# Patient Record
Sex: MALE | Race: WHITE | Employment: OTHER | URBAN - NONMETROPOLITAN AREA
[De-identification: names, ages, dates, MRNs, and addresses within clinical notes are randomized per-mention and may not be internally consistent; named-entity substitution may affect disease eponyms.]

---

## 2017-01-17 ENCOUNTER — OFFICE VISIT (OUTPATIENT)
Dept: ENDOCRINOLOGY | Age: 67
End: 2017-01-17

## 2017-01-17 VITALS
RESPIRATION RATE: 18 BRPM | HEART RATE: 74 BPM | DIASTOLIC BLOOD PRESSURE: 65 MMHG | BODY MASS INDEX: 30.94 KG/M2 | WEIGHT: 221 LBS | HEIGHT: 71 IN | SYSTOLIC BLOOD PRESSURE: 131 MMHG

## 2017-01-17 DIAGNOSIS — E11.65 TYPE 2 DIABETES MELLITUS WITH HYPERGLYCEMIA, WITHOUT LONG-TERM CURRENT USE OF INSULIN (HCC): Primary | ICD-10-CM

## 2017-01-17 PROCEDURE — 4040F PNEUMOC VAC/ADMIN/RCVD: CPT | Performed by: CLINICAL NURSE SPECIALIST

## 2017-01-17 PROCEDURE — 99214 OFFICE O/P EST MOD 30 MIN: CPT | Performed by: CLINICAL NURSE SPECIALIST

## 2017-01-17 PROCEDURE — 1123F ACP DISCUSS/DSCN MKR DOCD: CPT | Performed by: CLINICAL NURSE SPECIALIST

## 2017-01-17 PROCEDURE — 3045F PR MOST RECENT HEMOGLOBIN A1C LEVEL 7.0-9.0%: CPT | Performed by: CLINICAL NURSE SPECIALIST

## 2017-01-17 PROCEDURE — 1036F TOBACCO NON-USER: CPT | Performed by: CLINICAL NURSE SPECIALIST

## 2017-01-17 PROCEDURE — G8427 DOCREV CUR MEDS BY ELIG CLIN: HCPCS | Performed by: CLINICAL NURSE SPECIALIST

## 2017-01-17 PROCEDURE — 3017F COLORECTAL CA SCREEN DOC REV: CPT | Performed by: CLINICAL NURSE SPECIALIST

## 2017-01-17 PROCEDURE — G8484 FLU IMMUNIZE NO ADMIN: HCPCS | Performed by: CLINICAL NURSE SPECIALIST

## 2017-01-17 PROCEDURE — G8419 CALC BMI OUT NRM PARAM NOF/U: HCPCS | Performed by: CLINICAL NURSE SPECIALIST

## 2017-01-17 RX ORDER — METFORMIN HYDROCHLORIDE 500 MG/1
500 TABLET, EXTENDED RELEASE ORAL
Qty: 180 TABLET | Refills: 3 | Status: SHIPPED | OUTPATIENT
Start: 2017-01-17 | End: 2018-10-02 | Stop reason: DRUGHIGH

## 2017-01-17 ASSESSMENT — ENCOUNTER SYMPTOMS
ABDOMINAL PAIN: 0
SHORTNESS OF BREATH: 0
VOICE CHANGE: 0
COUGH: 0
WHEEZING: 0
NAUSEA: 0
EYE REDNESS: 0
CHEST TIGHTNESS: 0
CONSTIPATION: 0
DIARRHEA: 0

## 2017-01-19 ENCOUNTER — TELEPHONE (OUTPATIENT)
Dept: ENDOCRINOLOGY | Age: 67
End: 2017-01-19

## 2017-01-24 ENCOUNTER — OFFICE VISIT (OUTPATIENT)
Dept: FAMILY MEDICINE CLINIC | Age: 67
End: 2017-01-24

## 2017-01-24 VITALS
HEART RATE: 68 BPM | RESPIRATION RATE: 17 BRPM | DIASTOLIC BLOOD PRESSURE: 60 MMHG | WEIGHT: 219.6 LBS | SYSTOLIC BLOOD PRESSURE: 100 MMHG | BODY MASS INDEX: 30.74 KG/M2

## 2017-01-24 DIAGNOSIS — N40.1 BPH WITH OBSTRUCTION/LOWER URINARY TRACT SYMPTOMS: ICD-10-CM

## 2017-01-24 DIAGNOSIS — I48.0 PAROXYSMAL ATRIAL FIBRILLATION (HCC): ICD-10-CM

## 2017-01-24 DIAGNOSIS — F43.10 POST TRAUMATIC STRESS DISORDER: ICD-10-CM

## 2017-01-24 DIAGNOSIS — S61.452A CAT BITE OF HAND, LEFT, INITIAL ENCOUNTER: ICD-10-CM

## 2017-01-24 DIAGNOSIS — I10 ESSENTIAL HYPERTENSION: ICD-10-CM

## 2017-01-24 DIAGNOSIS — Z12.11 COLON CANCER SCREENING: ICD-10-CM

## 2017-01-24 DIAGNOSIS — E78.5 HYPERLIPIDEMIA WITH TARGET LDL LESS THAN 100: ICD-10-CM

## 2017-01-24 DIAGNOSIS — S96.911D RIGHT FOOT STRAIN, SUBSEQUENT ENCOUNTER: ICD-10-CM

## 2017-01-24 DIAGNOSIS — Z23 NEED FOR PNEUMOCOCCAL VACCINATION: ICD-10-CM

## 2017-01-24 DIAGNOSIS — K21.9 GASTROESOPHAGEAL REFLUX DISEASE WITHOUT ESOPHAGITIS: ICD-10-CM

## 2017-01-24 DIAGNOSIS — Z99.89 OBSTRUCTIVE SLEEP APNEA ON CPAP: ICD-10-CM

## 2017-01-24 DIAGNOSIS — G47.33 OBSTRUCTIVE SLEEP APNEA ON CPAP: ICD-10-CM

## 2017-01-24 DIAGNOSIS — D51.0 PERNICIOUS ANEMIA: Primary | ICD-10-CM

## 2017-01-24 DIAGNOSIS — N13.8 BPH WITH OBSTRUCTION/LOWER URINARY TRACT SYMPTOMS: ICD-10-CM
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
## 2017-01-24 DIAGNOSIS — E87.1 HYPONATREMIA: ICD-10-CM

## 2017-01-24 PROCEDURE — 3045F PR MOST RECENT HEMOGLOBIN A1C LEVEL 7.0-9.0%: CPT | Performed by: FAMILY MEDICINE

## 2017-01-24 PROCEDURE — 96372 THER/PROPH/DIAG INJ SC/IM: CPT | Performed by: FAMILY MEDICINE

## 2017-01-24 PROCEDURE — G0009 ADMIN PNEUMOCOCCAL VACCINE: HCPCS | Performed by: FAMILY MEDICINE

## 2017-01-24 PROCEDURE — G8419 CALC BMI OUT NRM PARAM NOF/U: HCPCS | Performed by: FAMILY MEDICINE

## 2017-01-24 PROCEDURE — 90732 PPSV23 VACC 2 YRS+ SUBQ/IM: CPT | Performed by: FAMILY MEDICINE

## 2017-01-24 PROCEDURE — G8484 FLU IMMUNIZE NO ADMIN: HCPCS | Performed by: FAMILY MEDICINE

## 2017-01-24 PROCEDURE — 4040F PNEUMOC VAC/ADMIN/RCVD: CPT | Performed by: FAMILY MEDICINE

## 2017-01-24 PROCEDURE — 99215 OFFICE O/P EST HI 40 MIN: CPT | Performed by: FAMILY MEDICINE

## 2017-01-24 PROCEDURE — G8427 DOCREV CUR MEDS BY ELIG CLIN: HCPCS | Performed by: FAMILY MEDICINE

## 2017-01-24 PROCEDURE — 3017F COLORECTAL CA SCREEN DOC REV: CPT | Performed by: FAMILY MEDICINE

## 2017-01-24 PROCEDURE — 1123F ACP DISCUSS/DSCN MKR DOCD: CPT | Performed by: FAMILY MEDICINE

## 2017-01-24 PROCEDURE — 1036F TOBACCO NON-USER: CPT | Performed by: FAMILY MEDICINE

## 2017-01-24 RX ORDER — AMOXICILLIN AND CLAVULANATE POTASSIUM 875; 125 MG/1; MG/1
1 TABLET, FILM COATED ORAL 2 TIMES DAILY
Qty: 20 TABLET | Refills: 0 | Status: SHIPPED | OUTPATIENT
Start: 2017-01-24 | End: 2017-02-02

## 2017-01-24 RX ORDER — CYANOCOBALAMIN 1000 UG/ML
1000 INJECTION INTRAMUSCULAR; SUBCUTANEOUS ONCE
Status: COMPLETED | OUTPATIENT
Start: 2017-01-24 | End: 2017-01-24

## 2017-01-24 RX ADMIN — CYANOCOBALAMIN 1000 MCG: 1000 INJECTION INTRAMUSCULAR; SUBCUTANEOUS at 17:05

## 2017-01-25 ENCOUNTER — TELEPHONE (OUTPATIENT)
Dept: ENDOCRINOLOGY | Age: 67
End: 2017-01-25

## 2017-02-15 ENCOUNTER — OFFICE VISIT (OUTPATIENT)
Dept: ENDOCRINOLOGY | Age: 67
End: 2017-02-15

## 2017-02-15 VITALS
BODY MASS INDEX: 30.59 KG/M2 | HEART RATE: 68 BPM | RESPIRATION RATE: 18 BRPM | DIASTOLIC BLOOD PRESSURE: 61 MMHG | HEIGHT: 71 IN | SYSTOLIC BLOOD PRESSURE: 109 MMHG | WEIGHT: 218.5 LBS

## 2017-02-15 DIAGNOSIS — E11.65 TYPE 2 DIABETES MELLITUS WITH HYPERGLYCEMIA, WITHOUT LONG-TERM CURRENT USE OF INSULIN (HCC): Primary | ICD-10-CM

## 2017-02-15 PROCEDURE — 1123F ACP DISCUSS/DSCN MKR DOCD: CPT | Performed by: NURSE PRACTITIONER

## 2017-02-15 PROCEDURE — 4040F PNEUMOC VAC/ADMIN/RCVD: CPT | Performed by: NURSE PRACTITIONER

## 2017-02-15 PROCEDURE — G8484 FLU IMMUNIZE NO ADMIN: HCPCS | Performed by: NURSE PRACTITIONER

## 2017-02-15 PROCEDURE — G8417 CALC BMI ABV UP PARAM F/U: HCPCS | Performed by: NURSE PRACTITIONER

## 2017-02-15 PROCEDURE — G8427 DOCREV CUR MEDS BY ELIG CLIN: HCPCS | Performed by: NURSE PRACTITIONER

## 2017-02-15 PROCEDURE — 3045F PR MOST RECENT HEMOGLOBIN A1C LEVEL 7.0-9.0%: CPT | Performed by: NURSE PRACTITIONER

## 2017-02-15 PROCEDURE — 3017F COLORECTAL CA SCREEN DOC REV: CPT | Performed by: NURSE PRACTITIONER

## 2017-02-15 PROCEDURE — 99214 OFFICE O/P EST MOD 30 MIN: CPT | Performed by: NURSE PRACTITIONER

## 2017-02-15 PROCEDURE — 1036F TOBACCO NON-USER: CPT | Performed by: NURSE PRACTITIONER

## 2017-02-15 ASSESSMENT — ENCOUNTER SYMPTOMS
TROUBLE SWALLOWING: 0
VOICE CHANGE: 0
SHORTNESS OF BREATH: 0
ABDOMINAL PAIN: 0
NAUSEA: 0
VOMITING: 0

## 2017-02-16 ENCOUNTER — TELEPHONE (OUTPATIENT)
Dept: INTERNAL MEDICINE | Age: 67
End: 2017-02-16

## 2017-02-16 RX ORDER — GLIPIZIDE 5 MG/1
2.5 TABLET ORAL DAILY
Qty: 15 TABLET | Refills: 3 | Status: SHIPPED | OUTPATIENT
Start: 2017-02-16 | End: 2017-03-22 | Stop reason: ALTCHOICE

## 2017-02-21 ENCOUNTER — NURSE ONLY (OUTPATIENT)
Dept: FAMILY MEDICINE CLINIC | Age: 67
End: 2017-02-21

## 2017-02-21 DIAGNOSIS — D51.0 PERNICIOUS ANEMIA: Primary | ICD-10-CM

## 2017-02-21 PROCEDURE — 96372 THER/PROPH/DIAG INJ SC/IM: CPT | Performed by: FAMILY MEDICINE

## 2017-02-21 RX ORDER — CYANOCOBALAMIN 1000 UG/ML
1000 INJECTION INTRAMUSCULAR; SUBCUTANEOUS ONCE
Status: COMPLETED | OUTPATIENT
Start: 2017-02-21 | End: 2017-02-21

## 2017-02-21 RX ADMIN — CYANOCOBALAMIN 1000 MCG: 1000 INJECTION INTRAMUSCULAR; SUBCUTANEOUS at 11:26

## 2017-02-24 ENCOUNTER — OFFICE VISIT (OUTPATIENT)
Dept: FAMILY MEDICINE CLINIC | Age: 67
End: 2017-02-24

## 2017-02-24 VITALS
HEART RATE: 72 BPM | HEIGHT: 71 IN | DIASTOLIC BLOOD PRESSURE: 70 MMHG | SYSTOLIC BLOOD PRESSURE: 110 MMHG | WEIGHT: 217.8 LBS | BODY MASS INDEX: 30.49 KG/M2 | RESPIRATION RATE: 16 BRPM

## 2017-02-24 DIAGNOSIS — S20.211D RIB CONTUSION, RIGHT, SUBSEQUENT ENCOUNTER: Primary | ICD-10-CM

## 2017-02-24 PROCEDURE — G8484 FLU IMMUNIZE NO ADMIN: HCPCS | Performed by: FAMILY MEDICINE

## 2017-02-24 PROCEDURE — 4040F PNEUMOC VAC/ADMIN/RCVD: CPT | Performed by: FAMILY MEDICINE

## 2017-02-24 PROCEDURE — 3017F COLORECTAL CA SCREEN DOC REV: CPT | Performed by: FAMILY MEDICINE

## 2017-02-24 PROCEDURE — 1123F ACP DISCUSS/DSCN MKR DOCD: CPT | Performed by: FAMILY MEDICINE

## 2017-02-24 PROCEDURE — G8417 CALC BMI ABV UP PARAM F/U: HCPCS | Performed by: FAMILY MEDICINE

## 2017-02-24 PROCEDURE — G8427 DOCREV CUR MEDS BY ELIG CLIN: HCPCS | Performed by: FAMILY MEDICINE

## 2017-02-24 PROCEDURE — 99213 OFFICE O/P EST LOW 20 MIN: CPT | Performed by: FAMILY MEDICINE

## 2017-02-24 PROCEDURE — 1036F TOBACCO NON-USER: CPT | Performed by: FAMILY MEDICINE

## 2017-02-24 RX ORDER — CYCLOBENZAPRINE HCL 5 MG
5-10 TABLET ORAL 2 TIMES DAILY PRN
Qty: 90 TABLET | Refills: 1 | Status: SHIPPED | OUTPATIENT
Start: 2017-02-24 | End: 2017-03-06

## 2017-02-24 RX ORDER — CLOSTRIDIUM TETANI TOXOID ANTIGEN (FORMALDEHYDE INACTIVATED) AND CORYNEBACTERIUM DIPHTHERIAE TOXOID ANTIGEN (FORMALDEHYDE INACTIVATED) 5; 2 [LF]/.5ML; [LF]/.5ML
INJECTION, SUSPENSION INTRAMUSCULAR
COMMUNITY
Start: 2017-01-25 | End: 2018-10-02 | Stop reason: ALTCHOICE

## 2017-03-22 ENCOUNTER — OFFICE VISIT (OUTPATIENT)
Dept: CARDIOLOGY | Age: 67
End: 2017-03-22

## 2017-03-22 ENCOUNTER — NURSE ONLY (OUTPATIENT)
Dept: FAMILY MEDICINE CLINIC | Age: 67
End: 2017-03-22

## 2017-03-22 VITALS
BODY MASS INDEX: 30.8 KG/M2 | HEIGHT: 71 IN | SYSTOLIC BLOOD PRESSURE: 142 MMHG | WEIGHT: 220 LBS | DIASTOLIC BLOOD PRESSURE: 78 MMHG | HEART RATE: 62 BPM

## 2017-03-22 DIAGNOSIS — D51.0 PERNICIOUS ANEMIA: Primary | ICD-10-CM

## 2017-03-22 DIAGNOSIS — E78.5 HYPERLIPIDEMIA WITH TARGET LDL LESS THAN 100: ICD-10-CM

## 2017-03-22 DIAGNOSIS — Z98.890 S/P CARDIAC CATHETERIZATION: Primary | ICD-10-CM

## 2017-03-22 DIAGNOSIS — I10 ESSENTIAL HYPERTENSION: ICD-10-CM

## 2017-03-22 DIAGNOSIS — G47.33 OBSTRUCTIVE SLEEP APNEA ON CPAP: ICD-10-CM

## 2017-03-22 DIAGNOSIS — Z99.89 OBSTRUCTIVE SLEEP APNEA ON CPAP: ICD-10-CM

## 2017-03-22 DIAGNOSIS — Z95.0 PACEMAKER: ICD-10-CM

## 2017-03-22 DIAGNOSIS — R06.02 SOB (SHORTNESS OF BREATH): ICD-10-CM

## 2017-03-22 PROCEDURE — 99213 OFFICE O/P EST LOW 20 MIN: CPT | Performed by: INTERNAL MEDICINE

## 2017-03-22 PROCEDURE — 96372 THER/PROPH/DIAG INJ SC/IM: CPT | Performed by: FAMILY MEDICINE

## 2017-03-22 PROCEDURE — 1123F ACP DISCUSS/DSCN MKR DOCD: CPT | Performed by: INTERNAL MEDICINE

## 2017-03-22 PROCEDURE — G8484 FLU IMMUNIZE NO ADMIN: HCPCS | Performed by: INTERNAL MEDICINE

## 2017-03-22 PROCEDURE — G8427 DOCREV CUR MEDS BY ELIG CLIN: HCPCS | Performed by: INTERNAL MEDICINE

## 2017-03-22 PROCEDURE — G8417 CALC BMI ABV UP PARAM F/U: HCPCS | Performed by: INTERNAL MEDICINE

## 2017-03-22 PROCEDURE — 1036F TOBACCO NON-USER: CPT | Performed by: INTERNAL MEDICINE

## 2017-03-22 PROCEDURE — 3017F COLORECTAL CA SCREEN DOC REV: CPT | Performed by: INTERNAL MEDICINE

## 2017-03-22 PROCEDURE — 4040F PNEUMOC VAC/ADMIN/RCVD: CPT | Performed by: INTERNAL MEDICINE

## 2017-03-22 RX ORDER — CYANOCOBALAMIN 1000 UG/ML
1000 INJECTION INTRAMUSCULAR; SUBCUTANEOUS ONCE
Status: COMPLETED | OUTPATIENT
Start: 2017-03-22 | End: 2017-03-22

## 2017-03-22 RX ADMIN — CYANOCOBALAMIN 1000 MCG: 1000 INJECTION INTRAMUSCULAR; SUBCUTANEOUS at 10:42

## 2017-04-17 ENCOUNTER — OFFICE VISIT (OUTPATIENT)
Dept: PULMONOLOGY | Age: 67
End: 2017-04-17

## 2017-04-17 VITALS
HEART RATE: 64 BPM | DIASTOLIC BLOOD PRESSURE: 80 MMHG | HEIGHT: 71 IN | WEIGHT: 217.4 LBS | SYSTOLIC BLOOD PRESSURE: 138 MMHG | BODY MASS INDEX: 30.44 KG/M2 | OXYGEN SATURATION: 96 %

## 2017-04-17 DIAGNOSIS — R06.02 SHORTNESS OF BREATH: ICD-10-CM

## 2017-04-17 DIAGNOSIS — Z99.89 OBSTRUCTIVE SLEEP APNEA ON CPAP: Primary | ICD-10-CM

## 2017-04-17 DIAGNOSIS — G47.33 OBSTRUCTIVE SLEEP APNEA ON CPAP: Primary | ICD-10-CM

## 2017-04-17 PROCEDURE — 3017F COLORECTAL CA SCREEN DOC REV: CPT | Performed by: PHYSICIAN ASSISTANT

## 2017-04-17 PROCEDURE — G8417 CALC BMI ABV UP PARAM F/U: HCPCS | Performed by: PHYSICIAN ASSISTANT

## 2017-04-17 PROCEDURE — G8427 DOCREV CUR MEDS BY ELIG CLIN: HCPCS | Performed by: PHYSICIAN ASSISTANT

## 2017-04-17 PROCEDURE — 99213 OFFICE O/P EST LOW 20 MIN: CPT | Performed by: PHYSICIAN ASSISTANT

## 2017-04-17 PROCEDURE — 1123F ACP DISCUSS/DSCN MKR DOCD: CPT | Performed by: PHYSICIAN ASSISTANT

## 2017-04-17 PROCEDURE — 1036F TOBACCO NON-USER: CPT | Performed by: PHYSICIAN ASSISTANT

## 2017-04-17 PROCEDURE — 4040F PNEUMOC VAC/ADMIN/RCVD: CPT | Performed by: PHYSICIAN ASSISTANT

## 2017-04-18 ENCOUNTER — OFFICE VISIT (OUTPATIENT)
Dept: ENDOCRINOLOGY | Age: 67
End: 2017-04-18

## 2017-04-18 VITALS
DIASTOLIC BLOOD PRESSURE: 74 MMHG | HEART RATE: 67 BPM | RESPIRATION RATE: 20 BRPM | BODY MASS INDEX: 30.73 KG/M2 | WEIGHT: 219.5 LBS | SYSTOLIC BLOOD PRESSURE: 136 MMHG | HEIGHT: 71 IN

## 2017-04-18 DIAGNOSIS — I10 ESSENTIAL HYPERTENSION: ICD-10-CM

## 2017-04-18 DIAGNOSIS — E87.1 HYPONATREMIA: ICD-10-CM

## 2017-04-18 DIAGNOSIS — E78.5 HYPERLIPIDEMIA WITH TARGET LDL LESS THAN 100: ICD-10-CM

## 2017-04-18 PROCEDURE — G8427 DOCREV CUR MEDS BY ELIG CLIN: HCPCS | Performed by: INTERNAL MEDICINE

## 2017-04-18 PROCEDURE — 3017F COLORECTAL CA SCREEN DOC REV: CPT | Performed by: INTERNAL MEDICINE

## 2017-04-18 PROCEDURE — G8417 CALC BMI ABV UP PARAM F/U: HCPCS | Performed by: INTERNAL MEDICINE

## 2017-04-18 PROCEDURE — 99214 OFFICE O/P EST MOD 30 MIN: CPT | Performed by: INTERNAL MEDICINE

## 2017-04-18 PROCEDURE — 3045F PR MOST RECENT HEMOGLOBIN A1C LEVEL 7.0-9.0%: CPT | Performed by: INTERNAL MEDICINE

## 2017-04-18 PROCEDURE — 1036F TOBACCO NON-USER: CPT | Performed by: INTERNAL MEDICINE

## 2017-04-18 PROCEDURE — 4040F PNEUMOC VAC/ADMIN/RCVD: CPT | Performed by: INTERNAL MEDICINE

## 2017-04-18 PROCEDURE — 1123F ACP DISCUSS/DSCN MKR DOCD: CPT | Performed by: INTERNAL MEDICINE

## 2017-04-24 ENCOUNTER — NURSE ONLY (OUTPATIENT)
Dept: FAMILY MEDICINE CLINIC | Age: 67
End: 2017-04-24

## 2017-04-24 DIAGNOSIS — D64.9 ANEMIA, UNSPECIFIED TYPE: Primary | ICD-10-CM

## 2017-04-24 PROCEDURE — 96372 THER/PROPH/DIAG INJ SC/IM: CPT | Performed by: FAMILY MEDICINE

## 2017-04-24 RX ORDER — CYANOCOBALAMIN 1000 UG/ML
1000 INJECTION INTRAMUSCULAR; SUBCUTANEOUS ONCE
Status: COMPLETED | OUTPATIENT
Start: 2017-04-24 | End: 2017-04-24

## 2017-04-24 RX ADMIN — CYANOCOBALAMIN 1000 MCG: 1000 INJECTION INTRAMUSCULAR; SUBCUTANEOUS at 10:43

## 2017-05-12 ENCOUNTER — OFFICE VISIT (OUTPATIENT)
Dept: PULMONOLOGY | Age: 67
End: 2017-05-12

## 2017-05-12 VITALS
HEIGHT: 69 IN | DIASTOLIC BLOOD PRESSURE: 66 MMHG | OXYGEN SATURATION: 96 % | TEMPERATURE: 98.4 F | HEART RATE: 68 BPM | RESPIRATION RATE: 18 BRPM | BODY MASS INDEX: 32.44 KG/M2 | SYSTOLIC BLOOD PRESSURE: 115 MMHG | WEIGHT: 219 LBS

## 2017-05-12 DIAGNOSIS — G47.33 OSA ON CPAP: ICD-10-CM

## 2017-05-12 DIAGNOSIS — Z99.89 OSA ON CPAP: ICD-10-CM

## 2017-05-12 DIAGNOSIS — R06.02 SOB (SHORTNESS OF BREATH): ICD-10-CM

## 2017-05-12 DIAGNOSIS — J44.9 COPD, MILD (HCC): Primary | ICD-10-CM

## 2017-05-12 PROCEDURE — G8926 SPIRO NO PERF OR DOC: HCPCS | Performed by: INTERNAL MEDICINE

## 2017-05-12 PROCEDURE — G8427 DOCREV CUR MEDS BY ELIG CLIN: HCPCS | Performed by: INTERNAL MEDICINE

## 2017-05-12 PROCEDURE — G8417 CALC BMI ABV UP PARAM F/U: HCPCS | Performed by: INTERNAL MEDICINE

## 2017-05-12 PROCEDURE — 1036F TOBACCO NON-USER: CPT | Performed by: INTERNAL MEDICINE

## 2017-05-12 PROCEDURE — 99214 OFFICE O/P EST MOD 30 MIN: CPT | Performed by: INTERNAL MEDICINE

## 2017-05-12 PROCEDURE — 3017F COLORECTAL CA SCREEN DOC REV: CPT | Performed by: INTERNAL MEDICINE

## 2017-05-12 PROCEDURE — 4040F PNEUMOC VAC/ADMIN/RCVD: CPT | Performed by: INTERNAL MEDICINE

## 2017-05-12 PROCEDURE — 94620 6 MIN WALK TEST: CPT | Performed by: INTERNAL MEDICINE

## 2017-05-12 PROCEDURE — 1123F ACP DISCUSS/DSCN MKR DOCD: CPT | Performed by: INTERNAL MEDICINE

## 2017-05-12 PROCEDURE — 3023F SPIROM DOC REV: CPT | Performed by: INTERNAL MEDICINE

## 2017-05-12 RX ORDER — ALBUTEROL SULFATE 90 UG/1
2 AEROSOL, METERED RESPIRATORY (INHALATION) 4 TIMES DAILY
Qty: 1 INHALER | Refills: 11 | Status: SHIPPED | OUTPATIENT
Start: 2017-05-12 | End: 2018-05-29 | Stop reason: ALTCHOICE

## 2017-05-12 ASSESSMENT — ENCOUNTER SYMPTOMS
STRIDOR: 0
BLOOD IN STOOL: 0
CHEST TIGHTNESS: 0
WHEEZING: 1
PHOTOPHOBIA: 0
VOMITING: 0
ABDOMINAL DISTENTION: 0
SHORTNESS OF BREATH: 1
CHOKING: 0
SINUS PRESSURE: 0
FACIAL SWELLING: 0
BACK PAIN: 0
VOICE CHANGE: 0
COUGH: 0
RHINORRHEA: 0
ABDOMINAL PAIN: 0
APNEA: 0
CONSTIPATION: 0

## 2017-05-23 ENCOUNTER — TELEPHONE (OUTPATIENT)
Dept: ENDOCRINOLOGY | Age: 67
End: 2017-05-23

## 2017-05-23 DIAGNOSIS — I10 ESSENTIAL HYPERTENSION: ICD-10-CM

## 2017-05-24 ENCOUNTER — NURSE ONLY (OUTPATIENT)
Dept: FAMILY MEDICINE CLINIC | Age: 67
End: 2017-05-24

## 2017-05-24 DIAGNOSIS — D64.9 ANEMIA, UNSPECIFIED TYPE: Primary | ICD-10-CM

## 2017-05-24 PROCEDURE — 96372 THER/PROPH/DIAG INJ SC/IM: CPT | Performed by: FAMILY MEDICINE

## 2017-05-24 RX ORDER — CYANOCOBALAMIN 1000 UG/ML
1000 INJECTION INTRAMUSCULAR; SUBCUTANEOUS ONCE
Status: COMPLETED | OUTPATIENT
Start: 2017-05-24 | End: 2017-05-24

## 2017-05-24 RX ADMIN — CYANOCOBALAMIN 1000 MCG: 1000 INJECTION INTRAMUSCULAR; SUBCUTANEOUS at 11:31

## 2017-05-31 ENCOUNTER — TELEPHONE (OUTPATIENT)
Dept: CARDIOLOGY | Age: 67
End: 2017-05-31

## 2017-06-14 ENCOUNTER — PROCEDURE VISIT (OUTPATIENT)
Dept: CARDIOLOGY | Age: 67
End: 2017-06-14

## 2017-06-14 DIAGNOSIS — Z95.0 PACEMAKER: Primary | ICD-10-CM

## 2017-06-14 PROCEDURE — 93280 PM DEVICE PROGR EVAL DUAL: CPT | Performed by: INTERNAL MEDICINE

## 2017-06-23 ENCOUNTER — NURSE ONLY (OUTPATIENT)
Dept: FAMILY MEDICINE CLINIC | Age: 67
End: 2017-06-23

## 2017-06-23 DIAGNOSIS — D64.9 ANEMIA, UNSPECIFIED TYPE: Primary | ICD-10-CM

## 2017-06-23 PROCEDURE — 96372 THER/PROPH/DIAG INJ SC/IM: CPT | Performed by: FAMILY MEDICINE

## 2017-06-23 RX ORDER — CYANOCOBALAMIN 1000 UG/ML
1000 INJECTION INTRAMUSCULAR; SUBCUTANEOUS ONCE
Status: COMPLETED | OUTPATIENT
Start: 2017-06-23 | End: 2017-06-23

## 2017-06-23 RX ADMIN — CYANOCOBALAMIN 1000 MCG: 1000 INJECTION INTRAMUSCULAR; SUBCUTANEOUS at 11:07

## 2017-07-18 ENCOUNTER — OFFICE VISIT (OUTPATIENT)
Dept: ENDOCRINOLOGY | Age: 67
End: 2017-07-18
Payer: MEDICARE

## 2017-07-18 VITALS
HEART RATE: 70 BPM | BODY MASS INDEX: 33.4 KG/M2 | WEIGHT: 225.5 LBS | RESPIRATION RATE: 16 BRPM | DIASTOLIC BLOOD PRESSURE: 68 MMHG | SYSTOLIC BLOOD PRESSURE: 127 MMHG | HEIGHT: 69 IN

## 2017-07-18 DIAGNOSIS — E11.9 TYPE 2 DIABETES MELLITUS WITHOUT COMPLICATION, WITHOUT LONG-TERM CURRENT USE OF INSULIN (HCC): Primary | ICD-10-CM

## 2017-07-18 DIAGNOSIS — E87.1 HYPONATREMIA: ICD-10-CM

## 2017-07-18 PROCEDURE — 1123F ACP DISCUSS/DSCN MKR DOCD: CPT | Performed by: CLINICAL NURSE SPECIALIST

## 2017-07-18 PROCEDURE — 99214 OFFICE O/P EST MOD 30 MIN: CPT | Performed by: CLINICAL NURSE SPECIALIST

## 2017-07-18 PROCEDURE — 3017F COLORECTAL CA SCREEN DOC REV: CPT | Performed by: CLINICAL NURSE SPECIALIST

## 2017-07-18 PROCEDURE — 1036F TOBACCO NON-USER: CPT | Performed by: CLINICAL NURSE SPECIALIST

## 2017-07-18 PROCEDURE — 4040F PNEUMOC VAC/ADMIN/RCVD: CPT | Performed by: CLINICAL NURSE SPECIALIST

## 2017-07-18 PROCEDURE — 3046F HEMOGLOBIN A1C LEVEL >9.0%: CPT | Performed by: CLINICAL NURSE SPECIALIST

## 2017-07-18 PROCEDURE — G8417 CALC BMI ABV UP PARAM F/U: HCPCS | Performed by: CLINICAL NURSE SPECIALIST

## 2017-07-18 PROCEDURE — G8427 DOCREV CUR MEDS BY ELIG CLIN: HCPCS | Performed by: CLINICAL NURSE SPECIALIST

## 2017-07-18 ASSESSMENT — ENCOUNTER SYMPTOMS
ABDOMINAL PAIN: 0
WHEEZING: 0
CONSTIPATION: 0
SHORTNESS OF BREATH: 0
NAUSEA: 0
VOICE CHANGE: 0
CHEST TIGHTNESS: 0
DIARRHEA: 0
EYE REDNESS: 0
COUGH: 0

## 2017-07-21 ENCOUNTER — TELEPHONE (OUTPATIENT)
Dept: ENDOCRINOLOGY | Age: 67
End: 2017-07-21

## 2017-07-25 ENCOUNTER — OFFICE VISIT (OUTPATIENT)
Dept: FAMILY MEDICINE CLINIC | Age: 67
End: 2017-07-25
Payer: MEDICARE

## 2017-07-25 VITALS
BODY MASS INDEX: 32.97 KG/M2 | HEIGHT: 69 IN | SYSTOLIC BLOOD PRESSURE: 120 MMHG | RESPIRATION RATE: 16 BRPM | WEIGHT: 222.6 LBS | HEART RATE: 62 BPM | DIASTOLIC BLOOD PRESSURE: 70 MMHG | OXYGEN SATURATION: 95 %

## 2017-07-25 DIAGNOSIS — G62.9 PERIPHERAL POLYNEUROPATHY: ICD-10-CM

## 2017-07-25 DIAGNOSIS — E78.5 HYPERLIPIDEMIA WITH TARGET LDL LESS THAN 100: ICD-10-CM

## 2017-07-25 DIAGNOSIS — G47.33 OBSTRUCTIVE SLEEP APNEA ON CPAP: ICD-10-CM

## 2017-07-25 DIAGNOSIS — N13.8 BPH WITH OBSTRUCTION/LOWER URINARY TRACT SYMPTOMS: ICD-10-CM

## 2017-07-25 DIAGNOSIS — F43.10 POST TRAUMATIC STRESS DISORDER: ICD-10-CM

## 2017-07-25 DIAGNOSIS — Z99.89 OBSTRUCTIVE SLEEP APNEA ON CPAP: ICD-10-CM

## 2017-07-25 DIAGNOSIS — I10 ESSENTIAL HYPERTENSION: ICD-10-CM

## 2017-07-25 DIAGNOSIS — R97.20 ELEVATED PSA: ICD-10-CM

## 2017-07-25 DIAGNOSIS — E53.8 B12 DEFICIENCY: ICD-10-CM

## 2017-07-25 DIAGNOSIS — N40.1 BPH WITH OBSTRUCTION/LOWER URINARY TRACT SYMPTOMS: ICD-10-CM

## 2017-07-25 PROCEDURE — 99214 OFFICE O/P EST MOD 30 MIN: CPT | Performed by: FAMILY MEDICINE

## 2017-07-25 PROCEDURE — G8427 DOCREV CUR MEDS BY ELIG CLIN: HCPCS | Performed by: FAMILY MEDICINE

## 2017-07-25 PROCEDURE — 3046F HEMOGLOBIN A1C LEVEL >9.0%: CPT | Performed by: FAMILY MEDICINE

## 2017-07-25 PROCEDURE — G8417 CALC BMI ABV UP PARAM F/U: HCPCS | Performed by: FAMILY MEDICINE

## 2017-07-25 PROCEDURE — 3017F COLORECTAL CA SCREEN DOC REV: CPT | Performed by: FAMILY MEDICINE

## 2017-07-25 PROCEDURE — 1123F ACP DISCUSS/DSCN MKR DOCD: CPT | Performed by: FAMILY MEDICINE

## 2017-07-25 PROCEDURE — 4040F PNEUMOC VAC/ADMIN/RCVD: CPT | Performed by: FAMILY MEDICINE

## 2017-07-25 PROCEDURE — 1036F TOBACCO NON-USER: CPT | Performed by: FAMILY MEDICINE

## 2017-07-25 PROCEDURE — 96372 THER/PROPH/DIAG INJ SC/IM: CPT | Performed by: FAMILY MEDICINE

## 2017-07-25 RX ORDER — CYANOCOBALAMIN 1000 UG/ML
1000 INJECTION INTRAMUSCULAR; SUBCUTANEOUS ONCE
Status: COMPLETED | OUTPATIENT
Start: 2017-07-25 | End: 2017-07-25

## 2017-07-25 RX ADMIN — CYANOCOBALAMIN 1000 MCG: 1000 INJECTION INTRAMUSCULAR; SUBCUTANEOUS at 12:26

## 2017-07-25 ASSESSMENT — PATIENT HEALTH QUESTIONNAIRE - PHQ9
2. FEELING DOWN, DEPRESSED OR HOPELESS: 0
1. LITTLE INTEREST OR PLEASURE IN DOING THINGS: 0
SUM OF ALL RESPONSES TO PHQ9 QUESTIONS 1 & 2: 0
SUM OF ALL RESPONSES TO PHQ QUESTIONS 1-9: 0

## 2017-08-22 ENCOUNTER — CARE COORDINATION (OUTPATIENT)
Dept: CARE COORDINATION | Age: 67
End: 2017-08-22

## 2017-08-25 ENCOUNTER — NURSE ONLY (OUTPATIENT)
Dept: FAMILY MEDICINE CLINIC | Age: 67
End: 2017-08-25
Payer: MEDICARE

## 2017-08-25 DIAGNOSIS — D64.9 ANEMIA, UNSPECIFIED TYPE: Primary | ICD-10-CM

## 2017-08-25 PROCEDURE — 96372 THER/PROPH/DIAG INJ SC/IM: CPT | Performed by: FAMILY MEDICINE

## 2017-08-25 RX ORDER — CYANOCOBALAMIN 1000 UG/ML
1000 INJECTION INTRAMUSCULAR; SUBCUTANEOUS ONCE
Status: COMPLETED | OUTPATIENT
Start: 2017-08-25 | End: 2017-08-25

## 2017-08-25 RX ADMIN — CYANOCOBALAMIN 1000 MCG: 1000 INJECTION INTRAMUSCULAR; SUBCUTANEOUS at 11:08

## 2017-09-06 ENCOUNTER — CARE COORDINATION (OUTPATIENT)
Dept: CARE COORDINATION | Age: 67
End: 2017-09-06

## 2017-09-25 ENCOUNTER — NURSE ONLY (OUTPATIENT)
Dept: FAMILY MEDICINE CLINIC | Age: 67
End: 2017-09-25
Payer: MEDICARE

## 2017-09-25 DIAGNOSIS — D64.9 ANEMIA, UNSPECIFIED TYPE: Primary | ICD-10-CM

## 2017-09-25 PROCEDURE — 96372 THER/PROPH/DIAG INJ SC/IM: CPT | Performed by: FAMILY MEDICINE

## 2017-09-25 RX ORDER — CYANOCOBALAMIN 1000 UG/ML
1000 INJECTION INTRAMUSCULAR; SUBCUTANEOUS ONCE
Status: COMPLETED | OUTPATIENT
Start: 2017-09-25 | End: 2017-09-25

## 2017-09-25 RX ADMIN — CYANOCOBALAMIN 1000 MCG: 1000 INJECTION INTRAMUSCULAR; SUBCUTANEOUS at 12:57

## 2017-09-26 ENCOUNTER — CARE COORDINATION (OUTPATIENT)
Dept: CARE COORDINATION | Age: 67
End: 2017-09-26

## 2017-09-28 ASSESSMENT — ENCOUNTER SYMPTOMS: DYSPNEA ASSOCIATED WITH: EXERTION

## 2017-10-11 ENCOUNTER — HOSPITAL ENCOUNTER (OUTPATIENT)
Age: 67
Discharge: HOME | End: 2017-10-11
Payer: MEDICARE

## 2017-10-11 DIAGNOSIS — E87.1 HYPONATREMIA: ICD-10-CM

## 2017-10-11 DIAGNOSIS — R97.20 ELEVATED PSA: ICD-10-CM

## 2017-10-11 DIAGNOSIS — E11.9 TYPE 2 DIABETES MELLITUS WITHOUT COMPLICATION, WITHOUT LONG-TERM CURRENT USE OF INSULIN (HCC): ICD-10-CM

## 2017-10-11 DIAGNOSIS — I10 ESSENTIAL HYPERTENSION: ICD-10-CM

## 2017-10-11 LAB
ANION GAP SERPL CALCULATED.3IONS-SCNC: 13 MEQ/L (ref 8–16)
BUN BLDV-MCNC: 11 MG/DL (ref 7–22)
CALCIUM SERPL-MCNC: 9.3 MG/DL (ref 8.5–10.5)
CHLORIDE BLD-SCNC: 92 MEQ/L (ref 98–111)
CO2: 28 MEQ/L (ref 23–33)
CREAT SERPL-MCNC: 0.9 MG/DL (ref 0.4–1.2)
GFR SERPL CREATININE-BSD FRML MDRD: 84 ML/MIN/1.73M2
GLUCOSE BLD-MCNC: 263 MG/DL (ref 70–108)
HCT VFR BLD CALC: 40.8 % (ref 42–52)
HEMOGLOBIN: 13.9 GM/DL (ref 14–18)
MCH RBC QN AUTO: 30 PG (ref 27–31)
MCHC RBC AUTO-ENTMCNC: 34 GM/DL (ref 33–37)
MCV RBC AUTO: 88.3 FL (ref 80–94)
PDW BLD-RTO: 13.7 % (ref 11.5–14.5)
PLATELET # BLD: 208 THOU/MM3 (ref 130–400)
PMV BLD AUTO: 7.9 MCM (ref 7.4–10.4)
POTASSIUM SERPL-SCNC: 4.5 MEQ/L (ref 3.5–5.2)
RBC # BLD: 4.62 MILL/MM3 (ref 4.7–6.1)
SODIUM BLD-SCNC: 133 MEQ/L (ref 135–145)
WBC # BLD: 5.1 THOU/MM3 (ref 4.8–10.8)

## 2017-10-11 PROCEDURE — 84154 ASSAY OF PSA FREE: CPT

## 2017-10-11 PROCEDURE — 85027 COMPLETE CBC AUTOMATED: CPT

## 2017-10-11 PROCEDURE — 84153 ASSAY OF PSA TOTAL: CPT

## 2017-10-11 PROCEDURE — 80048 BASIC METABOLIC PNL TOTAL CA: CPT

## 2017-10-11 PROCEDURE — 83036 HEMOGLOBIN GLYCOSYLATED A1C: CPT

## 2017-10-11 PROCEDURE — 36415 COLL VENOUS BLD VENIPUNCTURE: CPT

## 2017-10-12 LAB
AVERAGE GLUCOSE: 168 MG/DL (ref 70–126)
HBA1C MFR BLD: 7.6 % (ref 4.4–6.4)

## 2017-10-14 LAB — PROSTATE SPECIFIC ANTIGEN FREE: NORMAL

## 2017-10-16 ENCOUNTER — CARE COORDINATION (OUTPATIENT)
Dept: CARE COORDINATION | Age: 67
End: 2017-10-16

## 2017-10-17 ENCOUNTER — OFFICE VISIT (OUTPATIENT)
Dept: UROLOGY | Age: 67
End: 2017-10-17
Payer: MEDICARE

## 2017-10-17 VITALS — HEIGHT: 69 IN | WEIGHT: 225 LBS | BODY MASS INDEX: 33.33 KG/M2

## 2017-10-17 DIAGNOSIS — N40.1 BPH WITH OBSTRUCTION/LOWER URINARY TRACT SYMPTOMS: Primary | ICD-10-CM

## 2017-10-17 DIAGNOSIS — N13.8 BPH WITH OBSTRUCTION/LOWER URINARY TRACT SYMPTOMS: Primary | ICD-10-CM

## 2017-10-17 LAB
BILIRUBIN, POC: NORMAL
BLOOD URINE, POC: NORMAL
CLARITY, POC: CLEAR
COLOR, POC: YELLOW
GLUCOSE URINE, POC: NORMAL
KETONES, POC: NORMAL
LEUKOCYTE EST, POC: NORMAL
NITRITE, POC: NORMAL
PH, POC: NORMAL
POST VOID RESIDUAL (PVR): 0 ML
PROTEIN, POC: NORMAL
SPECIFIC GRAVITY, POC: NORMAL
UROBILINOGEN, POC: NORMAL

## 2017-10-17 PROCEDURE — G8482 FLU IMMUNIZE ORDER/ADMIN: HCPCS | Performed by: NURSE PRACTITIONER

## 2017-10-17 PROCEDURE — 1123F ACP DISCUSS/DSCN MKR DOCD: CPT | Performed by: NURSE PRACTITIONER

## 2017-10-17 PROCEDURE — 4040F PNEUMOC VAC/ADMIN/RCVD: CPT | Performed by: NURSE PRACTITIONER

## 2017-10-17 PROCEDURE — 99213 OFFICE O/P EST LOW 20 MIN: CPT | Performed by: NURSE PRACTITIONER

## 2017-10-17 PROCEDURE — G8427 DOCREV CUR MEDS BY ELIG CLIN: HCPCS | Performed by: NURSE PRACTITIONER

## 2017-10-17 PROCEDURE — 1036F TOBACCO NON-USER: CPT | Performed by: NURSE PRACTITIONER

## 2017-10-17 PROCEDURE — G8417 CALC BMI ABV UP PARAM F/U: HCPCS | Performed by: NURSE PRACTITIONER

## 2017-10-17 PROCEDURE — 51798 US URINE CAPACITY MEASURE: CPT | Performed by: NURSE PRACTITIONER

## 2017-10-17 PROCEDURE — 3017F COLORECTAL CA SCREEN DOC REV: CPT | Performed by: NURSE PRACTITIONER

## 2017-10-17 PROCEDURE — 81002 URINALYSIS NONAUTO W/O SCOPE: CPT | Performed by: NURSE PRACTITIONER

## 2017-10-17 NOTE — PROGRESS NOTES
Take 1 capsule by mouth 2 times daily      PARoxetine (PAXIL) 20 MG tablet   Take 40 mg by mouth daily       BUSPIRONE HCL PO Take 20 mg by mouth 3 times daily Take 1 tablet by mouth 3 times daily Disp 270 tablets  R-3      Omega-3 Fatty Acids (FISH OIL) 1000 MG CAPS Take 1,000 mg by mouth 2 times daily.  fluticasone (FLONASE) 50 MCG/ACT nasal spray USE 2 SPRAYS NASALLY DAILY 3 Bottle 3    cyanocobalamin 1000 MCG/ML injection Inject 1 mL into the muscle every 30 days. 1 vial 2    aspirin 81 MG EC tablet Take 81 mg by mouth daily.  vitamin D (CHOLECALCIFEROL) 1000 UNIT TABS tablet Take 1,000 Int'l Units by mouth 2 times daily        No current facility-administered medications on file prior to visit. Allergies   Allergen Reactions    Latex Rash     itching    Doxycycline Calcium [Doxycycline Calcium]     Lactose Diarrhea    Nasacort [Triamcinolone] Other (See Comments)     Unable to remember    Wellbutrin [Bupropion] Other (See Comments)     Does not remember       Social History   Substance Use Topics    Smoking status: Former Smoker     Quit date: 11/14/1971    Smokeless tobacco: Former User    Alcohol use No       Family History   Problem Relation Age of Onset    High Cholesterol Father     High Blood Pressure Father     Heart Disease Father     Stroke Father     Diabetes Mother     Heart Disease Mother     High Blood Pressure Mother     High Cholesterol Mother     Kidney Disease Mother     Cancer Paternal Grandfather        Review of Systems  ROS  No problems with ears, nose or throat. No problems with eyes. No chest pain, shortness of breath, abdominal pain, extremity pain or weakness, and no neurological deficits. No rashes. No swollen glands or lymph nodes.  symptoms per HPI. The remainder of the review of symptoms is negative.     Exam  Vitals:    10/17/17 0930   Weight: 225 lb (102.1 kg)   Height: 5' 8.9\" (1.75 m)     Nursing note and vitals

## 2017-10-18 ENCOUNTER — OFFICE VISIT (OUTPATIENT)
Dept: ENDOCRINOLOGY | Age: 67
End: 2017-10-18
Payer: MEDICARE

## 2017-10-18 VITALS
BODY MASS INDEX: 33.77 KG/M2 | RESPIRATION RATE: 16 BRPM | DIASTOLIC BLOOD PRESSURE: 74 MMHG | HEIGHT: 69 IN | SYSTOLIC BLOOD PRESSURE: 124 MMHG | HEART RATE: 70 BPM | WEIGHT: 228 LBS

## 2017-10-18 DIAGNOSIS — E78.5 HYPERLIPIDEMIA WITH TARGET LDL LESS THAN 100: ICD-10-CM

## 2017-10-18 DIAGNOSIS — I10 ESSENTIAL HYPERTENSION: ICD-10-CM

## 2017-10-18 DIAGNOSIS — E87.1 HYPONATREMIA: ICD-10-CM

## 2017-10-18 DIAGNOSIS — E11.65 UNCONTROLLED TYPE 2 DIABETES MELLITUS WITH HYPERGLYCEMIA, WITHOUT LONG-TERM CURRENT USE OF INSULIN (HCC): Primary | ICD-10-CM

## 2017-10-18 PROCEDURE — G8427 DOCREV CUR MEDS BY ELIG CLIN: HCPCS | Performed by: INTERNAL MEDICINE

## 2017-10-18 PROCEDURE — 4040F PNEUMOC VAC/ADMIN/RCVD: CPT | Performed by: INTERNAL MEDICINE

## 2017-10-18 PROCEDURE — G8417 CALC BMI ABV UP PARAM F/U: HCPCS | Performed by: INTERNAL MEDICINE

## 2017-10-18 PROCEDURE — 1036F TOBACCO NON-USER: CPT | Performed by: INTERNAL MEDICINE

## 2017-10-18 PROCEDURE — 99214 OFFICE O/P EST MOD 30 MIN: CPT | Performed by: INTERNAL MEDICINE

## 2017-10-18 PROCEDURE — G8482 FLU IMMUNIZE ORDER/ADMIN: HCPCS | Performed by: INTERNAL MEDICINE

## 2017-10-18 PROCEDURE — 3046F HEMOGLOBIN A1C LEVEL >9.0%: CPT | Performed by: INTERNAL MEDICINE

## 2017-10-18 PROCEDURE — 3017F COLORECTAL CA SCREEN DOC REV: CPT | Performed by: INTERNAL MEDICINE

## 2017-10-18 PROCEDURE — 1123F ACP DISCUSS/DSCN MKR DOCD: CPT | Performed by: INTERNAL MEDICINE

## 2017-10-18 NOTE — LETTER
 Bronchitis, chronic (HCC)     Carotid artery stenosis     Dr Anita Connell    Chickenpox     CVA (cerebral infarction)     GERD (gastroesophageal reflux disease)     Hemorrhoids     Hyperlipidemia     Hypertension     Nausea & vomiting     [facility]    Osteoarthritis     Peripheral neuropathy (HCC)     Post traumatic stress disorder (PTSD)     [facility]    S/P cardiac catheterization: 11/20/2013: No obstructive lesions. Moderate LI's in the mid LAD and in the RCA. 11/20/2013 11/20/2013: No obstructive lesions. Moderate LI's in the mid LAD and in the RCA.  Dr. Main Borne Tremor of both hands     [facility]    Type II or unspecified type diabetes mellitus without mention of complication, not stated as uncontrolled     Dr Lin garcía    Unspecified sleep apnea     Dr Rolley Sacks      Past Surgical History:   Procedure Laterality Date    APPENDECTOMY  age 14    BLEPHAROPLASTY Bilateral 05/2017    CARDIAC CATHETERIZATION  11/2013    no stents    CARPAL TUNNEL RELEASE Right 01/2017    COLONOSCOPY  2005    Dr. Jeff Banuelos      right eye clog oil duct    EYE SURGERY Bilateral 01/2017    HAND SURGERY Right 01/19/2017    OIO - carpal tunnel    MANDIBLE SURGERY N/A 05/2017    jaw surgery lower front of mouth    PACEMAKER INSERTION  2000/2010    TONSILLECTOMY AND ADENOIDECTOMY  as a child        Family History   Problem Relation Age of Onset    High Cholesterol Father     High Blood Pressure Father     Heart Disease Father     Stroke Father     Diabetes Mother     Heart Disease Mother     High Blood Pressure Mother     High Cholesterol Mother     Kidney Disease Mother     Cancer Paternal Grandfather      Social History   Substance Use Topics    Smoking status: Former Smoker     Quit date: 11/14/1971    Smokeless tobacco: Former User    Alcohol use No      Current Outpatient Prescriptions   Medication Sig Dispense Refill  Liraglutide (VICTOZA) 18 MG/3ML SOPN SC injection 0.6 mg daily for 1 week. Then increase to 1.2 mg daily 1 Pen 5    glucose blood VI test strips (ACCU-CHEK ERVIN) strip 1 each by In Vitro route daily As needed.  100 each 1    saxagliptin (ONGLYZA) 5 MG TABS tablet Take 1 tablet by mouth daily 90 tablet 1    albuterol sulfate HFA (VENTOLIN HFA) 108 (90 BASE) MCG/ACT inhaler Inhale 2 puffs into the lungs 4 times daily 1 Inhaler 11    TENIVAC 5-2 LFU injection       metFORMIN (GLUCOPHAGE XR) 500 MG extended release tablet Take 1 tablet by mouth 2 times daily (before meals) 180 tablet 3    FLUZONE HIGH-DOSE 0.5 ML POOL injection inject 0.5 milliliter intramuscularly  0    tamsulosin (FLOMAX) 0.4 MG capsule Take 1 capsule by mouth nightly 90 capsule 3    traMADol (ULTRAM) 50 MG tablet Take 2 tablets by mouth 2 times daily Short time increase to 100 mg BID for 2 weeks then back to 50 mg  tablet 3    loratadine (CLARITIN) 10 MG tablet Take 10 mg by mouth daily      hydrOXYzine (VISTARIL) 25 MG capsule Take 25 mg by mouth three times daily       olodaterol (STRIVERDI RESPIMAT) 2.5 MCG/ACT inhaler Inhale 2 puffs into the lungs daily      rivaroxaban (XARELTO) 20 MG TABS tablet Take 20 mg by mouth daily (with breakfast)      gabapentin (NEURONTIN) 300 MG capsule Take 300 mg by mouth 3 times daily      prazosin (MINIPRESS) 2 MG capsule Take 2 mg by mouth nightly      amLODIPine (NORVASC) 5 MG tablet TAKE 1 TABLET BY MOUTH DAILY (Patient taking differently: Take 5 mg by mouth daily TAKE 1 TABLET BY MOUTH DAILY) 90 tablet 3    atorvastatin (LIPITOR) 20 MG tablet take 1 tablet by mouth once daily 90 tablet 3    omeprazole (PRILOSEC) 40 MG capsule Take 1 capsule by mouth daily (Patient taking differently: Take 20 mg by mouth 2 times daily ) 90 capsule 3    propranolol (INDERAL LA) 120 MG CR capsule Take 120 mg by mouth 2 times daily  montelukast (SINGULAIR) 10 MG tablet take 1 tablet by mouth once daily 90 tablet 3    b complex vitamins capsule Take 1 capsule by mouth 2 times daily      PARoxetine (PAXIL) 20 MG tablet   Take 40 mg by mouth daily       BUSPIRONE HCL PO Take 20 mg by mouth 3 times daily Take 1 tablet by mouth 3 times daily Disp 270 tablets  R-3      Omega-3 Fatty Acids (FISH OIL) 1000 MG CAPS Take 1,000 mg by mouth 2 times daily.  fluticasone (FLONASE) 50 MCG/ACT nasal spray USE 2 SPRAYS NASALLY DAILY 3 Bottle 3    cyanocobalamin 1000 MCG/ML injection Inject 1 mL into the muscle every 30 days. 1 vial 2    aspirin 81 MG EC tablet Take 81 mg by mouth daily.  vitamin D (CHOLECALCIFEROL) 1000 UNIT TABS tablet Take 1,000 Int'l Units by mouth 2 times daily        No current facility-administered medications for this visit.       Allergies   Allergen Reactions    Latex Rash     itching    Doxycycline Calcium [Doxycycline Calcium]     Lactose Diarrhea    Nasacort [Triamcinolone] Other (See Comments)     Unable to remember    Wellbutrin [Bupropion] Other (See Comments)     Does not remember     Health Maintenance   Topic Date Due    AAA screen  1950    DTaP/Tdap/Td vaccine (1 - Tdap) 01/23/2013    Diabetic microalbuminuria test  10/10/2017    Diabetic retinal exam  03/14/2018    Lipid screen  04/13/2018    Diabetic foot exam  04/18/2018    Diabetic hemoglobin A1C test  10/11/2018    Colon cancer screen colonoscopy  03/17/2019    Zostavax vaccine  Completed    Flu vaccine  Completed    Pneumococcal low/med risk  Completed    Hepatitis C screen  Excluded       Labs  Lab Results   Component Value Date    LABA1C 7.6 (H) 10/11/2017     No results found for: EAG  Lab Results   Component Value Date    TSH 2.140 04/24/2017     Lab Results   Component Value Date    CHOL 122 04/13/2017    CHOL 142 10/26/2016    CHOL 129 10/10/2016     Lab Results   Component Value Date    TRIG 82 04/13/2017 TRIG 91 10/26/2016    TRIG 71 10/10/2016     Lab Results   Component Value Date    HDL 45 04/13/2017    HDL 55 10/26/2016    HDL 53 10/10/2016     Lab Results   Component Value Date    LDLCALC 61 04/13/2017    LDLCALC 69 10/26/2016    LDLCALC 62 10/10/2016     No results found for: LABVLDL, VLDL  No results found for: CHOLHDLRATIO      Review of Systems  Constitutional: negative for chills, fatigue and fevers  Eyes: negative for irritation, redness and visual disturbance  Respiratory: negative for cough, shortness of breath and wheezing  Cardiovascular: negative for chest pain, irregular heart beat and palpitations    The remainder of systems were reviewed and negative.      Objective:   /74   Pulse 70   Resp 16   Ht 5' 8.9\" (1.75 m)   Wt 228 lb (103.4 kg)   BMI 33.77 kg/m²      General:  alert, appears stated age, cooperative and no distress   Oropharynx: normal findings: lips normal without lesions, buccal mucosa normal, gums healthy and tongue midline and normal    Eyes:  negative findings: lids and lashes normal, conjunctivae and sclerae normal, corneas clear and pupils equal, round, reactive to light and accomodation       Neck: no adenopathy, no carotid bruit, no JVD, supple, symmetrical, trachea midline and thyroid not enlarged, symmetric, no tenderness/mass/nodules   Thyroid:  no palpable nodule   Lung: clear to auscultation bilaterally   Heart:  regular rate and rhythm, S1, S2 normal, no murmur, click, rub or gallop and normal apical impulse   Abdomen: soft, non-tender; bowel sounds normal; no masses,  no organomegaly   Extremities: extremities normal, atraumatic, no cyanosis or edema and Homans sign is negative, no sign of DVT   Pulses: 2+ and symmetric   Skin: warm and dry, no hyperpigmentation, vitiligo, or suspicious lesions   Neuro: normal without focal findings, mental status, speech normal, alert and oriented x3, SURI, cranial nerves 2-12 intact, muscle tone and

## 2017-10-18 NOTE — PROGRESS NOTES
[facility] PROFESSIONAL SERVS  ENDOCRINE DIABETES METABOLISM MAEGAN  [address]  [address]  Dept: [phone]  Dept Fax: [phone]  Loc: [phone]    Visit Date: 10/18/2017    Sri Mosquera is a 75 y.o. male who presents today for:  Chief Complaint   Patient presents with    Diabetes     type 2    Foot Problem     denies any foot problems    Eye Problem     03/14/17    Other     hyponatremia    Results     completed 10/11/17            Subjective:     75-year-old male comes for follow-up for type 2 diabetes mellitus, hypertension and hyperlipidemia. .  he was diagnosed with diabetes mellitus 17 years ago. Current therapy includes onglyza and metformin. He has been getting some GI symptoms from the metformin so he is taking half a tablet twice a day. I told him to take one tablet a day rather than splitting it. The patient does not follow diet. Physical activity is still limited due to post OP status. Weight has increased by 3 lbs. The patient is checking blood sugar a few times a week and his blood sugar is generally high . A1c has gone up from 7.4 to  7.6. Diabetic symptoms include: polyuria, polysipsia. visual blurriness. Most recent eye exam was this year. The patient reports no open sores in the feet. He experiences numbness in the feet and hands. He takes 5 mg and Norvasc for blood pressure and 20 mg of Lipitor for cholesterol. Patient has noticed tremors for which he is following with neurology. There are no headaches. This patient also has a history of hyponatremia. Recent blood sugar test showed sodium of 133. There is no headache or dizziness.   Past Medical History:   Diagnosis Date    Asthma     [employer] dual pacemaker 4/9/2014    Bronchitis, chronic (HCC)     Carotid artery stenosis     Dr Polo Adkins Chickenpox     CVA (cerebral infarction)     GERD (gastroesophageal reflux disease)     Hemorrhoids     Hyperlipidemia     Hypertension     tablet by mouth daily 90 tablet 1    albuterol sulfate HFA (VENTOLIN HFA) 108 (90 BASE) MCG/ACT inhaler Inhale 2 puffs into the lungs 4 times daily 1 Inhaler 11    TENIVAC 5-2 LFU injection       metFORMIN (GLUCOPHAGE XR) 500 MG extended release tablet Take 1 tablet by mouth 2 times daily (before meals) 180 tablet 3    FLUZONE HIGH-DOSE 0.5 ML POOL injection inject 0.5 milliliter intramuscularly  0    tamsulosin (FLOMAX) 0.4 MG capsule Take 1 capsule by mouth nightly 90 capsule 3    traMADol (ULTRAM) 50 MG tablet Take 2 tablets by mouth 2 times daily Short time increase to 100 mg BID for 2 weeks then back to 50 mg  tablet 3    loratadine (CLARITIN) 10 MG tablet Take 10 mg by mouth daily      hydrOXYzine (VISTARIL) 25 MG capsule Take 25 mg by mouth three times daily       olodaterol (STRIVERDI RESPIMAT) 2.5 MCG/ACT inhaler Inhale 2 puffs into the lungs daily      rivaroxaban (XARELTO) 20 MG TABS tablet Take 20 mg by mouth daily (with breakfast)      gabapentin (NEURONTIN) 300 MG capsule Take 300 mg by mouth 3 times daily      prazosin (MINIPRESS) 2 MG capsule Take 2 mg by mouth nightly      amLODIPine (NORVASC) 5 MG tablet TAKE 1 TABLET BY MOUTH DAILY (Patient taking differently: Take 5 mg by mouth daily TAKE 1 TABLET BY MOUTH DAILY) 90 tablet 3    atorvastatin (LIPITOR) 20 MG tablet take 1 tablet by mouth once daily 90 tablet 3    omeprazole (PRILOSEC) 40 MG capsule Take 1 capsule by mouth daily (Patient taking differently: Take 20 mg by mouth 2 times daily ) 90 capsule 3    propranolol (INDERAL LA) 120 MG CR capsule Take 120 mg by mouth 2 times daily       montelukast (SINGULAIR) 10 MG tablet take 1 tablet by mouth once daily 90 tablet 3    b complex vitamins capsule Take 1 capsule by mouth 2 times daily      PARoxetine (PAXIL) 20 MG tablet   Take 40 mg by mouth daily       BUSPIRONE HCL PO Take 20 mg by mouth 3 times daily Take 1 tablet by mouth 3 times daily Disp 270 tablets  R-3      Systems  Constitutional: negative for chills, fatigue and fevers  Eyes: negative for irritation, redness and visual disturbance  Respiratory: negative for cough, shortness of breath and wheezing  Cardiovascular: negative for chest pain, irregular heart beat and palpitations    The remainder of systems were reviewed and negative. Objective:   /74   Pulse 70   Resp 16   Ht 5' 8.9\" (1.75 m)   Wt 228 lb (103.4 kg)   BMI 33.77 kg/m²     General:  alert, appears stated age, cooperative and no distress   Oropharynx: normal findings: lips normal without lesions, buccal mucosa normal, gums healthy and tongue midline and normal    Eyes:  negative findings: lids and lashes normal, conjunctivae and sclerae normal, corneas clear and pupils equal, round, reactive to light and accomodation       Neck: no adenopathy, no carotid bruit, no JVD, supple, symmetrical, trachea midline and thyroid not enlarged, symmetric, no tenderness/mass/nodules   Thyroid:  no palpable nodule   Lung: clear to auscultation bilaterally   Heart:  regular rate and rhythm, S1, S2 normal, no murmur, click, rub or gallop and normal apical impulse   Abdomen: soft, non-tender; bowel sounds normal; no masses,  no organomegaly   Extremities: extremities normal, atraumatic, no cyanosis or edema and Homans sign is negative, no sign of DVT   Pulses: 2+ and symmetric   Skin: warm and dry, no hyperpigmentation, vitiligo, or suspicious lesions   Neuro: normal without focal findings, mental status, speech normal, alert and oriented x3, SURI, cranial nerves 2-12 intact, muscle tone and strength normal and symmetric. Assessment/Plan:     1. Uncontrolled type 2 diabetes mellitus with hyperglycemia, without long-term current use of insulin ([facility] [address]): Uncontrolled due to noncompliance. I have instructed him to check blood sugars at least once a day. This will give us data needed to adjust his medication.   At the same time I will add a small dose of

## 2017-10-23 ENCOUNTER — NURSE ONLY (OUTPATIENT)
Dept: FAMILY MEDICINE CLINIC | Age: 67
End: 2017-10-23
Payer: MEDICARE

## 2017-10-23 DIAGNOSIS — D51.0 PERNICIOUS ANEMIA: Primary | ICD-10-CM

## 2017-10-23 PROCEDURE — 96372 THER/PROPH/DIAG INJ SC/IM: CPT | Performed by: FAMILY MEDICINE

## 2017-10-23 RX ORDER — CYANOCOBALAMIN 1000 UG/ML
1000 INJECTION INTRAMUSCULAR; SUBCUTANEOUS ONCE
Status: COMPLETED | OUTPATIENT
Start: 2017-10-23 | End: 2017-10-23

## 2017-10-23 RX ADMIN — CYANOCOBALAMIN 1000 MCG: 1000 INJECTION INTRAMUSCULAR; SUBCUTANEOUS at 11:13

## 2017-10-23 NOTE — PROGRESS NOTES
After obtaining consent, and per orders of Dr. Jeremie Fenton, injection of Cyanocobalamin B-12 1 mL IM was given in Right upper quad. gluteus by Enoc Jaime. Patient instructed to remain in clinic for 20 minutes afterwards, and to report any adverse reaction to me immediately.

## 2017-10-24 ENCOUNTER — HOSPITAL ENCOUNTER (OUTPATIENT)
Age: 67
Discharge: HOME | End: 2017-10-24
Payer: MEDICARE

## 2017-10-24 ENCOUNTER — TELEPHONE (OUTPATIENT)
Dept: FAMILY MEDICINE CLINIC | Age: 67
End: 2017-10-24

## 2017-10-24 LAB
BASOPHILS # BLD: 0 %
BASOPHILS ABSOLUTE: 0 THOU/MM3 (ref 0–0.1)
BILIRUBIN URINE: NEGATIVE
BLOOD, URINE: NEGATIVE
CHARACTER, URINE: CLEAR
CHOLESTEROL, TOTAL: 121 MG/DL (ref 100–199)
COLOR: YELLOW
EOSINOPHIL # BLD: 1.2 %
EOSINOPHILS ABSOLUTE: 0.1 THOU/MM3 (ref 0–0.4)
GLUCOSE, URINE: NEGATIVE MG/DL
HCT VFR BLD CALC: 41.7 % (ref 42–52)
HDLC SERPL-MCNC: 52 MG/DL
HEMOGLOBIN: 14.3 GM/DL (ref 14–18)
KETONES, URINE: NEGATIVE
LDL CHOLESTEROL CALCULATED: 51 MG/DL
LEUKOCYTE ESTERASE, URINE: NEGATIVE
LYMPHOCYTES # BLD: 29.4 %
LYMPHOCYTES ABSOLUTE: 1.9 THOU/MM3 (ref 1–4.8)
MCH RBC QN AUTO: 29.8 PG (ref 27–31)
MCHC RBC AUTO-ENTMCNC: 34.2 GM/DL (ref 33–37)
MCV RBC AUTO: 87 FL (ref 80–94)
MONOCYTES # BLD: 9.8 %
MONOCYTES ABSOLUTE: 0.6 THOU/MM3 (ref 0.4–1.3)
NITRITE, URINE: NEGATIVE
NUCLEATED RED BLOOD CELLS: 0 /100 WBC
PDW BLD-RTO: 13.9 % (ref 11.5–14.5)
PH UA: 6.5 (ref 5–9)
PLATELET # BLD: 233 THOU/MM3 (ref 130–400)
PMV BLD AUTO: 8 MCM (ref 7.4–10.4)
PROTEIN UA: NEGATIVE MG/DL
RBC # BLD: 4.79 MILL/MM3 (ref 4.7–6.1)
SEG NEUTROPHILS: 59.6 %
SEGMENTED NEUTROPHILS ABSOLUTE COUNT: 3.8 THOU/MM3 (ref 1.8–7.7)
SPECIFIC GRAVITY UA: 1.01 (ref 1–1.03)
TRIGL SERPL-MCNC: 89 MG/DL (ref 0–199)
TSH SERPL DL<=0.05 MIU/L-ACNC: 2.52 UIU/ML (ref 0.4–4.2)
UROBILINOGEN, URINE: 1 EU/DL (ref 0.2–1)
VITAMIN D 25-HYDROXY: 34 NG/ML (ref 30–100)
WBC # BLD: 6.3 THOU/MM3 (ref 4.8–10.8)

## 2017-10-24 PROCEDURE — 80061 LIPID PANEL: CPT

## 2017-10-24 PROCEDURE — 81003 URINALYSIS AUTO W/O SCOPE: CPT

## 2017-10-24 PROCEDURE — 83036 HEMOGLOBIN GLYCOSYLATED A1C: CPT

## 2017-10-24 PROCEDURE — 36415 COLL VENOUS BLD VENIPUNCTURE: CPT

## 2017-10-24 PROCEDURE — 85025 COMPLETE CBC W/AUTO DIFF WBC: CPT

## 2017-10-24 PROCEDURE — 82306 VITAMIN D 25 HYDROXY: CPT

## 2017-10-24 PROCEDURE — 84443 ASSAY THYROID STIM HORMONE: CPT

## 2017-10-24 NOTE — TELEPHONE ENCOUNTER
No chief complaint on file. Urine clear    Call or return to clinic prn if these symptoms worsen or fail to improve as anticipated.

## 2017-10-25 LAB
AVERAGE GLUCOSE: 168 MG/DL (ref 70–126)
HBA1C MFR BLD: 7.6 % (ref 4.4–6.4)

## 2017-11-13 ENCOUNTER — CARE COORDINATION (OUTPATIENT)
Dept: CARE COORDINATION | Age: 67
End: 2017-11-13

## 2017-11-13 ASSESSMENT — ENCOUNTER SYMPTOMS: DYSPNEA ASSOCIATED WITH: EXERTION

## 2017-11-13 NOTE — CARE COORDINATION
Ambulatory Care Coordination Note  11/13/2017  CM Risk Score: 6  Ilda Mortality Risk Score: 4.73    ACC: Amharic Peeling, RN    Summary Note: Patient was called for continued Care Coordination follow up and education. Patient shared he has been doing \"ok. \"  Patient denied any c/o cough or worsening SOB being present. COPD education was briefly reviewed, and patient acknowledged understanding. Patient reported he recently saw his endocrinologist and was started on Victoza. Victoza education and administration use reviewed with patient and he verbalized understanding. Patient stated he is now monitoring BS daily and they are ranging 135-177. Patient stated this is improving from previously. Patient denied any c/o hypoglycemia. Hypoglycemia and hyperglycemia education reviewed with patient along with prevention and treatment and patient verbalized understanding. Patient shared he is scheduled to see a neurologist thru the [place] for  Parkinson's disease. Medications briefly reviewed with patient and patient denied any questions or concerns re: medications or costs. Patient denied any other questions, concerns, or needs and he was encouraged to call with any that may develop. Care Coordination Interventions    Program Enrollment:  Complex Care  Referral from Primary Care Provider:  No  Suggested Interventions and Community Resources  Diabetes Education:  Completed  Fall Risk Prevention:  Completed  Medication Assistance Program:  Declined (Comment: [facility] receives meds thru [facility] )  Transportation Support:  Declined  Zone Management Tools:  Completed  Other Services or Interventions:  [facility]             Most Recent     Conditions and Symptoms   Improving (11/13/2017)             I will notify my provider of any barriers to my plan of care. I will follow my Zone Management tool to seek urgent or emergent care.   I will notify my provider of any (PAXIL) 20 MG tablet   Take 40 mg by mouth daily    Yes Historical Provider, MD   BUSPIRONE HCL PO Take 20 mg by mouth 3 times daily Take 1 tablet by mouth 3 times daily Disp 270 tablets  R-3   Yes Historical Provider, MD   Omega-3 Fatty Acids (FISH OIL) 1000 MG CAPS Take 1,000 mg by mouth 2 times daily. Yes Historical Provider, MD   fluticasone (FLONASE) 50 MCG/ACT nasal spray USE 2 SPRAYS NASALLY DAILY 8/20/14  Yes Oliver Shah MD   cyanocobalamin 1000 MCG/ML injection Inject 1 mL into the muscle every 30 days. 8/20/14  Yes Oliver Shah MD   aspirin 81 MG EC tablet Take 81 mg by mouth daily. Yes Historical Provider, MD   vitamin D (CHOLECALCIFEROL) 1000 UNIT TABS tablet Take 1,000 Int'l Units by mouth 2 times daily    Yes Historical Provider, MD   Liraglutide (VICTOZA) 18 MG/3ML SOPN SC injection 0.6 mg daily for 1 week. Then increase to 1.2 mg daily 10/18/17   Jamaica Kim MD   glucose blood VI test strips (ACCU-CHEK ERVIN) strip 1 each by In Vitro route daily As needed.  10/18/17   Jamaica Kim MD   saxagliptin (ONGLYZA) 5 MG TABS tablet Take 1 tablet by mouth daily 7/18/17   FERDINAND Pleitez   TENIVAC 5-2 LFU injection  1/25/17   Historical Provider, MD   FLUZONE HIGH-DOSE 0.5 ML POOL injection inject 0.5 milliliter intramuscularly 10/4/16   Historical Provider, MD   tamsulosin [facility]) 0.4 MG capsule Take 1 capsule by mouth nightly 10/25/16 10/25/17  Jeannie Arredondo MD   hydrOXYzine (VISTARIL) 25 MG capsule Take 25 mg by mouth three times daily     Historical Provider, MD   montelukast (SINGULAIR) 10 MG tablet take 1 tablet by mouth once daily 6/2/15   Megan Gonsalves NP       Future Appointments  Date Time Provider Sy Ayon   11/20/2017 11:00 AM ABHINAV Pepe [facility] BRIAN PEREA OFFENEDAR IIRafaelVIERTRAINE   12/13/2017 1:15 PM DO [facility] MARCELLA OCHOA II.VIERTRAINE   12/13/2017 1:45 PM SCHEDULE, NIMCO PACER NURSE [facility] PCR PUT MARCELLA OCHOA II.GILLIANERTRAINE   1/25/2018 11:00 AM MD Toma Foy [address] 2/7/2018 2:00 PM MD Hamzah Dominguez [facility]   4/23/2018 10:15 AM Nereyda Henson, [address]   5/17/2018 10:20 AM Maninder Pritchett   10/16/2018 9:00 AM Ricki Pitts NP Roe Abad [facility]     General Assessment    Do you have any symptoms that are causing concern?:  No      and   COPD Assessment    Does the patient understand envrionmental exposure?:  Yes  Is the patient able to verbalize Rescue vs. Long Acting medications?:  Yes  Does the patient have a nebulizer?:  No  Does the patient use a space with inhaled medications?:  No     No patient-reported symptoms         Symptoms:   None:  Yes      Symptom course:  stable  Breathlessness:  exertion  Increase use of rapid acting/rescue inhaled medications?:  No  Change in chronic cough?:  No/At Baseline  Change in sputum?:  No/At Baseline     ,   Diabetes Assessment    Meal Planning:  Avoidance of concentrated sweets   How often do you test your blood sugar?:  Other, Daily   Do you have barriers with adherence to non-pharmacologic self-management interventions?  (Nutrition/Exercise/Self-Monitoring):  No   Have you ever had to go to the ED for symptoms of low blood sugar?:  No       No patient-reported symptoms   Do you have hyperglycemia symptoms?:  No   Do you have hypoglycemia symptoms?:  No   Last Blood Sugar Value:  135   Blood Sugar Monitoring Regimen:  Once a Day, Morning Fasting   Blood Sugar Trends:  Steady Decrease      ,

## 2017-11-20 ENCOUNTER — NURSE ONLY (OUTPATIENT)
Dept: FAMILY MEDICINE CLINIC | Age: 67
End: 2017-11-20
Payer: MEDICARE

## 2017-11-20 DIAGNOSIS — D64.9 ANEMIA, UNSPECIFIED TYPE: Primary | ICD-10-CM

## 2017-11-20 PROCEDURE — 96372 THER/PROPH/DIAG INJ SC/IM: CPT | Performed by: NURSE PRACTITIONER

## 2017-11-20 RX ORDER — CYANOCOBALAMIN 1000 UG/ML
1000 INJECTION INTRAMUSCULAR; SUBCUTANEOUS ONCE
Status: COMPLETED | OUTPATIENT
Start: 2017-11-20 | End: 2017-11-20

## 2017-11-20 RX ADMIN — CYANOCOBALAMIN 1000 MCG: 1000 INJECTION INTRAMUSCULAR; SUBCUTANEOUS at 11:02

## 2017-11-20 NOTE — PROGRESS NOTES
After obtaining consent, and per orders of Bob Damon CNP, injection of Cyanocobalamin 1,000mcg IM given in Right upper quad. gluteus by Lico Maria. Patient instructed to report any adverse reaction to me immediately.   Patient tolerated well

## 2017-12-05 ENCOUNTER — HOSPITAL ENCOUNTER (OUTPATIENT)
Age: 67
Discharge: HOME | End: 2017-12-05
Payer: MEDICARE

## 2017-12-05 DIAGNOSIS — E11.65 UNCONTROLLED TYPE 2 DIABETES MELLITUS WITH HYPERGLYCEMIA, WITHOUT LONG-TERM CURRENT USE OF INSULIN (HCC): ICD-10-CM

## 2017-12-05 LAB
ANION GAP SERPL CALCULATED.3IONS-SCNC: 15 MEQ/L (ref 8–16)
BUN BLDV-MCNC: 12 MG/DL (ref 7–22)
CALCIUM SERPL-MCNC: 9.4 MG/DL (ref 8.5–10.5)
CHLORIDE BLD-SCNC: 95 MEQ/L (ref 98–111)
CO2: 24 MEQ/L (ref 23–33)
CREAT SERPL-MCNC: 1.1 MG/DL (ref 0.4–1.2)
GFR SERPL CREATININE-BSD FRML MDRD: 66 ML/MIN/1.73M2
GLUCOSE BLD-MCNC: 257 MG/DL (ref 70–108)
POTASSIUM SERPL-SCNC: 4.8 MEQ/L (ref 3.5–5.2)
SODIUM BLD-SCNC: 134 MEQ/L (ref 135–145)

## 2017-12-05 PROCEDURE — 80048 BASIC METABOLIC PNL TOTAL CA: CPT

## 2017-12-05 PROCEDURE — 36415 COLL VENOUS BLD VENIPUNCTURE: CPT

## 2017-12-05 PROCEDURE — 83036 HEMOGLOBIN GLYCOSYLATED A1C: CPT

## 2017-12-06 LAB
AVERAGE GLUCOSE: 156 MG/DL (ref 70–126)
HBA1C MFR BLD: 7.2 % (ref 4.4–6.4)

## 2017-12-11 ENCOUNTER — CARE COORDINATION (OUTPATIENT)
Dept: CARE COORDINATION | Age: 67
End: 2017-12-11

## 2017-12-13 ENCOUNTER — OFFICE VISIT (OUTPATIENT)
Dept: CARDIOLOGY CLINIC | Age: 67
End: 2017-12-13
Payer: MEDICARE

## 2017-12-13 ENCOUNTER — NURSE ONLY (OUTPATIENT)
Dept: CARDIOLOGY CLINIC | Age: 67
End: 2017-12-13
Payer: MEDICARE

## 2017-12-13 VITALS
HEART RATE: 79 BPM | BODY MASS INDEX: 32.88 KG/M2 | DIASTOLIC BLOOD PRESSURE: 64 MMHG | SYSTOLIC BLOOD PRESSURE: 124 MMHG | WEIGHT: 222 LBS | HEIGHT: 69 IN

## 2017-12-13 DIAGNOSIS — Z98.890 S/P CARDIAC CATHETERIZATION: Primary | ICD-10-CM

## 2017-12-13 DIAGNOSIS — Z95.0 PACEMAKER: Primary | ICD-10-CM

## 2017-12-13 PROCEDURE — 3017F COLORECTAL CA SCREEN DOC REV: CPT | Performed by: INTERNAL MEDICINE

## 2017-12-13 PROCEDURE — 1123F ACP DISCUSS/DSCN MKR DOCD: CPT | Performed by: INTERNAL MEDICINE

## 2017-12-13 PROCEDURE — 4040F PNEUMOC VAC/ADMIN/RCVD: CPT | Performed by: INTERNAL MEDICINE

## 2017-12-13 PROCEDURE — G8482 FLU IMMUNIZE ORDER/ADMIN: HCPCS | Performed by: INTERNAL MEDICINE

## 2017-12-13 PROCEDURE — 93280 PM DEVICE PROGR EVAL DUAL: CPT | Performed by: INTERNAL MEDICINE

## 2017-12-13 PROCEDURE — G8417 CALC BMI ABV UP PARAM F/U: HCPCS | Performed by: INTERNAL MEDICINE

## 2017-12-13 PROCEDURE — 1036F TOBACCO NON-USER: CPT | Performed by: INTERNAL MEDICINE

## 2017-12-13 PROCEDURE — G8427 DOCREV CUR MEDS BY ELIG CLIN: HCPCS | Performed by: INTERNAL MEDICINE

## 2017-12-13 PROCEDURE — 99213 OFFICE O/P EST LOW 20 MIN: CPT | Performed by: INTERNAL MEDICINE

## 2017-12-13 PROCEDURE — 93000 ELECTROCARDIOGRAM COMPLETE: CPT | Performed by: INTERNAL MEDICINE

## 2017-12-13 RX ORDER — TOPIRAMATE 25 MG/1
25 TABLET ORAL 2 TIMES DAILY
COMMUNITY
End: 2018-02-04 | Stop reason: ALTCHOICE

## 2017-12-13 NOTE — PROGRESS NOTES
Neo Holman is a 75 y.o. male is here for mild cad and afib and in sinus and has pacer and is tired a lot and has srini and wears CPAP  none  nausea no fever and chills and no sob with and without activity. No evidence of swelling in legs no palpitations and no syncopal episodes and no dizziness ,no bleeding ,or urination  Problems ,no double visions ,no speech problems and no bowel problems or diarrhea and tolerating medications     Patient Active Problem List   Diagnosis    Diabetes mellitus type 2, uncontrolled    Post traumatic stress disorder    Hyperlipidemia with target LDL less than 100    Hypertension    Arthritis    S/P cardiac catheterization: 11/20/2013: No obstructive lesions. Moderate LI's in the mid LAD and in the RCA. [address] dual pacemaker    Paroxysmal atrial fibrillation (HCC)    Essential tremor    Peripheral polyneuropathy (HCC)    Obstructive sleep apnea on CPAP    Elevated PSA    BPH with obstruction/lower urinary tract symptoms    Hyponatremia    SOB (shortness of breath)    Hyponatremia     Past Medical History:   Diagnosis Date    Asthma     [employer] dual pacemaker 4/9/2014    Bronchitis, chronic (HCC)     Carotid artery stenosis     Dr J Luis Kraus    Chickenpox     CVA (cerebral infarction)     GERD (gastroesophageal reflux disease)     Hemorrhoids     Hyperlipidemia     Hypertension     Nausea & vomiting     [facility]    Osteoarthritis     Peripheral neuropathy ([address]     [facility] traumatic stress disorder [facility])     [facility]    S/P cardiac catheterization: 11/20/2013: No obstructive lesions. Moderate LI's in the mid LAD and in the RCA. 11/20/2013 11/20/2013: No obstructive lesions. Moderate LI's in the mid LAD and in the RCA.  Dr. César Peacock Tremor of both hands     [facility]    Type II or unspecified type diabetes mellitus without mention of complication, not stated as uncontrolled     Dr Lin office    Unspecified sleep apnea Dr Pablo Hedrick History   Substance Use Topics    Smoking status: Former Smoker     Quit date: 11/14/1971    Smokeless tobacco: Former User    Alcohol use No     Allergies   Allergen Reactions    Latex Rash     itching    Doxycycline Calcium [Doxycycline Calcium]     Lactose Diarrhea    Nasacort [Triamcinolone] Other (See Comments)     Unable to remember    Wellbutrin [Bupropion] Other (See Comments)     Does not remember     Current Outpatient Prescriptions   Medication Sig Dispense Refill    topiramate (TOPAMAX) 25 MG tablet Take 25 mg by mouth daily      Liraglutide (VICTOZA) 18 MG/3ML SOPN SC injection 0.6 mg daily for 1 week. Then increase to 1.2 mg daily 1 Pen 5    glucose blood VI test strips (ACCU-CHEK ERVIN) strip 1 each by In Vitro route daily As needed.  100 each 1    albuterol sulfate HFA (VENTOLIN HFA) 108 (90 BASE) MCG/ACT inhaler Inhale 2 puffs into the lungs 4 times daily 1 Inhaler 11    TENIVAC 5-2 LFU injection       metFORMIN (GLUCOPHAGE XR) 500 MG extended release tablet Take 1 tablet by mouth 2 times daily (before meals) 180 tablet 3    tamsulosin (FLOMAX) 0.4 MG capsule Take 1 capsule by mouth nightly 90 capsule 3    traMADol (ULTRAM) 50 MG tablet Take 2 tablets by mouth 2 times daily Short time increase to 100 mg BID for 2 weeks then back to 50 mg  tablet 3    loratadine (CLARITIN) 10 MG tablet Take 10 mg by mouth daily      hydrOXYzine (VISTARIL) 25 MG capsule Take 25 mg by mouth three times daily       olodaterol (STRIVERDI RESPIMAT) 2.5 MCG/ACT inhaler Inhale 2 puffs into the lungs daily      rivaroxaban (XARELTO) 20 MG TABS tablet Take 20 mg by mouth daily (with breakfast)      gabapentin (NEURONTIN) 300 MG capsule Take 300 mg by mouth 3 times daily      prazosin (MINIPRESS) 2 MG capsule Take 2 mg by mouth nightly      amLODIPine (NORVASC) 5 MG tablet TAKE 1 TABLET BY MOUTH DAILY (Patient taking differently: Take 5 mg by mouth daily TAKE 1 TABLET BY MOUTH DAILY) 90 tablet 3    atorvastatin (LIPITOR) 20 MG tablet take 1 tablet by mouth once daily (Patient taking differently: 40 mg take 1 tablet by mouth once daily) 90 tablet 3    omeprazole (PRILOSEC) 40 MG capsule Take 1 capsule by mouth daily (Patient taking differently: Take 20 mg by mouth 2 times daily ) 90 capsule 3    propranolol (INDERAL LA) 120 MG CR capsule Take 120 mg by mouth 2 times daily       montelukast (SINGULAIR) 10 MG tablet take 1 tablet by mouth once daily 90 tablet 3    b complex vitamins capsule Take 1 capsule by mouth 2 times daily      PARoxetine (PAXIL) 20 MG tablet   Take 40 mg by mouth daily       BUSPIRONE HCL PO Take 20 mg by mouth 3 times daily Take 1 tablet by mouth 3 times daily Disp 270 tablets  R-3      fluticasone (FLONASE) 50 MCG/ACT nasal spray USE 2 SPRAYS NASALLY DAILY 3 Bottle 3    cyanocobalamin 1000 MCG/ML injection Inject 1 mL into the muscle every 30 days. 1 vial 2    aspirin 81 MG EC tablet Take 81 mg by mouth daily.  vitamin D (CHOLECALCIFEROL) 1000 UNIT TABS tablet Take 1,000 Int'l Units by mouth 2 times daily        No current facility-administered medications for this visit. REVIEW OF SYSTEM  Constitutional  symptoms such as fever chills and malaise and weakness  Are negative   HE ENT negative  HEART all negative  LUNGS all negative   ABDOMEN all negative  GENITAL URINARY all within normal limits  NEUROLOGY all negative  NECK all negative   SKIN all negative  MUSCULOSKELETAL negative  HEMATOLOGICAL negative  PSYCHIATRIC  negative    Vitals:    12/13/17 1350   BP: 124/64   Pulse: 79   Weight: 222 lb (100.7 kg)   Height: 5' 9\" (1.753 m)       EXAMINATION   Gen.  Appearance is reflective of nutritional normal nutrition and well-groomed   Appears  stated age    Carotid the amplitude of  carotid artery  And up stroke are present or diminished and bruits are absent   Thyroid palpation appears to be  Normal    SURI  And evaluated and within normal limits  And size of pupils is normal  HEENT  teeth appears to be symmetrical without poor dentition and gums  and palate appears to be healthy and  head is normal cephalic  Without signs of trauma and eyes are without trauma no conjunctiva and Iids retracting and not retracted ptosis and no ptosis and without  erythematous changes and  Nose is symmetrical  without drainage  and ears are  without tinnitus  and throat is clear   JUGULAR VEINS  are not elevated and tongue is mid line no masses presence and no murry A waves present   LYMPHATICS are without adenopathies at least on exam   CHEST  No biventricular heaves and thrills and no right ventricular heaves and examination without signs of trauma and lesions  HEART not distant and regular rate and rhythm without   gallop signs and and no murmurs and systolic crescendo  Decrescendo  normal s1 and s2 sounds and no s3 and s4 split   Point of maximum intensity of the heartbeat  is at or displaced from the fifth intercostal space  LUNGS no   presence of intercostal retractions and no  use of the accessory muscles with a diaphragmatic movement present and not present pectus and pigeon chest and without wheezes and rhonchi and non diminished in all posterior lungs  and without rales  ABDOMEN abdominal bruit not present  And  No  Presency of  morbid obesity and with no    non distended without organomegaly and no rebound tenderness and bowel sound are present  all quadrants   NEUROLOGY all the cranial nerves are intact and no motor deficits  and no sensory deficits  MUSCULAR SKELETAL without signs of trauma and kyphosis and scoliosis and normal range of motion for all extremities  INTEGUMENTARY without tenting and skin rashes indentation and  dryness  NECK without lymph adenopathy   NEUROPSYCHIATRIC has no anxiety, no mood swings and depression  VASCULAR EXAM for carotid ,radial femoral arteries are  steady  and not diminished   Extremities no evidence of peripheral edema And pulses are  normal    Assessment and plans    1. S/P cardiac catheterization: 11/20/2013: No obstructive lesions. Moderate LI's in the mid LAD and in the RCA. Patient is a advised to have their lipids and liver panel and TSH checked through their family doctors and the therapeutic values that need to be met are also presented to the patient and need to achieve them is emphasized and patient agrees and accepts. Patient is on anticoagulation and has accepted the potential risk of bleeding to hopefull decrease risk of embolic stroke.    ekg sinus paced  In ventricle     We Re assured  And educated the  patient  On their  medical status  And the treatment plans  And the patient  is willing  to be complaint with care  And follow up and  All their question  answered to their  satisfaction    Patient was advised to return in 6 to 10 months for follow up or sooner if there is any change in care.     Electronically signed by Heaven Fajardo DO on 12/13/2017 at 2:14 PM

## 2017-12-19 ENCOUNTER — CARE COORDINATION (OUTPATIENT)
Dept: CARE COORDINATION | Age: 67
End: 2017-12-19

## 2017-12-19 ASSESSMENT — ENCOUNTER SYMPTOMS: DYSPNEA ASSOCIATED WITH: EXERTION

## 2017-12-19 NOTE — CARE COORDINATION
Ambulatory Care Coordination Note  12/19/2017  CM Risk Score: 6  Ilda Mortality Risk Score: 3.55    ACC: María Argueta, RN    Summary Note: Patient called [facility] in f/u to recent message. Patient shared he has been doing well. Patient denied any c/o SOB or cough being present. COPD education was briefly reviewed with patient and he verbalized understanding. Patient stated he has been monitoring BS every AM and they are now running in the 150 range on average. Patient is also trying to check in the evenings and those readings are in the 130-140 range. Patient denied any c/o hypoglycemia. Patient stated he continues to work with the [facility] and he is now taking victoza. Patient was encouraged to continue with monitoring and to work to follow a diabetic diet. Diabetic foot care was also reviewed with patient and he verbalized understanding. Patient denied any other questions, concerns, or needs and he was encouraged to call with any that may develop. Care Coordination Interventions    Program Enrollment:  Complex Care  Referral from Primary Care Provider:  No  Suggested Interventions and Community Resources  Diabetes Education:  Completed  Fall Risk Prevention:  Completed  Medication Assistance Program:  Declined (Comment: states receives meds thru [facility] )  Transportation Support:  Declined  Zone Management Tools:  Completed  Other Services or Interventions:  [facility]         Goals Addressed             Most Recent     Conditions and Symptoms   Improving (12/19/2017)             I will notify my provider of any barriers to my plan of care. I will follow my Zone Management tool to seek urgent or emergent care. I will notify my provider of any symptoms that indicate a worsening of my condition.     Barriers: none  Plan for overcoming my barriers: N/A  Confidence: 7/10  Anticipated Goal Completion Date: ongoing              Prior to Admission medications    Medication Sig Start Date End Date Taking? Authorizing Provider   topiramate (TOPAMAX) 25 MG tablet Take 25 mg by mouth 2 times daily As per [address]   Yes Historical Provider, MD   Liraglutide (VICTOZA) 18 MG/3ML SOPN SC injection 0.6 mg daily for 1 week. Then increase to 1.2 mg daily 10/18/17   Yuan Bolton MD   glucose blood VI test strips (ACCU-CHEK ERVIN) strip 1 each by In Vitro route daily As needed.  10/18/17   Yuan Bolton MD   albuterol sulfate HFA (VENTOLIN HFA) 108 (90 BASE) MCG/ACT inhaler Inhale 2 puffs into the lungs 4 times daily 5/12/17 5/12/18  La Parks MD   TENIVAC 5-2 LFU injection  1/25/17   Historical Provider, MD   metFORMIN (GLUCOPHAGE XR) 500 MG extended release tablet Take 1 tablet by mouth 2 times daily (before meals) 1/17/17   FERDINAND Doshi   tamsulosin [facility]) 0.4 MG capsule Take 1 capsule by mouth nightly 10/25/16 12/13/17  Brit Shaffer MD   traMADol (ULTRAM) 50 MG tablet Take 2 tablets by mouth 2 times daily Short time increase to 100 mg BID for 2 weeks then back to 50 mg BID 8/25/16   Estefania Machado MD   loratadine (CLARITIN) 10 MG tablet Take 10 mg by mouth daily    Historical Provider, MD   hydrOXYzine (VISTARIL) 25 MG capsule Take 25 mg by mouth three times daily     Historical Provider, MD   olodaterol (STRIVERDI RESPIMAT) 2.5 MCG/ACT inhaler Inhale 2 puffs into the lungs daily    Historical Provider, MD   rivaroxaban (XARELTO) 20 MG TABS tablet Take 20 mg by mouth daily (with breakfast)    Historical Provider, MD   gabapentin (NEURONTIN) 300 MG capsule Take 300 mg by mouth 3 times daily    Historical Provider, MD   prazosin (MINIPRESS) 2 MG capsule Take 2 mg by mouth nightly    Historical Provider, MD   amLODIPine (NORVASC) 5 MG tablet TAKE 1 TABLET BY MOUTH DAILY  Patient taking differently: Take 5 mg by mouth daily TAKE 1 TABLET BY MOUTH DAILY 7/28/15   Estefania Machado MD   atorvastatin (LIPITOR) 20 MG tablet take 1 tablet by mouth once daily  Patient taking differently: 40 mg take 1 tablet by mouth once daily 7/28/15   Angela Russo MD   omeprazole (PRILOSEC) 40 MG capsule Take 1 capsule by mouth daily  Patient taking differently: Take 20 mg by mouth 2 times daily  7/6/15   Angela Russo MD   propranolol (INDERAL LA) 120 MG CR capsule Take 120 mg by mouth 2 times daily     Historical Provider, MD   montelukast (SINGULAIR) 10 MG tablet take 1 tablet by mouth once daily 6/2/15   Anand Rojas NP   b complex vitamins capsule Take 1 capsule by mouth 2 times daily    Historical Provider, MD   PARoxetine (PAXIL) 20 MG tablet   Take 40 mg by mouth daily     Historical Provider, MD   BUSPIRONE HCL PO Take 20 mg by mouth 3 times daily Take 1 tablet by mouth 3 times daily Disp 270 tablets  R-3    Historical Provider, MD   fluticasone (FLONASE) 50 MCG/ACT nasal spray USE 2 SPRAYS NASALLY DAILY 8/20/14   Angela Russo MD   cyanocobalamin 1000 MCG/ML injection Inject 1 mL into the muscle every 30 days. 8/20/14   Angela Russo MD   aspirin 81 MG EC tablet Take 81 mg by mouth daily.       Historical Provider, MD   vitamin D (CHOLECALCIFEROL) 1000 UNIT TABS tablet Take 1,000 Int'l Units by mouth 2 times daily     Historical Provider, MD       Future Appointments  Date Time Provider Sy Ayon   12/26/2017 11:00 AM SCHEDULE, ABHINAV [facility]   1/25/2018 11:00 AM [facility] [place]   2/7/2018 2:00 PM Shena Cannon MD [facility] [place]   4/23/2018 10:15 AM Paola Herr [facility]   5/17/2018 10:20 AM [facility] [place] [facility] [place]   6/13/2018 2:30 PM SCHEDULE, CARINA PACER NURSE [facility] [place]   9/19/2018 3:00 PM Celena Coffey, DO [facility] [place]   10/16/2018 9:00 AM ALEK Loving [facility]     General Assessment    Do you have any symptoms that are causing concern?:  No      and   COPD Assessment    Does the patient understand envrionmental exposure?:  Yes  Is the patient able to verbalize Rescue vs. Long Acting medications?:  Yes  Does the patient have a nebulizer?:  No  Does the patient use a space with inhaled medications?:  No     No patient-reported symptoms         Symptoms:   None:  Yes      Symptom course:  stable  Breathlessness:  exertion  Change in chronic cough?:  No/At Baseline  Change in sputum?:  No/At Baseline     ,   Diabetes Assessment    Meal Planning:  Avoidance of concentrated sweets   How often do you test your blood sugar?:  Other, Daily   Do you have barriers with adherence to non-pharmacologic self-management interventions?  (Nutrition/Exercise/Self-Monitoring):  No   Have you ever had to go to the ED for symptoms of low blood sugar?:  No       No patient-reported symptoms   Do you have hyperglycemia symptoms?:  No   Do you have hypoglycemia symptoms?:  No   Last Blood Sugar Value:  140   Blood Sugar Monitoring Regimen:  Morning Fasting   Blood Sugar Trends:  No Change      ,

## 2017-12-20 ENCOUNTER — TELEPHONE (OUTPATIENT)
Dept: ENDOCRINOLOGY | Age: 67
End: 2017-12-20

## 2017-12-20 DIAGNOSIS — E11.65 UNCONTROLLED TYPE 2 DIABETES MELLITUS WITH HYPERGLYCEMIA, WITHOUT LONG-TERM CURRENT USE OF INSULIN (HCC): ICD-10-CM

## 2017-12-20 NOTE — TELEPHONE ENCOUNTER
How much Victoza is he taking? And is he tolerating without nausea, vomiting, abdominal pain or jaundice?

## 2017-12-26 ENCOUNTER — NURSE ONLY (OUTPATIENT)
Dept: FAMILY MEDICINE CLINIC | Age: 67
End: 2017-12-26
Payer: MEDICARE

## 2017-12-26 DIAGNOSIS — D64.9 ANEMIA, UNSPECIFIED TYPE: Primary | ICD-10-CM

## 2017-12-26 PROCEDURE — 96372 THER/PROPH/DIAG INJ SC/IM: CPT | Performed by: FAMILY MEDICINE

## 2017-12-26 RX ORDER — CYANOCOBALAMIN 1000 UG/ML
1000 INJECTION INTRAMUSCULAR; SUBCUTANEOUS ONCE
Status: COMPLETED | OUTPATIENT
Start: 2017-12-26 | End: 2017-12-26

## 2017-12-26 RX ADMIN — CYANOCOBALAMIN 1000 MCG: 1000 INJECTION INTRAMUSCULAR; SUBCUTANEOUS at 09:39

## 2018-01-15 ENCOUNTER — CARE COORDINATION (OUTPATIENT)
Dept: CARE COORDINATION | Age: 68
End: 2018-01-15

## 2018-01-19 ENCOUNTER — CARE COORDINATION (OUTPATIENT)
Dept: CARE COORDINATION | Age: 68
End: 2018-01-19

## 2018-01-22 ENCOUNTER — OFFICE VISIT (OUTPATIENT)
Dept: FAMILY MEDICINE CLINIC | Age: 68
End: 2018-01-22
Payer: MEDICARE

## 2018-01-22 VITALS
RESPIRATION RATE: 16 BRPM | BODY MASS INDEX: 30.83 KG/M2 | OXYGEN SATURATION: 98 % | DIASTOLIC BLOOD PRESSURE: 60 MMHG | WEIGHT: 208.8 LBS | HEART RATE: 74 BPM | SYSTOLIC BLOOD PRESSURE: 100 MMHG | TEMPERATURE: 98.7 F

## 2018-01-22 DIAGNOSIS — J20.9 ACUTE BRONCHITIS, UNSPECIFIED ORGANISM: Primary | ICD-10-CM

## 2018-01-22 PROCEDURE — 1123F ACP DISCUSS/DSCN MKR DOCD: CPT | Performed by: NURSE PRACTITIONER

## 2018-01-22 PROCEDURE — 3017F COLORECTAL CA SCREEN DOC REV: CPT | Performed by: NURSE PRACTITIONER

## 2018-01-22 PROCEDURE — G8417 CALC BMI ABV UP PARAM F/U: HCPCS | Performed by: NURSE PRACTITIONER

## 2018-01-22 PROCEDURE — 1036F TOBACCO NON-USER: CPT | Performed by: NURSE PRACTITIONER

## 2018-01-22 PROCEDURE — 99213 OFFICE O/P EST LOW 20 MIN: CPT | Performed by: NURSE PRACTITIONER

## 2018-01-22 PROCEDURE — G8427 DOCREV CUR MEDS BY ELIG CLIN: HCPCS | Performed by: NURSE PRACTITIONER

## 2018-01-22 PROCEDURE — 4040F PNEUMOC VAC/ADMIN/RCVD: CPT | Performed by: NURSE PRACTITIONER

## 2018-01-22 PROCEDURE — G8482 FLU IMMUNIZE ORDER/ADMIN: HCPCS | Performed by: NURSE PRACTITIONER

## 2018-01-22 RX ORDER — METHYLPREDNISOLONE 4 MG/1
TABLET ORAL
Qty: 1 KIT | Refills: 0 | Status: SHIPPED | OUTPATIENT
Start: 2018-01-22 | End: 2018-01-28

## 2018-01-22 RX ORDER — LEVOFLOXACIN 500 MG/1
500 TABLET, FILM COATED ORAL DAILY
Qty: 10 TABLET | Refills: 0 | Status: SHIPPED | OUTPATIENT
Start: 2018-01-22 | End: 2018-01-30 | Stop reason: ALTCHOICE

## 2018-01-22 ASSESSMENT — ENCOUNTER SYMPTOMS
GASTROINTESTINAL NEGATIVE: 1
EYES NEGATIVE: 1
COUGH: 1

## 2018-01-23 NOTE — PROGRESS NOTES
Subjective:      Patient ID: Jonathan Veras is a 75 y.o. male. HPI   Chief Complaint   Patient presents with    Cough     patient voiced started last Thursday - dry cough with no phelgm      Patient's medications, allergies, past medical, surgical, social and family histories were reviewed and updated as appropriate. Patient Active Problem List   Diagnosis    Diabetes mellitus type 2, uncontrolled    Post traumatic stress disorder    Hyperlipidemia with target LDL less than 100    Hypertension    Arthritis    S/P cardiac catheterization: 11/20/2013: No obstructive lesions. Moderate LI's in the mid LAD and in the RCA.  [employer] dual pacemaker    Paroxysmal atrial fibrillation (HCC)    Essential tremor    Peripheral polyneuropathy (HCC)    Obstructive sleep apnea on CPAP    Elevated PSA    BPH with obstruction/lower urinary tract symptoms    Hyponatremia    SOB (shortness of breath)    Hyponatremia     Allergies   Allergen Reactions    Latex Rash     itching    Doxycycline Calcium [Doxycycline Calcium]     Lactose Diarrhea    Nasacort [Triamcinolone] Other (See Comments)     Unable to remember    Wellbutrin [Bupropion] Other (See Comments)     Does not remember     Health Maintenance   Topic Date Due    AAA screen  1950    DTaP/Tdap/Td vaccine (1 - Tdap) 01/23/2013    Diabetic microalbuminuria test  10/10/2017    Diabetic retinal exam  03/14/2018    Diabetic foot exam  04/18/2018    Lipid screen  10/24/2018    A1C test (Diabetic or Prediabetic)  12/05/2018    Colon cancer screen colonoscopy  03/17/2019    Zostavax vaccine  Completed    Flu vaccine  Completed    Pneumococcal low/med risk  Completed    Hepatitis C screen  Excluded     Current Outpatient Prescriptions   Medication Sig Dispense Refill    levofloxacin (LEVAQUIN) 500 MG tablet Take 1 tablet by mouth daily for 10 days 10 tablet 0    methylPREDNISolone (MEDROL DOSEPACK) 4 MG tablet Take by mouth.  1 fluticasone (FLONASE) 50 MCG/ACT nasal spray USE 2 SPRAYS NASALLY DAILY 3 Bottle 3    cyanocobalamin 1000 MCG/ML injection Inject 1 mL into the muscle every 30 days. 1 vial 2    aspirin 81 MG EC tablet Take 81 mg by mouth daily.  vitamin D (CHOLECALCIFEROL) 1000 UNIT TABS tablet Take 1,000 Int'l Units by mouth 2 times daily       tamsulosin (FLOMAX) 0.4 MG capsule Take 1 capsule by mouth nightly 90 capsule 3    atorvastatin (LIPITOR) 20 MG tablet take 1 tablet by mouth once daily (Patient taking differently: 40 mg take 1 tablet by mouth once daily) 90 tablet 3     No current facility-administered medications for this visit. Pt here cough chills but no fever. Started about 7 days ago. Wife is ill as well  She has pneumonia and is getting admitted    Review of Systems   Constitutional: Negative. HENT: Negative. Eyes: Negative. Respiratory: Positive for cough. Cardiovascular: Negative. Gastrointestinal: Negative. Musculoskeletal: Negative. Skin: Negative. Neurological: Negative. Objective:   Physical Exam   Constitutional: He is oriented to person, place, and time. He appears well-developed and well-nourished. HENT:   Head: Normocephalic. Right Ear: Tympanic membrane and external ear normal.   Left Ear: Tympanic membrane and external ear normal.   Nose: Nose normal.   Mouth/Throat: Oropharynx is clear and moist.   Neck: Normal range of motion. Neck supple. Cardiovascular: Normal rate, regular rhythm, normal heart sounds and intact distal pulses. Exam reveals no gallop and no friction rub. No murmur heard. Pulmonary/Chest: Effort normal and breath sounds normal. He has no wheezes. He has no rales. Abdominal: Soft. Bowel sounds are normal. There is no tenderness. There is no guarding. Musculoskeletal: Normal range of motion. Lymphadenopathy:     He has no cervical adenopathy. Neurological: He is alert and oriented to person, place, and time.  He has

## 2018-01-25 ENCOUNTER — OFFICE VISIT (OUTPATIENT)
Dept: FAMILY MEDICINE CLINIC | Age: 68
End: 2018-01-25
Payer: MEDICARE

## 2018-01-25 VITALS
HEIGHT: 69 IN | WEIGHT: 206.6 LBS | RESPIRATION RATE: 16 BRPM | HEART RATE: 73 BPM | BODY MASS INDEX: 30.6 KG/M2 | OXYGEN SATURATION: 98 % | TEMPERATURE: 97.7 F | SYSTOLIC BLOOD PRESSURE: 120 MMHG | DIASTOLIC BLOOD PRESSURE: 68 MMHG

## 2018-01-25 DIAGNOSIS — J40 BRONCHITIS: ICD-10-CM

## 2018-01-25 DIAGNOSIS — E87.1 HYPONATREMIA: ICD-10-CM

## 2018-01-25 DIAGNOSIS — G25.0 ESSENTIAL TREMOR: ICD-10-CM

## 2018-01-25 DIAGNOSIS — N40.1 BPH WITH OBSTRUCTION/LOWER URINARY TRACT SYMPTOMS: ICD-10-CM

## 2018-01-25 DIAGNOSIS — E78.5 HYPERLIPIDEMIA WITH TARGET LDL LESS THAN 100: ICD-10-CM

## 2018-01-25 DIAGNOSIS — G47.33 OBSTRUCTIVE SLEEP APNEA ON CPAP: ICD-10-CM

## 2018-01-25 DIAGNOSIS — Z87.891 PERSONAL HISTORY OF TOBACCO USE: ICD-10-CM

## 2018-01-25 DIAGNOSIS — Z99.89 OBSTRUCTIVE SLEEP APNEA ON CPAP: ICD-10-CM

## 2018-01-25 DIAGNOSIS — I10 ESSENTIAL HYPERTENSION: ICD-10-CM

## 2018-01-25 DIAGNOSIS — I48.0 PAROXYSMAL ATRIAL FIBRILLATION (HCC): ICD-10-CM

## 2018-01-25 DIAGNOSIS — F43.10 POST TRAUMATIC STRESS DISORDER: ICD-10-CM

## 2018-01-25 DIAGNOSIS — N13.8 BPH WITH OBSTRUCTION/LOWER URINARY TRACT SYMPTOMS: ICD-10-CM

## 2018-01-25 DIAGNOSIS — E11.65 UNCONTROLLED TYPE 2 DIABETES MELLITUS WITH HYPERGLYCEMIA, WITHOUT LONG-TERM CURRENT USE OF INSULIN (HCC): Primary | ICD-10-CM

## 2018-01-25 PROCEDURE — G0296 VISIT TO DETERM LDCT ELIG: HCPCS | Performed by: FAMILY MEDICINE

## 2018-01-25 PROCEDURE — 3046F HEMOGLOBIN A1C LEVEL >9.0%: CPT | Performed by: FAMILY MEDICINE

## 2018-01-25 PROCEDURE — 4040F PNEUMOC VAC/ADMIN/RCVD: CPT | Performed by: FAMILY MEDICINE

## 2018-01-25 PROCEDURE — G8427 DOCREV CUR MEDS BY ELIG CLIN: HCPCS | Performed by: FAMILY MEDICINE

## 2018-01-25 PROCEDURE — 1123F ACP DISCUSS/DSCN MKR DOCD: CPT | Performed by: FAMILY MEDICINE

## 2018-01-25 PROCEDURE — 96372 THER/PROPH/DIAG INJ SC/IM: CPT | Performed by: FAMILY MEDICINE

## 2018-01-25 PROCEDURE — 3017F COLORECTAL CA SCREEN DOC REV: CPT | Performed by: FAMILY MEDICINE

## 2018-01-25 PROCEDURE — G8417 CALC BMI ABV UP PARAM F/U: HCPCS | Performed by: FAMILY MEDICINE

## 2018-01-25 PROCEDURE — 99214 OFFICE O/P EST MOD 30 MIN: CPT | Performed by: FAMILY MEDICINE

## 2018-01-25 PROCEDURE — G8482 FLU IMMUNIZE ORDER/ADMIN: HCPCS | Performed by: FAMILY MEDICINE

## 2018-01-25 PROCEDURE — 1036F TOBACCO NON-USER: CPT | Performed by: FAMILY MEDICINE

## 2018-01-25 RX ORDER — CYANOCOBALAMIN 1000 UG/ML
1000 INJECTION INTRAMUSCULAR; SUBCUTANEOUS ONCE
Status: COMPLETED | OUTPATIENT
Start: 2018-01-25 | End: 2018-01-25

## 2018-01-25 RX ADMIN — CYANOCOBALAMIN 1000 MCG: 1000 INJECTION INTRAMUSCULAR; SUBCUTANEOUS at 11:53

## 2018-01-25 NOTE — PROGRESS NOTES
After obtaining consent, and per orders of Dr. Anna Bryan, injection of 1ml Cyanocobalamin given in Right upper quad. gluteus by Agnieszka Washington. Patient instructed to report any adverse reaction to me immediately.

## 2018-01-28 NOTE — PROGRESS NOTES
[facility] PROFESSIONAL SERVS  [facility]  [address] [facility]  Dept: [phone]  Dept Fax: [phone]  Loc: [phone]    Jorge Apple is a 75 y.o. male who presents today for:  Chief Complaint   Patient presents with    6 Month Follow-Up    Injections     Patient would like his B12 shot today    Cough     Patient is still experiencing a cough. Patient was seen in the office on 1/22/18 and was prescribed medication that he is still currently taking. HPI:     HPI  Here for recheck but also slowly recovering from bronchitis. Still taking levaquin and medrol. Symptoms still include cough, headache with the coughing, weakness, sweats, runny nose, nausea, and low appetite. He is seeing primarily the [place] due to medications and treatment for PTSD but comes here 2 times yearly to go over all the information. Hypertension: Patient here for follow-up of elevated blood pressure. He is not exercising and is adherent to low salt diet. Blood pressure is well controlled at home. Cardiac symptoms none. Patient denies chest pain, dyspnea and palpitations. Cardiovascular risk factors: advanced age (older than 54 for men, 72 for women), dyslipidemia, hypertension and male gender. Use of agents associated with hypertension: none. History of target organ damage: stroke. Hyperlipidemia: Patient presents with hyperlipidemia. He was tested because hypertension and CVA.   His last labs showed   Lab Results   Component Value Date    CHOL 121 10/24/2017    CHOL 122 04/13/2017    CHOL 142 10/26/2016     Lab Results   Component Value Date    TRIG 89 10/24/2017    TRIG 82 04/13/2017    TRIG 91 10/26/2016     Lab Results   Component Value Date    HDL 52 10/24/2017    HDL 45 04/13/2017    HDL 55 10/26/2016     Lab Results   Component Value Date    LDLCALC 51 10/24/2017    LDLCALC 61 04/13/2017    LDLCALC 69 10/26/2016     No results found for: LABVLDL, VLDL  No results A1c quarterly, lipids every 6 months and BMP quaterly    Discussed use, benefit, and side effects of prescribed medications. All patient questions answered. Pt voiced understanding. Reviewed health maintenance. Instructed to continue current medications, diet and exercise. Patient agreed with treatment plan. Follow up as directed.      Electronically signed by Jessi Chavira MD

## 2018-01-30 ENCOUNTER — APPOINTMENT (OUTPATIENT)
Dept: GENERAL RADIOLOGY | Age: 68
End: 2018-01-30
Payer: MEDICARE

## 2018-01-30 ENCOUNTER — HOSPITAL ENCOUNTER (EMERGENCY)
Age: 68
Discharge: HOME | End: 2018-01-30
Attending: EMERGENCY MEDICINE
Payer: MEDICARE

## 2018-01-30 ENCOUNTER — APPOINTMENT (OUTPATIENT)
Dept: CT IMAGING | Age: 68
End: 2018-01-30
Payer: MEDICARE

## 2018-01-30 ENCOUNTER — TELEPHONE (OUTPATIENT)
Dept: FAMILY MEDICINE CLINIC | Age: 68
End: 2018-01-30

## 2018-01-30 VITALS
WEIGHT: 208 LBS | SYSTOLIC BLOOD PRESSURE: 122 MMHG | OXYGEN SATURATION: 96 % | DIASTOLIC BLOOD PRESSURE: 75 MMHG | TEMPERATURE: 98 F | BODY MASS INDEX: 30.81 KG/M2 | RESPIRATION RATE: 18 BRPM | HEIGHT: 69 IN | HEART RATE: 78 BPM

## 2018-01-30 DIAGNOSIS — R53.1 GENERALIZED WEAKNESS: Primary | ICD-10-CM

## 2018-01-30 DIAGNOSIS — R11.0 NAUSEA: ICD-10-CM

## 2018-01-30 LAB
ALBUMIN SERPL-MCNC: 3.3 GM/DL (ref 3.5–5)
ALP BLD-CCNC: 129 U/L (ref 46–116)
ALT SERPL-CCNC: 52 U/L (ref 12–78)
AMORPHOUS: ABNORMAL
ANION GAP: 10 MEQ/L (ref 8–16)
AST SERPL-CCNC: 20 U/L (ref 15–37)
BACTERIA: ABNORMAL
BASOPHILS # BLD: 1 % (ref 0–3)
BILIRUB SERPL-MCNC: 0.8 MG/DL (ref 0.2–1)
BILIRUBIN URINE: NEGATIVE
BLOOD, URINE: NEGATIVE
BUN BLDV-MCNC: 16 MG/DL (ref 7–18)
CASTS UA: ABNORMAL /LPF
CHARACTER, URINE: CLEAR
CHLORIDE BLD-SCNC: 97 MEQ/L (ref 98–107)
CO2: 25 MEQ/L (ref 21–32)
COLOR: ABNORMAL
CREAT SERPL-MCNC: 1 MG/DL (ref 0.8–1.3)
CRYSTALS, UA: ABNORMAL
EKG ATRIAL RATE: 86 BPM
EKG P AXIS: 41 DEGREES
EKG P-R INTERVAL: 188 MS
EKG Q-T INTERVAL: 438 MS
EKG QRS DURATION: 168 MS
EKG QTC CALCULATION (BAZETT): 524 MS
EKG R AXIS: -103 DEGREES
EKG T AXIS: 57 DEGREES
EKG VENTRICULAR RATE: 86 BPM
EOSINOPHILS RELATIVE PERCENT: 1.8 % (ref 0–4)
EPITHELIAL CELLS, UA: ABNORMAL /HPF
GFR, ESTIMATED: 79 ML/MIN/1.73M2
GLUCOSE BLD-MCNC: 227 MG/DL (ref 74–106)
GLUCOSE BLD-MCNC: 231 MG/DL (ref 70–108)
GLUCOSE, URINE: 250 MG/DL
HCT VFR BLD CALC: 41.2 % (ref 42–52)
HEMOGLOBIN: 14.1 GM/DL (ref 14–18)
KETONES, URINE: NEGATIVE
LEUKOCYTE ESTERASE, URINE: NEGATIVE
LIPASE: 104 U/L (ref 65–230)
LYMPHOCYTES # BLD: 29.1 % (ref 15–47)
MAGNESIUM: 1.9 MG/DL (ref 1.8–2.4)
MCH RBC QN AUTO: 30 PG (ref 27–31)
MCHC RBC AUTO-ENTMCNC: 34.2 GM/DL (ref 33–37)
MCV RBC AUTO: 87.8 FL (ref 80–94)
MONOCYTES: 10.2 % (ref 0–12)
MUCUS: ABNORMAL
NITRITE, URINE: NEGATIVE
PDW BLD-RTO: 11.4 % (ref 11.5–14.5)
PH UA: 6 (ref 5–9)
PLATELET # BLD: 315 THOU/MM3 (ref 130–400)
PMV BLD AUTO: 6.4 MCM (ref 7.4–10.4)
POC CALCIUM: 8.9 MG/DL (ref 8.5–10.1)
POTASSIUM SERPL-SCNC: 4 MEQ/L (ref 3.5–5.1)
PROTEIN UA: ABNORMAL MG/DL
RBC # BLD: 4.69 MILL/MM3 (ref 4.7–6.1)
RBC UA: ABNORMAL /HPF
REFLEX TO URINE C & S: ABNORMAL
SEGS: 57.9 % (ref 43–75)
SODIUM BLD-SCNC: 132 MEQ/L (ref 136–145)
SPECIFIC GRAVITY UA: 1.02 (ref 1–1.03)
TOTAL PROTEIN: 6.8 GM/DL (ref 6.4–8.2)
TROPONIN I: 0.04 NG/ML
UROBILINOGEN, URINE: 0.2 EU/DL (ref 0–1)
WBC # BLD: 8.2 THOU/MM3 (ref 4.8–10.8)
WBC UA: ABNORMAL /HPF

## 2018-01-30 PROCEDURE — 84484 ASSAY OF TROPONIN QUANT: CPT

## 2018-01-30 PROCEDURE — 81001 URINALYSIS AUTO W/SCOPE: CPT

## 2018-01-30 PROCEDURE — 85025 COMPLETE CBC W/AUTO DIFF WBC: CPT

## 2018-01-30 PROCEDURE — 71046 X-RAY EXAM CHEST 2 VIEWS: CPT

## 2018-01-30 PROCEDURE — 96374 THER/PROPH/DIAG INJ IV PUSH: CPT

## 2018-01-30 PROCEDURE — 99285 EMERGENCY DEPT VISIT HI MDM: CPT

## 2018-01-30 PROCEDURE — 83735 ASSAY OF MAGNESIUM: CPT

## 2018-01-30 PROCEDURE — 93005 ELECTROCARDIOGRAM TRACING: CPT

## 2018-01-30 PROCEDURE — 83690 ASSAY OF LIPASE: CPT

## 2018-01-30 PROCEDURE — 2580000003 HC RX 258: Performed by: EMERGENCY MEDICINE

## 2018-01-30 PROCEDURE — 80053 COMPREHEN METABOLIC PANEL: CPT

## 2018-01-30 PROCEDURE — 74019 RADEX ABDOMEN 2 VIEWS: CPT

## 2018-01-30 PROCEDURE — 36415 COLL VENOUS BLD VENIPUNCTURE: CPT

## 2018-01-30 PROCEDURE — 6360000002 HC RX W HCPCS: Performed by: EMERGENCY MEDICINE

## 2018-01-30 PROCEDURE — 70450 CT HEAD/BRAIN W/O DYE: CPT

## 2018-01-30 PROCEDURE — 82948 REAGENT STRIP/BLOOD GLUCOSE: CPT

## 2018-01-30 RX ORDER — ONDANSETRON 2 MG/ML
4 INJECTION INTRAMUSCULAR; INTRAVENOUS ONCE
Status: COMPLETED | OUTPATIENT
Start: 2018-01-30 | End: 2018-01-30

## 2018-01-30 RX ORDER — 0.9 % SODIUM CHLORIDE 0.9 %
500 INTRAVENOUS SOLUTION INTRAVENOUS ONCE
Status: COMPLETED | OUTPATIENT
Start: 2018-01-30 | End: 2018-01-30

## 2018-01-30 RX ADMIN — SODIUM CHLORIDE 500 ML: 9 INJECTION, SOLUTION INTRAVENOUS at 18:27

## 2018-01-30 RX ADMIN — ONDANSETRON 4 MG: 2 INJECTION INTRAMUSCULAR; INTRAVENOUS at 18:27

## 2018-01-30 ASSESSMENT — ENCOUNTER SYMPTOMS
ABDOMINAL PAIN: 1
NAUSEA: 1
VOMITING: 0
SORE THROAT: 0
CONSTIPATION: 1
SHORTNESS OF BREATH: 0
WHEEZING: 0
BLOOD IN STOOL: 0
COUGH: 1

## 2018-01-30 NOTE — ED PROVIDER NOTES
gabapentin (NEURONTIN) 300 MG capsule Take 300 mg by mouth 2 times daily . Historical Med      prazosin (MINIPRESS) 2 MG capsule Take 2 mg by mouth nightly      amLODIPine (NORVASC) 5 MG tablet TAKE 1 TABLET BY MOUTH DAILY, Disp-90 tablet, R-3      atorvastatin (LIPITOR) 20 MG tablet take 1 tablet by mouth once daily, Disp-90 tablet, R-3      omeprazole (PRILOSEC) 40 MG capsule Take 1 capsule by mouth daily, Disp-90 capsule, R-3      propranolol (INDERAL LA) 120 MG CR capsule Take 120 mg by mouth 2 times daily       montelukast (SINGULAIR) 10 MG tablet take 1 tablet by mouth once daily, Disp-90 tablet, R-3      b complex vitamins capsule Take 1 capsule by mouth 2 times daily      PARoxetine (PAXIL) 20 MG tablet   Take 40 mg by mouth daily       BUSPIRONE HCL PO Take 20 mg by mouth 3 times daily Take 1 tablet by mouth 3 times daily Disp 270 tablets  R-3      fluticasone (FLONASE) 50 MCG/ACT nasal spray USE 2 SPRAYS NASALLY DAILY, Disp-3 Bottle, R-3      cyanocobalamin 1000 MCG/ML injection Inject 1 mL into the muscle every 30 days. , Disp-1 vial, R-2      aspirin 81 MG EC tablet Take 81 mg by mouth daily. vitamin D (CHOLECALCIFEROL) 1000 UNIT TABS tablet Take 1,000 Int'l Units by mouth 2 times daily       glucose blood VI test strips (ACCU-CHEK ERVIN) strip DAILY Starting Wed 10/18/2017, Disp-100 each, R-1, NormalAs needed. tamsulosin (FLOMAX) 0.4 MG capsule Take 1 capsule by mouth nightly, Disp-90 capsule, R-3Print             ALLERGIES    is allergic to latex; doxycycline calcium [doxycycline calcium]; lactose; nasacort [triamcinolone]; and wellbutrin [bupropion]. FAMILY HISTORY    indicated that his mother is . He indicated that his father is . He indicated that his sister is alive. He indicated that his brother is alive.  He indicated that the status of his paternal grandfather is unknown.    family history includes Cancer in his paternal grandfather; Diabetes in his mother; Heart

## 2018-02-01 ENCOUNTER — TELEPHONE (OUTPATIENT)
Dept: ENDOCRINOLOGY | Age: 68
End: 2018-02-01

## 2018-02-01 NOTE — TELEPHONE ENCOUNTER
Patient of dr galdamez's calling who was treated for dm. Would dasia or John Ovalles be willing to see? Please advise.

## 2018-02-04 ENCOUNTER — HOSPITAL ENCOUNTER (EMERGENCY)
Age: 68
Discharge: HOME | End: 2018-02-04
Attending: EMERGENCY MEDICINE
Payer: MEDICARE

## 2018-02-04 VITALS
BODY MASS INDEX: 31.1 KG/M2 | WEIGHT: 210 LBS | TEMPERATURE: 96.4 F | HEIGHT: 69 IN | DIASTOLIC BLOOD PRESSURE: 77 MMHG | SYSTOLIC BLOOD PRESSURE: 125 MMHG | OXYGEN SATURATION: 97 % | HEART RATE: 66 BPM | RESPIRATION RATE: 16 BRPM

## 2018-02-04 DIAGNOSIS — R73.9 HYPERGLYCEMIA: Primary | ICD-10-CM

## 2018-02-04 LAB
GLUCOSE BLD-MCNC: 208 MG/DL (ref 70–108)
GLUCOSE BLD-MCNC: 273 MG/DL (ref 70–108)

## 2018-02-04 PROCEDURE — 82948 REAGENT STRIP/BLOOD GLUCOSE: CPT

## 2018-02-04 PROCEDURE — 96372 THER/PROPH/DIAG INJ SC/IM: CPT

## 2018-02-04 PROCEDURE — 99284 EMERGENCY DEPT VISIT MOD MDM: CPT

## 2018-02-04 PROCEDURE — 6370000000 HC RX 637 (ALT 250 FOR IP): Performed by: EMERGENCY MEDICINE

## 2018-02-04 RX ORDER — GLIMEPIRIDE 2 MG/1
1 TABLET ORAL
Qty: 30 TABLET | Refills: 0 | Status: SHIPPED | OUTPATIENT
Start: 2018-02-04 | End: 2018-04-05 | Stop reason: SDUPTHER

## 2018-02-04 RX ADMIN — INSULIN HUMAN 8 UNITS: 100 INJECTION, SOLUTION PARENTERAL at 17:30

## 2018-02-04 ASSESSMENT — ENCOUNTER SYMPTOMS
BLOOD IN STOOL: 0
SORE THROAT: 0
VOMITING: 0
ABDOMINAL PAIN: 0
DIARRHEA: 0
WHEEZING: 0
SHORTNESS OF BREATH: 0
NAUSEA: 0

## 2018-02-04 NOTE — ED PROVIDER NOTES
stated as uncontrolled; and Unspecified sleep apnea. SURGICAL HISTORY      has a past surgical history that includes Appendectomy (age 14); Tonsillectomy and adenoidectomy (as a child ); Pacemaker insertion (2000/2010); Colonoscopy (2005); Cardiac catheterization (11/2013); Hand surgery (Right, 01/19/2017); Carpal tunnel release (Right, 01/2017); eye surgery; eye surgery (Bilateral, 01/2017); Mandible surgery (N/A, 05/2017); and Blepharoplasty (Bilateral, 05/2017). CURRENT MEDICATIONS       Discharge Medication List as of 2/4/2018  5:23 PM      CONTINUE these medications which have NOT CHANGED    Details   metFORMIN (GLUCOPHAGE XR) 500 MG extended release tablet Take 1 tablet by mouth 2 times daily (before meals), Disp-180 tablet, R-3      loratadine (CLARITIN) 10 MG tablet Take 10 mg by mouth daily      hydrOXYzine (VISTARIL) 25 MG capsule Take 25 mg by mouth three times daily Historical Med      olodaterol (STRIVERDI RESPIMAT) 2.5 MCG/ACT inhaler Inhale 2 puffs into the lungs daily      rivaroxaban (XARELTO) 20 MG TABS tablet Take 20 mg by mouth daily (with breakfast)      gabapentin (NEURONTIN) 300 MG capsule Take 300 mg by mouth 2 times daily . Historical Med      prazosin (MINIPRESS) 2 MG capsule Take 2 mg by mouth nightly      amLODIPine (NORVASC) 5 MG tablet TAKE 1 TABLET BY MOUTH DAILY, Disp-90 tablet, R-3      omeprazole (PRILOSEC) 40 MG capsule Take 1 capsule by mouth daily, Disp-90 capsule, R-3      propranolol (INDERAL LA) 120 MG CR capsule Take 120 mg by mouth 2 times daily       montelukast (SINGULAIR) 10 MG tablet take 1 tablet by mouth once daily, Disp-90 tablet, R-3      b complex vitamins capsule Take 1 capsule by mouth 2 times daily      PARoxetine (PAXIL) 20 MG tablet   Take 40 mg by mouth daily       BUSPIRONE HCL PO Take 20 mg by mouth 3 times daily Take 1 tablet by mouth 3 times daily Disp 270 tablets  R-3      fluticasone (FLONASE) 50 MCG/ACT nasal spray USE 2 SPRAYS NASALLY DAILY,

## 2018-02-07 ENCOUNTER — OFFICE VISIT (OUTPATIENT)
Dept: FAMILY MEDICINE CLINIC | Age: 68
End: 2018-02-07
Payer: MEDICARE

## 2018-02-07 VITALS
SYSTOLIC BLOOD PRESSURE: 118 MMHG | RESPIRATION RATE: 16 BRPM | DIASTOLIC BLOOD PRESSURE: 70 MMHG | HEART RATE: 71 BPM | OXYGEN SATURATION: 98 % | WEIGHT: 206.2 LBS | BODY MASS INDEX: 30.45 KG/M2

## 2018-02-07 DIAGNOSIS — G93.31 POSTVIRAL FATIGUE SYNDROME: ICD-10-CM

## 2018-02-07 DIAGNOSIS — J40 BRONCHITIS: Primary | ICD-10-CM

## 2018-02-07 DIAGNOSIS — E87.1 HYPONATREMIA: ICD-10-CM

## 2018-02-07 DIAGNOSIS — Z01.89 ROUTINE LAB DRAW: ICD-10-CM

## 2018-02-07 LAB
ALBUMIN SERPL-MCNC: 4 G/DL (ref 3.5–5.1)
ALP BLD-CCNC: 129 U/L (ref 38–126)
ALT SERPL-CCNC: 48 U/L (ref 11–66)
ANION GAP SERPL CALCULATED.3IONS-SCNC: 15 MEQ/L (ref 8–16)
AST SERPL-CCNC: 27 U/L (ref 5–40)
BILIRUB SERPL-MCNC: 0.4 MG/DL (ref 0.3–1.2)
BUN BLDV-MCNC: 11 MG/DL (ref 7–22)
CALCIUM SERPL-MCNC: 9.3 MG/DL (ref 8.5–10.5)
CHLORIDE BLD-SCNC: 96 MEQ/L (ref 98–111)
CO2: 24 MEQ/L (ref 23–33)
CREAT SERPL-MCNC: 1 MG/DL (ref 0.4–1.2)
GFR SERPL CREATININE-BSD FRML MDRD: 74 ML/MIN/1.73M2
GLUCOSE BLD-MCNC: 309 MG/DL (ref 70–108)
HCT VFR BLD CALC: 39.2 % (ref 42–52)
HEMOGLOBIN: 13.2 GM/DL (ref 14–18)
MCH RBC QN AUTO: 30.5 PG (ref 27–31)
MCHC RBC AUTO-ENTMCNC: 33.8 GM/DL (ref 33–37)
MCV RBC AUTO: 90.5 FL (ref 80–94)
PDW BLD-RTO: 13.7 % (ref 11.5–14.5)
PLATELET # BLD: 321 THOU/MM3 (ref 130–400)
PMV BLD AUTO: 8 FL (ref 7.4–10.4)
POTASSIUM SERPL-SCNC: 4.8 MEQ/L (ref 3.5–5.2)
RBC # BLD: 4.33 MILL/MM3 (ref 4.7–6.1)
SODIUM BLD-SCNC: 135 MEQ/L (ref 135–145)
TOTAL PROTEIN: 6.4 G/DL (ref 6.1–8)
WBC # BLD: 5.4 THOU/MM3 (ref 4.8–10.8)

## 2018-02-07 PROCEDURE — 4040F PNEUMOC VAC/ADMIN/RCVD: CPT | Performed by: FAMILY MEDICINE

## 2018-02-07 PROCEDURE — 36415 COLL VENOUS BLD VENIPUNCTURE: CPT | Performed by: FAMILY MEDICINE

## 2018-02-07 PROCEDURE — G8427 DOCREV CUR MEDS BY ELIG CLIN: HCPCS | Performed by: FAMILY MEDICINE

## 2018-02-07 PROCEDURE — 3017F COLORECTAL CA SCREEN DOC REV: CPT | Performed by: FAMILY MEDICINE

## 2018-02-07 PROCEDURE — G8482 FLU IMMUNIZE ORDER/ADMIN: HCPCS | Performed by: FAMILY MEDICINE

## 2018-02-07 PROCEDURE — 1036F TOBACCO NON-USER: CPT | Performed by: FAMILY MEDICINE

## 2018-02-07 PROCEDURE — 1123F ACP DISCUSS/DSCN MKR DOCD: CPT | Performed by: FAMILY MEDICINE

## 2018-02-07 PROCEDURE — 99214 OFFICE O/P EST MOD 30 MIN: CPT | Performed by: FAMILY MEDICINE

## 2018-02-07 PROCEDURE — G8417 CALC BMI ABV UP PARAM F/U: HCPCS | Performed by: FAMILY MEDICINE

## 2018-02-07 NOTE — PROGRESS NOTES
exhibits no edema. Left lower leg: He exhibits no edema. Neurological: He is alert. Oriented and pleasent        Assessment/Plan   Lisa Finney was seen today for ed follow-up. Diagnoses and all orders for this visit:    Bronchitis  -     CBC; Future  -     CBC    Hyponatremia  -     Comprehensive Metabolic Panel; Future  -     Comprehensive Metabolic Panel    Postviral fatigue syndrome  -     CBC; Future  -     CBC    Push fluids  Tylenol or ibuprofen prn fever  Cool mist Humidifier in the bedroom  Follow up if not better in 1 week or if symptoms get worse. Discussed use, benefit, and side effects of prescribed medications. All patient questions answered. Pt voiced understanding. Reviewed health maintenance. Instructed to continue current medications, diet and exercise. Patient agreed with treatment plan. Follow up as directed.      Electronically signed by Cynthia Garcia MD

## 2018-02-08 ENCOUNTER — TELEPHONE (OUTPATIENT)
Dept: FAMILY MEDICINE CLINIC | Age: 68
End: 2018-02-08

## 2018-02-08 NOTE — TELEPHONE ENCOUNTER
Labs look ok but the sugar is still high  Have him check coverage for bydureon or trulicity or byetta  Call patient

## 2018-02-08 NOTE — TELEPHONE ENCOUNTER
Patient stated that the trulicity is 993$ for a 90 day supply. 1006$ for Byetta the bydureon was unaffordable aslso Patient stated that he may be able to go through [facility] services for cheaper medication but he cannot afford the insulin at this time. Patient wants to know if there is any other suggestions other than these?

## 2018-02-14 ENCOUNTER — TELEPHONE (OUTPATIENT)
Dept: ENDOCRINOLOGY | Age: 68
End: 2018-02-14

## 2018-02-14 NOTE — TELEPHONE ENCOUNTER
Patient contacted our office and left a message on the nurse line stating he has decided to follow with his family doctor.

## 2018-02-16 ENCOUNTER — CARE COORDINATION (OUTPATIENT)
Dept: CARE COORDINATION | Age: 68
End: 2018-02-16

## 2018-02-16 ASSESSMENT — ENCOUNTER SYMPTOMS: DYSPNEA ASSOCIATED WITH: EXERTION

## 2018-02-26 ENCOUNTER — NURSE ONLY (OUTPATIENT)
Dept: FAMILY MEDICINE CLINIC | Age: 68
End: 2018-02-26
Payer: MEDICARE

## 2018-02-26 DIAGNOSIS — D64.9 ANEMIA, UNSPECIFIED TYPE: Primary | ICD-10-CM

## 2018-02-26 PROCEDURE — 96372 THER/PROPH/DIAG INJ SC/IM: CPT | Performed by: FAMILY MEDICINE

## 2018-02-26 RX ORDER — CYANOCOBALAMIN 1000 UG/ML
1000 INJECTION INTRAMUSCULAR; SUBCUTANEOUS ONCE
Status: COMPLETED | OUTPATIENT
Start: 2018-02-26 | End: 2018-02-26

## 2018-02-26 RX ADMIN — CYANOCOBALAMIN 1000 MCG: 1000 INJECTION INTRAMUSCULAR; SUBCUTANEOUS at 14:10

## 2018-03-05 ENCOUNTER — CARE COORDINATION (OUTPATIENT)
Dept: CARE COORDINATION | Age: 68
End: 2018-03-05

## 2018-03-07 ENCOUNTER — OFFICE VISIT (OUTPATIENT)
Dept: FAMILY MEDICINE CLINIC | Age: 68
End: 2018-03-07
Payer: MEDICARE

## 2018-03-07 VITALS
WEIGHT: 216 LBS | RESPIRATION RATE: 16 BRPM | BODY MASS INDEX: 31.99 KG/M2 | SYSTOLIC BLOOD PRESSURE: 104 MMHG | HEIGHT: 69 IN | HEART RATE: 68 BPM | DIASTOLIC BLOOD PRESSURE: 60 MMHG

## 2018-03-07 DIAGNOSIS — N13.8 BPH WITH OBSTRUCTION/LOWER URINARY TRACT SYMPTOMS: ICD-10-CM

## 2018-03-07 DIAGNOSIS — Z99.89 OBSTRUCTIVE SLEEP APNEA ON CPAP: ICD-10-CM

## 2018-03-07 DIAGNOSIS — E55.9 VITAMIN D DEFICIENCY: ICD-10-CM

## 2018-03-07 DIAGNOSIS — N40.1 BPH WITH OBSTRUCTION/LOWER URINARY TRACT SYMPTOMS: ICD-10-CM

## 2018-03-07 DIAGNOSIS — G47.33 OBSTRUCTIVE SLEEP APNEA ON CPAP: ICD-10-CM

## 2018-03-07 DIAGNOSIS — F43.10 POST TRAUMATIC STRESS DISORDER: ICD-10-CM

## 2018-03-07 DIAGNOSIS — I48.0 PAROXYSMAL ATRIAL FIBRILLATION (HCC): ICD-10-CM

## 2018-03-07 DIAGNOSIS — E78.5 HYPERLIPIDEMIA WITH TARGET LDL LESS THAN 100: ICD-10-CM

## 2018-03-07 DIAGNOSIS — E11.65 UNCONTROLLED TYPE 2 DIABETES MELLITUS WITH HYPERGLYCEMIA, WITHOUT LONG-TERM CURRENT USE OF INSULIN (HCC): Primary | ICD-10-CM

## 2018-03-07 DIAGNOSIS — I10 ESSENTIAL HYPERTENSION: ICD-10-CM

## 2018-03-07 PROCEDURE — G8482 FLU IMMUNIZE ORDER/ADMIN: HCPCS | Performed by: FAMILY MEDICINE

## 2018-03-07 PROCEDURE — G8427 DOCREV CUR MEDS BY ELIG CLIN: HCPCS | Performed by: FAMILY MEDICINE

## 2018-03-07 PROCEDURE — 1123F ACP DISCUSS/DSCN MKR DOCD: CPT | Performed by: FAMILY MEDICINE

## 2018-03-07 PROCEDURE — G8417 CALC BMI ABV UP PARAM F/U: HCPCS | Performed by: FAMILY MEDICINE

## 2018-03-07 PROCEDURE — 4040F PNEUMOC VAC/ADMIN/RCVD: CPT | Performed by: FAMILY MEDICINE

## 2018-03-07 PROCEDURE — 99214 OFFICE O/P EST MOD 30 MIN: CPT | Performed by: FAMILY MEDICINE

## 2018-03-07 PROCEDURE — 3017F COLORECTAL CA SCREEN DOC REV: CPT | Performed by: FAMILY MEDICINE

## 2018-03-07 PROCEDURE — 1036F TOBACCO NON-USER: CPT | Performed by: FAMILY MEDICINE

## 2018-03-07 PROCEDURE — 3046F HEMOGLOBIN A1C LEVEL >9.0%: CPT | Performed by: FAMILY MEDICINE

## 2018-03-07 NOTE — PATIENT INSTRUCTIONS
Patient Education        Learning About Meal Planning for Diabetes  Why plan your meals? Meal planning can be a key part of managing diabetes. Planning meals and snacks with the right balance of carbohydrate, protein, and fat can help you keep your blood sugar at the target level you set with your doctor. You don't have to eat special foods. You can eat what your family eats, including sweets once in a while. But you do have to pay attention to how often you eat and how much you eat of certain foods. You may want to work with a dietitian or a certified diabetes educator. He or she can give you tips and meal ideas and can answer your questions about meal planning. This health professional can also help you reach a healthy weight if that is one of your goals. What plan is right for you? Your dietitian or diabetes educator may suggest that you start with the plate format or carbohydrate counting. The plate format  The plate format is a simple way to help you manage how you eat. You plan meals by learning how much space each food should take on a plate. Using the plate format helps you spread carbohydrate throughout the day. It can make it easier to keep your blood sugar level within your target range. It also helps you see if you're eating healthy portion sizes. To use the plate format, you put non-starchy vegetables on half your plate. Add meat or meat substitutes on one-quarter of the plate. Put a grain or starchy vegetable (such as brown rice or a potato) on the final quarter of the plate. You can add a small piece of fruit and some low-fat or fat-free milk or yogurt, depending on your carbohydrate goal for each meal.  Here are some tips for using the plate format:  · Make sure that you are not using an oversized plate. A 9-inch plate is best. Many restaurants use larger plates. · Get used to using the plate format at home. Then you can use it when you eat out.   · Write down your questions about using the rapid-acting insulin to take before you eat. If you use an insulin pump, you get a constant rate of insulin during the day. So the pump must be programmed at meals to give you extra insulin to cover the rise in blood sugar after meals. When you know how much carbohydrate you will eat, you can take the right amount of insulin. Or, if you always use the same amount of insulin, you need to make sure that you eat the same amount of carbohydrate at meals. If you need more help to understand carbohydrate counting and food labels, ask your doctor, dietitian, or diabetes educator. How do you get started with meal planning? Here are some tips to get started:  · Plan your meals a week at a time. Don't forget to include snacks too. · Use cookbooks or online recipes to plan several main meals. Plan some quick meals for busy nights. You also can double some recipes that freeze well. Then you can save half for other busy nights when you don't have time to cook. · Make sure you have the ingredients you need for your recipes. If you're running low on basic items, put these items on your shopping list too. · List foods that you use to make breakfasts, lunches, and snacks. List plenty of fruits and vegetables. · Post this list on the refrigerator. Add to it as you think of more things you need. · Take the list to the store to do your weekly shopping. Follow-up care is a key part of your treatment and safety. Be sure to make and go to all appointments, and call your doctor if you are having problems. It's also a good idea to know your test results and keep a list of the medicines you take. Where can you learn more? Go to [email or web address] and sign in to your [employer] account. Enter K338 in the [facility] box to learn more about \"Learning About Meal Planning for Diabetes. \"     If you do not have an account, please click on the \"Sign Up Now\" link.   Current as of: March 13, 2017  Content

## 2018-03-09 NOTE — PROGRESS NOTES
sore throat, rhinorrhea, postnasal drip and ear discharge. Eyes: Negative for photophobia and visual disturbance. Respiratory: Negative for cough, chest tightness, shortness of breath and wheezing. Cardiovascular: Negative for chest pain and leg swelling. Gastrointestinal: Negative for nausea, vomiting, abdominal pain, diarrhea and constipation. Genitourinary: Negative for dysuria, urgency and frequency. Neurological: Negative for weakness, light-headedness and headaches. Psychiatric/Behavioral: Negative for sleep disturbance. Objective:     Vitals:    03/07/18 1011   BP: 104/60   Site: Left Arm   Position: Sitting   Cuff Size: Large Adult   Pulse: 68   Resp: 16   Weight: 216 lb (98 kg)   Height: 5' 9.02\" (1.753 m)     Wt Readings from Last 3 Encounters:   03/07/18 216 lb (98 kg)   02/07/18 206 lb 3.2 oz (93.5 kg)   02/04/18 210 lb (95.3 kg)       Physical Exam  Physical Exam   Constitutional: Vital signs are normal. He appears well-developed and well-nourished. He is active. HENT:   Head: Normocephalic and atraumatic. Right Ear: Tympanic membrane, external ear and ear canal normal. No drainage or tenderness. Left Ear: Tympanic membrane, external ear and ear canal normal. No drainage or tenderness. Nose: Nose normal. No mucosal edema or rhinorrhea. Mouth/Throat: Uvula is midline, oropharynx is clear and moist and mucous membranes are normal. Mucous membranes are not pale. Normal dentition. No posterior oropharyngeal edema or posterior oropharyngeal erythema. Eyes: Lids are normal. Right eye exhibits no chemosis and no discharge. Left eye exhibits no chemosis and no drainage. Right conjunctiva has no hemorrhage. Left conjunctiva has no hemorrhage. Right eye exhibits normal extraocular motion. Left eye exhibits normal extraocular motion. Right pupil is round and reactive. Left pupil is round and reactive.  Pupils are equal.   Cardiovascular: Normal rate, regular rhythm, S1 normal, S2 normal and normal heart sounds. Exam reveals no gallop. No murmur heard. Pulmonary/Chest: Effort normal and breath sounds normal. No respiratory distress. He has no wheezes. He has no rhonchi. He has no rales. Abdominal: Soft. Normal appearance and bowel sounds are normal. He exhibits no distension and no mass. There is no hepatosplenomegaly. No tenderness. He has no rigidity, no rebound and no guarding. No hernia. Musculoskeletal:        Right lower leg: He exhibits no edema. Left lower leg: He exhibits no edema. Neurological: He is alert. Oriented and pleasent        Assessment/Plan   Brian Baldwin was seen today for 1 month follow-up. Diagnoses and all orders for this visit:    Uncontrolled type 2 diabetes mellitus with hyperglycemia, without long-term current use of insulin ([facility] [address]  -     Basic Metabolic Panel; Future  -     Hemoglobin A1C; Future    Essential hypertension    Obstructive sleep apnea on CPAP    Hyperlipidemia with target LDL less than 100    Post traumatic stress disorder    Paroxysmal atrial fibrillation (HCC)  -     CBC; Future    BPH with obstruction/lower urinary tract symptoms    Vitamin D deficiency  -     Vitamin D 25 Hydroxy; Future    No change to medication   Continue healthy diet and exercise  Yearly eye exam  Daily foot inspection  Yearly flu shot  Monitor glucose regularly  Daily aspirin  Regular labs : A1c quarterly, lipids every 6 months and BMP quaterly    Patient given educational materials - see patient instructions. Discussed use, benefit, and side effects of prescribed medications. All patient questions answered. Pt voiced understanding. Reviewed health maintenance. Instructed to continue current medications, diet and exercise. Patient agreed with treatment plan. Follow up as directed.      Electronically signed by Harley Garcia MD

## 2018-03-26 ENCOUNTER — NURSE ONLY (OUTPATIENT)
Dept: FAMILY MEDICINE CLINIC | Age: 68
End: 2018-03-26
Payer: MEDICARE

## 2018-03-26 DIAGNOSIS — D64.9 ANEMIA, UNSPECIFIED TYPE: Primary | ICD-10-CM

## 2018-03-26 PROCEDURE — 96372 THER/PROPH/DIAG INJ SC/IM: CPT | Performed by: FAMILY MEDICINE

## 2018-03-26 RX ORDER — CYANOCOBALAMIN 1000 UG/ML
1000 INJECTION INTRAMUSCULAR; SUBCUTANEOUS ONCE
Status: COMPLETED | OUTPATIENT
Start: 2018-03-26 | End: 2018-03-26

## 2018-03-26 RX ADMIN — CYANOCOBALAMIN 1000 MCG: 1000 INJECTION INTRAMUSCULAR; SUBCUTANEOUS at 11:05

## 2018-04-05 ENCOUNTER — CARE COORDINATION (OUTPATIENT)
Dept: CARE COORDINATION | Age: 68
End: 2018-04-05

## 2018-04-06 RX ORDER — GLIMEPIRIDE 2 MG/1
1 TABLET ORAL
Qty: 30 TABLET | Refills: 11 | Status: SHIPPED | OUTPATIENT
Start: 2018-04-06 | End: 2018-10-02

## 2018-04-23 ENCOUNTER — CARE COORDINATION (OUTPATIENT)
Dept: CARE COORDINATION | Age: 68
End: 2018-04-23

## 2018-04-23 ENCOUNTER — OFFICE VISIT (OUTPATIENT)
Dept: PULMONOLOGY | Age: 68
End: 2018-04-23
Payer: MEDICARE

## 2018-04-23 VITALS
DIASTOLIC BLOOD PRESSURE: 66 MMHG | SYSTOLIC BLOOD PRESSURE: 118 MMHG | RESPIRATION RATE: 18 BRPM | HEIGHT: 69 IN | OXYGEN SATURATION: 96 % | WEIGHT: 216.6 LBS | HEART RATE: 68 BPM | BODY MASS INDEX: 32.08 KG/M2

## 2018-04-23 DIAGNOSIS — G47.33 OBSTRUCTIVE SLEEP APNEA ON CPAP: Primary | ICD-10-CM

## 2018-04-23 DIAGNOSIS — Z99.89 OBSTRUCTIVE SLEEP APNEA ON CPAP: Primary | ICD-10-CM

## 2018-04-23 PROCEDURE — 3017F COLORECTAL CA SCREEN DOC REV: CPT | Performed by: PHYSICIAN ASSISTANT

## 2018-04-23 PROCEDURE — G8427 DOCREV CUR MEDS BY ELIG CLIN: HCPCS | Performed by: PHYSICIAN ASSISTANT

## 2018-04-23 PROCEDURE — G8417 CALC BMI ABV UP PARAM F/U: HCPCS | Performed by: PHYSICIAN ASSISTANT

## 2018-04-23 PROCEDURE — 1123F ACP DISCUSS/DSCN MKR DOCD: CPT | Performed by: PHYSICIAN ASSISTANT

## 2018-04-23 PROCEDURE — 99213 OFFICE O/P EST LOW 20 MIN: CPT | Performed by: PHYSICIAN ASSISTANT

## 2018-04-23 PROCEDURE — 1036F TOBACCO NON-USER: CPT | Performed by: PHYSICIAN ASSISTANT

## 2018-04-23 PROCEDURE — 4040F PNEUMOC VAC/ADMIN/RCVD: CPT | Performed by: PHYSICIAN ASSISTANT

## 2018-04-23 RX ORDER — OMEGA-3/DHA/EPA/FISH OIL 60 MG-90MG
CAPSULE ORAL 2 TIMES DAILY
COMMUNITY
End: 2022-02-11

## 2018-04-23 ASSESSMENT — ENCOUNTER SYMPTOMS
SORE THROAT: 0
GASTROINTESTINAL NEGATIVE: 1
HEARTBURN: 0
EYES NEGATIVE: 1
SHORTNESS OF BREATH: 1
COUGH: 0
WHEEZING: 0
NAUSEA: 0
SINUS PAIN: 0
ORTHOPNEA: 0
SPUTUM PRODUCTION: 0

## 2018-04-26 ENCOUNTER — NURSE ONLY (OUTPATIENT)
Dept: FAMILY MEDICINE CLINIC | Age: 68
End: 2018-04-26
Payer: MEDICARE

## 2018-04-26 DIAGNOSIS — D50.9 IRON DEFICIENCY ANEMIA, UNSPECIFIED IRON DEFICIENCY ANEMIA TYPE: Primary | ICD-10-CM

## 2018-04-26 PROCEDURE — 96372 THER/PROPH/DIAG INJ SC/IM: CPT | Performed by: FAMILY MEDICINE

## 2018-04-26 RX ORDER — CYANOCOBALAMIN 1000 UG/ML
1000 INJECTION INTRAMUSCULAR; SUBCUTANEOUS ONCE
Status: COMPLETED | OUTPATIENT
Start: 2018-04-26 | End: 2018-04-26

## 2018-04-26 RX ADMIN — CYANOCOBALAMIN 1000 MCG: 1000 INJECTION INTRAMUSCULAR; SUBCUTANEOUS at 10:44

## 2018-05-11 ENCOUNTER — OFFICE VISIT (OUTPATIENT)
Dept: PULMONOLOGY | Age: 68
End: 2018-05-11
Payer: MEDICARE

## 2018-05-11 VITALS
RESPIRATION RATE: 16 BRPM | HEIGHT: 69 IN | HEART RATE: 68 BPM | TEMPERATURE: 97.8 F | DIASTOLIC BLOOD PRESSURE: 64 MMHG | SYSTOLIC BLOOD PRESSURE: 108 MMHG | OXYGEN SATURATION: 98 % | WEIGHT: 217 LBS | BODY MASS INDEX: 32.14 KG/M2

## 2018-05-11 DIAGNOSIS — Z99.89 OSA ON CPAP: ICD-10-CM

## 2018-05-11 DIAGNOSIS — G47.33 OSA ON CPAP: ICD-10-CM

## 2018-05-11 DIAGNOSIS — J44.9 COPD, MILD (HCC): Primary | ICD-10-CM

## 2018-05-11 PROCEDURE — 1123F ACP DISCUSS/DSCN MKR DOCD: CPT | Performed by: NURSE PRACTITIONER

## 2018-05-11 PROCEDURE — 99213 OFFICE O/P EST LOW 20 MIN: CPT | Performed by: NURSE PRACTITIONER

## 2018-05-11 PROCEDURE — G8417 CALC BMI ABV UP PARAM F/U: HCPCS | Performed by: NURSE PRACTITIONER

## 2018-05-11 PROCEDURE — G8427 DOCREV CUR MEDS BY ELIG CLIN: HCPCS | Performed by: NURSE PRACTITIONER

## 2018-05-11 PROCEDURE — 4040F PNEUMOC VAC/ADMIN/RCVD: CPT | Performed by: NURSE PRACTITIONER

## 2018-05-11 PROCEDURE — G8926 SPIRO NO PERF OR DOC: HCPCS | Performed by: NURSE PRACTITIONER

## 2018-05-11 PROCEDURE — 3023F SPIROM DOC REV: CPT | Performed by: NURSE PRACTITIONER

## 2018-05-11 PROCEDURE — 3017F COLORECTAL CA SCREEN DOC REV: CPT | Performed by: NURSE PRACTITIONER

## 2018-05-11 PROCEDURE — 1036F TOBACCO NON-USER: CPT | Performed by: NURSE PRACTITIONER

## 2018-05-11 ASSESSMENT — ENCOUNTER SYMPTOMS
VOMITING: 0
EYES NEGATIVE: 1
WHEEZING: 0
HEMOPTYSIS: 0
COUGH: 1
NAUSEA: 0
DIARRHEA: 0
SHORTNESS OF BREATH: 0
SPUTUM PRODUCTION: 0
ABDOMINAL PAIN: 0

## 2018-05-23 ENCOUNTER — CARE COORDINATION (OUTPATIENT)
Dept: CARE COORDINATION | Age: 68
End: 2018-05-23

## 2018-05-25 ENCOUNTER — NURSE ONLY (OUTPATIENT)
Dept: FAMILY MEDICINE CLINIC | Age: 68
End: 2018-05-25
Payer: MEDICARE

## 2018-05-25 DIAGNOSIS — E55.9 VITAMIN D DEFICIENCY: ICD-10-CM

## 2018-05-25 DIAGNOSIS — D64.9 ANEMIA, UNSPECIFIED TYPE: Primary | ICD-10-CM

## 2018-05-25 DIAGNOSIS — E11.65 UNCONTROLLED TYPE 2 DIABETES MELLITUS WITH HYPERGLYCEMIA, WITHOUT LONG-TERM CURRENT USE OF INSULIN (HCC): ICD-10-CM

## 2018-05-25 DIAGNOSIS — I48.0 PAROXYSMAL ATRIAL FIBRILLATION (HCC): ICD-10-CM

## 2018-05-25 LAB
ANION GAP SERPL CALCULATED.3IONS-SCNC: 17 MEQ/L (ref 8–16)
AVERAGE GLUCOSE: 141 MG/DL (ref 70–126)
BUN BLDV-MCNC: 13 MG/DL (ref 7–22)
CALCIUM SERPL-MCNC: 9 MG/DL (ref 8.5–10.5)
CHLORIDE BLD-SCNC: 98 MEQ/L (ref 98–111)
CO2: 23 MEQ/L (ref 23–33)
CREAT SERPL-MCNC: 1 MG/DL (ref 0.4–1.2)
GFR SERPL CREATININE-BSD FRML MDRD: 74 ML/MIN/1.73M2
GLUCOSE BLD-MCNC: 278 MG/DL (ref 70–108)
HBA1C MFR BLD: 6.7 % (ref 4.4–6.4)
HCT VFR BLD CALC: 40.6 % (ref 42–52)
HEMOGLOBIN: 13.7 GM/DL (ref 14–18)
MCH RBC QN AUTO: 29.9 PG (ref 27–31)
MCHC RBC AUTO-ENTMCNC: 33.8 GM/DL (ref 33–37)
MCV RBC AUTO: 88.3 FL (ref 80–94)
PDW BLD-RTO: 13.7 % (ref 11.5–14.5)
PLATELET # BLD: 214 THOU/MM3 (ref 130–400)
PMV BLD AUTO: 8.1 FL (ref 7.4–10.4)
POTASSIUM SERPL-SCNC: 4.3 MEQ/L (ref 3.5–5.2)
RBC # BLD: 4.59 MILL/MM3 (ref 4.7–6.1)
SODIUM BLD-SCNC: 138 MEQ/L (ref 135–145)
VITAMIN D 25-HYDROXY: 34 NG/ML (ref 30–100)
WBC # BLD: 5.1 THOU/MM3 (ref 4.8–10.8)

## 2018-05-25 PROCEDURE — 36415 COLL VENOUS BLD VENIPUNCTURE: CPT | Performed by: FAMILY MEDICINE

## 2018-05-25 PROCEDURE — 96372 THER/PROPH/DIAG INJ SC/IM: CPT | Performed by: FAMILY MEDICINE

## 2018-05-25 RX ORDER — CYANOCOBALAMIN 1000 UG/ML
1000 INJECTION INTRAMUSCULAR; SUBCUTANEOUS ONCE
Status: COMPLETED | OUTPATIENT
Start: 2018-05-25 | End: 2018-05-25

## 2018-05-25 RX ADMIN — CYANOCOBALAMIN 1000 MCG: 1000 INJECTION INTRAMUSCULAR; SUBCUTANEOUS at 11:16

## 2018-05-29 ENCOUNTER — OFFICE VISIT (OUTPATIENT)
Dept: FAMILY MEDICINE CLINIC | Age: 68
End: 2018-05-29
Payer: MEDICARE

## 2018-05-29 VITALS
HEART RATE: 68 BPM | DIASTOLIC BLOOD PRESSURE: 64 MMHG | SYSTOLIC BLOOD PRESSURE: 122 MMHG | HEIGHT: 69 IN | WEIGHT: 218 LBS | BODY MASS INDEX: 32.29 KG/M2 | RESPIRATION RATE: 16 BRPM | TEMPERATURE: 97.6 F

## 2018-05-29 DIAGNOSIS — N13.8 BPH WITH OBSTRUCTION/LOWER URINARY TRACT SYMPTOMS: ICD-10-CM

## 2018-05-29 DIAGNOSIS — N40.1 BPH WITH OBSTRUCTION/LOWER URINARY TRACT SYMPTOMS: ICD-10-CM

## 2018-05-29 DIAGNOSIS — I48.0 PAROXYSMAL ATRIAL FIBRILLATION (HCC): ICD-10-CM

## 2018-05-29 DIAGNOSIS — F43.10 POST TRAUMATIC STRESS DISORDER: ICD-10-CM

## 2018-05-29 DIAGNOSIS — Z98.890 S/P CARDIAC CATHETERIZATION: ICD-10-CM

## 2018-05-29 DIAGNOSIS — E11.65 UNCONTROLLED TYPE 2 DIABETES MELLITUS WITH HYPERGLYCEMIA, WITHOUT LONG-TERM CURRENT USE OF INSULIN (HCC): Primary | ICD-10-CM

## 2018-05-29 DIAGNOSIS — G47.33 OBSTRUCTIVE SLEEP APNEA ON CPAP: ICD-10-CM

## 2018-05-29 DIAGNOSIS — Z99.89 OBSTRUCTIVE SLEEP APNEA ON CPAP: ICD-10-CM

## 2018-05-29 DIAGNOSIS — E55.9 VITAMIN D DEFICIENCY: ICD-10-CM

## 2018-05-29 DIAGNOSIS — I10 ESSENTIAL HYPERTENSION: ICD-10-CM

## 2018-05-29 DIAGNOSIS — R97.20 ELEVATED PSA: ICD-10-CM

## 2018-05-29 DIAGNOSIS — E78.5 HYPERLIPIDEMIA WITH TARGET LDL LESS THAN 100: ICD-10-CM

## 2018-05-29 PROCEDURE — 4040F PNEUMOC VAC/ADMIN/RCVD: CPT | Performed by: FAMILY MEDICINE

## 2018-05-29 PROCEDURE — 1036F TOBACCO NON-USER: CPT | Performed by: FAMILY MEDICINE

## 2018-05-29 PROCEDURE — G8427 DOCREV CUR MEDS BY ELIG CLIN: HCPCS | Performed by: FAMILY MEDICINE

## 2018-05-29 PROCEDURE — 3044F HG A1C LEVEL LT 7.0%: CPT | Performed by: FAMILY MEDICINE

## 2018-05-29 PROCEDURE — 1123F ACP DISCUSS/DSCN MKR DOCD: CPT | Performed by: FAMILY MEDICINE

## 2018-05-29 PROCEDURE — 2022F DILAT RTA XM EVC RTNOPTHY: CPT | Performed by: FAMILY MEDICINE

## 2018-05-29 PROCEDURE — 3017F COLORECTAL CA SCREEN DOC REV: CPT | Performed by: FAMILY MEDICINE

## 2018-05-29 PROCEDURE — G8417 CALC BMI ABV UP PARAM F/U: HCPCS | Performed by: FAMILY MEDICINE

## 2018-05-29 PROCEDURE — 99215 OFFICE O/P EST HI 40 MIN: CPT | Performed by: FAMILY MEDICINE

## 2018-06-19 ENCOUNTER — CARE COORDINATION (OUTPATIENT)
Dept: CARE COORDINATION | Age: 68
End: 2018-06-19

## 2018-06-20 ENCOUNTER — NURSE ONLY (OUTPATIENT)
Dept: CARDIOLOGY CLINIC | Age: 68
End: 2018-06-20
Payer: MEDICARE

## 2018-06-20 DIAGNOSIS — Z95.0 PACEMAKER: Primary | ICD-10-CM

## 2018-06-20 PROCEDURE — 93280 PM DEVICE PROGR EVAL DUAL: CPT | Performed by: INTERNAL MEDICINE

## 2018-06-21 ENCOUNTER — NURSE TRIAGE (OUTPATIENT)
Dept: ADMINISTRATIVE | Age: 68
End: 2018-06-21

## 2018-06-22 ENCOUNTER — NURSE ONLY (OUTPATIENT)
Dept: FAMILY MEDICINE CLINIC | Age: 68
End: 2018-06-22
Payer: MEDICARE

## 2018-06-22 DIAGNOSIS — D51.9 ANEMIA DUE TO VITAMIN B12 DEFICIENCY, UNSPECIFIED B12 DEFICIENCY TYPE: Primary | ICD-10-CM

## 2018-06-22 PROCEDURE — 96372 THER/PROPH/DIAG INJ SC/IM: CPT | Performed by: FAMILY MEDICINE

## 2018-06-22 RX ORDER — CYANOCOBALAMIN 1000 UG/ML
1000 INJECTION INTRAMUSCULAR; SUBCUTANEOUS ONCE
Status: COMPLETED | OUTPATIENT
Start: 2018-06-22 | End: 2018-06-22

## 2018-06-22 RX ADMIN — CYANOCOBALAMIN 1000 MCG: 1000 INJECTION INTRAMUSCULAR; SUBCUTANEOUS at 10:57

## 2018-07-19 ENCOUNTER — CARE COORDINATION (OUTPATIENT)
Dept: CARE COORDINATION | Age: 68
End: 2018-07-19

## 2018-07-19 NOTE — CARE COORDINATION
Appointments  Date Time Provider Sy Dodsoni   7/20/2018 11:00 AM ABHINAV Pepe Begun [facility] - [place]   9/19/2018 3:15 PM MD Quin [facility]   10/2/2018 11:10 AM MD Abhinav oRb FM [facility] - [place]   10/16/2018 9:00 AM Alanis Friend, APRN - [facility] [place] [facility] - [place]   1/16/2019 12:45 PM SCHEDULE, CARINA PACER NURSE SRPX PCR PUT [facility] - [place]   4/23/2019 10:15 AM Roseline Ramachandran PA-C Pulm Med [facility] - [place]   5/10/2019 11:30 AM Evette Soundra Runner, FERDINAND - CNP Pul Med [facility] - [place]     ,   Diabetes Assessment    Meal Planning:  Avoidance of concentrated sweets   How often do you test your blood sugar?:  Other, Daily (Comment: Now checking every AM and prior to supper/dinner nightly )   Do you have barriers with adherence to non-pharmacologic self-management interventions?  (Nutrition/Exercise/Self-Monitoring):  No   Have you ever had to go to the ED for symptoms of low blood sugar?:  No       No patient-reported symptoms   Do you have hyperglycemia symptoms?:  No   Do you have hypoglycemia symptoms?:  No   Last Blood Sugar Value:  108   Blood Sugar Monitoring Regimen:  Morning Fasting   Blood Sugar Trends:  No Change      ,   COPD Assessment    Does the patient understand envrionmental exposure?:  Yes  Is the patient able to verbalize Rescue vs. Long Acting medications?:  Yes  Does the patient have a nebulizer?:  No  Does the patient use a space with inhaled medications?:  No     No patient-reported symptoms         Symptoms:      Symptom course:  stable  Increase use of rapid acting/rescue inhaled medications?:  No  Change in chronic cough?:  No/At Baseline  Change in sputum?:  No/At Baseline     ,   General Assessment    Do you have any symptoms that are causing concern?:  Yes  Progression since Onset:  Unchanged, Intermittent - Waxing/Waning  Reported Symptoms:  Fatigue, Nausea, Other (Comment: headache and \"flu\" like symptoms )      and Care Coordination Episodes    Type: [facility]

## 2018-07-20 ENCOUNTER — NURSE ONLY (OUTPATIENT)
Dept: FAMILY MEDICINE CLINIC | Age: 68
End: 2018-07-20
Payer: MEDICARE

## 2018-07-20 DIAGNOSIS — D51.9 ANEMIA DUE TO VITAMIN B12 DEFICIENCY, UNSPECIFIED B12 DEFICIENCY TYPE: Primary | ICD-10-CM

## 2018-07-20 PROCEDURE — 96372 THER/PROPH/DIAG INJ SC/IM: CPT | Performed by: FAMILY MEDICINE

## 2018-07-20 RX ORDER — CYANOCOBALAMIN 1000 UG/ML
1000 INJECTION INTRAMUSCULAR; SUBCUTANEOUS ONCE
Status: COMPLETED | OUTPATIENT
Start: 2018-07-20 | End: 2018-07-20

## 2018-07-20 RX ADMIN — CYANOCOBALAMIN 1000 MCG: 1000 INJECTION INTRAMUSCULAR; SUBCUTANEOUS at 11:09

## 2018-08-21 ENCOUNTER — CARE COORDINATION (OUTPATIENT)
Dept: CARE COORDINATION | Age: 68
End: 2018-08-21

## 2018-08-21 ENCOUNTER — TELEPHONE (OUTPATIENT)
Dept: FAMILY MEDICINE CLINIC | Age: 68
End: 2018-08-21

## 2018-08-21 DIAGNOSIS — E11.65 UNCONTROLLED TYPE 2 DIABETES MELLITUS WITH HYPERGLYCEMIA, WITHOUT LONG-TERM CURRENT USE OF INSULIN (HCC): ICD-10-CM

## 2018-08-21 RX ORDER — LANCETS 30 GAUGE
EACH MISCELLANEOUS
Qty: 100 EACH | Refills: 3 | Status: SHIPPED | OUTPATIENT
Start: 2018-08-21 | End: 2022-02-11

## 2018-08-21 NOTE — CARE COORDINATION
5 mg by mouth daily TAKE 1 TABLET BY MOUTH DAILY 7/28/15  Yes Micaela Franks MD   atorvastatin (LIPITOR) 20 MG tablet take 1 tablet by mouth once daily  Patient taking differently: 40 mg take 1 tablet by mouth once daily 7/28/15  Yes Micaela Franks MD   omeprazole (PRILOSEC) 40 MG capsule Take 1 capsule by mouth daily  Patient taking differently: Take 20 mg by mouth 2 times daily  7/6/15  Yes Micaela Franks MD   propranolol (INDERAL LA) 120 MG CR capsule Take 120 mg by mouth 2 times daily    Yes Historical Provider, MD   montelukast (SINGULAIR) 10 MG tablet take 1 tablet by mouth once daily 6/2/15  Yes FERDINAND Jennings - CNP   b complex vitamins capsule Take 1 capsule by mouth 2 times daily   Yes Historical Provider, MD   PARoxetine (PAXIL) 20 MG tablet   Take 40 mg by mouth daily    Yes Historical Provider, MD   BUSPIRONE HCL PO Take 10 mg by mouth 3 times daily Take 2 tablet by mouth 3 times daily Disp 270 tablets  R-3   Yes Historical Provider, MD   cyanocobalamin 1000 MCG/ML injection Inject 1 mL into the muscle every 30 days. 8/20/14  Yes Micaela Franks MD   aspirin 81 MG EC tablet Take 81 mg by mouth daily. Yes Historical Provider, MD   glucose blood VI test strips (ACCU-CHEK ERVIN) strip 1 each by In Vitro route daily As needed.  10/18/17   Georgina Kruger MD   TENIVAC 5-2 LFU injection  1/25/17   Historical Provider, MD   tamsulosin [facility]) 0.4 MG capsule Take 1 capsule by mouth nightly 10/25/16 5/29/18  Yris Sher MD   hydrOXYzine (VISTARIL) 25 MG capsule Take 25 mg by mouth three times daily     Historical Provider, MD   fluticasone Earnstine Trinidad) 50 MCG/ACT nasal spray USE 2 SPRAYS NASALLY DAILY 8/20/14   Micaela Franks MD       Future Appointments  Date Time Provider Sy yAon   8/23/2018 11:00 [facility] [place]   9/19/2018 1:15 PM Christine Okeefe PA-C [facility] [place]   10/2/2018 11:10 AM MD Anna Sullivan [facility] - [place]   10/16/2018 9:00 AM Fito Hobson

## 2018-08-23 ENCOUNTER — NURSE ONLY (OUTPATIENT)
Dept: FAMILY MEDICINE CLINIC | Age: 68
End: 2018-08-23
Payer: MEDICARE

## 2018-08-23 DIAGNOSIS — E53.8 VITAMIN B 12 DEFICIENCY: Primary | ICD-10-CM

## 2018-08-23 PROCEDURE — 96372 THER/PROPH/DIAG INJ SC/IM: CPT | Performed by: FAMILY MEDICINE

## 2018-08-23 RX ORDER — CYANOCOBALAMIN 1000 UG/ML
1000 INJECTION INTRAMUSCULAR; SUBCUTANEOUS ONCE
Status: COMPLETED | OUTPATIENT
Start: 2018-08-23 | End: 2018-08-23

## 2018-08-23 RX ADMIN — CYANOCOBALAMIN 1000 MCG: 1000 INJECTION INTRAMUSCULAR; SUBCUTANEOUS at 11:03

## 2018-09-19 ENCOUNTER — OFFICE VISIT (OUTPATIENT)
Dept: CARDIOLOGY CLINIC | Age: 68
End: 2018-09-19
Payer: MEDICARE

## 2018-09-19 VITALS
HEART RATE: 60 BPM | WEIGHT: 223 LBS | SYSTOLIC BLOOD PRESSURE: 128 MMHG | HEIGHT: 71 IN | BODY MASS INDEX: 31.22 KG/M2 | DIASTOLIC BLOOD PRESSURE: 62 MMHG

## 2018-09-19 DIAGNOSIS — I25.10 CORONARY ARTERY DISEASE INVOLVING NATIVE CORONARY ARTERY OF NATIVE HEART WITHOUT ANGINA PECTORIS: Primary | ICD-10-CM

## 2018-09-19 DIAGNOSIS — E78.5 HYPERLIPIDEMIA WITH TARGET LDL LESS THAN 100: ICD-10-CM

## 2018-09-19 DIAGNOSIS — Z99.89 OBSTRUCTIVE SLEEP APNEA ON CPAP: ICD-10-CM

## 2018-09-19 DIAGNOSIS — I10 ESSENTIAL HYPERTENSION: ICD-10-CM

## 2018-09-19 DIAGNOSIS — G47.33 OBSTRUCTIVE SLEEP APNEA ON CPAP: ICD-10-CM

## 2018-09-19 DIAGNOSIS — Z95.0 PACEMAKER: ICD-10-CM

## 2018-09-19 DIAGNOSIS — Z98.890 S/P CARDIAC CATHETERIZATION: ICD-10-CM

## 2018-09-19 PROCEDURE — 1123F ACP DISCUSS/DSCN MKR DOCD: CPT | Performed by: PHYSICIAN ASSISTANT

## 2018-09-19 PROCEDURE — G8598 ASA/ANTIPLAT THER USED: HCPCS | Performed by: PHYSICIAN ASSISTANT

## 2018-09-19 PROCEDURE — G8417 CALC BMI ABV UP PARAM F/U: HCPCS | Performed by: PHYSICIAN ASSISTANT

## 2018-09-19 PROCEDURE — 3017F COLORECTAL CA SCREEN DOC REV: CPT | Performed by: PHYSICIAN ASSISTANT

## 2018-09-19 PROCEDURE — 1101F PT FALLS ASSESS-DOCD LE1/YR: CPT | Performed by: PHYSICIAN ASSISTANT

## 2018-09-19 PROCEDURE — 1036F TOBACCO NON-USER: CPT | Performed by: PHYSICIAN ASSISTANT

## 2018-09-19 PROCEDURE — 99213 OFFICE O/P EST LOW 20 MIN: CPT | Performed by: PHYSICIAN ASSISTANT

## 2018-09-19 PROCEDURE — 4040F PNEUMOC VAC/ADMIN/RCVD: CPT | Performed by: PHYSICIAN ASSISTANT

## 2018-09-19 PROCEDURE — G8427 DOCREV CUR MEDS BY ELIG CLIN: HCPCS | Performed by: PHYSICIAN ASSISTANT

## 2018-09-19 RX ORDER — TOPIRAMATE 100 MG/1
100 TABLET, FILM COATED ORAL 2 TIMES DAILY
COMMUNITY
End: 2019-11-06 | Stop reason: CLARIF

## 2018-09-19 NOTE — PROGRESS NOTES
[facility] PROFESSIONAL SERVICES  [facility] [address]   [address] [place] [address] [address] [address]   Dept: [phone]   Dept Fax: [phone]   Loc: [phone]      Chief Complaint   Patient presents with    Check-Up     Pt with a h/o PPM and mild CAD. Pt states he has been doing well. Pt denies any CP, SOB or palpitations. Cardiologist:  Dr. Freire Early:   No fever, no chills, No fatigue or weight loss  Pulmonary:    No dyspnea, no wheezing  Cardiac:    Denies recent chest pain   GI:     No nausea or vomiting, no abdominal pain  Neuro:    No dizziness or light headedness  Musculoskeletal:  No recent active issues  Extremities:   No edema, good peripheral pulses      Past Medical History:   Diagnosis Date    Asthma     [employer] dual pacemaker 4/9/2014    Bronchitis, chronic (HCC)     Carotid artery stenosis     Dr Pearl White Chickenpox     CVA (cerebral infarction)     GERD (gastroesophageal reflux disease)     Hemorrhoids     Hyperlipidemia     Hypertension     Nausea & vomiting     [facility]     [facility]    Osteoarthritis     Peripheral neuropathy     Post traumatic stress disorder (PTSD)     [facility]    S/P cardiac catheterization: 11/20/2013: No obstructive lesions. Moderate LI's in the mid LAD and in the RCA. 11/20/2013 11/20/2013: No obstructive lesions. Moderate LI's in the mid LAD and in the RCA.  Dr. Geoffrey Nobles Tremor of both hands     [facility]    Type II or unspecified type diabetes mellitus without mention of complication, not stated as uncontrolled     Dr Lin garcía    Unspecified sleep apnea     Dr Brad Alvarez       Allergies   Allergen Reactions    Latex Rash     itching    Doxycycline Calcium [Doxycycline Calcium]     Lactose Diarrhea    Nasacort [Triamcinolone] Other (See Comments)     Unable to remember    Wellbutrin [Bupropion] Other (See Comments)     Does not remember       Current Outpatient Prescriptions   Medication Sig Dispense Refill    positive bowel sounds  Extremities:   No edema, no cyanosis, good peripheral pulses  Neurological:   Awake, alert, oriented. No obvious focal deficits  Musculoskeletal:  No obvious deformities      Stress test 3/30/2017  Conclusions      Summary   Lexiscan EKG stress test is not suggestive for ischemia.   The LVEF is calculated to be 55% with mild apical hypokinesis   There was a probable moderate infarct with no ischemia and or , no   viability in the distribution of the left anterior descending in the   anterior and inferior apical region. Echo 3/30/2017  Conclusions      Summary   Systolic function was normal.   Ejection fraction is visually estimated at 55%.   Tricuspid valve is structurally normal.   Mild tricuspid regurgitation visualized. Diagnosis Orders   1. Coronary artery disease involving native coronary artery of native heart without angina pectoris     2. S/P cardiac catheterization: 11/20/2013: No obstructive lesions. Moderate LI's in the mid LAD and in the RCA. 3. [employer] dual pacemaker     4. Obstructive sleep apnea on CPAP     5. Hyperlipidemia with target LDL less than 100     6. Essential hypertension         Continue Dr Tomas Bell current treatment plan    Continue same current medications and with constant vigilance to changes in symptoms and also any potential side effects. Return for care if become worse or seek medical attention immediately. The patient is educated on symptoms of heart disease that include chest pain and passing out, dizziness, etc and to report them if there is any change or go to emergency room.        Follow up with myself in 1 year sooner if needed  (Please note that portions of this note were completed with a voice recognition program.  Efforts were made to edit the dictation but occasionally words are mis-transcribed.)      Sonam Garcia PA-C

## 2018-09-19 NOTE — PROGRESS NOTES
Pt here for 9 month check up     Denies chest pain, palpitations or edema    Continues to have SOB with no change

## 2018-09-25 ENCOUNTER — CARE COORDINATION (OUTPATIENT)
Dept: CARE COORDINATION | Age: 68
End: 2018-09-25

## 2018-09-26 ENCOUNTER — NURSE ONLY (OUTPATIENT)
Dept: FAMILY MEDICINE CLINIC | Age: 68
End: 2018-09-26
Payer: MEDICARE

## 2018-09-26 DIAGNOSIS — D51.9 ANEMIA DUE TO VITAMIN B12 DEFICIENCY, UNSPECIFIED B12 DEFICIENCY TYPE: Primary | ICD-10-CM

## 2018-09-26 DIAGNOSIS — N13.8 BPH WITH OBSTRUCTION/LOWER URINARY TRACT SYMPTOMS: ICD-10-CM

## 2018-09-26 DIAGNOSIS — E11.65 UNCONTROLLED TYPE 2 DIABETES MELLITUS WITH HYPERGLYCEMIA, WITHOUT LONG-TERM CURRENT USE OF INSULIN (HCC): ICD-10-CM

## 2018-09-26 DIAGNOSIS — E55.9 VITAMIN D DEFICIENCY: ICD-10-CM

## 2018-09-26 DIAGNOSIS — N40.1 BPH WITH OBSTRUCTION/LOWER URINARY TRACT SYMPTOMS: ICD-10-CM

## 2018-09-26 LAB
ANION GAP SERPL CALCULATED.3IONS-SCNC: 17 MEQ/L (ref 8–16)
AVERAGE GLUCOSE: 132 MG/DL (ref 70–126)
BUN BLDV-MCNC: 13 MG/DL (ref 7–22)
CALCIUM SERPL-MCNC: 9.7 MG/DL (ref 8.5–10.5)
CHLORIDE BLD-SCNC: 97 MEQ/L (ref 98–111)
CO2: 23 MEQ/L (ref 23–33)
CREAT SERPL-MCNC: 1 MG/DL (ref 0.4–1.2)
CREATININE, URINE: 80.5 MG/DL
GFR SERPL CREATININE-BSD FRML MDRD: 74 ML/MIN/1.73M2
GLUCOSE BLD-MCNC: 137 MG/DL (ref 70–108)
HBA1C MFR BLD: 6.4 % (ref 4.4–6.4)
MICROALBUMIN UR-MCNC: < 1.2 MG/DL
MICROALBUMIN/CREAT UR-RTO: 15 MG/G (ref 0–30)
POTASSIUM SERPL-SCNC: 4.5 MEQ/L (ref 3.5–5.2)
PROSTATE SPECIFIC ANTIGEN: 3.08 NG/ML (ref 0–1)
SODIUM BLD-SCNC: 137 MEQ/L (ref 135–145)
VITAMIN D 25-HYDROXY: 44 NG/ML (ref 30–100)

## 2018-09-26 PROCEDURE — 96372 THER/PROPH/DIAG INJ SC/IM: CPT | Performed by: FAMILY MEDICINE

## 2018-09-26 PROCEDURE — 36415 COLL VENOUS BLD VENIPUNCTURE: CPT | Performed by: FAMILY MEDICINE

## 2018-09-26 RX ORDER — CYANOCOBALAMIN 1000 UG/ML
1000 INJECTION INTRAMUSCULAR; SUBCUTANEOUS ONCE
Status: COMPLETED | OUTPATIENT
Start: 2018-09-26 | End: 2018-09-26

## 2018-09-26 RX ADMIN — CYANOCOBALAMIN 1000 MCG: 1000 INJECTION INTRAMUSCULAR; SUBCUTANEOUS at 08:26

## 2018-10-02 ENCOUNTER — OFFICE VISIT (OUTPATIENT)
Dept: FAMILY MEDICINE CLINIC | Age: 68
End: 2018-10-02
Payer: MEDICARE

## 2018-10-02 ENCOUNTER — CARE COORDINATION (OUTPATIENT)
Dept: CARE COORDINATION | Age: 68
End: 2018-10-02

## 2018-10-02 VITALS
HEART RATE: 68 BPM | SYSTOLIC BLOOD PRESSURE: 128 MMHG | BODY MASS INDEX: 31.33 KG/M2 | DIASTOLIC BLOOD PRESSURE: 70 MMHG | RESPIRATION RATE: 16 BRPM | WEIGHT: 223.8 LBS | HEIGHT: 71 IN | TEMPERATURE: 98 F

## 2018-10-02 DIAGNOSIS — Z99.89 OBSTRUCTIVE SLEEP APNEA ON CPAP: ICD-10-CM

## 2018-10-02 DIAGNOSIS — N40.1 BPH WITH OBSTRUCTION/LOWER URINARY TRACT SYMPTOMS: ICD-10-CM

## 2018-10-02 DIAGNOSIS — N13.8 BPH WITH OBSTRUCTION/LOWER URINARY TRACT SYMPTOMS: ICD-10-CM

## 2018-10-02 DIAGNOSIS — I10 ESSENTIAL HYPERTENSION: ICD-10-CM

## 2018-10-02 DIAGNOSIS — I48.0 PAROXYSMAL ATRIAL FIBRILLATION (HCC): ICD-10-CM

## 2018-10-02 DIAGNOSIS — Z98.890 S/P CARDIAC CATHETERIZATION: ICD-10-CM

## 2018-10-02 DIAGNOSIS — G62.9 PERIPHERAL POLYNEUROPATHY: ICD-10-CM

## 2018-10-02 DIAGNOSIS — G25.0 ESSENTIAL TREMOR: ICD-10-CM

## 2018-10-02 DIAGNOSIS — E78.5 HYPERLIPIDEMIA WITH TARGET LDL LESS THAN 100: ICD-10-CM

## 2018-10-02 DIAGNOSIS — F43.10 POST TRAUMATIC STRESS DISORDER: ICD-10-CM

## 2018-10-02 DIAGNOSIS — E11.65 UNCONTROLLED TYPE 2 DIABETES MELLITUS WITH HYPERGLYCEMIA (HCC): Primary | ICD-10-CM

## 2018-10-02 DIAGNOSIS — G47.33 OBSTRUCTIVE SLEEP APNEA ON CPAP: ICD-10-CM

## 2018-10-02 DIAGNOSIS — Z77.098 AGENT ORANGE EXPOSURE: ICD-10-CM

## 2018-10-02 DIAGNOSIS — J45.20 MILD INTERMITTENT ASTHMA WITHOUT COMPLICATION: ICD-10-CM

## 2018-10-02 PROCEDURE — 1036F TOBACCO NON-USER: CPT | Performed by: FAMILY MEDICINE

## 2018-10-02 PROCEDURE — 2022F DILAT RTA XM EVC RTNOPTHY: CPT | Performed by: FAMILY MEDICINE

## 2018-10-02 PROCEDURE — G8482 FLU IMMUNIZE ORDER/ADMIN: HCPCS | Performed by: FAMILY MEDICINE

## 2018-10-02 PROCEDURE — 3044F HG A1C LEVEL LT 7.0%: CPT | Performed by: FAMILY MEDICINE

## 2018-10-02 PROCEDURE — 99214 OFFICE O/P EST MOD 30 MIN: CPT | Performed by: FAMILY MEDICINE

## 2018-10-02 PROCEDURE — G8427 DOCREV CUR MEDS BY ELIG CLIN: HCPCS | Performed by: FAMILY MEDICINE

## 2018-10-02 PROCEDURE — 1123F ACP DISCUSS/DSCN MKR DOCD: CPT | Performed by: FAMILY MEDICINE

## 2018-10-02 PROCEDURE — 4040F PNEUMOC VAC/ADMIN/RCVD: CPT | Performed by: FAMILY MEDICINE

## 2018-10-02 PROCEDURE — G8417 CALC BMI ABV UP PARAM F/U: HCPCS | Performed by: FAMILY MEDICINE

## 2018-10-02 PROCEDURE — G8598 ASA/ANTIPLAT THER USED: HCPCS | Performed by: FAMILY MEDICINE

## 2018-10-02 PROCEDURE — 3017F COLORECTAL CA SCREEN DOC REV: CPT | Performed by: FAMILY MEDICINE

## 2018-10-02 PROCEDURE — 1101F PT FALLS ASSESS-DOCD LE1/YR: CPT | Performed by: FAMILY MEDICINE

## 2018-10-02 RX ORDER — ALBUTEROL SULFATE 90 UG/1
2 AEROSOL, METERED RESPIRATORY (INHALATION) EVERY 6 HOURS PRN
Qty: 1 INHALER | Refills: 3 | Status: SHIPPED | OUTPATIENT
Start: 2018-10-02 | End: 2019-11-01 | Stop reason: SDUPTHER

## 2018-10-02 RX ORDER — METFORMIN HYDROCHLORIDE 500 MG/1
1000 TABLET, EXTENDED RELEASE ORAL 2 TIMES DAILY
Qty: 60 TABLET | Refills: 11 | Status: SHIPPED
Start: 2018-10-02 | End: 2022-02-11 | Stop reason: DRUGHIGH

## 2018-10-02 ASSESSMENT — PATIENT HEALTH QUESTIONNAIRE - PHQ9
SUM OF ALL RESPONSES TO PHQ QUESTIONS 1-9: 0
2. FEELING DOWN, DEPRESSED OR HOPELESS: 0
SUM OF ALL RESPONSES TO PHQ QUESTIONS 1-9: 0
SUM OF ALL RESPONSES TO PHQ9 QUESTIONS 1 & 2: 0
1. LITTLE INTEREST OR PLEASURE IN DOING THINGS: 0

## 2018-10-03 NOTE — PROGRESS NOTES
hemorrhage. Right eye exhibits normal extraocular motion. Left eye exhibits normal extraocular motion. Right pupil is round and reactive. Left pupil is round and reactive. Pupils are equal.   Cardiovascular: Normal rate, regular rhythm, S1 normal, S2 normal and normal heart sounds. Exam reveals no gallop. No murmur heard. Pulmonary/Chest: Effort normal and breath sounds normal. No respiratory distress. He has no wheezes. He has no rhonchi. He has no rales. Abdominal: Soft. Normal appearance and bowel sounds are normal. He exhibits no distension and no mass. There is no hepatosplenomegaly. No tenderness. He has no rigidity, no rebound and no guarding. No hernia. Musculoskeletal:        Right lower leg: He exhibits no edema. Left lower leg: He exhibits no edema. Neurological: He is alert. Oriented and pleasent        Assessment/Plan   Amy Foreman was seen today for follow-up. Diagnoses and all orders for this visit:    Uncontrolled type 2 diabetes mellitus with hyperglycemia (HCC)  -     metFORMIN (GLUCOPHAGE XR) 500 MG extended release tablet; Take 2 tablets by mouth 2 times daily  -     insulin glargine (BASAGLAR KWIKPEN) 100 UNIT/ML injection pen; Inject 10 Units into the skin nightly  -     Hemoglobin A1C; Future  -     Comprehensive Metabolic Panel; Future  -      DIABETES FOOT EXAM    Paroxysmal atrial fibrillation (HCC)    Obstructive sleep apnea on CPAP    Essential hypertension    Post traumatic stress disorder    Hyperlipidemia with target LDL less than 100  -     Comprehensive Metabolic Panel; Future  -     Lipid Panel; Future    S/P cardiac catheterization: 11/20/2013: No obstructive lesions. Moderate LI's in the mid LAD and in the RCA.     Essential tremor    Peripheral polyneuropathy  -     TSH With Reflex Ft4; Future    BPH with obstruction/lower urinary tract symptoms    Mild intermittent asthma without complication  -     albuterol sulfate HFA (PROAIR HFA) 108 (90 Base) MCG/ACT

## 2018-10-16 ENCOUNTER — CARE COORDINATION (OUTPATIENT)
Dept: CARE COORDINATION | Age: 68
End: 2018-10-16

## 2018-10-16 ENCOUNTER — OFFICE VISIT (OUTPATIENT)
Dept: UROLOGY | Age: 68
End: 2018-10-16
Payer: MEDICARE

## 2018-10-16 VITALS
WEIGHT: 256 LBS | HEIGHT: 71 IN | BODY MASS INDEX: 35.84 KG/M2 | DIASTOLIC BLOOD PRESSURE: 88 MMHG | SYSTOLIC BLOOD PRESSURE: 136 MMHG

## 2018-10-16 DIAGNOSIS — N40.1 BPH WITH OBSTRUCTION/LOWER URINARY TRACT SYMPTOMS: Primary | ICD-10-CM

## 2018-10-16 DIAGNOSIS — N13.8 BPH WITH OBSTRUCTION/LOWER URINARY TRACT SYMPTOMS: Primary | ICD-10-CM

## 2018-10-16 LAB
BILIRUBIN, POC: NORMAL
BLOOD URINE, POC: NORMAL
CLARITY, POC: NORMAL
COLOR, POC: NORMAL
GLUCOSE URINE, POC: NORMAL
KETONES, POC: NORMAL
LEUKOCYTE EST, POC: NORMAL
NITRITE, POC: NORMAL
PH, POC: NORMAL
POST VOID RESIDUAL (PVR): 0 ML
PROTEIN, POC: NORMAL
SPECIFIC GRAVITY, POC: NORMAL
UROBILINOGEN, POC: NORMAL

## 2018-10-16 PROCEDURE — G8427 DOCREV CUR MEDS BY ELIG CLIN: HCPCS | Performed by: NURSE PRACTITIONER

## 2018-10-16 PROCEDURE — G8417 CALC BMI ABV UP PARAM F/U: HCPCS | Performed by: NURSE PRACTITIONER

## 2018-10-16 PROCEDURE — 1101F PT FALLS ASSESS-DOCD LE1/YR: CPT | Performed by: NURSE PRACTITIONER

## 2018-10-16 PROCEDURE — 1036F TOBACCO NON-USER: CPT | Performed by: NURSE PRACTITIONER

## 2018-10-16 PROCEDURE — 99213 OFFICE O/P EST LOW 20 MIN: CPT | Performed by: NURSE PRACTITIONER

## 2018-10-16 PROCEDURE — G8598 ASA/ANTIPLAT THER USED: HCPCS | Performed by: NURSE PRACTITIONER

## 2018-10-16 PROCEDURE — 81002 URINALYSIS NONAUTO W/O SCOPE: CPT | Performed by: NURSE PRACTITIONER

## 2018-10-16 PROCEDURE — 3017F COLORECTAL CA SCREEN DOC REV: CPT | Performed by: NURSE PRACTITIONER

## 2018-10-16 PROCEDURE — G8482 FLU IMMUNIZE ORDER/ADMIN: HCPCS | Performed by: NURSE PRACTITIONER

## 2018-10-16 PROCEDURE — 51798 US URINE CAPACITY MEASURE: CPT | Performed by: NURSE PRACTITIONER

## 2018-10-16 PROCEDURE — 4040F PNEUMOC VAC/ADMIN/RCVD: CPT | Performed by: NURSE PRACTITIONER

## 2018-10-16 PROCEDURE — 1123F ACP DISCUSS/DSCN MKR DOCD: CPT | Performed by: NURSE PRACTITIONER

## 2018-10-16 NOTE — PROGRESS NOTES
EOM are normal. No scleral icterus. PERRLA. Neck:        Supple, symmetrical, trachea midline  Pulmonary/Chest:      Chest symmetric with normal A/P diameter,  Normal respiratory rate and rhthym. No use of accessory muscles. Abdominal:         Soft. No tenderness. Bowel sounds present. Musculoskeletal:         Normal range of motion. No edema or tenderness of lower extremities. Extremities: No cyanosis, clubbing, or edema present. Neurological:        Alert and oriented. No cranial nerve deficit. [facility]:        Normal mood and affect. Rectal: moderate enlarged prostate, normal tone, no masses, nodules, induration palpated, smooth and freely movable      Labs   Urine dip demonstrates   Results for POC orders placed in visit on 10/16/18   poct post void residual   Result Value Ref Range    post void residual 0 ml       Patients recent PSA values are as follows  Lab Results   Component Value Date    PSA 3.08 (H) 09/26/2018    PSA 3.16 (H) 10/26/2016    PSA 3.16 (H) 10/26/2016        Recent BUN/Creatinine:  Lab Results   Component Value Date    BUN 13 09/26/2018    CREATININE 1.0 09/26/2018       Radiology  No recent imaging       Assessment & Plan:     BPH w/LUTS    Alexus f/u for BPH w/ LUTS which ate stable. PVR is 0 ml. Remains on Flomax 0.4 mg daily. PSA is stable, obtain in 1 year. Return in about 1 year (around 10/16/2019) for BPH.     Kavya Livingston, APRN  Urology

## 2018-10-16 NOTE — CARE COORDINATION
tablets by mouth 2 times daily Short time increase to 100 mg BID for 2 weeks then back to 50 mg BID  Patient taking differently: Take 50 mg by mouth 2 times daily Short time increase to 100 mg BID for 2 weeks then back to 50 mg BID. 8/25/16   Wilmar Ford MD   loratadine (CLARITIN) 10 MG tablet Take 10 mg by mouth daily    Historical Provider, MD   rivaroxaban (XARELTO) 20 MG TABS tablet Take 20 mg by mouth daily (with breakfast)    Historical Provider, MD   gabapentin (NEURONTIN) 300 MG capsule Take 300 mg by mouth 2 times daily . Historical Provider, MD   amLODIPine (NORVASC) 5 MG tablet TAKE 1 TABLET BY MOUTH DAILY  Patient taking differently: Take 5 mg by mouth daily  7/28/15   Wilmar Ford MD   atorvastatin (LIPITOR) 20 MG tablet take 1 tablet by mouth once daily  Patient taking differently: 40 mg take 1 tablet by mouth once daily 7/28/15   Wilmar Ford MD   omeprazole (PRILOSEC) 40 MG capsule Take 1 capsule by mouth daily  Patient taking differently: Take 20 mg by mouth 2 times daily  7/6/15   Wilmar Ford MD   propranolol (INDERAL LA) 120 MG CR capsule Take 120 mg by mouth 2 times daily     Historical Provider, MD   montelukast (SINGULAIR) 10 MG tablet take 1 tablet by mouth once daily 6/2/15   FERDINAND Jennings CNP   b complex vitamins capsule Take 1 capsule by mouth 2 times daily    Historical Provider, MD   PARoxetine (PAXIL) 20 MG tablet   Take 40 mg by mouth daily     Historical Provider, MD   BUSPIRONE HCL PO Take 10 mg by mouth 3 times daily Take 2 tablet by mouth 3 times daily Disp 270 tablets  R-3    Historical Provider, MD   fluticasone (FLONASE) 50 MCG/ACT nasal spray USE 2 SPRAYS NASALLY DAILY 8/20/14   Wilmar Ford MD   cyanocobalamin 1000 MCG/ML injection Inject 1 mL into the muscle every 30 days. 8/20/14   Wilmar Ford MD   aspirin 81 MG EC tablet Take 81 mg by mouth daily.       Historical Provider, MD       Future Appointments  Date Time Provider Sy Ayon

## 2018-10-24 ENCOUNTER — NURSE ONLY (OUTPATIENT)
Dept: FAMILY MEDICINE CLINIC | Age: 68
End: 2018-10-24
Payer: MEDICARE

## 2018-10-24 DIAGNOSIS — D51.9 ANEMIA DUE TO VITAMIN B12 DEFICIENCY, UNSPECIFIED B12 DEFICIENCY TYPE: Primary | ICD-10-CM

## 2018-10-24 PROCEDURE — 96372 THER/PROPH/DIAG INJ SC/IM: CPT | Performed by: FAMILY MEDICINE

## 2018-10-24 RX ORDER — CYANOCOBALAMIN 1000 UG/ML
1000 INJECTION INTRAMUSCULAR; SUBCUTANEOUS ONCE
Status: COMPLETED | OUTPATIENT
Start: 2018-10-24 | End: 2018-10-24

## 2018-10-24 RX ADMIN — CYANOCOBALAMIN 1000 MCG: 1000 INJECTION INTRAMUSCULAR; SUBCUTANEOUS at 08:17

## 2018-10-25 ENCOUNTER — OFFICE VISIT (OUTPATIENT)
Dept: FAMILY MEDICINE CLINIC | Age: 68
End: 2018-10-25
Payer: MEDICARE

## 2018-10-25 VITALS
HEART RATE: 72 BPM | SYSTOLIC BLOOD PRESSURE: 100 MMHG | WEIGHT: 222 LBS | RESPIRATION RATE: 14 BRPM | DIASTOLIC BLOOD PRESSURE: 60 MMHG | TEMPERATURE: 98.2 F | BODY MASS INDEX: 30.96 KG/M2

## 2018-10-25 DIAGNOSIS — G56.02 LEFT CARPAL TUNNEL SYNDROME: Primary | ICD-10-CM

## 2018-10-25 PROCEDURE — G8598 ASA/ANTIPLAT THER USED: HCPCS | Performed by: FAMILY MEDICINE

## 2018-10-25 PROCEDURE — 1123F ACP DISCUSS/DSCN MKR DOCD: CPT | Performed by: FAMILY MEDICINE

## 2018-10-25 PROCEDURE — 4040F PNEUMOC VAC/ADMIN/RCVD: CPT | Performed by: FAMILY MEDICINE

## 2018-10-25 PROCEDURE — 99213 OFFICE O/P EST LOW 20 MIN: CPT | Performed by: FAMILY MEDICINE

## 2018-10-25 PROCEDURE — 3017F COLORECTAL CA SCREEN DOC REV: CPT | Performed by: FAMILY MEDICINE

## 2018-10-25 PROCEDURE — 1036F TOBACCO NON-USER: CPT | Performed by: FAMILY MEDICINE

## 2018-10-25 PROCEDURE — G8417 CALC BMI ABV UP PARAM F/U: HCPCS | Performed by: FAMILY MEDICINE

## 2018-10-25 PROCEDURE — G8482 FLU IMMUNIZE ORDER/ADMIN: HCPCS | Performed by: FAMILY MEDICINE

## 2018-10-25 PROCEDURE — 1101F PT FALLS ASSESS-DOCD LE1/YR: CPT | Performed by: FAMILY MEDICINE

## 2018-10-25 PROCEDURE — G8427 DOCREV CUR MEDS BY ELIG CLIN: HCPCS | Performed by: FAMILY MEDICINE

## 2018-10-29 ENCOUNTER — TELEPHONE (OUTPATIENT)
Dept: FAMILY MEDICINE CLINIC | Age: 68
End: 2018-10-29

## 2018-10-29 DIAGNOSIS — E11.65 UNCONTROLLED TYPE 2 DIABETES MELLITUS WITH HYPERGLYCEMIA (HCC): ICD-10-CM

## 2018-11-08 ENCOUNTER — OFFICE VISIT (OUTPATIENT)
Dept: FAMILY MEDICINE CLINIC | Age: 68
End: 2018-11-08
Payer: MEDICARE

## 2018-11-08 VITALS
OXYGEN SATURATION: 98 % | TEMPERATURE: 96.3 F | WEIGHT: 222.6 LBS | RESPIRATION RATE: 18 BRPM | DIASTOLIC BLOOD PRESSURE: 64 MMHG | BODY MASS INDEX: 31.16 KG/M2 | SYSTOLIC BLOOD PRESSURE: 112 MMHG | HEIGHT: 71 IN | HEART RATE: 63 BPM

## 2018-11-08 DIAGNOSIS — L30.9 DERMATITIS OF FACE: Primary | ICD-10-CM

## 2018-11-08 DIAGNOSIS — E11.65 UNCONTROLLED TYPE 2 DIABETES MELLITUS WITH HYPERGLYCEMIA (HCC): ICD-10-CM

## 2018-11-08 PROCEDURE — G8482 FLU IMMUNIZE ORDER/ADMIN: HCPCS | Performed by: FAMILY MEDICINE

## 2018-11-08 PROCEDURE — G8427 DOCREV CUR MEDS BY ELIG CLIN: HCPCS | Performed by: FAMILY MEDICINE

## 2018-11-08 PROCEDURE — G8598 ASA/ANTIPLAT THER USED: HCPCS | Performed by: FAMILY MEDICINE

## 2018-11-08 PROCEDURE — 1123F ACP DISCUSS/DSCN MKR DOCD: CPT | Performed by: FAMILY MEDICINE

## 2018-11-08 PROCEDURE — 1101F PT FALLS ASSESS-DOCD LE1/YR: CPT | Performed by: FAMILY MEDICINE

## 2018-11-08 PROCEDURE — 1036F TOBACCO NON-USER: CPT | Performed by: FAMILY MEDICINE

## 2018-11-08 PROCEDURE — 4040F PNEUMOC VAC/ADMIN/RCVD: CPT | Performed by: FAMILY MEDICINE

## 2018-11-08 PROCEDURE — 3044F HG A1C LEVEL LT 7.0%: CPT | Performed by: FAMILY MEDICINE

## 2018-11-08 PROCEDURE — 3017F COLORECTAL CA SCREEN DOC REV: CPT | Performed by: FAMILY MEDICINE

## 2018-11-08 PROCEDURE — 99213 OFFICE O/P EST LOW 20 MIN: CPT | Performed by: FAMILY MEDICINE

## 2018-11-08 PROCEDURE — 2022F DILAT RTA XM EVC RTNOPTHY: CPT | Performed by: FAMILY MEDICINE

## 2018-11-08 PROCEDURE — G8417 CALC BMI ABV UP PARAM F/U: HCPCS | Performed by: FAMILY MEDICINE

## 2018-11-08 RX ORDER — DIAPER,BRIEF,INFANT-TODD,DISP
EACH MISCELLANEOUS
Qty: 1 TUBE | Refills: 1 | COMMUNITY
Start: 2018-11-08 | End: 2018-11-15

## 2018-11-15 ENCOUNTER — CARE COORDINATION (OUTPATIENT)
Dept: CARE COORDINATION | Age: 68
End: 2018-11-15

## 2018-11-15 DIAGNOSIS — E11.65 UNCONTROLLED TYPE 2 DIABETES MELLITUS WITH HYPERGLYCEMIA (HCC): ICD-10-CM

## 2018-11-15 NOTE — CARE COORDINATION
Prevention:  Completed  Medication Assistance Program:  Declined (Comment: [facility] receives meds thru [facility] )  Transportation Support:  Declined  Zone Management Tools:  Completed  Other Services or Interventions:  [facility] [place] [address]             Most Recent       Care Coordination     Conditions and Symptoms   Improving (11/15/2018)             I will notify my provider of any barriers to my plan of care. I will follow my Zone Management tool to seek urgent or emergent care. I will notify my provider of any symptoms that indicate a worsening of my condition. Barriers: none  Plan for overcoming my barriers: N/A  Confidence: 7/10  Anticipated Goal Completion Date: ongoing              Prior to Admission medications    Medication Sig Start Date End Date Taking? Authorizing Provider   insulin glargine (BASAGLAR KWIKPEN) 100 UNIT/ML injection pen Inject 20 Units into the skin nightly 11/8/18  Yes Kayla Boateng MD   metFORMIN (GLUCOPHAGE XR) 500 MG extended release tablet Take 2 tablets by mouth 2 times daily 10/2/18  Yes Kayla Boateng MD   mupirocin (BACTROBAN) 2 % ointment Apply 3 times daily. 11/8/18 11/15/18  Kayla Boateng MD   hydrocortisone 1 % cream Apply topically 2 times daily. 11/8/18 11/15/18  Kayla Boateng MD   albuterol sulfate HFA (PROAIR HFA) 108 (90 Base) MCG/ACT inhaler Inhale 2 puffs into the lungs every 6 hours as needed for Wheezing 10/2/18   Kayla Boateng MD   topiramate (TOPAMAX) 100 MG tablet Take 100 mg by mouth 2 times daily    Historical Provider, MD   blood glucose test strips (ACCU-CHEK ERVIN) strip 1 each by In Vitro route 2 times daily As needed.  8/21/18   Kayla Boateng MD   Lancets MISC Testing BID 8/21/18   Kayla Boateng MD   vitamin D (CHOLECALCIFEROL) 1000 UNIT TABS tablet Take 1 tablet by mouth 2 times daily 5/29/18   Kayla Boateng MD   olodaterol (STRIVERDI RESPIMAT) 2.5 MCG/ACT inhaler Inhale 2 puffs into the lungs daily Drake Baker MD   cyanocobalamin 1000 MCG/ML injection Inject 1 mL into the muscle every 30 days. 8/20/14   Drake Baker MD   aspirin 81 MG EC tablet Take 81 mg by mouth daily. Historical Provider, MD       Future Appointments  Date Time Provider Sy Ayon   11/19/2018 9:00 AM SCHEDULE, ABHINAV [facility]low [facility]   1/16/2019 12:45 PM SCHEDULE, CARINA PACER NURSE SRPX PCR [facility] [place]   1/16/2019 1:00 PM Keo Simpson PA-C [facility] [place]   2/6/2019 10:30 AM MD Abhinav Warren [facility]   4/23/2019 10:15 AM Shay Oakes PA-C Pulm Med [facility] [place]   5/10/2019 11:30 AM FERDINAND Hooks CNP Pul Med [facility] [place]   10/15/2019 8:30 AM FERDINAND Rothman - DARIUS Lucas [facility] [place]     ,   Diabetes Assessment    Meal Planning:  Avoidance of concentrated sweets   How often do you test your blood sugar?:  Other, Daily (Comment: Now checking every AM and prior to supper/dinner nightly )   Do you have barriers with adherence to non-pharmacologic self-management interventions?  (Nutrition/Exercise/Self-Monitoring):  No   Have you ever had to go to the ED for symptoms of low blood sugar?:  No       No patient-reported symptoms   Do you have hyperglycemia symptoms?:  No   Do you have hypoglycemia symptoms?:  No   Last Blood Sugar Value:  180   Blood Sugar Monitoring Regimen:  Once a Day, Morning Fasting   Blood Sugar Trends:  No Change      ,   COPD Assessment    Does the patient understand envrionmental exposure?:  Yes  Is the patient able to verbalize Rescue vs. Long Acting medications?:  Yes  Does the patient have a nebulizer?:  No  Does the patient use a space with inhaled medications?:  No     No patient-reported symptoms         Symptoms:      Symptom course:  stable  Increase use of rapid acting/rescue inhaled medications?:  No  Change in chronic cough?:  No/At Baseline  Change in sputum?:  No/At Baseline     ,   General Assessment    Do you have any symptoms that are causing concern?:  Yes  Progression since Onset:  Gradually Improving  Reported Symptoms:  Rash      and Care Coordination Episodes    Type: Amb Care Coordination  Episode: Complex Care  Noted: 8/22/2017

## 2018-11-19 ENCOUNTER — NURSE ONLY (OUTPATIENT)
Dept: FAMILY MEDICINE CLINIC | Age: 68
End: 2018-11-19
Payer: MEDICARE

## 2018-11-19 DIAGNOSIS — D51.9 ANEMIA DUE TO VITAMIN B12 DEFICIENCY, UNSPECIFIED B12 DEFICIENCY TYPE: Primary | ICD-10-CM

## 2018-11-19 PROCEDURE — 96372 THER/PROPH/DIAG INJ SC/IM: CPT | Performed by: FAMILY MEDICINE

## 2018-11-19 RX ORDER — CYANOCOBALAMIN 1000 UG/ML
1000 INJECTION INTRAMUSCULAR; SUBCUTANEOUS ONCE
Status: COMPLETED | OUTPATIENT
Start: 2018-11-19 | End: 2018-11-19

## 2018-11-19 RX ADMIN — CYANOCOBALAMIN 1000 MCG: 1000 INJECTION INTRAMUSCULAR; SUBCUTANEOUS at 09:48

## 2018-12-17 ENCOUNTER — NURSE ONLY (OUTPATIENT)
Dept: FAMILY MEDICINE CLINIC | Age: 68
End: 2018-12-17
Payer: MEDICARE

## 2018-12-17 DIAGNOSIS — D51.9 ANEMIA DUE TO VITAMIN B12 DEFICIENCY, UNSPECIFIED B12 DEFICIENCY TYPE: Primary | ICD-10-CM

## 2018-12-17 PROCEDURE — 96372 THER/PROPH/DIAG INJ SC/IM: CPT | Performed by: FAMILY MEDICINE

## 2018-12-17 RX ORDER — CYANOCOBALAMIN 1000 UG/ML
1000 INJECTION INTRAMUSCULAR; SUBCUTANEOUS ONCE
Status: COMPLETED | OUTPATIENT
Start: 2018-12-17 | End: 2018-12-17

## 2018-12-17 RX ADMIN — CYANOCOBALAMIN 1000 MCG: 1000 INJECTION INTRAMUSCULAR; SUBCUTANEOUS at 13:35

## 2018-12-17 NOTE — PROGRESS NOTES
Administrations This Visit     cyanocobalamin injection 1,000 mcg     Admin Date  12/17/2018  13:35 Action  Given Dose  1000 mcg Route  Intramuscular Site  Dorsogluteal Right Administered By  Dave Bower LPN    Ordering Provider:  Ilsa Fraga MD    [address]:  [id number]    Lot#:  [id number]    :  Bvents0 [facility]    Patient Supplied?:  No    Comments:  EXP: 03/01/2020                Patient instructed to report any adverse reaction to me immediately. PATIENT TOLERATED WELL.

## 2018-12-18 ENCOUNTER — CARE COORDINATION (OUTPATIENT)
Dept: CARE COORDINATION | Age: 68
End: 2018-12-18

## 2019-01-16 ENCOUNTER — OFFICE VISIT (OUTPATIENT)
Dept: CARDIOLOGY CLINIC | Age: 69
End: 2019-01-16
Payer: MEDICARE

## 2019-01-16 ENCOUNTER — NURSE ONLY (OUTPATIENT)
Dept: CARDIOLOGY CLINIC | Age: 69
End: 2019-01-16
Payer: MEDICARE

## 2019-01-16 ENCOUNTER — NURSE ONLY (OUTPATIENT)
Dept: FAMILY MEDICINE CLINIC | Age: 69
End: 2019-01-16
Payer: MEDICARE

## 2019-01-16 VITALS
SYSTOLIC BLOOD PRESSURE: 128 MMHG | HEART RATE: 72 BPM | WEIGHT: 223 LBS | BODY MASS INDEX: 33.03 KG/M2 | DIASTOLIC BLOOD PRESSURE: 62 MMHG | HEIGHT: 69 IN

## 2019-01-16 DIAGNOSIS — Z95.0 PACEMAKER: Primary | ICD-10-CM

## 2019-01-16 DIAGNOSIS — Z95.0 PACEMAKER: ICD-10-CM

## 2019-01-16 DIAGNOSIS — D51.8 OTHER VITAMIN B12 DEFICIENCY ANEMIA: Primary | ICD-10-CM

## 2019-01-16 DIAGNOSIS — I48.0 PAROXYSMAL ATRIAL FIBRILLATION (HCC): Primary | ICD-10-CM

## 2019-01-16 DIAGNOSIS — Z98.890 S/P CARDIAC CATHETERIZATION: ICD-10-CM

## 2019-01-16 DIAGNOSIS — I10 ESSENTIAL HYPERTENSION: ICD-10-CM

## 2019-01-16 DIAGNOSIS — E78.5 HYPERLIPIDEMIA WITH TARGET LDL LESS THAN 100: ICD-10-CM

## 2019-01-16 PROCEDURE — 1036F TOBACCO NON-USER: CPT | Performed by: PHYSICIAN ASSISTANT

## 2019-01-16 PROCEDURE — G8427 DOCREV CUR MEDS BY ELIG CLIN: HCPCS | Performed by: PHYSICIAN ASSISTANT

## 2019-01-16 PROCEDURE — 3017F COLORECTAL CA SCREEN DOC REV: CPT | Performed by: PHYSICIAN ASSISTANT

## 2019-01-16 PROCEDURE — 1123F ACP DISCUSS/DSCN MKR DOCD: CPT | Performed by: PHYSICIAN ASSISTANT

## 2019-01-16 PROCEDURE — 93280 PM DEVICE PROGR EVAL DUAL: CPT | Performed by: INTERNAL MEDICINE

## 2019-01-16 PROCEDURE — 1101F PT FALLS ASSESS-DOCD LE1/YR: CPT | Performed by: PHYSICIAN ASSISTANT

## 2019-01-16 PROCEDURE — 99213 OFFICE O/P EST LOW 20 MIN: CPT | Performed by: PHYSICIAN ASSISTANT

## 2019-01-16 PROCEDURE — G8417 CALC BMI ABV UP PARAM F/U: HCPCS | Performed by: PHYSICIAN ASSISTANT

## 2019-01-16 PROCEDURE — 96372 THER/PROPH/DIAG INJ SC/IM: CPT | Performed by: FAMILY MEDICINE

## 2019-01-16 PROCEDURE — 4040F PNEUMOC VAC/ADMIN/RCVD: CPT | Performed by: PHYSICIAN ASSISTANT

## 2019-01-16 PROCEDURE — G8482 FLU IMMUNIZE ORDER/ADMIN: HCPCS | Performed by: PHYSICIAN ASSISTANT

## 2019-01-16 RX ORDER — DULOXETIN HYDROCHLORIDE 60 MG/1
60 CAPSULE, DELAYED RELEASE ORAL DAILY
COMMUNITY

## 2019-01-16 RX ORDER — CYANOCOBALAMIN 1000 UG/ML
1000 INJECTION INTRAMUSCULAR; SUBCUTANEOUS ONCE
Status: COMPLETED | OUTPATIENT
Start: 2019-01-16 | End: 2019-01-16

## 2019-01-16 RX ADMIN — CYANOCOBALAMIN 1000 MCG: 1000 INJECTION INTRAMUSCULAR; SUBCUTANEOUS at 13:43

## 2019-01-17 ENCOUNTER — CARE COORDINATION (OUTPATIENT)
Dept: CARE COORDINATION | Age: 69
End: 2019-01-17

## 2019-01-28 ENCOUNTER — NURSE ONLY (OUTPATIENT)
Dept: FAMILY MEDICINE CLINIC | Age: 69
End: 2019-01-28
Payer: MEDICARE

## 2019-01-28 DIAGNOSIS — Z77.098 AGENT ORANGE EXPOSURE: ICD-10-CM

## 2019-01-28 DIAGNOSIS — G62.9 PERIPHERAL POLYNEUROPATHY: ICD-10-CM

## 2019-01-28 DIAGNOSIS — E78.5 HYPERLIPIDEMIA WITH TARGET LDL LESS THAN 100: ICD-10-CM

## 2019-01-28 DIAGNOSIS — E11.65 UNCONTROLLED TYPE 2 DIABETES MELLITUS WITH HYPERGLYCEMIA (HCC): ICD-10-CM

## 2019-01-28 LAB
ALBUMIN SERPL-MCNC: 4.8 G/DL (ref 3.5–5.1)
ALP BLD-CCNC: 130 U/L (ref 38–126)
ALT SERPL-CCNC: 23 U/L (ref 11–66)
ANION GAP SERPL CALCULATED.3IONS-SCNC: 11 MEQ/L (ref 8–16)
AST SERPL-CCNC: 16 U/L (ref 5–40)
AVERAGE GLUCOSE: 132 MG/DL (ref 70–126)
BILIRUB SERPL-MCNC: 0.6 MG/DL (ref 0.3–1.2)
BUN BLDV-MCNC: 13 MG/DL (ref 7–22)
CALCIUM SERPL-MCNC: 9.2 MG/DL (ref 8.5–10.5)
CHLORIDE BLD-SCNC: 94 MEQ/L (ref 98–111)
CHOLESTEROL, TOTAL: 117 MG/DL (ref 100–199)
CO2: 27 MEQ/L (ref 23–33)
CREAT SERPL-MCNC: 1.2 MG/DL (ref 0.4–1.2)
ERYTHROCYTE [DISTWIDTH] IN BLOOD BY AUTOMATED COUNT: 12.5 % (ref 11.5–14.5)
ERYTHROCYTE [DISTWIDTH] IN BLOOD BY AUTOMATED COUNT: 41 FL (ref 35–45)
FOLATE: > 20 NG/ML (ref 4.8–24.2)
GFR SERPL CREATININE-BSD FRML MDRD: 60 ML/MIN/1.73M2
GLUCOSE BLD-MCNC: 162 MG/DL (ref 70–108)
HBA1C MFR BLD: 6.4 % (ref 4.4–6.4)
HCT VFR BLD CALC: 44.1 % (ref 42–52)
HDLC SERPL-MCNC: 49 MG/DL
HEMOGLOBIN: 14.7 GM/DL (ref 14–18)
LDL CHOLESTEROL CALCULATED: 53 MG/DL
MCH RBC QN AUTO: 29.9 PG (ref 26–33)
MCHC RBC AUTO-ENTMCNC: 33.3 GM/DL (ref 32.2–35.5)
MCV RBC AUTO: 89.6 FL (ref 80–94)
PLATELET # BLD: 261 THOU/MM3 (ref 130–400)
PMV BLD AUTO: 9.6 FL (ref 9.4–12.4)
POTASSIUM SERPL-SCNC: 5.2 MEQ/L (ref 3.5–5.2)
RBC # BLD: 4.92 MILL/MM3 (ref 4.7–6.1)
SODIUM BLD-SCNC: 132 MEQ/L (ref 135–145)
TOTAL PROTEIN: 7 G/DL (ref 6.1–8)
TRIGL SERPL-MCNC: 77 MG/DL (ref 0–199)
TSH SERPL DL<=0.05 MIU/L-ACNC: 3.78 UIU/ML (ref 0.4–4.2)
VITAMIN B-12: 1175 PG/ML (ref 211–911)
WBC # BLD: 5.7 THOU/MM3 (ref 4.8–10.8)

## 2019-01-28 PROCEDURE — 36415 COLL VENOUS BLD VENIPUNCTURE: CPT | Performed by: FAMILY MEDICINE

## 2019-02-06 ENCOUNTER — OFFICE VISIT (OUTPATIENT)
Dept: FAMILY MEDICINE CLINIC | Age: 69
End: 2019-02-06
Payer: MEDICARE

## 2019-02-06 VITALS
HEART RATE: 68 BPM | BODY MASS INDEX: 33.09 KG/M2 | SYSTOLIC BLOOD PRESSURE: 112 MMHG | WEIGHT: 223.4 LBS | DIASTOLIC BLOOD PRESSURE: 68 MMHG | RESPIRATION RATE: 16 BRPM | HEIGHT: 69 IN | TEMPERATURE: 97.4 F

## 2019-02-06 DIAGNOSIS — G25.0 ESSENTIAL TREMOR: ICD-10-CM

## 2019-02-06 DIAGNOSIS — N40.1 BPH WITH OBSTRUCTION/LOWER URINARY TRACT SYMPTOMS: ICD-10-CM

## 2019-02-06 DIAGNOSIS — F43.10 POST TRAUMATIC STRESS DISORDER: ICD-10-CM

## 2019-02-06 DIAGNOSIS — E11.65 UNCONTROLLED TYPE 2 DIABETES MELLITUS WITH HYPERGLYCEMIA (HCC): Primary | ICD-10-CM

## 2019-02-06 DIAGNOSIS — Z98.890 S/P CARDIAC CATHETERIZATION: ICD-10-CM

## 2019-02-06 DIAGNOSIS — I10 ESSENTIAL HYPERTENSION: ICD-10-CM

## 2019-02-06 DIAGNOSIS — E87.1 HYPONATREMIA: ICD-10-CM

## 2019-02-06 DIAGNOSIS — I48.0 PAROXYSMAL ATRIAL FIBRILLATION (HCC): ICD-10-CM

## 2019-02-06 DIAGNOSIS — L71.0 PERIORAL DERMATITIS: ICD-10-CM

## 2019-02-06 DIAGNOSIS — Z99.89 OBSTRUCTIVE SLEEP APNEA ON CPAP: ICD-10-CM

## 2019-02-06 DIAGNOSIS — G47.33 OBSTRUCTIVE SLEEP APNEA ON CPAP: ICD-10-CM

## 2019-02-06 DIAGNOSIS — N13.8 BPH WITH OBSTRUCTION/LOWER URINARY TRACT SYMPTOMS: ICD-10-CM

## 2019-02-06 DIAGNOSIS — E53.8 VITAMIN B12 DEFICIENCY: ICD-10-CM

## 2019-02-06 DIAGNOSIS — J45.20 MILD INTERMITTENT ASTHMA WITHOUT COMPLICATION: ICD-10-CM

## 2019-02-06 DIAGNOSIS — E78.5 HYPERLIPIDEMIA WITH TARGET LDL LESS THAN 100: ICD-10-CM

## 2019-02-06 LAB — SODIUM BLD-SCNC: 129 MEQ/L (ref 135–145)

## 2019-02-06 PROCEDURE — 4040F PNEUMOC VAC/ADMIN/RCVD: CPT | Performed by: FAMILY MEDICINE

## 2019-02-06 PROCEDURE — G8427 DOCREV CUR MEDS BY ELIG CLIN: HCPCS | Performed by: FAMILY MEDICINE

## 2019-02-06 PROCEDURE — 1101F PT FALLS ASSESS-DOCD LE1/YR: CPT | Performed by: FAMILY MEDICINE

## 2019-02-06 PROCEDURE — G8482 FLU IMMUNIZE ORDER/ADMIN: HCPCS | Performed by: FAMILY MEDICINE

## 2019-02-06 PROCEDURE — 99214 OFFICE O/P EST MOD 30 MIN: CPT | Performed by: FAMILY MEDICINE

## 2019-02-06 PROCEDURE — 3017F COLORECTAL CA SCREEN DOC REV: CPT | Performed by: FAMILY MEDICINE

## 2019-02-06 PROCEDURE — 36415 COLL VENOUS BLD VENIPUNCTURE: CPT | Performed by: FAMILY MEDICINE

## 2019-02-06 PROCEDURE — 2022F DILAT RTA XM EVC RTNOPTHY: CPT | Performed by: FAMILY MEDICINE

## 2019-02-06 PROCEDURE — G8417 CALC BMI ABV UP PARAM F/U: HCPCS | Performed by: FAMILY MEDICINE

## 2019-02-06 PROCEDURE — 3044F HG A1C LEVEL LT 7.0%: CPT | Performed by: FAMILY MEDICINE

## 2019-02-06 PROCEDURE — 1123F ACP DISCUSS/DSCN MKR DOCD: CPT | Performed by: FAMILY MEDICINE

## 2019-02-06 PROCEDURE — 1036F TOBACCO NON-USER: CPT | Performed by: FAMILY MEDICINE

## 2019-02-06 ASSESSMENT — PATIENT HEALTH QUESTIONNAIRE - PHQ9
1. LITTLE INTEREST OR PLEASURE IN DOING THINGS: 0
2. FEELING DOWN, DEPRESSED OR HOPELESS: 0
SUM OF ALL RESPONSES TO PHQ QUESTIONS 1-9: 0
SUM OF ALL RESPONSES TO PHQ9 QUESTIONS 1 & 2: 0
SUM OF ALL RESPONSES TO PHQ QUESTIONS 1-9: 0

## 2019-02-07 ENCOUNTER — TELEPHONE (OUTPATIENT)
Dept: FAMILY MEDICINE CLINIC | Age: 69
End: 2019-02-07

## 2019-02-11 ENCOUNTER — NURSE ONLY (OUTPATIENT)
Dept: FAMILY MEDICINE CLINIC | Age: 69
End: 2019-02-11
Payer: MEDICARE

## 2019-02-11 DIAGNOSIS — Z01.89 ROUTINE LAB DRAW: ICD-10-CM

## 2019-02-11 DIAGNOSIS — E87.1 HYPONATREMIA: Primary | ICD-10-CM

## 2019-02-11 LAB — SODIUM BLD-SCNC: 134 MEQ/L (ref 136–145)

## 2019-02-11 PROCEDURE — 36415 COLL VENOUS BLD VENIPUNCTURE: CPT | Performed by: FAMILY MEDICINE

## 2019-02-14 ENCOUNTER — TELEPHONE (OUTPATIENT)
Dept: FAMILY MEDICINE CLINIC | Age: 69
End: 2019-02-14

## 2019-02-15 ENCOUNTER — TELEPHONE (OUTPATIENT)
Dept: CARDIOLOGY CLINIC | Age: 69
End: 2019-02-15

## 2019-03-04 ENCOUNTER — NURSE ONLY (OUTPATIENT)
Dept: FAMILY MEDICINE CLINIC | Age: 69
End: 2019-03-04
Payer: MEDICARE

## 2019-03-04 DIAGNOSIS — E53.8 B12 DEFICIENCY: Primary | ICD-10-CM

## 2019-03-04 PROCEDURE — 96372 THER/PROPH/DIAG INJ SC/IM: CPT | Performed by: FAMILY MEDICINE

## 2019-03-04 RX ORDER — CYANOCOBALAMIN 1000 UG/ML
1000 INJECTION INTRAMUSCULAR; SUBCUTANEOUS ONCE
Status: COMPLETED | OUTPATIENT
Start: 2019-03-04 | End: 2019-03-04

## 2019-03-04 RX ADMIN — CYANOCOBALAMIN 1000 MCG: 1000 INJECTION INTRAMUSCULAR; SUBCUTANEOUS at 10:50

## 2019-04-16 ENCOUNTER — CARE COORDINATION (OUTPATIENT)
Dept: CARE COORDINATION | Age: 69
End: 2019-04-16

## 2019-04-16 NOTE — CARE COORDINATION
Ambulatory Care Coordination Note  4/16/2019  CM Risk Score: 4  Ilda Mortality Risk Score: 6    ACC: Yumiko Perez RN    Summary Note: Patient was called for continued Care Coordination follow up and education. Patient shared he has been doing well. Patient stated he continues to monitor BS every AM and they remain stable in the 140-160 range. Patient denied any c/o hypoglcyemia or hyperglycemia symptoms being present. Diabetes education was reviewed and patient verbalized understanding. Patient reported his breathing remains at his baseline and he denied any c/o worsening SOB or cough being present. COPD education reviewed. Med Rec completed and patient shared he is now receiving medications thru the [place] and he denied any concerns. Patient denied any other questions, concerns, or needs and he was encouraged to call with any that may develop. Patient has remained stable and is aware of the resources available to him so will plan to discharge from Care Coordination at this time. Patient verbalized understanding and agreement to this plan. Actions:   - Reviewed COPD and Diabetes education   - Monitored for additional needs  - Reviewed home medications and monitored for any need for assistance          Plan of Care:   - Discharge from Care Coordination     Care Coordination Interventions    Program Enrollment:  Complex Care  Referral from Primary Care Provider:  No  Suggested Interventions and Community Resources  Diabetes Education:  Completed  Fall Risk Prevention:  Completed  Medication Assistance Program:  Declined (Comment: states receives meds thru [facility] )  Transportation Support:  Declined  Zone Management Tools:  Completed  Other Services or Interventions:  [facility] [place]         Goals Addressed                 This Visit's Progress       Care Coordination     Conditions and Symptoms   On track     I will notify my provider of any barriers to my plan of care.   I will follow my Zone Management tool to seek urgent or emergent care. I will notify my provider of any symptoms that indicate a worsening of my condition. Barriers: none  Plan for overcoming my barriers: N/A  Confidence: 7/10  Anticipated Goal Completion Date: ongoing              Prior to Admission medications    Medication Sig Start Date End Date Taking? Authorizing Provider   insulin detemir (LEVEMIR FLEXTOUCH) 100 UNIT/ML injection pen Inject 25 Units into the skin nightly 2/6/19  Yes Sharona Gan MD   DULoxetine (CYMBALTA) 60 MG extended release capsule Take 60 mg by mouth daily   Yes Historical Provider, MD   metFORMIN (GLUCOPHAGE XR) 500 MG extended release tablet Take 2 tablets by mouth 2 times daily 10/2/18  Yes Sharona Gan MD   albuterol sulfate HFA (PROAIR HFA) 108 (90 Base) MCG/ACT inhaler Inhale 2 puffs into the lungs every 6 hours as needed for Wheezing 10/2/18  Yes Sharona Gan MD   topiramate (TOPAMAX) 100 MG tablet Take 100 mg by mouth 2 times daily   Yes Historical Provider, MD   vitamin D (CHOLECALCIFEROL) 1000 UNIT TABS tablet Take 1 tablet by mouth 2 times daily 5/29/18  Yes Sharona Gan MD   olodaterol (STRIVERDI RESPIMAT) 2.5 MCG/ACT inhaler Inhale 2 puffs into the lungs daily 5/11/18  Yes FERDINAND Soto CNP   tamsulosin (FLOMAX) 0.4 MG capsule Take 1 capsule by mouth nightly 10/25/16 4/16/19 Yes Keily Ricardo MD   loratadine (CLARITIN) 10 MG tablet Take 10 mg by mouth daily   Yes Historical Provider, MD   rivaroxaban (XARELTO) 20 MG TABS tablet Take 20 mg by mouth daily (with breakfast)   Yes Historical Provider, MD   gabapentin (NEURONTIN) 300 MG capsule Take 300 mg by mouth 2 times daily .    Yes Historical Provider, MD   amLODIPine (NORVASC) 5 MG tablet TAKE 1 TABLET BY MOUTH DAILY  Patient taking differently: Take 5 mg by mouth daily  7/28/15  Yes Sharona Gan MD   atorvastatin (LIPITOR) 20 MG tablet take 1 tablet by mouth once daily  Patient taking differently: 40 mg take 1 tablet by mouth once daily 7/28/15  Yes Nabil Horton MD   omeprazole (PRILOSEC) 40 MG capsule Take 1 capsule by mouth daily  Patient taking differently: Take 20 mg by mouth 2 times daily  7/6/15  Yes Nabil Horton MD   propranolol (INDERAL LA) 120 MG CR capsule Take 120 mg by mouth 2 times daily    Yes Historical Provider, MD   montelukast (SINGULAIR) 10 MG tablet take 1 tablet by mouth once daily 6/2/15  Yes FERDINAND Jennings CNP   b complex vitamins capsule Take 1 capsule by mouth 2 times daily   Yes Historical Provider, MD   BUSPIRONE HCL PO Take 10 mg by mouth 3 times daily Take 2 tablet by mouth 3 times daily Disp 270 tablets  R-3   Yes Historical Provider, MD   fluticasone (FLONASE) 50 MCG/ACT nasal spray USE 2 SPRAYS NASALLY DAILY 8/20/14  Yes Nabil Horton MD   cyanocobalamin 1000 MCG/ML injection Inject 1 mL into the muscle every 30 days. 8/20/14  Yes Nabil Horton MD   aspirin 81 MG EC tablet Take 81 mg by mouth daily. Yes Historical Provider, MD   blood glucose test strips (ACCU-CHEK ERVIN) strip 1 each by In Vitro route 2 times daily As needed.  8/21/18   Nabil Horton MD   Lancets MISC Testing BID 8/21/18   Nabil Horton MD   Omega-3 Fatty Acids (FISH OIL) 500 MG CAPS Take by mouth 2 times daily    Historical Provider, MD       Future Appointments   Date Time Provider Sy Ayon   4/18/2019  1:30 PM ABHINAV Pepe [facility] [place]   4/23/2019 10:15 AM Tirso Luna PA-C Pulm [facility] [place]   5/10/2019 11:30 AM [facility] [place]   5/14/2019 10:15 AM MD Abhinav Manning [facility] [place]   6/19/2019 12:00 PM SCHEDULE, CARINA PACER NURSE SRPX PCR [facility] [place]   10/15/2019  8:30 AM FERDINAND Wong - DARIUS [facility] [place]   1/15/2020  1:00 PM [facility]  [place]     ,   Diabetes Assessment    Meal Planning:  Avoidance of concentrated sweets   How often do you test your blood sugar?:  Other, Daily (Comment: Now checking every AM and prior to supper/dinner nightly )   Do you have barriers with adherence to non-pharmacologic self-management interventions?  (Nutrition/Exercise/Self-Monitoring):  No   Have you ever had to go to the ED for symptoms of low blood sugar?:  No       No patient-reported symptoms   Do you have hyperglycemia symptoms?:  No   Do you have hypoglycemia symptoms?:  No   Blood Sugar Monitoring Regimen:  Morning Fasting   Blood Sugar Trends:  No Change      ,   COPD Assessment    Does the patient understand envrionmental exposure?:  Yes  Is the patient able to verbalize Rescue vs. Long Acting medications?:  Yes  Does the patient have a nebulizer?:  No  Does the patient use a space with inhaled medications?:  No     No patient-reported symptoms         Symptoms:      Symptom course:  stable  Increase use of rapid acting/rescue inhaled medications?:  No  Change in chronic cough?:  No/At Baseline  Change in sputum?:  No/At Baseline     ,   General Assessment    Do you have any symptoms that are causing concern?:  Yes  Progression since Onset:  Unchanged, Intermittent - Waxing/Waning  Reported Symptoms:  Other (Comment: sinsus congestion )      and Care Coordination Episodes    Type: Amb Care Coordination  Episode: Complex Care  Noted: 8/22/2017

## 2019-04-18 ENCOUNTER — NURSE ONLY (OUTPATIENT)
Dept: FAMILY MEDICINE CLINIC | Age: 69
End: 2019-04-18
Payer: MEDICARE

## 2019-04-18 DIAGNOSIS — D51.9 ANEMIA DUE TO VITAMIN B12 DEFICIENCY, UNSPECIFIED B12 DEFICIENCY TYPE: Primary | ICD-10-CM

## 2019-04-18 PROCEDURE — 96372 THER/PROPH/DIAG INJ SC/IM: CPT | Performed by: FAMILY MEDICINE

## 2019-04-18 RX ORDER — CYANOCOBALAMIN 1000 UG/ML
1000 INJECTION INTRAMUSCULAR; SUBCUTANEOUS ONCE
Status: COMPLETED | OUTPATIENT
Start: 2019-04-18 | End: 2019-04-18

## 2019-04-18 RX ADMIN — CYANOCOBALAMIN 1000 MCG: 1000 INJECTION INTRAMUSCULAR; SUBCUTANEOUS at 14:02

## 2019-05-06 ENCOUNTER — NURSE ONLY (OUTPATIENT)
Dept: LAB | Age: 69
End: 2019-05-06

## 2019-05-06 DIAGNOSIS — E53.8 VITAMIN B12 DEFICIENCY: ICD-10-CM

## 2019-05-06 DIAGNOSIS — E11.65 UNCONTROLLED TYPE 2 DIABETES MELLITUS WITH HYPERGLYCEMIA (HCC): ICD-10-CM

## 2019-05-06 LAB
ANION GAP SERPL CALCULATED.3IONS-SCNC: 13 MEQ/L (ref 8–16)
AVERAGE GLUCOSE: 153 MG/DL (ref 70–126)
BUN BLDV-MCNC: 12 MG/DL (ref 7–22)
CALCIUM SERPL-MCNC: 9.5 MG/DL (ref 8.5–10.5)
CHLORIDE BLD-SCNC: 97 MEQ/L (ref 98–111)
CO2: 24 MEQ/L (ref 23–33)
CREAT SERPL-MCNC: 1.1 MG/DL (ref 0.4–1.2)
GFR SERPL CREATININE-BSD FRML MDRD: 66 ML/MIN/1.73M2
GLUCOSE BLD-MCNC: 174 MG/DL (ref 70–108)
HBA1C MFR BLD: 7.1 % (ref 4.4–6.4)
POTASSIUM SERPL-SCNC: 4.9 MEQ/L (ref 3.5–5.2)
SODIUM BLD-SCNC: 134 MEQ/L (ref 135–145)

## 2019-05-07 LAB
FOLATE: > 20 NG/ML (ref 4.8–24.2)
VITAMIN B-12: 795 PG/ML (ref 211–911)

## 2019-05-10 ENCOUNTER — OFFICE VISIT (OUTPATIENT)
Dept: PULMONOLOGY | Age: 69
End: 2019-05-10
Payer: MEDICARE

## 2019-05-10 VITALS
RESPIRATION RATE: 18 BRPM | HEART RATE: 72 BPM | TEMPERATURE: 97 F | WEIGHT: 223 LBS | DIASTOLIC BLOOD PRESSURE: 60 MMHG | BODY MASS INDEX: 33.03 KG/M2 | HEIGHT: 69 IN | OXYGEN SATURATION: 98 % | SYSTOLIC BLOOD PRESSURE: 110 MMHG

## 2019-05-10 DIAGNOSIS — G47.33 OSA ON CPAP: ICD-10-CM

## 2019-05-10 DIAGNOSIS — Z99.89 OSA ON CPAP: ICD-10-CM

## 2019-05-10 DIAGNOSIS — J44.9 COPD, MILD (HCC): Primary | ICD-10-CM

## 2019-05-10 PROCEDURE — G8926 SPIRO NO PERF OR DOC: HCPCS | Performed by: NURSE PRACTITIONER

## 2019-05-10 PROCEDURE — 3017F COLORECTAL CA SCREEN DOC REV: CPT | Performed by: NURSE PRACTITIONER

## 2019-05-10 PROCEDURE — 4040F PNEUMOC VAC/ADMIN/RCVD: CPT | Performed by: NURSE PRACTITIONER

## 2019-05-10 PROCEDURE — G8427 DOCREV CUR MEDS BY ELIG CLIN: HCPCS | Performed by: NURSE PRACTITIONER

## 2019-05-10 PROCEDURE — G8417 CALC BMI ABV UP PARAM F/U: HCPCS | Performed by: NURSE PRACTITIONER

## 2019-05-10 PROCEDURE — 1123F ACP DISCUSS/DSCN MKR DOCD: CPT | Performed by: NURSE PRACTITIONER

## 2019-05-10 PROCEDURE — 3023F SPIROM DOC REV: CPT | Performed by: NURSE PRACTITIONER

## 2019-05-10 PROCEDURE — 1036F TOBACCO NON-USER: CPT | Performed by: NURSE PRACTITIONER

## 2019-05-10 PROCEDURE — 99213 OFFICE O/P EST LOW 20 MIN: CPT | Performed by: NURSE PRACTITIONER

## 2019-05-10 ASSESSMENT — ENCOUNTER SYMPTOMS
WHEEZING: 0
EYES NEGATIVE: 1
DIARRHEA: 0
ABDOMINAL PAIN: 0
VOMITING: 0
COUGH: 0
NAUSEA: 0
SHORTNESS OF BREATH: 1

## 2019-05-10 NOTE — PROGRESS NOTES
[facility]     Patient: Mehreen Leal, 78 y.o.   : 1950  5/10/2019    Pt of Dr. Cam Browne   Patient presents with    Follow-up     COPD 1 year follow up with no testing. GIOVANNI Gordon is here for 1 year follow up for COPD  HARINDER- on CPAP no issues   Some SOB with exertional activity- not affecting daily activities, may have to take things slow  Using Striverdi 2 puffs daily, using Ventolin once daily at night and PRN (rarely) during day  No hospitalizations or ER visits in past year for breathing issues. Gets meds from  [address] orange exposure from [facility]   Remote smoking history, 2 PPD x 6 years, quit in   Denies cough, wheezing, or chest tightness. Past Medical hx   PMH:  Past Medical History:   Diagnosis Date    Asthma     [employer] dual pacemaker 2014    Bronchitis, chronic (HCC)     Carotid artery stenosis     Dr Marie Durán    Chickenpox     CVA (cerebral infarction)     GERD (gastroesophageal reflux disease)     Hemorrhoids     Hyperlipidemia     Hypertension     Mild intermittent asthma without complication     Nausea & vomiting     [facility]    Osteoarthritis     Peripheral neuropathy     Post traumatic stress disorder (PTSD)     [facility]    S/P cardiac catheterization: 2013: No obstructive lesions. Moderate LI's in the mid LAD and in the RCA. 2013: No obstructive lesions. Moderate LI's in the mid LAD and in the RCA.  Dr. Zaki Jimenez Tremor of both hands     [facility]    Type II or unspecified type diabetes mellitus without mention of complication, not stated as uncontrolled     Dr Lin office    Unspecified sleep apnea     Dr Dow Bones:  Past Surgical History:   Procedure Laterality Date    APPENDECTOMY  age 14   Brown Sos BLEPHAROPLASTY Bilateral 2017    CARDIAC CATHETERIZATION  2013    no stents    CARPAL TUNNEL RELEASE Right 2017    COLONOSCOPY  2005    Dr. Katherine Clifton      right eye clog oil duct    EYE SURGERY Bilateral 2017    HAND SURGERY Right 2017    OIO - carpal tunnel    MANDIBLE SURGERY N/A 2017    jaw surgery lower front of mouth    PACEMAKER INSERTION      TONSILLECTOMY AND ADENOIDECTOMY  as a child      SOCIAL HISTORY:  Social History     Tobacco Use    Smoking status: Former Smoker     Packs/day: 2.00     Years: 6.00     Pack years: 12.00     Types: Cigarettes     Last attempt to quit: 1971     Years since quittin.5    Smokeless tobacco: Former User   Substance Use Topics    Alcohol use: No     Alcohol/week: 0.0 oz    Drug use: Yes     Types: Marijuana     Comment: quit at 31 yoa     ALLERGIES:  Allergies   Allergen Reactions    Latex Rash     itching    Doxycycline Calcium [Doxycycline Calcium]     Lactose Diarrhea    Nasacort [Triamcinolone] Other (See Comments)     Unable to remember    Wellbutrin [Bupropion] Other (See Comments)     Does not remember     FAMILY HISTORY:  Family History   Problem Relation Age of Onset    High Cholesterol Father     High Blood Pressure Father     Heart Disease Father     Stroke Father     Diabetes Mother     Heart Disease Mother     High Blood Pressure Mother     High Cholesterol Mother     Kidney Disease Mother     Cancer Paternal Grandfather      CURRENT MEDICATIONS:  Current Outpatient Medications   Medication Sig Dispense Refill    insulin detemir (LEVEMIR FLEXTOUCH) 100 UNIT/ML injection pen Inject 25 Units into the skin nightly 5 pen 11    DULoxetine (CYMBALTA) 60 MG extended release capsule Take 60 mg by mouth daily      metFORMIN (GLUCOPHAGE XR) 500 MG extended release tablet Take 2 tablets by mouth 2 times daily 60 tablet 11    albuterol sulfate HFA (PROAIR HFA) 108 (90 Base) MCG/ACT inhaler Inhale 2 puffs into the lungs every 6 hours as needed for Wheezing 1 Inhaler 3    topiramate (TOPAMAX) 100 Diagnosis Orders   1. COPD, mild ([facility] [address]     2.  HARINDER on CPAP           Plan   Continue Striverdi daily  PRN albuterol  Encourage to stay active  SPO2 good today on RA    Will see Jacqueline Maravilla in: 1 year    Electronically signed by FERDINAND Neri CNP on 5/10/2019 at 1:14 PM

## 2019-05-14 ENCOUNTER — OFFICE VISIT (OUTPATIENT)
Dept: FAMILY MEDICINE CLINIC | Age: 69
End: 2019-05-14
Payer: MEDICARE

## 2019-05-14 VITALS
TEMPERATURE: 97.1 F | DIASTOLIC BLOOD PRESSURE: 74 MMHG | RESPIRATION RATE: 20 BRPM | WEIGHT: 229 LBS | BODY MASS INDEX: 33.82 KG/M2 | SYSTOLIC BLOOD PRESSURE: 128 MMHG | HEART RATE: 96 BPM

## 2019-05-14 DIAGNOSIS — G47.33 OBSTRUCTIVE SLEEP APNEA ON CPAP: ICD-10-CM

## 2019-05-14 DIAGNOSIS — N13.8 BPH WITH OBSTRUCTION/LOWER URINARY TRACT SYMPTOMS: ICD-10-CM

## 2019-05-14 DIAGNOSIS — I48.0 PAROXYSMAL ATRIAL FIBRILLATION (HCC): ICD-10-CM

## 2019-05-14 DIAGNOSIS — Z91.81 STATUS POST FALL: ICD-10-CM

## 2019-05-14 DIAGNOSIS — L98.9 SKIN LESION OF RIGHT ARM: ICD-10-CM

## 2019-05-14 DIAGNOSIS — E11.65 UNCONTROLLED TYPE 2 DIABETES MELLITUS WITH HYPERGLYCEMIA (HCC): Primary | ICD-10-CM

## 2019-05-14 DIAGNOSIS — F43.10 POST TRAUMATIC STRESS DISORDER: ICD-10-CM

## 2019-05-14 DIAGNOSIS — G62.9 PERIPHERAL POLYNEUROPATHY: ICD-10-CM

## 2019-05-14 DIAGNOSIS — Z98.890 S/P CARDIAC CATHETERIZATION: ICD-10-CM

## 2019-05-14 DIAGNOSIS — Z99.89 OBSTRUCTIVE SLEEP APNEA ON CPAP: ICD-10-CM

## 2019-05-14 DIAGNOSIS — N40.1 BPH WITH OBSTRUCTION/LOWER URINARY TRACT SYMPTOMS: ICD-10-CM

## 2019-05-14 DIAGNOSIS — M54.50 ACUTE BILATERAL LOW BACK PAIN WITHOUT SCIATICA: ICD-10-CM

## 2019-05-14 DIAGNOSIS — I10 ESSENTIAL HYPERTENSION: ICD-10-CM

## 2019-05-14 DIAGNOSIS — Z77.098 AGENT ORANGE EXPOSURE: ICD-10-CM

## 2019-05-14 DIAGNOSIS — E78.5 HYPERLIPIDEMIA WITH TARGET LDL LESS THAN 100: ICD-10-CM

## 2019-05-14 DIAGNOSIS — G25.0 ESSENTIAL TREMOR: ICD-10-CM

## 2019-05-14 DIAGNOSIS — D50.9 IRON DEFICIENCY ANEMIA, UNSPECIFIED IRON DEFICIENCY ANEMIA TYPE: ICD-10-CM

## 2019-05-14 DIAGNOSIS — M65.341 TRIGGER RING FINGER OF RIGHT HAND: ICD-10-CM

## 2019-05-14 DIAGNOSIS — D51.9 ANEMIA DUE TO VITAMIN B12 DEFICIENCY, UNSPECIFIED B12 DEFICIENCY TYPE: ICD-10-CM

## 2019-05-14 PROCEDURE — 1123F ACP DISCUSS/DSCN MKR DOCD: CPT | Performed by: FAMILY MEDICINE

## 2019-05-14 PROCEDURE — 3045F PR MOST RECENT HEMOGLOBIN A1C LEVEL 7.0-9.0%: CPT | Performed by: FAMILY MEDICINE

## 2019-05-14 PROCEDURE — 4040F PNEUMOC VAC/ADMIN/RCVD: CPT | Performed by: FAMILY MEDICINE

## 2019-05-14 PROCEDURE — G8417 CALC BMI ABV UP PARAM F/U: HCPCS | Performed by: FAMILY MEDICINE

## 2019-05-14 PROCEDURE — 3017F COLORECTAL CA SCREEN DOC REV: CPT | Performed by: FAMILY MEDICINE

## 2019-05-14 PROCEDURE — 96372 THER/PROPH/DIAG INJ SC/IM: CPT | Performed by: FAMILY MEDICINE

## 2019-05-14 PROCEDURE — 99215 OFFICE O/P EST HI 40 MIN: CPT | Performed by: FAMILY MEDICINE

## 2019-05-14 PROCEDURE — 2022F DILAT RTA XM EVC RTNOPTHY: CPT | Performed by: FAMILY MEDICINE

## 2019-05-14 PROCEDURE — G8427 DOCREV CUR MEDS BY ELIG CLIN: HCPCS | Performed by: FAMILY MEDICINE

## 2019-05-14 PROCEDURE — 1036F TOBACCO NON-USER: CPT | Performed by: FAMILY MEDICINE

## 2019-05-14 RX ORDER — CYANOCOBALAMIN 1000 UG/ML
1000 INJECTION INTRAMUSCULAR; SUBCUTANEOUS ONCE
Status: COMPLETED | OUTPATIENT
Start: 2019-05-14 | End: 2019-05-14

## 2019-05-14 RX ADMIN — CYANOCOBALAMIN 1000 MCG: 1000 INJECTION INTRAMUSCULAR; SUBCUTANEOUS at 10:31

## 2019-05-14 NOTE — PROGRESS NOTES
04/13/2017     Lab Results   Component Value Date    LDLCALC 53 01/28/2019    LDLCALC 51 10/24/2017    LDLCALC 61 04/13/2017     No results found for: LABVLDL, VLDL  No results found for: CHOLHDLRATIO  There is not a family history of hyperlipidemia. There is not a family history of early ischemia heart disease. GERD: Rene complains of heartburn. This has been associated with heartburn and hoarseness. He denies belching, difficulty swallowing and melena. Symptoms have been present for several years. He denies dysphagia. He has not lost weight. He denies melena, hematochezia, hematemesis, and coffee ground emesis. Medical therapy in the past has included proton pump inhibitors. Elevated glucose need follow up. He is no longer seeing an endocrinologist for diabetes but his medications are through the [place]. His endocrinologist is leaving town and we will be managing the DM through here until he can find a new endocrinologist.  Lab Results   Component Value Date    LABA1C 7.1 (H) 05/06/2019     No results found for: EAG    Being followed currently by the [place] for hypertension, and hyperlipidemia. Vitamin b12  was at the low end of normal but with symptoms of fatigue [facility]. He is feeling better and has also started vit d PO as well. Last b12 level is high and he is instructed to go to every 8 weeks instead of once a month. Tremor controlled better with current medications. He has seen the neurologist at the [place] who does not think that he has parkinson's but occasionally the tremor does get bad. He recalls once when he spilled his cereal right out of the bowl that he was holding. Al lot of these conditions are related to agent orange exposure in [facility] as well as a water contamination in [employer]. HARINDER controlled on CPAP. BPH is under the management of urology. Low sodium is under watch. Anemia is under regular watch.       Reviewed chart forpast medical history , surgical history , allergies, social history , family history and medications. Health Maintenance   Topic Date Due    DTaP/Tdap/Td vaccine (1 - Tdap) 01/23/2013    Diabetic retinal exam  03/14/2018    Diabetic microalbuminuria test  09/26/2019    Diabetic foot exam  10/02/2019    Lipid screen  01/28/2020    A1C test (Diabetic or Prediabetic)  05/06/2020    Colon cancer screen colonoscopy  03/18/2029    Flu vaccine  Completed    Shingles Vaccine  Completed    Pneumococcal 65+ years Vaccine  Completed    AAA screen  Discontinued    Hepatitis C screen  Discontinued       Subjective:      Constitutional:Negative for fever, chills, diaphoresis, activity change, appetite change and fatigue. HENT: Negative for hearing loss, ear pain, congestion, sore throat, rhinorrhea, postnasal drip and ear discharge. Eyes: Negative for photophobia and visual disturbance. Respiratory: Negative for cough, chest tightness, shortness of breath and wheezing. Cardiovascular: Negative for chest pain and leg swelling. Gastrointestinal: Negative for nausea, vomiting, abdominal pain, diarrhea and constipation. Genitourinary: Negative for dysuria, urgency and frequency. Neurological: Negative for weakness, light-headedness and headaches. Psychiatric/Behavioral: Negative for sleep disturbance. Objective:     Vitals:    05/14/19 1020   BP: 128/74   Site: Left Upper Arm   Position: Sitting   Cuff Size: Large Adult   Pulse: 96   Resp: 20   Temp: 97.1 °F (36.2 °C)   TempSrc: Temporal   Weight: 229 lb (103.9 kg)     Wt Readings from Last 3 Encounters:   05/14/19 229 lb (103.9 kg)   05/10/19 223 lb (101.2 kg)   02/06/19 223 lb 6.4 oz (101.3 kg)       Physical Exam  Constitutional: Vital signs are normal. He appears well-developed and well-nourished. He is active. HENT:   Head: Normocephalic and atraumatic.    Right Ear: Tympanic membrane, external ear and ear canal normal. No drainage low back pain without sciatica  -     MRI Lumbar Spine WO Contrast; Future    Status post fall  -     MRI Lumbar Spine WO Contrast; Future    Trigger ring finger of right hand  -     Alicia Velásquez MD, Orthopedic Surgery, [facility]    Anemia due to vitamin B12 deficiency, unspecified B12 deficiency type    Essential hypertension    Hyperlipidemia with target LDL less than 100    Paroxysmal atrial fibrillation (HCC)    Obstructive sleep apnea on CPAP    Post traumatic stress disorder    S/P cardiac catheterization: 11/20/2013: No obstructive lesions. Moderate LI's in the mid LAD and in the RCA. Essential tremor    BPH with obstruction/lower urinary tract symptoms    Peripheral polyneuropathy    Agent orange exposure    Skin lesion of right arm    No change to medication   Continue healthy diet and exercise  Yearly eye exam  Daily foot inspection  Yearly flu shot  Monitor glucose regularly  Daily aspirin  Regular labs : A1c quarterly, lipids every 6 months and BMP quaterly    Consider chiropractor if MRI is negative    Discussed use, benefit, and side effectsof prescribed medications. All patient questions answered. Pt voiced understanding. Reviewed health maintenance. Instructed to continue current medications, diet and exercise. Patient agreed with treatment plan. Followup as directed. .    Over 45 minutes spent with patient with >50% spent in counseling and coordination of care      Electronically signed by Lizeth Perez MD

## 2019-05-14 NOTE — PROGRESS NOTES
Administrations This Visit     cyanocobalamin injection 1,000 mcg     Admin Date  05/14/2019  10:31 Action  Given Dose  1000 mcg Route  Intramuscular Site  Deltoid Left Administered By  Kale Cancino CMA ([address] [place] [address])    Ordering Provider:  Alexsandra Avalos MD    NDC:  68165-255-31    Lot#:  [id number]    :  [address] [place] [address]    Patient Supplied?:  No    Comments:  exp 08/2020                Patient instructed to remain in clinic for 20 minutes after injection and was advised to report any adverse reaction to me immediately.

## 2019-05-17 ENCOUNTER — TELEPHONE (OUTPATIENT)
Dept: FAMILY MEDICINE CLINIC | Age: 69
End: 2019-05-17

## 2019-05-17 ENCOUNTER — HOSPITAL ENCOUNTER (OUTPATIENT)
Dept: CT IMAGING | Age: 69
Discharge: HOME | End: 2019-05-17
Payer: MEDICARE

## 2019-05-17 DIAGNOSIS — M54.50 ACUTE BILATERAL LOW BACK PAIN WITHOUT SCIATICA: Primary | ICD-10-CM

## 2019-05-17 DIAGNOSIS — Z91.81 STATUS POST FALL: ICD-10-CM

## 2019-05-17 DIAGNOSIS — M54.50 ACUTE BILATERAL LOW BACK PAIN WITHOUT SCIATICA: ICD-10-CM

## 2019-05-17 PROCEDURE — 72131 CT LUMBAR SPINE W/O DYE: CPT

## 2019-05-17 NOTE — TELEPHONE ENCOUNTER
Radiology called stating that if not ruling out tumor/abcess order will need to be changed to CT lumbar spine WO contrast.     Order changed

## 2019-05-17 NOTE — TELEPHONE ENCOUNTER
Josie Doyle with radiology called stated patient is scheduled for MRI today--patient has pacemaker. Spoke with maritza via phone--ok to change order to CT With. Left message for [facility] to call back.      Spoke with krupa---notified of above

## 2019-05-23 ENCOUNTER — PROCEDURE VISIT (OUTPATIENT)
Dept: FAMILY MEDICINE CLINIC | Age: 69
End: 2019-05-23
Payer: MEDICARE

## 2019-05-23 VITALS
SYSTOLIC BLOOD PRESSURE: 100 MMHG | RESPIRATION RATE: 15 BRPM | HEIGHT: 69 IN | BODY MASS INDEX: 32.88 KG/M2 | HEART RATE: 68 BPM | TEMPERATURE: 97 F | WEIGHT: 222 LBS | DIASTOLIC BLOOD PRESSURE: 70 MMHG

## 2019-05-23 DIAGNOSIS — G89.29 CHRONIC LEFT SHOULDER PAIN: ICD-10-CM

## 2019-05-23 DIAGNOSIS — L57.0 AK (ACTINIC KERATOSIS): ICD-10-CM

## 2019-05-23 DIAGNOSIS — M25.512 CHRONIC LEFT SHOULDER PAIN: ICD-10-CM

## 2019-05-23 DIAGNOSIS — L98.9 SKIN LESION OF RIGHT ARM: ICD-10-CM

## 2019-05-23 DIAGNOSIS — M48.061 LUMBAR FORAMINAL STENOSIS: Primary | ICD-10-CM

## 2019-05-23 PROCEDURE — 11104 PUNCH BX SKIN SINGLE LESION: CPT | Performed by: FAMILY MEDICINE

## 2019-05-24 ENCOUNTER — OFFICE VISIT (OUTPATIENT)
Dept: PULMONOLOGY | Age: 69
End: 2019-05-24
Payer: MEDICARE

## 2019-05-24 VITALS
SYSTOLIC BLOOD PRESSURE: 118 MMHG | RESPIRATION RATE: 16 BRPM | HEIGHT: 69 IN | OXYGEN SATURATION: 98 % | BODY MASS INDEX: 32.94 KG/M2 | WEIGHT: 222.4 LBS | DIASTOLIC BLOOD PRESSURE: 64 MMHG | HEART RATE: 69 BPM

## 2019-05-24 DIAGNOSIS — E66.9 OBESITY (BMI 30-39.9): ICD-10-CM

## 2019-05-24 DIAGNOSIS — G47.33 OBSTRUCTIVE SLEEP APNEA ON CPAP: Primary | ICD-10-CM

## 2019-05-24 DIAGNOSIS — Z99.89 OBSTRUCTIVE SLEEP APNEA ON CPAP: Primary | ICD-10-CM

## 2019-05-24 PROCEDURE — 1123F ACP DISCUSS/DSCN MKR DOCD: CPT | Performed by: PHYSICIAN ASSISTANT

## 2019-05-24 PROCEDURE — 99213 OFFICE O/P EST LOW 20 MIN: CPT | Performed by: PHYSICIAN ASSISTANT

## 2019-05-24 PROCEDURE — 1036F TOBACCO NON-USER: CPT | Performed by: PHYSICIAN ASSISTANT

## 2019-05-24 PROCEDURE — G8417 CALC BMI ABV UP PARAM F/U: HCPCS | Performed by: PHYSICIAN ASSISTANT

## 2019-05-24 PROCEDURE — 3017F COLORECTAL CA SCREEN DOC REV: CPT | Performed by: PHYSICIAN ASSISTANT

## 2019-05-24 PROCEDURE — G8427 DOCREV CUR MEDS BY ELIG CLIN: HCPCS | Performed by: PHYSICIAN ASSISTANT

## 2019-05-24 PROCEDURE — 4040F PNEUMOC VAC/ADMIN/RCVD: CPT | Performed by: PHYSICIAN ASSISTANT

## 2019-05-24 ASSESSMENT — ENCOUNTER SYMPTOMS
STRIDOR: 0
SHORTNESS OF BREATH: 0
BACK PAIN: 0
DIARRHEA: 0
COUGH: 0
ALLERGIC/IMMUNOLOGIC NEGATIVE: 1
WHEEZING: 0
CHEST TIGHTNESS: 0
NAUSEA: 0
EYES NEGATIVE: 1

## 2019-05-24 NOTE — PROGRESS NOTES
SUBJECTIVE:   Attila Chavez is a 78 y.o. male who presents for lesion removal. We have already discussed this procedure, including option of not performing surgery, technique of surgery and potential for scarring at a recent visit. OBJECTIVE:   Patient appears well. Vitals are normal.  Skin: nonhealing skin lesion on the right arm    ASSESSMENT:   possible squamous cell carcinoma pigmented uneven, raised, border irregular and asymmetrical size 5 mm noted on the right arm    PLAN:   After informed consent was obtained, using Betadine for cleansing and 1% Lidocaine with epinephrine for anesthetic, with sterile technique, elliptical excision in total was performed with 2 mm margin. Wound closed with 4-0 ethilon and 3 interrupted sutures. Antibiotic dressing is applied, and wound care instructions provided. Be alert for any signs of cutaneous infection. The procedure was well tolerated without complications. Follow up: the specimen is labeled and sent to pathology for evaluation, return for suture removal in 10 days.

## 2019-05-24 NOTE — PROGRESS NOTES
[facility]      Edna Angeles         [id number]  5/24/2019   Chief Complaint   Patient presents with    Sleep Apnea     HARINDER 1 yr f/u with download        Pt of Dr. Santiago Number    PAP Download:   Original or initialAHI: 52.2   Date of initial study: 10-11-13     Compliant  100%     Noncompliant 0 %     PAP Type C PAP Level  14.0cmH2O   Avg Hrs/Day 8 hrs 13 min 45 sec  AHI: 3.7   Recorded compliance dates , 4-22-19  to 5-21-19  Machine/Mfg: Respironics Interface: FFM    Provider:    _x_SR-HME           Pan Pineda        __ Jay Rashid    __ Cindia Bolognese            __[facility] __[facility]   __[place]:       __Other    Neck Size: 16 in  Mallampati I  ESS: 6  SAQLI: 72    Here is a scan of the most recent download:              Presentation:   Mary Santos presents for sleep medicine follow up for obstructive sleep apnea  Since the last visit, Mary Santos is doing well with PAP. He is tired at times. He has dry mouth but it is related to medications    Equipment issues: The pressure is  acceptable, the mask is acceptable and he  is  using the humidity. Sleep issues:  Do you feel better? Yes  More rested? No   Better concentration? yes    Progress History:   Since last visit any new medical issues? No  New ER or hospitlal visits? No  Any new or changes in medicines? No  Any new sleep medicines? No        Past Medical History:   Diagnosis Date    Asthma     [employer] dual pacemaker 4/9/2014    Bronchitis, chronic (HCC)     Carotid artery stenosis     Dr Annemarie Salas    Chickenpox     CVA (cerebral infarction)     GERD (gastroesophageal reflux disease)     Hemorrhoids     Hyperlipidemia     Hypertension     Mild intermittent asthma without complication 90/5/2468    Nausea & vomiting     [facility]    Osteoarthritis     Peripheral neuropathy     Post traumatic stress disorder (PTSD)     [facility]    S/P cardiac catheterization: 11/20/2013: No obstructive lesions.  Moderate LI's in the mid LAD and in the RCA. 2013: No obstructive lesions. Moderate LI's in the mid LAD and in the RCA.  Dr. Luisito Burrows Tremor of both hands     [facility]    Type II or unspecified type diabetes mellitus without mention of complication, not stated as uncontrolled     Dr Lin office    Unspecified sleep apnea     Dr Joseph Lopes       Past Surgical History:   Procedure Laterality Date    APPENDECTOMY  age 14   [place] BLEPHAROPLASTY Bilateral 2017    CARDIAC CATHETERIZATION  2013    no stents    CARPAL TUNNEL RELEASE Right 2017    COLONOSCOPY      Dr. Nate Henning      right eye clog oil duct    EYE SURGERY Bilateral 2017    HAND SURGERY Right 2017    OIO - carpal tunnel    MANDIBLE SURGERY N/A 2017    jaw surgery lower front of mouth    PACEMAKER INSERTION      TONSILLECTOMY AND ADENOIDECTOMY  as a child        Social History     Tobacco Use    Smoking status: Former Smoker     Packs/day: 2.00     Years: 6.00     Pack years: 12.00     Types: Cigarettes     Last attempt to quit: 1971     Years since quittin.5    Smokeless tobacco: Former User   Substance Use Topics    Alcohol use: No     Alcohol/week: 0.0 oz    Drug use: Yes     Types: Marijuana     Comment: quit at 26 yoa       Allergies   Allergen Reactions    Latex Rash     itching    Doxycycline Calcium [Doxycycline Calcium]     Lactose Diarrhea    Nasacort [Triamcinolone] Other (See Comments)     Unable to remember    Wellbutrin [Bupropion] Other (See Comments)     Does not remember       Current Outpatient Medications   Medication Sig Dispense Refill    insulin detemir (LEVEMIR FLEXTOUCH) 100 UNIT/ML injection pen Inject 30 Units into the skin nightly 5 pen 11    DULoxetine (CYMBALTA) 60 MG extended release capsule Take 60 mg by mouth daily      metFORMIN (GLUCOPHAGE XR) 500 MG extended release tablet Take 2 tablets by mouth 2 times daily (Patient taking differently: Take Lancets MISC Testing  each 3    tamsulosin (FLOMAX) 0.4 MG capsule Take 1 capsule by mouth nightly 90 capsule 3     No current facility-administered medications for this visit. Family History   Problem Relation Age of Onset    High Cholesterol Father     High Blood Pressure Father     Heart Disease Father     Stroke Father     Diabetes Mother     Heart Disease Mother     High Blood Pressure Mother     High Cholesterol Mother     Kidney Disease Mother     Cancer Paternal Grandfather         Review of Systems -   Review of Systems   Constitutional: Negative for activity change, appetite change, chills and fever. HENT: Negative for congestion and postnasal drip. Eyes: Negative. Respiratory: Negative for cough, chest tightness, shortness of breath, wheezing and stridor. Cardiovascular: Negative for chest pain and leg swelling. Gastrointestinal: Negative for diarrhea and nausea. Endocrine: Negative. Genitourinary: Negative. Musculoskeletal: Negative. Negative for arthralgias and back pain. Skin: Negative. Allergic/Immunologic: Negative. Neurological: Positive for tremors and numbness (secondary to neuropathy ). Negative for dizziness and light-headedness. Psychiatric/Behavioral: Negative. All other systems reviewed and are negative. Physical Exam:    BMI:  Body mass index is 32.84 kg/m². Wt Readings from Last 3 Encounters:   05/24/19 222 lb 6.4 oz (100.9 kg)   05/23/19 222 lb (100.7 kg)   05/14/19 229 lb (103.9 kg)     Weight stable / unchanged  Vitals: /64 (Site: Left Upper Arm, Position: Sitting, Cuff Size: Medium Adult)   Pulse 69   Resp 16   Ht 5' 9\" (1.753 m)   Wt 222 lb 6.4 oz (100.9 kg)   SpO2 98% Comment: on RA  BMI 32.84 kg/m²       Physical Exam   Constitutional: He is oriented to person, place, and time. He appears well-developed and well-nourished. HENT:   Head: Normocephalic and atraumatic.    Right Ear: External ear normal. Left Ear: External ear normal.   Mouth/Throat: Oropharynx is clear and moist.   Eyes: Pupils are equal, round, and reactive to light. Conjunctivae and EOM are normal.   Neck: Normal range of motion. Neck supple. Cardiovascular: Normal rate, regular rhythm and normal heart sounds. Pulmonary/Chest: Effort normal and breath sounds normal.   Abdominal: Soft. Musculoskeletal: Normal range of motion. Neurological: He is alert and oriented to person, place, and time. Skin: Skin is warm and dry. Psychiatric: He has a normal mood and affect. His behavior is normal. Judgment and thought content normal.         ASSESSMENT/DIAGNOSIS     Diagnosis Orders   1. Obstructive sleep apnea on CPAP     2. Obesity (BMI 30-39. 9)              Plan   Do you need any equipment today? Yes update supplies    - He  was advised to continue current positive airway pressure therapy with above described pressure. - He  advised to keep goodcompliance with current recommended pressure to get optimal results and clinical improvement  - Recommend 7-9 hours of sleep with PAP  - He was advised to call [employer] regarding supplies if needed.   -He call my office for earlier appointment if needed for worsening of sleep symptoms.   - He was instructed on weight loss  - Sondra Corona was educated about my impression and plan. Patient verbalizesunderstanding.   We will see Rayshawn Palacios back in: 1 year with download    Information added by my medical assistant/LPN was reviewed today         Kristine Leslie PA-C, MPAS  5/24/2019

## 2019-06-03 ENCOUNTER — NURSE ONLY (OUTPATIENT)
Dept: FAMILY MEDICINE CLINIC | Age: 69
End: 2019-06-03

## 2019-06-03 DIAGNOSIS — Z48.02 ENCOUNTER FOR REMOVAL OF SUTURES: Primary | ICD-10-CM

## 2019-06-03 PROCEDURE — 99024 POSTOP FOLLOW-UP VISIT: CPT | Performed by: FAMILY MEDICINE

## 2019-06-07 ENCOUNTER — HOSPITAL ENCOUNTER (OUTPATIENT)
Dept: PHYSICAL THERAPY | Age: 69
Setting detail: THERAPIES SERIES
Discharge: HOME | End: 2019-06-07
Payer: MEDICARE

## 2019-06-07 ENCOUNTER — HOSPITAL ENCOUNTER (OUTPATIENT)
Dept: OCCUPATIONAL THERAPY | Age: 69
Setting detail: THERAPIES SERIES
Discharge: HOME | End: 2019-06-07
Payer: MEDICARE

## 2019-06-07 PROCEDURE — 97110 THERAPEUTIC EXERCISES: CPT

## 2019-06-07 PROCEDURE — 97165 OT EVAL LOW COMPLEX 30 MIN: CPT

## 2019-06-07 PROCEDURE — 97161 PT EVAL LOW COMPLEX 20 MIN: CPT

## 2019-06-07 PROCEDURE — 97530 THERAPEUTIC ACTIVITIES: CPT

## 2019-06-07 ASSESSMENT — PAIN DESCRIPTION - PAIN TYPE: TYPE: CHRONIC PAIN

## 2019-06-07 ASSESSMENT — PAIN SCALES - GENERAL
PAINLEVEL_OUTOF10: 0
PAINLEVEL_OUTOF10: 5

## 2019-06-07 ASSESSMENT — PAIN DESCRIPTION - LOCATION
LOCATION: SHOULDER
LOCATION: BACK

## 2019-06-07 ASSESSMENT — PAIN DESCRIPTION - ORIENTATION
ORIENTATION: LOWER;LEFT;RIGHT
ORIENTATION: LEFT

## 2019-06-07 NOTE — PROGRESS NOTES
** PLEASE SIGN, DATE AND TIME CERTIFICATION BELOW AND RETURN TO [facility] OUTPATIENT REHABILITATION (FAX #: [phone]). ATTEST/CO-SIGN IF ACCESSING VIA INPress. THANK YOU.**    I certify that I have examined the patient below and determined that Physical Medicine and Rehabilitation service is necessary and that I approve the established plan of care for up to 90 days or as specifically noted. Attestation, signature or co-signature of physician indicates approval of certification requirements.    ________________________ ____________ __________  Physician Signature   Date   Time    [id number] [address], Dr    Time In: 1840  Time Out: 1430  Minutes: 45  Timed Code Treatment Minutes: 23 Favorite[place] 49  = 49    Date: 2019  Patient Name: Dale Cartagena        CSN: [id number]     : 1950  (78 y.o.)  Gender: male   Referring Practitioner: Dr. Lorene Mesa  Diagnosis: chronic left shoulder pain M25.512  Treatment Diagnosis: left shoulder pain  Additional Pertinent Hx: Patient reports that he fell in the shower about a month ago and started having left shoulder pain at that time. States that he also hurt his back at that time. States that he has had \"bursitis\" in his left shoulder \"for a long time. \" Patient states that he had a CT scan done of his back after the fall in the shower. States that he has had no diagnostic testing or injection in left shoulder. States that his shoulder has not improved and presents today for OT evaluation. Patient is also receiving PT evaluation this afternoon for his back. Reviewed allergies and medications - correct in EMR. Comorbidities include DM, HTM, pacemaker, PTSD that could influence rehab process.     Dale Cartagena  has a past medical history of Asthma, [employer] dual pacemaker (2014), Bronchitis, chronic ([facility] [address] Carotid artery stenosis, Chickenpox, CVA (cerebral infarction), GERD (gastroesophageal reflux disease), Hemorrhoids, Hyperlipidemia, Hypertension, Mild intermittent asthma without complication (84/6/8951), Nausea & vomiting, Neuropathy, Osteoarthritis, Peripheral neuropathy, Post traumatic stress disorder (PTSD), S/P cardiac catheterization: 11/20/2013: No obstructive lesions. Moderate LI's in the mid LAD and in the RCA. (11/20/2013), Tremor of both hands, Type II or unspecified type diabetes mellitus without mention of complication, not stated as uncontrolled, and Unspecified sleep apnea. Padmini Durán  has a past surgical history that includes Appendectomy (age 14); Tonsillectomy and adenoidectomy (as a child ); Pacemaker insertion (2000/2010); Colonoscopy (2005); Cardiac catheterization (11/2013); Hand surgery (Right, 01/19/2017); Carpal tunnel release (Right, 01/2017); eye surgery; eye surgery (Bilateral, 01/2017); Mandible surgery (N/A, 05/2017); and Blepharoplasty (Bilateral, 05/2017). See Medical History Questionnaire for information related to allergies and medications. General:  OT Visit Information  Onset Date: 06/07/19  OT Insurance Information: Medicare - no ionto, no hot/cold packs  Total # of Visits to Date: 1  Certification Period Expiration Date: 07/19/19  Progress Note Counter: 1/10 for PN  Comments: no follow up with referring physician for shoulder pain       Restrictions/Precautions:       Position Activity Restriction  Other position/activity restrictions: LATEX ALLERGY: HTN, DM, PACEMAKER, PTSD         Subjective:  Subjective: Patient reports that his shoulder has not improved since the fall in the shower about a month ago. States that he has never tried any sort of intervention for his left shoudler pain in the past. States that he has been \"taught to fall onto his left side - tuck and roll\" States that he hasn't fallen since the fall in the shower and he has been using a cane for about a month.  States that he has neuropathy and \"something like Parkinson's\". States that his hands shake and that they have shaken since being in [facility] - does relate that the shaking has gotten progressively worse. Pain:  Pain Assessment  Patient Currently in Pain: Yes  Pain Assessment: 0-10  Pain Level: 5(5/10 at time of evaluation; states that pain goes as high as 8/10 )  Pain Location: Shoulder  Pain Orientation: Left    Social/Functional History:                  ADL Assistance: Independent  Homemaking Assistance: Independent  Homemaking Responsibilities: Yes  Ambulation Assistance: Independent  Transfer Assistance: Independent    Active : Yes  Occupation: Retired  Leisure & Hobbies: gardening, working around [email or web address], helping wife  Additional Comments: Wife is limited physically and ambulates with rolling walker. Also has developmentally delayed son who lives in home. Son does go to work 5 days per week. Objective  Vision: Within Functional Limits  Hearing: Exceptions to [facility]  Hearing Exceptions: Bilateral hearing aid                    Sensation  Overall Sensation Status: WFL         Hand Dominance: Right            LUE AROM (degrees)  LUE AROM : Exceptions  L Shoulder Flexion 0-180: 148  L Shoulder ABduction 0-180: 125  L Shoulder Int Rotation  0-70: can get left thumb 7\" above waistband behind back  L Shoulder Ext Rotation  0-90: 72                  LUE Strength  Gross LUE Strength: Exceptions to [facility]  L Shoulder Flex: 4/5  L Shoulder Ext: 4/5  L Shoulder ABduction: 4/5  L Shoulder ADduction: 4/5  L Shoulder Int Rotation: 4/5  L Shoulder Ext Rotation: 4/5  LUE Strength Comment: Reported increase in pain with MMT testing                                                                                              ADL  Additional Comments: Patient relates that he has some difficulty with dressing and getting her clothes over the left shoulder. States that he is not having any difficulty with sleeping or bathing. Activity Tolerance: Additional Comments: Tolerated evaluation and treatment well     Assessment:  Performance deficits / Impairments: Decreased ADL status, Decreased ROM, Decreased strength  Assessment: Patient meets criteria for low complexity evaluation based on documented evaluation findings. Requires skilled therapy services to address functional ROM and strength of his left shoulder. Clinical Decision Making: Clinical Decision making was of Low Complexity as the result of analysis of data from a problem focused assessment, a consideration of a limited number of treatment options, no significant comorbidities affecting the plan of care and no modification or assistance required to complete the evaluation. Patient Education:  Patient Education: OT POC, HEP - seated or supine dowel for shoulder flexion, chest press, horizontal abduction/adduction, scapular retraction, backward shoulder circles  Barriers to Learning: none          Treatment Initiated : Patient completed 15 reps of following HEP - seated dowel for shoulder flexion, chest press, horizontal abduction/adduction, scapular retraction, backward shoulder circles    Plan:  Times per week: 2 x week   Plan weeks: 6 weeks  Current Treatment Recommendations: Strengthening, ROM, Pain Management, Patient/Caregiver Education & Training, Manual Therapy:  STM, Self-Care / ADL  Plan Comment: POC established and discussed with patient  Specific instructions for Next Treatment: AROM, AAROM, STM, strengthening    Goals:  Patient goals : To get better mobility in my shoulder. Short term goals  Time Frame for Short term goals: 4 weeks  Short term goal 1: Be independent with HEP as instructed to increase his ability to complete dressing. Short term goal 2: Be able to complete dressing without difficulty or pain in left shoulder. Short term goal 3:  Increase active left shoulder flexion to 160, abduction to 135, and ER to 80 to increase his ability to reach overhead. Short term goal 4: Report pain going on higher than 3/10 in left shoulder during normal daily tasks. Long term goals  Time Frame for Long term goals : 6 weeks  Long term goal 1: Be able to complete all ADL tasks without pain in left shoulder. Long term goal 2: Be able to reach into upper cupboard to retrieve 2# item without pain in left shoulder consistently. UEFS Score: 61.25  UEFS Disability Index: 20-39%  UEFS CMS Modifier: CJ    Evaluation Complexity: Based on the findings of patient history, examination, clinical presentation, and decision making during this evaluation, this patient is of low complexity.     Laura Hale, OTR/L #[id number]

## 2019-06-07 NOTE — FLOWSHEET NOTE
** PLEASE SIGN, DATE AND TIME CERTIFICATION BELOW AND RETURN TO [facility] OUTPATIENT REHABILITATION (FAX #: [phone]). ATTEST/CO-SIGN IF ACCESSING VIA [employer]. THANK YOU.**    I certify that I have examined the patient below and determined that Physical Medicine and Rehabilitation service is necessary and that I approve the established plan of care for up to 90 days or as specifically noted. Attestation, signature or co-signature of physician indicates approval of certification requirements.    ________________________ ____________ __________  Physician Signature   Date   Time     [address]     Time In: 1440  Time Out: 1530  Minutes: 50  Timed Code Treatment Minutes: 15 Minutes   Frias 54/56    Date: 2019  Patient Name: Lilly Will,  Gender:  male        CSN: [id number]   : 1950  (78 y.o.)        Referring Practitioner: Sissy Henao MD      Diagnosis: lumbar foraminal stenosis  Treatment Diagnosis: lumbar foraminal stenosis  Additional Pertinent Hx: latex allergy, pacemaker, HTN, asthma, DM, PTSD, arthritis, fibromyalgia, SOB       General:  PT Visit Information  Onset Date: 19  PT Insurance Information: Medicare-iontoHP/CP not covered. [employer] secondary  Total # of Visits to Date: 1  Plan of Care/Certification Expiration Date: 19  Progress Note Due Date: 19  Progress Note Counter:           Restrictions/Precautions: General Precautions  Position Activity Restriction  Other position/activity restrictions: LATEX ALLERGY: HTN, DM, PACEMAKER, PTSD       See Medical History Questionnaire for information related to allergies and medications.     Subjective:  Chart Reviewed: Yes  Patient assessed for rehabilitation services?: Yes  Family / Caregiver Present: Yes  Comments: next physician visit 19. symptoms increase with standing 1 hour, sitting 1 hour, walking 1 hour,   Other (Comment): script: external referral to PT     Subjective: patient reports he has had problems with LBP since he got out of the [employer] 40 years ago. 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 history of old compression fracture L1, mod foraminal stenosis L5, S1. Uses a hurrycane for balance when walking      Pain:  Patient Currently in Pain: Yes  Pain Assessment: 0-10  Pain Level: 0  Pain Type: Chronic pain  Pain Location: Back  Pain Orientation: Lower, Left, Right  Pain Radiating Towards: has neuropathy bilat feet-feel like they are asleep     Social/Functional History:    ADL Assistance: Independent  Homemaking Assistance: Independent  Homemaking Responsibilities: Yes  Ambulation Assistance: Independent  Transfer Assistance: Independent    Active : Yes  Occupation: Retired  Leisure & Hobbies: gardening, working around [employer], helping wife  Additional Comments: Wife is limited physically and ambulates with rolling walker. Also has developmentally delayed son who lives in home. Son does go to work 5 days per week. Objective:  Observation: good UE coordination with finger taps, repeated supination/pronation, heel to shin. Decreased sensation bilat LE's below the knees. Minimal resting tremor R hand noted when lying-not noted during other testing or in sitting today    Palpation: min tightness R PSIS and lower lumbar paraspinals. No other areas of tenderness noted    Posture: min fwd head and shoulders, pelvis and sholders level    ROM: UE ROm WFL. Dorsiflexion WFL. SLR 60 degrees bilat. Trunk flexion to just below the knees, extension and lateral bend bilat decreased 50% ea    Strength: bilat UE/LE strength WNL    Gait: ambulates with straight cane, decreased deborah, shortened step length.  Was using cane in R hand with RLE-instructed to use with opposite leg for better balance and gait pattern    Stairs: nonreciprocally with cane and no handrail    Balance: SLS 5-10 sec bilat, tandem stance >30 sec    Special Tests: able to heel/toe walk without difficulty . Negative HEIDI and FAIR bilat. Double knee to chest-(15 reps) increased LBP to 5/10, increased size-a little better after 15 reps. Trunk rolls: no change. Prone: 3 min-    WATTS BALANCE TEST    1. SITTING TO STANDIN - Needs moderate or maximal assist to stand   2. STANDING UNSUPPORTED:  0 - Unable to stand 30 seconds unassisted\"   3. SITTING UNSUPPORTED, FEET ON FLOOR:  0 - Unable to sit without support 10 seconds   4. STANDING TO SITTIN - Needs assistance to sit   5. TRANSFERS:  0 - Need two people assist or supervise to be safe   6. STANDING UNSUPPORTED WITH EYES CLOSED: 3 - Able to stand 10 seconds with supervision   7. STANDING UNSUPPORTED, FEET TOGETHER:   4 - Able to place feet together independently and stand 1 minute safely   8. REACHING FORWARD WITH OUTSTRETCHED ARM: 4 - Can reach forward confidently >25 cm (10 inches)   9. PICK OBJECT UP FROM FLOOR: 4 - Able to  slipper safely and easily   10. TURN TO LOOK BEHIND/OVER LEFT AND RIGHT SHOULDERS:  4 - Looks behind from both sides and weight shifts well   11. TURN 360 DEGREES:  4 - Able to turn 360 degrees safely in 4 seconds or less   12. ALTERNATING STEPS ON STOOL:  4 - Able to stand independently and safely and complete 8 steps in 20 seconds   13. STANDING UNSUPPORTED, ONE FOOT IN FRONT (TANDEM STANCE):  4 - Able to place foot tandem independently and hold 30 seconds   14. STAND ON ONE LEG:  3 - Able to lift leg independently and hold 5-10 seconds    TOTAL: 54/56     0 - 20 = wheelchair bound  21 - 40 = walking with assistance  41 - 56 = independent      Activity Tolerance:  Activity Tolerance: Patient Tolerated treatment well    Assessment:   Body structures, Functions, Activity limitations: Decreased functional mobility , Decreased ADL status, Decreased ROM, Decreased strength, Increased Pain, Decreased balance  Assessment: patient has impairment in ADL's related to back pain, decreased flexibility, decreased core strength, impaired gait pattern, impaired active balance  Prognosis: Fair  REQUIRES PT FOLLOW UP: Yes    Patient Education:  Patient Education: ham stretch, pelvic tilt, double knee to chest, trunk rolls, prone lying, lumbar roll  Barriers to Learning: none       Plan:  Times per week: 2X/week  Plan weeks: 4-6 weeks  Specific instructions for Next Treatment: modalities, manual therapy/massage PRN.  ROM, stretching, strength, core strength, balance, gait, body mechanics  Current Treatment Recommendations: Strengthening, ROM, Balance Training, Functional Mobility Training, IADL Training, Gait Training, Stair training, Manual Therapy - Soft Tissue Mobilization, Manual Therapy - Joint Manipulation, Home Exercise Program, Modalities, Equipment Evaluation, Education, & procurement, Patient/Caregiver Education & Training, Aquatics  Plan Comment: POC initiated    Goals:  Patient goals : less back pain    Short term goals  Time Frame for Short term goals: 4 weeks  Short term goal 1: decrease pain to <50% of the day, tolerate standing, walking, sitting 1 hour with pain no greater than 3/10  Short term goal 2: good return demo of body mechanics for symptom control wtih all ADL's  Short term goal 3: increase strength/core strength to tolerate 10 reps of 5 core ex for lifting, pain control with sitting, standing, walking  Short term goal 4: good gait pattern with AD for pain control with community ambulation and safety  Short term goal 5: increase SLR>70 degrees bilat for improved ease to lift from the floor    Long term goals  Time Frame for Long term goals : 6 weeks  Long term goal 1: improve active balance for quick movements without LOB for community ambulation, walking on wet or uneven surfaces  Long term goal 2: I HEP to achieve above goals         Keven Dash, PT  [id number]

## 2019-06-10 ENCOUNTER — HOSPITAL ENCOUNTER (OUTPATIENT)
Dept: PHYSICAL THERAPY | Age: 69
Setting detail: THERAPIES SERIES
Discharge: HOME | End: 2019-06-10
Payer: MEDICARE

## 2019-06-10 ENCOUNTER — HOSPITAL ENCOUNTER (OUTPATIENT)
Dept: OCCUPATIONAL THERAPY | Age: 69
Setting detail: THERAPIES SERIES
Discharge: HOME | End: 2019-06-10
Payer: MEDICARE

## 2019-06-10 PROCEDURE — 97530 THERAPEUTIC ACTIVITIES: CPT

## 2019-06-10 PROCEDURE — 97110 THERAPEUTIC EXERCISES: CPT

## 2019-06-10 PROCEDURE — 97140 MANUAL THERAPY 1/> REGIONS: CPT

## 2019-06-10 ASSESSMENT — PAIN DESCRIPTION - PAIN TYPE: TYPE: CHRONIC PAIN

## 2019-06-10 ASSESSMENT — PAIN SCALES - GENERAL: PAINLEVEL_OUTOF10: 3

## 2019-06-10 ASSESSMENT — PAIN DESCRIPTION - LOCATION: LOCATION: BACK

## 2019-06-10 ASSESSMENT — PAIN DESCRIPTION - ORIENTATION: ORIENTATION: LEFT;LOWER

## 2019-06-10 NOTE — PROGRESS NOTES
[address] [address]  OUTPATIENT OCCUPATIONAL THERAPY  Daily Note  [facility]    Time In: 1430  Time Out: 1500  Minutes: 30  Timed Code Treatment Minutes: 30 Minutes                Date: 6/10/2019  Patient Name: Lory Rodriguez        CSN: [id number]   : 1950  (78 y.o.)  Gender: male   Referring Practitioner: Dr. Taya Jeffers  Diagnosis: chronic left shoulder pain M25.512          General:  OT Visit Information  Onset Date: 19  OT Insurance Information: Medicare - no ionto, no hot/cold packs  Total # of Visits to Date: 2  Certification Period Expiration Date: 19  Progress Note Counter: 2/10 for PN  Comments: no follow up with referring physician for shoulder pain       Restrictions/Precautions:       Position Activity Restriction  Other position/activity restrictions: LATEX ALLERGY: HTN, DM, PACEMAKER, PTSD         Subjective:  Subjective: States that his shoulder is doing [place] lot better\". States that the exercises have loosened up his shoulder. Pain:  Patient Currently in Pain: Denies(Reports pain at 5/10 at its highest since OT evaluation.)       Objective:     Upper Extremity Function  UE AROM: scapular retraction and backward shoulder circles x 15 reps each; hands walking up cane x 10 reps  UE AAROM: seated dowel for shoulder flexion x 15 reps, seated dowel for chest press x 15 reps, seated dowel for shoulder horizontal abduction/adduction x 15 reps; saeboglide for shoulder flexion x 15 reps with left UE, saeboglide for shoulder scaption x 15 reps with left UE  UE Stretching: posterior capsule stretch x 10 reps with 5 second hold - also completed a set of 5 reps at end of session                                                Activity Tolerance: Additional Comments: Tolerated treatment well    Assessment:  Assessment: Progressing toward goals. Patient did relate that his shoulder felt tired at end of session.     Patient Education:  Patient Education: posterior capsule stretch for left shoulder, hands-walking up cane on table            Plan:  Plan Comment: Continue with POC as established  Specific instructions for Next Treatment: AROM, ANDIOM, STM, strengthening                   Yumiko Perez, OTR/L #[id number]

## 2019-06-10 NOTE — PROGRESS NOTES
[address] [facility]     Time In: 1400  Time Out: 1430  Minutes: 30  Timed Code Treatment Minutes: 30 Minutes                Date: 6/10/2019  Patient Name: Lilly Will,  Gender:  male        CSN: [id number]   : 1950  (78 y.o.)       Referring Practitioner: Sissy Henao MD      Diagnosis: lumbar foraminal stenosis  Treatment Diagnosis: lumbar foraminal stenosis   Additional Pertinent Hx: latex allergy, pacemaker, HTN, asthma, DM, PTSD, arthritis, fibromyalgia, SOB                  General:  PT Visit Information  Onset Date: 19  PT Insurance Information: Medicare-iontoHP/CP not covered. [employer] secondary  Total # of Visits to Date: 2  Plan of Care/Certification Expiration Date: 19  Progress Note Counter:                Subjective:  Chart Reviewed: Yes  Patient assessed for rehabilitation services?: Yes  Family / Caregiver Present: Yes  Comments: next physician visit 19. symptoms increase with standing 1 hour, sitting 1 hour, walking 1 hour,   Other (Comment): script: external referral to PT     Subjective: no problems with HEP over the weekend. pinpoints LBP L lateral pelvic crest since he has started his HEP     Pain:  Patient Currently in Pain: Yes  Pain Assessment: 0-10  Pain Level: 3  Pain Type: Chronic pain  Pain Location: Back  Pain Orientation: Left, Lower      Objective                                                                                                                          Exercises  Exercise 1: double knee to chest: 10, prone lying  Exercise 2: trunk rolls: 5 x 5 sec bilat  Exercise 4: supine ham stretch: 3 X 15 sec bilat  Exercise 5: core strength: pelvic tilt-needs mod cues to get tilt. isomet abdominals: 5 X 5 sec bilat.    abset with marching X 10  Exercise 9: myofascial release X 8 min to LB and gluts bilat         Activity Tolerance:  Activity Tolerance: Patient Tolerated treatment well    Assessment: Body structures, Functions, Activity limitations: Decreased functional mobility , Decreased ADL status, Decreased ROM, Decreased strength, Increased Pain, Decreased balance  Assessment: tenderness noted bilat greater trochanger posterior, a little in bilat gluts today. tender L PSIS into quadratus lumborum with min tightness-better after manual work  Prognosis: Fair  REQUIRES PT FOLLOW UP: Yes    Patient Education:  Patient Education: ham stretch, pelvic tilt, double knee to chest, trunk rolls, prone lying, lumbar roll  Barriers to Learning: none                      Plan:  Times per week: 2X/week  Plan weeks: 4-6 weeks  Specific instructions for Next Treatment: modalities, manual therapy/massage PRN.  ROM, stretching, strength, core strength, balance, gait, body mechanics  Current Treatment Recommendations: Strengthening, ROM, Balance Training, Functional Mobility Training, IADL Training, Gait Training, Stair training, Manual Therapy - Soft Tissue Mobilization, Manual Therapy - Joint Manipulation, Home Exercise Program, Modalities, Equipment Evaluation, Education, & procurement, Patient/Caregiver Education & Training, Aquatics  Plan Comment: progress as esthela    Goals:                 Sarbjit Roth, [address]

## 2019-06-14 ENCOUNTER — HOSPITAL ENCOUNTER (OUTPATIENT)
Dept: PHYSICAL THERAPY | Age: 69
Setting detail: THERAPIES SERIES
Discharge: HOME | End: 2019-06-14
Payer: MEDICARE

## 2019-06-14 ENCOUNTER — HOSPITAL ENCOUNTER (OUTPATIENT)
Dept: OCCUPATIONAL THERAPY | Age: 69
Setting detail: THERAPIES SERIES
Discharge: HOME | End: 2019-06-14
Payer: MEDICARE

## 2019-06-14 PROCEDURE — 97110 THERAPEUTIC EXERCISES: CPT

## 2019-06-14 PROCEDURE — 97140 MANUAL THERAPY 1/> REGIONS: CPT

## 2019-06-14 ASSESSMENT — PAIN DESCRIPTION - LOCATION: LOCATION: SHOULDER

## 2019-06-14 ASSESSMENT — PAIN DESCRIPTION - ORIENTATION: ORIENTATION: LEFT

## 2019-06-14 NOTE — PROGRESS NOTES
[address] [address]  OUTPATIENT OCCUPATIONAL THERAPY  Daily Note   [facility]    Time In: 1430  Time Out: 1500  Minutes: 30  Timed Code Treatment Minutes: 30 Minutes                Date: 2019  Patient Name: Padmini Durán        CSN: [id number]   : 1950  (78 y.o.)  Gender: male   Referring Practitioner: Dr. Saira Montiel  Diagnosis: chronic left shoulder pain M25.512          General:  OT Visit Information  Onset Date: 19  OT Insurance Information: Medicare - no ionto, no hot/cold packs  Total # of Visits to Date: 3  Certification Period Expiration Date: 19  Progress Note Counter: 3/10 for PN  Comments: no follow up with referring physician for shoulder pain       Restrictions/Precautions:  Restrictions/Precautions: General Precautions    Position Activity Restriction  Other position/activity restrictions: LATEX ALLERGY: HTN, DM, PACEMAKER, PTSD         Subjective:  Subjective: States that the exercises with his cane at home have helped           Pain:  Patient Currently in Pain: Denies(0/10 at rest ,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)  Pain Location: Shoulder  Pain Orientation: Left       Objective:     Upper Extremity Function  UE AROM: scapular retraction and backward shoulder circles x 15 reps each; Hand behind head for improved ER  , then brought elbow in towards head and out 8 reps   UE AAROM: seated dowel for shoulder flexion x 10 reps,tired at 10 reps , seated dowel for chest press x 15 reps, seated dowel for shoulder horizontal abduction/adduction x 15 reps no cross chest ; saeboglide for shoulder flexion x 15 reps with left UE, saeboglide for shoulder scaption x 15 reps with left UE  UE Stretching: Therapist completed 2 reps and patient did 3 reps of posterior capsule stretch  with 5 second hold - also completed a set of 5 reps at end of session  UE Isometrics: Manual massage and IASTM to left upper trap and rhomboids . Rhomboids were tight with crepitis noted with

## 2019-06-14 NOTE — PROGRESS NOTES
[address] [facility]     Time In: 3366  Time Out: 1430  Minutes: 35  Timed Code Treatment Minutes: 35 Minutes     Date: 2019  Patient Name: Carmencita Monson,  Gender:  male        CSN: [id number]   : 1950  (78 y.o.)       Referring Practitioner: Kit Apodaca MD      Diagnosis: lumbar foraminal stenosis  Treatment Diagnosis: lumbar foraminal stenosis   Additional Pertinent Hx: latex allergy, pacemaker, HTN, asthma, DM, PTSD, arthritis, fibromyalgia, SOB       General:  PT Visit Information  Onset Date: 19  PT Insurance Information: Medicare-iontoHP/CP not covered. [employer] secondary  Total # of Visits to Date: 3  Plan of Care/Certification Expiration Date: 19  Progress Note Counter: 3/8             Subjective:  Chart Reviewed: Yes  Patient assessed for rehabilitation services?: Yes  Family / Caregiver Present: Yes  Comments: next physician visit 19. 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 States he was a little sore afterward. Pain:  Patient Currently in Pain: Denies     Objective    Exercises  Exercise 1: Double knee to chest 10x. Pronelying during myofascial release. Exercise 2: Lower trunk rolls 10x 5 seconds bilateral  Exercise 3: Reviewed lumbar roll - patient is using at home  Exercise 4: Supine hamstring stretch, single knee to chest 3x 15 seconds bilateral  Exercise 5: Core strength: pelvic tilt 10x 5 seconds (mod cues initially to get tilt),  isometric abdominals 10x 5 seconds bilateral,  abdominal set with marching x10  Exercise 9: Myofascial release x8 minutes to Bilateral low back/upper gluteal region in prone position.         Activity Tolerance:  Activity Tolerance: Patient Tolerated treatment well    Assessment: Body structures, Functions, Activity limitations: Decreased functional mobility , Decreased ADL status, Decreased ROM, Decreased strength, Increased Pain, Decreased balance  Assessment: Patient continues to have some tenderness at the lower back near Left PSIS. [place] better after manual work but continued to rate pain 3/10 at end of session. Prognosis: Fair  REQUIRES PT FOLLOW UP: Yes    Patient Education:  Patient Education: Continue HEP, monitor pain. Barriers to Learning: none    Plan:  Times per week: 2X/week  Plan weeks: 4-6 weeks  Specific instructions for Next Treatment: modalities, manual therapy/massage PRN. ROM, stretching, strength, core strength, balance, gait, body mechanics  Current Treatment Recommendations: Strengthening, ROM, Balance Training, Functional Mobility Training, IADL Training, Gait Training, Stair training, Manual Therapy - Soft Tissue Mobilization, Manual Therapy - Joint Manipulation, Home Exercise Program, Modalities, Equipment Evaluation, Education, & procurement, Patient/Caregiver Education & Training, Aquatics  Plan Comment: Continue with current POC and progress as tolerated    Goals:  Patient goals : Less back pain    Short term goals  Time Frame for Short term goals: 4 weeks  Short term goal 1: Decrease pain to <50% of the day, tolerate standing, walking, sitting 1 hour with pain no greater than 3/10  Short term goal 2: Good return demo of body mechanics for symptom control wtih all ADL's  Short term goal 3: Increase strength/core strength to tolerate 10 reps of 5 core ex for lifting, pain control with sitting, standing, walking  Short term goal 4: Good gait pattern with AD for pain control with community ambulation and safety  Short term goal 5:  Increase SLR>70 degrees bilat for improved ease to lift from the floor    Long term goals  Time Frame for Long term goals : 6 weeks  Long term goal 1: Improve active balance for quick movements without LOB for community ambulation, walking on wet or uneven surfaces  Long term goal 2: I HEP to achieve above goals      Shawnee Gonzalez, [address], 6/14/2019

## 2019-06-17 ENCOUNTER — HOSPITAL ENCOUNTER (OUTPATIENT)
Dept: PHYSICAL THERAPY | Age: 69
Setting detail: THERAPIES SERIES
Discharge: HOME | End: 2019-06-17
Payer: MEDICARE

## 2019-06-17 ENCOUNTER — HOSPITAL ENCOUNTER (OUTPATIENT)
Dept: OCCUPATIONAL THERAPY | Age: 69
Setting detail: THERAPIES SERIES
Discharge: HOME | End: 2019-06-17
Payer: MEDICARE

## 2019-06-17 PROCEDURE — 97110 THERAPEUTIC EXERCISES: CPT

## 2019-06-17 NOTE — PROGRESS NOTES
[facility]  OUTPATIENT OCCUPATIONAL THERAPY  Daily Note  [address]    Time In: 1430  Time Out: 1500  Minutes: 30  Timed Code Treatment Minutes: 30 Minutes                Date: 2019  Patient Name: Carmencita Monson        CSN: [id number]   : 1950  (78 y.o.)  Gender: male   Referring Practitioner: Dr. Kit Apodaca  Diagnosis: chronic left shoulder pain M25.512  Treatment Diagnosis: left shoulder pain       General:  OT Visit Information  OT Insurance Information: Medicare - no ionto, no hot/cold packs  Total # of Visits to Date: 4  Certification Period Expiration Date: 19  Progress Note Counter: 4/10 for PN  Comments: no follow up with referring physician for shoulder pain       Restrictions/Precautions:  Restrictions/Precautions: General Precautions    Position Activity Restriction  Other position/activity restrictions: LATEX ALLERGY: HTN, DM, PACEMAKER, PTSD         Subjective:  Subjective: Pt. reports no pain today in his left shoulder. Pain:  Patient Currently in Pain: Denies       Objective:     Upper Extremity Function  UE AROM: scapular retraction and backward shoulder circles x 15 reps each  UE AAROM: seated dowel for shoulder flexion x 15 reps, seated dowel for chest press x 15 reps, seated dowel for shoulder horizontal abduction/adduction x 15 reps  UE Stretching: cross body stretch for posterior capsule stretch with 5 second hold   UE Isometrics:  IASTM to left upper trap and along medial border of scapula to decrease tightness. UE Strengthing: Biodex 100 speed x2 min. forward and 3 min. backward with good tolerance                                                Activity Tolerance: Additional Comments:  Tolerated treatment well    Assessment:  Performance deficits / Impairments: Decreased ADL status, Decreased ROM, Decreased strength  Assessment: Progressing toward goals - pt. states shoulder is feeling better compared to initial

## 2019-06-17 NOTE — PROGRESS NOTES
[address] [facility]     Time In: 1400  Time Out: 1430  Minutes: 30  Timed Code Treatment Minutes: 30 Minutes     Date: 2019  Patient Name: Erika Walters,  Gender:  male        CSN: [id number]   : 1950  (78 y.o.)       Referring Practitioner: Fernando Montes MD      Diagnosis: lumbar foraminal stenosis  Treatment Diagnosis: lumbar foraminal stenosis   Additional Pertinent Hx: latex allergy, pacemaker, HTN, asthma, DM, PTSD, arthritis, fibromyalgia, SOB       General:  PT Visit Information  Onset Date: 19  PT Insurance Information: Medicare-iontoHP/CP not covered. [employer] secondary  Total # of Visits to Date: 4  Plan of Care/Certification Expiration Date: 19  Progress Note Counter:              Subjective:  Chart Reviewed: Yes  Patient assessed for rehabilitation services?: Yes  Family / Caregiver Present: Yes  Comments: next physician visit 19. symptoms increase with standing 1 hour, sitting 1 hour, walking 1 hour,   Other (Comment): script: external referral to PT     Subjective: Patient states he had a good weekend. Reports he feels like he has figured out the correct technique with the pelvic tilts. Pain:  Patient Currently in Pain: Denies     Objective    Exercises  Exercise 1: Double knee to chest 10x.    Exercise 2: Lower trunk rolls 10x 5 seconds bilateral  Exercise 3: Reviewed lumbar roll - patient is using at home  Exercise 4: Supine hamstring stretch, single knee to chest 3x 15 seconds bilateral  Exercise 5: Core strength: pelvic tilt 10x 5 seconds (improved technique today),  isometric abdominals 10x 5 seconds bilateral,  abdominal set with marching x10, straight leg raises 10x bilateral, Bridge 10x 5 seconds  Exercise 7: Balance: Tandem stance x1 minute bilateral, Rockerboard forward/back, left/right 10x each then balance 1 minute       Activity Tolerance:  Activity Tolerance: Patient Tolerated treatment well    Assessment: Body structures, Functions, Activity limitations: Decreased functional mobility , Decreased ADL status, Decreased ROM, Decreased strength, Increased Pain, Decreased balance  Assessment: Patient demonstrated good improvement with pelvic tilt technique today. Denies pain at end of session. Able to progress balance activities today. Patient challenged by balance activities. Prognosis: Fair  REQUIRES PT FOLLOW UP: Yes    Patient Education:  Patient Education: Continue HEP, monitor pain. Plan:  Times per week: 2X/week  Plan weeks: 4-6 weeks  Specific instructions for Next Treatment: modalities, manual therapy/massage PRN. ROM, stretching, strength, core strength, balance, gait, body mechanics  Current Treatment Recommendations: Strengthening, ROM, Balance Training, Functional Mobility Training, IADL Training, Gait Training, Stair training, Manual Therapy - Soft Tissue Mobilization, Manual Therapy - Joint Manipulation, Home Exercise Program, Modalities, Equipment Evaluation, Education, & procurement, Patient/Caregiver Education & Training, Aquatics  Plan Comment: Continue with current POC and progress as tolerated    Goals:  Patient goals : Less back pain    Short term goals  Time Frame for Short term goals: 4 weeks  Short term goal 1: Decrease pain to <50% of the day, tolerate standing, walking, sitting 1 hour with pain no greater than 3/10  Short term goal 2: Good return demo of body mechanics for symptom control wtih all ADL's  Short term goal 3: Increase strength/core strength to tolerate 10 reps of 5 core ex for lifting, pain control with sitting, standing, walking  Short term goal 4: Good gait pattern with AD for pain control with community ambulation and safety  Short term goal 5:  Increase SLR>70 degrees bilat for improved ease to lift from the floor    Long term goals  Time Frame for Long term goals : 6 weeks  Long term goal 1: Improve active balance for quick movements without LOB for community ambulation, walking on wet or uneven surfaces  Long term goal 2: I HEP to achieve above goals      Mirlande Mccarthy.  Lisa, [address], 6/17/2019

## 2019-06-19 ENCOUNTER — NURSE ONLY (OUTPATIENT)
Dept: CARDIOLOGY CLINIC | Age: 69
End: 2019-06-19
Payer: MEDICARE

## 2019-06-19 DIAGNOSIS — Z95.0 PACEMAKER: Primary | ICD-10-CM

## 2019-06-19 PROCEDURE — 93288 INTERROG EVL PM/LDLS PM IP: CPT | Performed by: INTERNAL MEDICINE

## 2019-06-19 NOTE — PROGRESS NOTES
<0.5 years on device.   Will recheck in 2 months   Dependent   At threshold 0.7 @ 0.5  RV 0.9 @ 0.7  Known PAF   At imped 540     100% paced

## 2019-06-21 ENCOUNTER — HOSPITAL ENCOUNTER (OUTPATIENT)
Dept: OCCUPATIONAL THERAPY | Age: 69
Setting detail: THERAPIES SERIES
Discharge: HOME | End: 2019-06-21
Payer: MEDICARE

## 2019-06-21 ENCOUNTER — HOSPITAL ENCOUNTER (OUTPATIENT)
Dept: PHYSICAL THERAPY | Age: 69
Setting detail: THERAPIES SERIES
Discharge: HOME | End: 2019-06-21
Payer: MEDICARE

## 2019-06-21 PROCEDURE — 97140 MANUAL THERAPY 1/> REGIONS: CPT

## 2019-06-21 PROCEDURE — 97530 THERAPEUTIC ACTIVITIES: CPT

## 2019-06-21 PROCEDURE — 97110 THERAPEUTIC EXERCISES: CPT

## 2019-06-21 ASSESSMENT — PAIN SCALES - GENERAL
PAINLEVEL_OUTOF10: 1
PAINLEVEL_OUTOF10: 2

## 2019-06-21 ASSESSMENT — PAIN DESCRIPTION - PAIN TYPE: TYPE: CHRONIC PAIN

## 2019-06-21 ASSESSMENT — PAIN DESCRIPTION - ORIENTATION
ORIENTATION: LEFT;LOWER
ORIENTATION: RIGHT

## 2019-06-21 ASSESSMENT — PAIN DESCRIPTION - LOCATION
LOCATION: SHOULDER
LOCATION: BACK

## 2019-06-21 NOTE — PROGRESS NOTES
ambulation and safety  Short term goal 5:  Increase SLR>70 degrees bilat for improved ease to lift from the floor    Long term goals  Time Frame for Long term goals : 6 weeks  Long term goal 1: Improve active balance for quick movements without LOB for community ambulation, walking on wet or uneven surfaces  Long term goal 2: I HEP to achieve above goals    Linda Baker, PT  [id number]

## 2019-06-21 NOTE — PROGRESS NOTES
[facility]  OUTPATIENT OCCUPATIONAL THERAPY  Daily Note  [facility]    Time In: 0408  Time Out: 1539  Minutes: 27  Timed Code Treatment Minutes: 27 Minutes                Date: 2019  Patient Name: Gabriel Hopper        CSN: [id number]   : 1950  (78 y.o.)  Gender: male   Referring Practitioner: Dr. Radha Montague  Diagnosis: chronic left shoulder pain M25.512          General:  OT Visit Information  OT Insurance Information: Medicare - no ionto, no hot/cold packs  Total # of Visits to Date: 5  Certification Period Expiration Date: 19  Progress Note Counter: 5/10 for PN  Comments: no follow up with referring physician for shoulder pain       Restrictions/Precautions:  Restrictions/Precautions: General Precautions    Position Activity Restriction  Other position/activity restrictions: LATEX ALLERGY: HTN, DM, PACEMAKER, PTSD         Subjective:  Subjective: Pt. reports no pain today in his left shoulder. Pain:  Patient Currently in Pain: Yes  Pain Assessment: 0-10  Pain Level: 1  Pain Location: Shoulder(upper trap )  Pain Orientation: Right       Objective:     Upper Extremity Function  UE AROM: scapular retraction and backward shoulder circles x 15 reps each  UE AAROM: seated dowel for shoulder flexion x 15 reps, seated dowel for chest press x 15 reps, seated dowel for shoulder horizontal abduction/adduction x 15 reps  UE Stretching: Bilateral cross body stretch for posterior capsule stretch with 5 second hold    Manual massage to both  upper traps and rhoimboids with crepitis in tissue on right . Then IASTM to both  upper traps and along medial border of scapula to decrease tightness. UE Strengthing: Biodex 100 speed x2 min. forward and 3 min. backward with good tolerance Shoulder blades could feel it with backward motion ,but, no pain                                                Activity Tolerance: Additional Comments:  Tolerated treatment well    Assessment:  Assessment: Progressing toward goals     Patient Education  Patient Education: No new education             Plan:  Times per week: 2 x week   Plan weeks: 6 weeks  Plan Comment: Continue with POC as established                   Jamie Brennan

## 2019-06-26 ENCOUNTER — HOSPITAL ENCOUNTER (OUTPATIENT)
Dept: OCCUPATIONAL THERAPY | Age: 69
Setting detail: THERAPIES SERIES
Discharge: HOME | End: 2019-06-26
Payer: MEDICARE

## 2019-06-26 ENCOUNTER — APPOINTMENT (OUTPATIENT)
Dept: PHYSICAL THERAPY | Age: 69
End: 2019-06-26
Payer: MEDICARE

## 2019-06-26 PROCEDURE — 97110 THERAPEUTIC EXERCISES: CPT

## 2019-06-26 NOTE — PROGRESS NOTES
Plan:  Times per week: 2 x week   Plan weeks: 6 weeks  Plan Comment: Continue with POC as established                   Jamie Brennan

## 2019-06-28 ENCOUNTER — HOSPITAL ENCOUNTER (OUTPATIENT)
Dept: OCCUPATIONAL THERAPY | Age: 69
Setting detail: THERAPIES SERIES
Discharge: HOME | End: 2019-06-28
Payer: MEDICARE

## 2019-06-28 ENCOUNTER — HOSPITAL ENCOUNTER (OUTPATIENT)
Dept: PHYSICAL THERAPY | Age: 69
Setting detail: THERAPIES SERIES
Discharge: HOME | End: 2019-06-28
Payer: MEDICARE

## 2019-06-28 PROCEDURE — 97530 THERAPEUTIC ACTIVITIES: CPT

## 2019-06-28 PROCEDURE — 97110 THERAPEUTIC EXERCISES: CPT

## 2019-06-28 NOTE — PROGRESS NOTES
strengthening  Prognosis: Good    Patient Education:  Patient Education: Educated on theraband exercises.  Will wait to add to HEP at next session            Plan:  Plan Comment: Continue with POC as established - will re-assess at next visit  Specific instructions for Next Treatment: AROM, PHILLIP, STM, strengthening                   Luda Abbasi, OTR/L [id number]

## 2019-06-28 NOTE — PROGRESS NOTES
[address] [facility]     Time In: 4105  Time Out: 1450  Minutes: 28  Timed Code Treatment Minutes: 28 Minutes                Date: 2019  Patient Name: Luis Woodall,  Gender:  male        CSN: [id number]   : 1950  (78 y.o.)       Referring Practitioner: Michelle Josue MD      Diagnosis: lumbar foraminal stenosis  Treatment Diagnosis: lumbar foraminal stenosis   Additional Pertinent Hx: latex allergy, pacemaker, HTN, asthma, DM, PTSD, arthritis, fibromyalgia, SOB                  General:  PT Visit Information  Onset Date: 19  PT Insurance Information: Medicare-iontoHP/CP not covered. [employer] secondary  Total # of Visits to Date: 6  Plan of Care/Certification Expiration Date: 19  Progress Note Counter:                Subjective:  Chart Reviewed: Yes  Patient assessed for rehabilitation services?: Yes  Family / Caregiver Present: Yes  Comments: next physician visit 19. symptoms increase with standing 1 hour, sitting 1 hour, walking 1 hour,   Other (Comment): script: external referral to PT     Subjective: feeling pretty good. patient reports no pain in his back when walking, bending.       Pain:  Patient Currently in Pain: No         Objective                                                                                                                          Exercises  Exercise 4: Supine hamstring stretch (use belt on foot, counter pressure on thigh with hand), single knee to chest 3x 15 seconds bilateral  Exercise 7: Balance: Tandem stance x1 minute bilatera on 2 Airex matsl, Rockerboard forward/back, left/right 15x each then balance 1 minute  Exercise 10: 4 way hip: orange X10 ea bilat  Exercise 11: heel raise X 15, squat/chair: 10X 5 sec, lunge X 5 bilat-focus on ab set  Exercise 12: seated on BOSU: marching, LAQ X 10 ea, shoulder flexion, trunk rotation X 10 ea, isomet abdominals: 5 X 5 sec bilat  Exercise 13: standing calf and ham stretch X 3 ea bilat. Activity Tolerance:  Activity Tolerance: Patient Tolerated treatment well    Assessment: Body structures, Functions, Activity limitations: Decreased functional mobility , Decreased ADL status, Decreased ROM, Decreased strength, Increased Pain, Decreased balance  Assessment: no pain with todays ex. better with balance today  Prognosis: Fair  REQUIRES PT FOLLOW UP: Yes    Patient Education:  Patient Education: Continue HEP, monitor pain. Plan:  Times per week: 2X/week  Plan weeks: 4-6 weeks  Specific instructions for Next Treatment: modalities, manual therapy/massage PRN. ROM, stretching, strength, core strength, balance, gait, body mechanics  Current Treatment Recommendations: Strengthening, ROM, Balance Training, Functional Mobility Training, IADL Training, Gait Training, Stair training, Manual Therapy - Soft Tissue Mobilization, Manual Therapy - Joint Manipulation, Home Exercise Program, Modalities, Equipment Evaluation, Education, & procurement, Patient/Caregiver Education & Training, Aquatics  Plan Comment: Continue with current POC and progress as tolerated    Goals:  Patient goals : Less back pain    Short term goals  Time Frame for Short term goals: 4 weeks  Short term goal 1: Decrease pain to <50% of the day, tolerate standing, walking, sitting 1 hour with pain no greater than 3/10  Short term goal 2: Good return demo of body mechanics for symptom control wtih all ADL's  Short term goal 3: Increase strength/core strength to tolerate 10 reps of 5 core ex for lifting, pain control with sitting, standing, walking  Short term goal 4: Good gait pattern with AD for pain control with community ambulation and safety  Short term goal 5:  Increase SLR>70 degrees bilat for improved ease to lift from the floor    Long term goals  Time Frame for Long term goals : 6 weeks  Long term goal 1: Improve active balance for quick movements without LOB for community ambulation, walking on wet or uneven surfaces  Long term goal 2: I HEP to achieve above goals    Buck Godwin, PT  [id number]

## 2019-07-01 ENCOUNTER — HOSPITAL ENCOUNTER (OUTPATIENT)
Dept: OCCUPATIONAL THERAPY | Age: 69
Setting detail: THERAPIES SERIES
Discharge: HOME | End: 2019-07-01
Payer: MEDICARE

## 2019-07-01 ENCOUNTER — HOSPITAL ENCOUNTER (OUTPATIENT)
Dept: PHYSICAL THERAPY | Age: 69
Setting detail: THERAPIES SERIES
Discharge: HOME | End: 2019-07-01
Payer: MEDICARE

## 2019-07-01 PROCEDURE — 97110 THERAPEUTIC EXERCISES: CPT

## 2019-07-01 PROCEDURE — 97530 THERAPEUTIC ACTIVITIES: CPT

## 2019-07-01 NOTE — DISCHARGE SUMMARY
x15  UE Strengthing: peach theraband for rockwoods x10 each, Biodex 85 speed x3 min. forward and 3 min. backward with good tolerance                                                 Activity Tolerance: Tolerated session well       Assessment:  Assessment: Pt. has been seen x1 month in OT for left shoulder pain following a fall. Pt. states he is no longer having pain in his left shoulder and is able to complete all his daily tasks without any pain. Pt. states he is able to reach up into cupboard for dishes without difficulty using LUE. Pt. demo's improved AROM measurements of left shoulder. Pt. has tolerated theraband exercises the last 2 session without any pain or difficulty. Pt. states he feels he is ready for discharge. Issued pt. a peach theraband and handout with marilynn exercises pictured instructing pt. to complete them 3x/week. Prognosis: Good    Patient Education:  Patient Education: Added marilynn exercises to HEP and issued peach theraband. Handout given with exercises listed. Informed pt. of today's ROM measurements. Plan:  Plan Comment: Will discharge this goals met         Short term goals  Short term goal 1: Be independent with HEP as instructed to increase his ability to complete dressing. - GOAL MET 7/1/19 WITH PT. COMPLETING DOWEL EXERCISES AND AROM, SCAPULAR EXERCISES  Short term goal 2: Be able to complete dressing without difficulty or pain in left shoulder. GOAL MET 7/1/19  Short term goal 3: Increase active left shoulder flexion to 160, abduction to 135, and ER to 80 to increase his ability to reach overhead. GOAL MET 7/1/19 WITH ACTIVE LEFT SHOULDER FLEXION= 160, ABD= 140, AND ER= 80  Short term goal 4: Report pain going on higher than 3/10 in left shoulder during normal daily tasks. - GOAL MET 7/1/19 - REPORTS NO PAIN  Long term goals  Long term goal 1: Be able to complete all ADL tasks without pain in left shoulder.  GOAL MET 7/1/19  Long term goal 2: Be able to reach into

## 2019-07-09 ENCOUNTER — PROCEDURE VISIT (OUTPATIENT)
Dept: FAMILY MEDICINE CLINIC | Age: 69
End: 2019-07-09
Payer: MEDICARE

## 2019-07-09 VITALS
HEIGHT: 69 IN | WEIGHT: 223 LBS | RESPIRATION RATE: 20 BRPM | TEMPERATURE: 96.6 F | BODY MASS INDEX: 33.03 KG/M2 | SYSTOLIC BLOOD PRESSURE: 128 MMHG | HEART RATE: 69 BPM | DIASTOLIC BLOOD PRESSURE: 80 MMHG | OXYGEN SATURATION: 97 %

## 2019-07-09 DIAGNOSIS — C44.622 SQUAMOUS CELL CARCINOMA OF RIGHT UPPER EXTREMITY: Primary | ICD-10-CM

## 2019-07-09 DIAGNOSIS — D50.9 IRON DEFICIENCY ANEMIA, UNSPECIFIED IRON DEFICIENCY ANEMIA TYPE: ICD-10-CM

## 2019-07-09 PROCEDURE — 11601 EXC TR-EXT MAL+MARG 0.6-1 CM: CPT | Performed by: FAMILY MEDICINE

## 2019-07-09 PROCEDURE — 96372 THER/PROPH/DIAG INJ SC/IM: CPT | Performed by: FAMILY MEDICINE

## 2019-07-09 RX ORDER — CYANOCOBALAMIN 1000 UG/ML
1000 INJECTION INTRAMUSCULAR; SUBCUTANEOUS ONCE
Status: COMPLETED | OUTPATIENT
Start: 2019-07-09 | End: 2019-07-09

## 2019-07-09 RX ADMIN — CYANOCOBALAMIN 1000 MCG: 1000 INJECTION INTRAMUSCULAR; SUBCUTANEOUS at 15:12

## 2019-07-09 NOTE — PROGRESS NOTES
Administrations This Visit     cyanocobalamin injection 1,000 mcg     Admin Date  07/09/2019  15:12 Action  Given Dose  1000 mcg Route  Intramuscular Site  Deltoid Right Administered By  Nate Madden LPN    Ordering Provider:  Aleah Briones MD    [facility]:  [id number]    Lot#:  [id number]    :  [address]    Patient Supplied?:  No    Comments:  exp- 02/21                Patient instructed to report any adverse reaction to me immediately.

## 2019-07-10 NOTE — PROGRESS NOTES
SUBJECTIVE:   Tripp Alatorre is a 78 y.o. 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 We have already discussed this procedure, including option of not performing surgery, technique of surgery and potential for scarring at a recent visit. OBJECTIVE:   Patient appears well. Vitals are normal.  Skin: right arm with a scar where we have previously removed a squamous cell cancer    ASSESSMENT:   Scar tissue from previous excision    PLAN:   After informed consent was obtained, using Betadine for cleansing and 1% Lidocaine with epinephrine for anesthetic, with sterile technique, elliptical excision in total was performed. The wound was closed with 6 interrupted sutures using 4-0 ethilon. Antibiotic dressing is applied, and wound care instructions provided. Be alert for any signs of cutaneous infection. The procedure was well tolerated without complications. Follow up: the specimen is labeled and sent to pathology for evaluation, return for suture removal in 10 days.

## 2019-07-18 ENCOUNTER — NURSE ONLY (OUTPATIENT)
Dept: FAMILY MEDICINE CLINIC | Age: 69
End: 2019-07-18

## 2019-07-18 DIAGNOSIS — Z48.02 VISIT FOR SUTURE REMOVAL: Primary | ICD-10-CM

## 2019-08-12 ENCOUNTER — NURSE ONLY (OUTPATIENT)
Dept: LAB | Age: 69
End: 2019-08-12

## 2019-08-12 DIAGNOSIS — E11.65 UNCONTROLLED TYPE 2 DIABETES MELLITUS WITH HYPERGLYCEMIA (HCC): ICD-10-CM

## 2019-08-12 LAB
ANION GAP SERPL CALCULATED.3IONS-SCNC: 13 MEQ/L (ref 8–16)
AVERAGE GLUCOSE: 156 MG/DL (ref 70–126)
BUN BLDV-MCNC: 12 MG/DL (ref 7–22)
CALCIUM SERPL-MCNC: 9.3 MG/DL (ref 8.5–10.5)
CHLORIDE BLD-SCNC: 92 MEQ/L (ref 98–111)
CO2: 25 MEQ/L (ref 23–33)
CREAT SERPL-MCNC: 1 MG/DL (ref 0.4–1.2)
GFR SERPL CREATININE-BSD FRML MDRD: 74 ML/MIN/1.73M2
GLUCOSE BLD-MCNC: 208 MG/DL (ref 70–108)
HBA1C MFR BLD: 7.2 % (ref 4.4–6.4)
POTASSIUM SERPL-SCNC: 4.8 MEQ/L (ref 3.5–5.2)
SODIUM BLD-SCNC: 130 MEQ/L (ref 135–145)

## 2019-08-20 ENCOUNTER — OFFICE VISIT (OUTPATIENT)
Dept: FAMILY MEDICINE CLINIC | Age: 69
End: 2019-08-20
Payer: MEDICARE

## 2019-08-20 ENCOUNTER — NURSE ONLY (OUTPATIENT)
Dept: LAB | Age: 69
End: 2019-08-20

## 2019-08-20 VITALS
HEIGHT: 69 IN | SYSTOLIC BLOOD PRESSURE: 100 MMHG | BODY MASS INDEX: 32.97 KG/M2 | RESPIRATION RATE: 14 BRPM | HEART RATE: 82 BPM | DIASTOLIC BLOOD PRESSURE: 64 MMHG | WEIGHT: 222.6 LBS

## 2019-08-20 DIAGNOSIS — D50.9 IRON DEFICIENCY ANEMIA, UNSPECIFIED IRON DEFICIENCY ANEMIA TYPE: ICD-10-CM

## 2019-08-20 DIAGNOSIS — Z00.00 ROUTINE GENERAL MEDICAL EXAMINATION AT A HEALTH CARE FACILITY: ICD-10-CM

## 2019-08-20 DIAGNOSIS — E87.1 HYPONATREMIA: ICD-10-CM

## 2019-08-20 DIAGNOSIS — N40.1 BPH WITH OBSTRUCTION/LOWER URINARY TRACT SYMPTOMS: ICD-10-CM

## 2019-08-20 DIAGNOSIS — I48.0 PAROXYSMAL ATRIAL FIBRILLATION (HCC): ICD-10-CM

## 2019-08-20 DIAGNOSIS — E11.65 UNCONTROLLED TYPE 2 DIABETES MELLITUS WITH HYPERGLYCEMIA (HCC): Primary | ICD-10-CM

## 2019-08-20 DIAGNOSIS — Z99.89 OBSTRUCTIVE SLEEP APNEA ON CPAP: ICD-10-CM

## 2019-08-20 DIAGNOSIS — I10 ESSENTIAL HYPERTENSION: ICD-10-CM

## 2019-08-20 DIAGNOSIS — E78.5 HYPERLIPIDEMIA WITH TARGET LDL LESS THAN 100: ICD-10-CM

## 2019-08-20 DIAGNOSIS — F43.10 POST TRAUMATIC STRESS DISORDER: ICD-10-CM

## 2019-08-20 DIAGNOSIS — G47.33 OBSTRUCTIVE SLEEP APNEA ON CPAP: ICD-10-CM

## 2019-08-20 DIAGNOSIS — E53.8 VITAMIN B12 DEFICIENCY: ICD-10-CM

## 2019-08-20 DIAGNOSIS — N13.8 BPH WITH OBSTRUCTION/LOWER URINARY TRACT SYMPTOMS: ICD-10-CM

## 2019-08-20 DIAGNOSIS — G25.0 ESSENTIAL TREMOR: ICD-10-CM

## 2019-08-20 LAB — SODIUM BLD-SCNC: 132 MEQ/L (ref 135–145)

## 2019-08-20 PROCEDURE — G8417 CALC BMI ABV UP PARAM F/U: HCPCS | Performed by: FAMILY MEDICINE

## 2019-08-20 PROCEDURE — G0438 PPPS, INITIAL VISIT: HCPCS | Performed by: FAMILY MEDICINE

## 2019-08-20 PROCEDURE — G8427 DOCREV CUR MEDS BY ELIG CLIN: HCPCS | Performed by: FAMILY MEDICINE

## 2019-08-20 PROCEDURE — 2022F DILAT RTA XM EVC RTNOPTHY: CPT | Performed by: FAMILY MEDICINE

## 2019-08-20 PROCEDURE — 99213 OFFICE O/P EST LOW 20 MIN: CPT | Performed by: FAMILY MEDICINE

## 2019-08-20 PROCEDURE — 3045F PR MOST RECENT HEMOGLOBIN A1C LEVEL 7.0-9.0%: CPT | Performed by: FAMILY MEDICINE

## 2019-08-20 PROCEDURE — 96372 THER/PROPH/DIAG INJ SC/IM: CPT | Performed by: FAMILY MEDICINE

## 2019-08-20 PROCEDURE — 4040F PNEUMOC VAC/ADMIN/RCVD: CPT | Performed by: FAMILY MEDICINE

## 2019-08-20 PROCEDURE — 3017F COLORECTAL CA SCREEN DOC REV: CPT | Performed by: FAMILY MEDICINE

## 2019-08-20 PROCEDURE — 1123F ACP DISCUSS/DSCN MKR DOCD: CPT | Performed by: FAMILY MEDICINE

## 2019-08-20 PROCEDURE — 1036F TOBACCO NON-USER: CPT | Performed by: FAMILY MEDICINE

## 2019-08-20 RX ORDER — CYANOCOBALAMIN 1000 UG/ML
1000 INJECTION INTRAMUSCULAR; SUBCUTANEOUS ONCE
Status: COMPLETED | OUTPATIENT
Start: 2019-08-20 | End: 2019-08-20

## 2019-08-20 RX ADMIN — CYANOCOBALAMIN 1000 MCG: 1000 INJECTION INTRAMUSCULAR; SUBCUTANEOUS at 11:38

## 2019-08-20 NOTE — PATIENT INSTRUCTIONS
legal in the state where you now live. Or you might use a universal form that has been approved by many states. This kind of form can sometimes be completed and stored online. Your electronic copy will then be available wherever you have a connection to the Internet. In most cases, doctors will respect your wishes even if you have a form from a different state. · You don't need an  to complete a living will. But legal advice can be helpful if your state's laws are unclear, your health history is complicated, or your family can't agree on what should be in your living will. · You can change your living will at any time. Some people find that their wishes about end-of-life care change as their health changes. · In addition to making a living will, think about completing a medical power of  form. This form lets you name the person you want to make end-of-life treatment decisions for you (your \"health care agent\") if you're not able to. Many hospitals and nursing homes will give you the forms you need to complete a living will and a medical power of . · Your living will is used only if you can't make or communicate decisions for yourself anymore. If you become able to make decisions again, you can accept or refuse any treatment, no matter what you wrote in your living will. · Your state may offer an online registry. This is a place where you can store your living will online so the doctors and nurses who need to treat you can find it right away. What should you think about when creating a living will? Talk about your end-of-life wishes with your family members and your doctor. Let them know what you want. That way the people making decisions for you won't be surprised by your choices. Think about these questions as you make your living will:  · Do you know enough about life support methods that might be used?  If not, talk to your doctor so you know what might be done if you can't breathe pasta, every day. An ounce-equivalent is 1 slice of bread, 1 cup of ready-to-eat cereal, or ½ cup of cooked rice, cooked pasta, or cooked cereal.  ? 2½ cups of vegetables, especially:  § Dark-green vegetables such as broccoli and spinach. § Orange vegetables such as carrots and sweet potatoes. § Dry beans (such as vernon and kidney beans) and peas (such as lentils). ? 2 cups of fresh, frozen, or canned fruit. A small apple or 1 banana or orange equals 1 cup. ? 3 cups of nonfat or low-fat milk, yogurt, or other milk products. ? 5½ ounces of meat and beans, such as chicken, fish, lean meat, beans, nuts, and seeds. One egg, 1 tablespoon of peanut butter, ½ ounce nuts or seeds, or ¼ cup of cooked beans equals 1 ounce of meat. · Learn how to read food labels for serving sizes and ingredients. Fast-food and convenience-food meals often contain few or no fruits or vegetables. Make sure you eat some fruits and vegetables to make the meal more nutritious. · Look at your food diary. For each food group, add up what you have eaten and then divide the total by the number of days. This will give you an idea of how much you are eating from each food group. See if you can find some ways to change your diet to make it more healthy. Start small  · Do not try to make dramatic changes to your diet all at once. You might feel that you are missing out on your favorite foods and then be more likely to fail. · Start slowly, and gradually change your habits. Try some of the following:  ? Use whole wheat bread instead of white bread. ? Use nonfat or low-fat milk instead of whole milk. ? Eat brown rice instead of white rice, and eat whole wheat pasta instead of white-flour pasta. ? Try low-fat cheeses and low-fat yogurt. ? Add more fruits and vegetables to meals and have them for snacks. ? Add lettuce, tomato, cucumber, and onion to sandwiches.   ? Add fruit to yogurt and cereal.  Enjoy food  · You can still eat your favorite

## 2019-08-20 NOTE — PROGRESS NOTES
belching, difficulty swallowing and melena. Symptoms have been present for several years. He denies dysphagia. He has not lost weight. He denies melena, hematochezia, hematemesis, and coffee ground emesis. Medical therapy in the past has included proton pump inhibitors. Elevated glucose need follow up. He is no longer seeing an endocrinologist for diabetes but his medications are through the [place]. His endocrinologist is leaving town and we will be managing the DM through here until he can find a new endocrinologist.  Lab Results   Component Value Date    LABA1C 7.2 (H) 08/12/2019     No results found for: EAG    Being followed currently by the [place] for hypertension, and hyperlipidemia. Vitamin b12  was at the low end of normal but with symptoms of fatigue [facility]. He is feeling better and has also started vit d PO as well. Last b12 level is high and he is instructed to go to every 8 weeks instead of once a month. Lab Results   Component Value Date    [id number] 05/06/2019       Tremor controlled better with current medications. He has seen the neurologist at the [place] who does not think that he has parkinson's but occasionally the tremor does get bad. He recalls once when he spilled his cereal right out of the bowl that he was holding. Al lot of these conditions are related to agent orange exposure in [facility] as well as a water contamination in [employer]. HARINDER controlled on CPAP. BPH is under the management of urology. Low sodium is under watch. Anemia is under regular watch. Reviewed chart forpast medical history , surgical history , allergies, social history , family history and medications.     Health Maintenance   Topic Date Due    DTaP/Tdap/Td vaccine (1 - Tdap) 01/26/2017    Flu vaccine (1) 09/01/2019    Diabetic microalbuminuria test  09/26/2019    Diabetic foot exam  10/02/2019    Lipid screen  01/28/2020   

## 2019-08-21 ENCOUNTER — NURSE ONLY (OUTPATIENT)
Dept: CARDIOLOGY CLINIC | Age: 69
End: 2019-08-21
Payer: MEDICARE

## 2019-08-21 DIAGNOSIS — Z95.0 PACEMAKER: Primary | ICD-10-CM

## 2019-08-21 PROCEDURE — 93288 INTERROG EVL PM/LDLS PM IP: CPT | Performed by: INTERNAL MEDICINE

## 2019-09-13 ENCOUNTER — TELEPHONE (OUTPATIENT)
Dept: CARDIOLOGY CLINIC | Age: 69
End: 2019-09-13

## 2019-09-13 NOTE — TELEPHONE ENCOUNTER
SANDIP    Thinks someone turned his pacemaker off-he's dependent. Has had dizzy spells, feeling tired/fatigued-states he becomes pale and \"almost passed out\" . Aware from his pacer check on 8/21/19, I have no reason to believe his pacemaker is not functioning. Is being checked monthly for battery watch. Also aware his pacer has 2-3 months \"built in\" that the device will function normally once the device hits PANCHO. Aware other things can make him feel dizzy. Suggested he go to ER if his symptoms continue.

## 2019-10-10 ENCOUNTER — NURSE ONLY (OUTPATIENT)
Dept: LAB | Age: 69
End: 2019-10-10

## 2019-10-10 DIAGNOSIS — N13.8 BPH WITH OBSTRUCTION/LOWER URINARY TRACT SYMPTOMS: ICD-10-CM

## 2019-10-10 DIAGNOSIS — N40.1 BPH WITH OBSTRUCTION/LOWER URINARY TRACT SYMPTOMS: ICD-10-CM

## 2019-10-11 LAB — PROSTATE SPECIFIC ANTIGEN: 3.44 NG/ML (ref 0–1)

## 2019-10-15 ENCOUNTER — NURSE ONLY (OUTPATIENT)
Dept: FAMILY MEDICINE CLINIC | Age: 69
End: 2019-10-15
Payer: MEDICARE

## 2019-10-15 ENCOUNTER — OFFICE VISIT (OUTPATIENT)
Dept: UROLOGY | Age: 69
End: 2019-10-15
Payer: MEDICARE

## 2019-10-15 VITALS
SYSTOLIC BLOOD PRESSURE: 122 MMHG | HEIGHT: 71 IN | WEIGHT: 220 LBS | DIASTOLIC BLOOD PRESSURE: 64 MMHG | BODY MASS INDEX: 30.8 KG/M2

## 2019-10-15 DIAGNOSIS — N13.8 BPH WITH OBSTRUCTION/LOWER URINARY TRACT SYMPTOMS: Primary | ICD-10-CM

## 2019-10-15 DIAGNOSIS — N40.1 BPH WITH OBSTRUCTION/LOWER URINARY TRACT SYMPTOMS: Primary | ICD-10-CM

## 2019-10-15 DIAGNOSIS — E53.8 VITAMIN B12 DEFICIENCY: Primary | ICD-10-CM

## 2019-10-15 LAB
BILIRUBIN, POC: NORMAL
BLOOD URINE, POC: NORMAL
CLARITY, POC: NORMAL
COLOR, POC: NORMAL
GLUCOSE URINE, POC: NORMAL
KETONES, POC: NORMAL
LEUKOCYTE EST, POC: NORMAL
NITRITE, POC: NORMAL
PH, POC: NORMAL
PROTEIN, POC: NORMAL
SPECIFIC GRAVITY, POC: NORMAL
UROBILINOGEN, POC: NORMAL

## 2019-10-15 PROCEDURE — 3017F COLORECTAL CA SCREEN DOC REV: CPT | Performed by: NURSE PRACTITIONER

## 2019-10-15 PROCEDURE — G8417 CALC BMI ABV UP PARAM F/U: HCPCS | Performed by: NURSE PRACTITIONER

## 2019-10-15 PROCEDURE — 1036F TOBACCO NON-USER: CPT | Performed by: NURSE PRACTITIONER

## 2019-10-15 PROCEDURE — 99213 OFFICE O/P EST LOW 20 MIN: CPT | Performed by: NURSE PRACTITIONER

## 2019-10-15 PROCEDURE — G8427 DOCREV CUR MEDS BY ELIG CLIN: HCPCS | Performed by: NURSE PRACTITIONER

## 2019-10-15 PROCEDURE — 96372 THER/PROPH/DIAG INJ SC/IM: CPT | Performed by: FAMILY MEDICINE

## 2019-10-15 PROCEDURE — 4040F PNEUMOC VAC/ADMIN/RCVD: CPT | Performed by: NURSE PRACTITIONER

## 2019-10-15 PROCEDURE — G8482 FLU IMMUNIZE ORDER/ADMIN: HCPCS | Performed by: NURSE PRACTITIONER

## 2019-10-15 PROCEDURE — 1123F ACP DISCUSS/DSCN MKR DOCD: CPT | Performed by: NURSE PRACTITIONER

## 2019-10-15 PROCEDURE — 81002 URINALYSIS NONAUTO W/O SCOPE: CPT | Performed by: NURSE PRACTITIONER

## 2019-10-15 RX ORDER — CYANOCOBALAMIN 1000 UG/ML
1000 INJECTION INTRAMUSCULAR; SUBCUTANEOUS ONCE
Status: COMPLETED | OUTPATIENT
Start: 2019-10-15 | End: 2019-10-15

## 2019-10-15 RX ADMIN — CYANOCOBALAMIN 1000 MCG: 1000 INJECTION INTRAMUSCULAR; SUBCUTANEOUS at 09:16

## 2019-10-16 ENCOUNTER — NURSE ONLY (OUTPATIENT)
Dept: CARDIOLOGY CLINIC | Age: 69
End: 2019-10-16
Payer: MEDICARE

## 2019-10-16 DIAGNOSIS — Z95.0 PACEMAKER: Primary | ICD-10-CM

## 2019-10-16 PROCEDURE — 93288 INTERROG EVL PM/LDLS PM IP: CPT | Performed by: INTERNAL MEDICINE

## 2019-11-06 ENCOUNTER — OFFICE VISIT (OUTPATIENT)
Dept: CARDIOLOGY CLINIC | Age: 69
End: 2019-11-06
Payer: MEDICARE

## 2019-11-06 VITALS
BODY MASS INDEX: 31.61 KG/M2 | HEIGHT: 71 IN | WEIGHT: 225.8 LBS | HEART RATE: 67 BPM | DIASTOLIC BLOOD PRESSURE: 79 MMHG | SYSTOLIC BLOOD PRESSURE: 133 MMHG

## 2019-11-06 DIAGNOSIS — Z95.0 PACEMAKER: Primary | ICD-10-CM

## 2019-11-06 PROCEDURE — G8482 FLU IMMUNIZE ORDER/ADMIN: HCPCS | Performed by: INTERNAL MEDICINE

## 2019-11-06 PROCEDURE — 1036F TOBACCO NON-USER: CPT | Performed by: INTERNAL MEDICINE

## 2019-11-06 PROCEDURE — 3017F COLORECTAL CA SCREEN DOC REV: CPT | Performed by: INTERNAL MEDICINE

## 2019-11-06 PROCEDURE — 99205 OFFICE O/P NEW HI 60 MIN: CPT | Performed by: INTERNAL MEDICINE

## 2019-11-06 PROCEDURE — 4040F PNEUMOC VAC/ADMIN/RCVD: CPT | Performed by: INTERNAL MEDICINE

## 2019-11-06 PROCEDURE — G8417 CALC BMI ABV UP PARAM F/U: HCPCS | Performed by: INTERNAL MEDICINE

## 2019-11-06 PROCEDURE — 1123F ACP DISCUSS/DSCN MKR DOCD: CPT | Performed by: INTERNAL MEDICINE

## 2019-11-06 PROCEDURE — G8427 DOCREV CUR MEDS BY ELIG CLIN: HCPCS | Performed by: INTERNAL MEDICINE

## 2019-11-06 PROCEDURE — 93000 ELECTROCARDIOGRAM COMPLETE: CPT | Performed by: INTERNAL MEDICINE

## 2019-11-06 RX ORDER — TAMSULOSIN HYDROCHLORIDE 0.4 MG/1
0.4 CAPSULE ORAL DAILY
COMMUNITY

## 2019-11-06 RX ORDER — TOPIRAMATE 25 MG/1
25 CAPSULE, COATED PELLETS ORAL 2 TIMES DAILY
COMMUNITY

## 2019-11-06 ASSESSMENT — ENCOUNTER SYMPTOMS
ABDOMINAL PAIN: 0
BLURRED VISION: 0
VOMITING: 0
SHORTNESS OF BREATH: 0
RIGHT EYE: 0
COUGH: 0
NAUSEA: 0
WHEEZING: 0
SORE THROAT: 0
DOUBLE VISION: 0
LEFT EYE: 0
BACK PAIN: 0
DIARRHEA: 0
CONSTIPATION: 0

## 2019-11-14 ENCOUNTER — NURSE ONLY (OUTPATIENT)
Dept: LAB | Age: 69
End: 2019-11-14

## 2019-11-14 DIAGNOSIS — E11.65 UNCONTROLLED TYPE 2 DIABETES MELLITUS WITH HYPERGLYCEMIA (HCC): ICD-10-CM

## 2019-11-14 LAB
ANION GAP SERPL CALCULATED.3IONS-SCNC: 14 MEQ/L (ref 8–16)
AVERAGE GLUCOSE: 159 MG/DL (ref 70–126)
BUN BLDV-MCNC: 17 MG/DL (ref 7–22)
CALCIUM SERPL-MCNC: 10 MG/DL (ref 8.5–10.5)
CHLORIDE BLD-SCNC: 95 MEQ/L (ref 98–111)
CO2: 24 MEQ/L (ref 23–33)
CREAT SERPL-MCNC: 1.1 MG/DL (ref 0.4–1.2)
GFR SERPL CREATININE-BSD FRML MDRD: 66 ML/MIN/1.73M2
GLUCOSE BLD-MCNC: 162 MG/DL (ref 70–108)
HBA1C MFR BLD: 7.3 % (ref 4.4–6.4)
POTASSIUM SERPL-SCNC: 5.5 MEQ/L (ref 3.5–5.2)
SODIUM BLD-SCNC: 133 MEQ/L (ref 135–145)

## 2019-11-19 ENCOUNTER — PREP FOR PROCEDURE (OUTPATIENT)
Dept: CARDIOLOGY | Age: 69
End: 2019-11-19

## 2019-11-19 ENCOUNTER — NURSE ONLY (OUTPATIENT)
Dept: LAB | Age: 69
End: 2019-11-19

## 2019-11-19 ENCOUNTER — OFFICE VISIT (OUTPATIENT)
Dept: FAMILY MEDICINE CLINIC | Age: 69
End: 2019-11-19
Payer: MEDICARE

## 2019-11-19 VITALS
SYSTOLIC BLOOD PRESSURE: 136 MMHG | RESPIRATION RATE: 18 BRPM | DIASTOLIC BLOOD PRESSURE: 70 MMHG | OXYGEN SATURATION: 98 % | HEART RATE: 67 BPM | BODY MASS INDEX: 31.14 KG/M2 | HEIGHT: 71 IN | TEMPERATURE: 96.4 F | WEIGHT: 222.4 LBS

## 2019-11-19 DIAGNOSIS — N13.8 BPH WITH OBSTRUCTION/LOWER URINARY TRACT SYMPTOMS: ICD-10-CM

## 2019-11-19 DIAGNOSIS — N40.1 BPH WITH OBSTRUCTION/LOWER URINARY TRACT SYMPTOMS: ICD-10-CM

## 2019-11-19 DIAGNOSIS — E53.8 VITAMIN B12 DEFICIENCY: ICD-10-CM

## 2019-11-19 DIAGNOSIS — G25.0 ESSENTIAL TREMOR: ICD-10-CM

## 2019-11-19 DIAGNOSIS — Z99.89 OBSTRUCTIVE SLEEP APNEA ON CPAP: ICD-10-CM

## 2019-11-19 DIAGNOSIS — F43.10 POST TRAUMATIC STRESS DISORDER: ICD-10-CM

## 2019-11-19 DIAGNOSIS — E87.1 HYPONATREMIA: ICD-10-CM

## 2019-11-19 DIAGNOSIS — E87.5 HYPERKALEMIA: ICD-10-CM

## 2019-11-19 DIAGNOSIS — D50.9 IRON DEFICIENCY ANEMIA, UNSPECIFIED IRON DEFICIENCY ANEMIA TYPE: ICD-10-CM

## 2019-11-19 DIAGNOSIS — I10 ESSENTIAL HYPERTENSION: ICD-10-CM

## 2019-11-19 DIAGNOSIS — G47.33 OBSTRUCTIVE SLEEP APNEA ON CPAP: ICD-10-CM

## 2019-11-19 DIAGNOSIS — J45.20 MILD INTERMITTENT ASTHMA WITHOUT COMPLICATION: ICD-10-CM

## 2019-11-19 DIAGNOSIS — E53.8 B12 DEFICIENCY: ICD-10-CM

## 2019-11-19 DIAGNOSIS — E78.5 HYPERLIPIDEMIA WITH TARGET LDL LESS THAN 100: ICD-10-CM

## 2019-11-19 DIAGNOSIS — E11.65 UNCONTROLLED TYPE 2 DIABETES MELLITUS WITH HYPERGLYCEMIA (HCC): Primary | ICD-10-CM

## 2019-11-19 DIAGNOSIS — I48.0 PAROXYSMAL ATRIAL FIBRILLATION (HCC): ICD-10-CM

## 2019-11-19 LAB
ANION GAP SERPL CALCULATED.3IONS-SCNC: 12 MEQ/L (ref 8–16)
BUN BLDV-MCNC: 14 MG/DL (ref 7–22)
CALCIUM SERPL-MCNC: 9.3 MG/DL (ref 8.5–10.5)
CHLORIDE BLD-SCNC: 95 MEQ/L (ref 98–111)
CO2: 25 MEQ/L (ref 23–33)
CREAT SERPL-MCNC: 1 MG/DL (ref 0.4–1.2)
GFR SERPL CREATININE-BSD FRML MDRD: 74 ML/MIN/1.73M2
GLUCOSE BLD-MCNC: 177 MG/DL (ref 70–108)
POTASSIUM SERPL-SCNC: 4.5 MEQ/L (ref 3.5–5.2)
SODIUM BLD-SCNC: 132 MEQ/L (ref 135–145)

## 2019-11-19 PROCEDURE — 99214 OFFICE O/P EST MOD 30 MIN: CPT | Performed by: FAMILY MEDICINE

## 2019-11-19 PROCEDURE — G8427 DOCREV CUR MEDS BY ELIG CLIN: HCPCS | Performed by: FAMILY MEDICINE

## 2019-11-19 PROCEDURE — 1036F TOBACCO NON-USER: CPT | Performed by: FAMILY MEDICINE

## 2019-11-19 PROCEDURE — 3017F COLORECTAL CA SCREEN DOC REV: CPT | Performed by: FAMILY MEDICINE

## 2019-11-19 PROCEDURE — 2022F DILAT RTA XM EVC RTNOPTHY: CPT | Performed by: FAMILY MEDICINE

## 2019-11-19 PROCEDURE — G8417 CALC BMI ABV UP PARAM F/U: HCPCS | Performed by: FAMILY MEDICINE

## 2019-11-19 PROCEDURE — 1123F ACP DISCUSS/DSCN MKR DOCD: CPT | Performed by: FAMILY MEDICINE

## 2019-11-19 PROCEDURE — G8482 FLU IMMUNIZE ORDER/ADMIN: HCPCS | Performed by: FAMILY MEDICINE

## 2019-11-19 PROCEDURE — 4040F PNEUMOC VAC/ADMIN/RCVD: CPT | Performed by: FAMILY MEDICINE

## 2019-11-19 PROCEDURE — 96372 THER/PROPH/DIAG INJ SC/IM: CPT | Performed by: FAMILY MEDICINE

## 2019-11-19 RX ORDER — SODIUM CHLORIDE 9 MG/ML
INJECTION, SOLUTION INTRAVENOUS CONTINUOUS
Status: CANCELLED | OUTPATIENT
Start: 2019-11-19

## 2019-11-19 RX ORDER — SODIUM CHLORIDE 0.9 % (FLUSH) 0.9 %
10 SYRINGE (ML) INJECTION PRN
Status: CANCELLED | OUTPATIENT
Start: 2019-11-19

## 2019-11-19 RX ORDER — CYANOCOBALAMIN 1000 UG/ML
1000 INJECTION INTRAMUSCULAR; SUBCUTANEOUS ONCE
Status: COMPLETED | OUTPATIENT
Start: 2019-11-19 | End: 2019-11-19

## 2019-11-19 RX ORDER — CHLORHEXIDINE GLUCONATE 4 G/100ML
SOLUTION TOPICAL ONCE
Status: CANCELLED | OUTPATIENT
Start: 2019-11-19 | End: 2019-11-19

## 2019-11-19 RX ORDER — SODIUM CHLORIDE 0.9 % (FLUSH) 0.9 %
10 SYRINGE (ML) INJECTION EVERY 12 HOURS SCHEDULED
Status: CANCELLED | OUTPATIENT
Start: 2019-11-19

## 2019-11-19 RX ADMIN — CYANOCOBALAMIN 1000 MCG: 1000 INJECTION INTRAMUSCULAR; SUBCUTANEOUS at 10:50

## 2019-11-20 ENCOUNTER — HOSPITAL ENCOUNTER (OUTPATIENT)
Dept: INPATIENT UNIT | Age: 69
Discharge: HOME | End: 2019-11-20
Attending: INTERNAL MEDICINE | Admitting: INTERNAL MEDICINE
Payer: MEDICARE

## 2019-11-20 VITALS
HEIGHT: 71 IN | HEART RATE: 73 BPM | RESPIRATION RATE: 22 BRPM | WEIGHT: 223 LBS | DIASTOLIC BLOOD PRESSURE: 75 MMHG | OXYGEN SATURATION: 95 % | TEMPERATURE: 97.7 F | SYSTOLIC BLOOD PRESSURE: 152 MMHG | BODY MASS INDEX: 31.22 KG/M2

## 2019-11-20 LAB
ANION GAP SERPL CALCULATED.3IONS-SCNC: 14 MEQ/L (ref 8–16)
BASOPHILS # BLD: 0.5 %
BASOPHILS ABSOLUTE: 0 THOU/MM3 (ref 0–0.1)
BUN BLDV-MCNC: 15 MG/DL (ref 7–22)
CALCIUM SERPL-MCNC: 9.7 MG/DL (ref 8.5–10.5)
CHLORIDE BLD-SCNC: 94 MEQ/L (ref 98–111)
CO2: 25 MEQ/L (ref 23–33)
CREAT SERPL-MCNC: 1.1 MG/DL (ref 0.4–1.2)
EKG ATRIAL RATE: 66 BPM
EKG ATRIAL RATE: 71 BPM
EKG P AXIS: 63 DEGREES
EKG P AXIS: 69 DEGREES
EKG P-R INTERVAL: 192 MS
EKG Q-T INTERVAL: 458 MS
EKG Q-T INTERVAL: 478 MS
EKG QRS DURATION: 172 MS
EKG QRS DURATION: 172 MS
EKG QTC CALCULATION (BAZETT): 501 MS
EKG QTC CALCULATION (BAZETT): 504 MS
EKG R AXIS: -90 DEGREES
EKG R AXIS: -91 DEGREES
EKG T AXIS: 56 DEGREES
EKG T AXIS: 66 DEGREES
EKG VENTRICULAR RATE: 66 BPM
EKG VENTRICULAR RATE: 73 BPM
EOSINOPHIL # BLD: 1.9 %
EOSINOPHILS ABSOLUTE: 0.1 THOU/MM3 (ref 0–0.4)
ERYTHROCYTE [DISTWIDTH] IN BLOOD BY AUTOMATED COUNT: 12.8 % (ref 11.5–14.5)
ERYTHROCYTE [DISTWIDTH] IN BLOOD BY AUTOMATED COUNT: 41.1 FL (ref 35–45)
GFR SERPL CREATININE-BSD FRML MDRD: 66 ML/MIN/1.73M2
GLUCOSE BLD-MCNC: 156 MG/DL (ref 70–108)
HCT VFR BLD CALC: 41.6 % (ref 42–52)
HEMOGLOBIN: 13.8 GM/DL (ref 14–18)
IMMATURE GRANS (ABS): 0.04 THOU/MM3 (ref 0–0.07)
IMMATURE GRANULOCYTES: 0.6 %
INR BLD: 1.03 (ref 0.85–1.13)
LYMPHOCYTES # BLD: 32.1 %
LYMPHOCYTES ABSOLUTE: 2 THOU/MM3 (ref 1–4.8)
MCH RBC QN AUTO: 29.8 PG (ref 26–33)
MCHC RBC AUTO-ENTMCNC: 33.2 GM/DL (ref 32.2–35.5)
MCV RBC AUTO: 89.8 FL (ref 80–94)
MONOCYTES # BLD: 11.2 %
MONOCYTES ABSOLUTE: 0.7 THOU/MM3 (ref 0.4–1.3)
NUCLEATED RED BLOOD CELLS: 0 /100 WBC
PLATELET # BLD: 235 THOU/MM3 (ref 130–400)
PMV BLD AUTO: 8.9 FL (ref 9.4–12.4)
POTASSIUM REFLEX MAGNESIUM: 4.9 MEQ/L (ref 3.5–5.2)
RBC # BLD: 4.63 MILL/MM3 (ref 4.7–6.1)
SEG NEUTROPHILS: 53.7 %
SEGMENTED NEUTROPHILS ABSOLUTE COUNT: 3.3 THOU/MM3 (ref 1.8–7.7)
SODIUM BLD-SCNC: 133 MEQ/L (ref 135–145)
WBC # BLD: 6.2 THOU/MM3 (ref 4.8–10.8)

## 2019-11-20 PROCEDURE — 93005 ELECTROCARDIOGRAM TRACING: CPT | Performed by: INTERNAL MEDICINE

## 2019-11-20 PROCEDURE — C1785 PMKR, DUAL, RATE-RESP: HCPCS

## 2019-11-20 PROCEDURE — 93005 ELECTROCARDIOGRAM TRACING: CPT | Performed by: PHYSICIAN ASSISTANT

## 2019-11-20 PROCEDURE — 2580000003 HC RX 258: Performed by: PHYSICIAN ASSISTANT

## 2019-11-20 PROCEDURE — 85025 COMPLETE CBC W/AUTO DIFF WBC: CPT

## 2019-11-20 PROCEDURE — 2709999900 HC NON-CHARGEABLE SUPPLY

## 2019-11-20 PROCEDURE — 80048 BASIC METABOLIC PNL TOTAL CA: CPT

## 2019-11-20 PROCEDURE — 85610 PROTHROMBIN TIME: CPT

## 2019-11-20 PROCEDURE — 2500000003 HC RX 250 WO HCPCS

## 2019-11-20 PROCEDURE — 33228 REMV&REPLC PM GEN DUAL LEAD: CPT

## 2019-11-20 PROCEDURE — 36415 COLL VENOUS BLD VENIPUNCTURE: CPT

## 2019-11-20 PROCEDURE — 33228 REMV&REPLC PM GEN DUAL LEAD: CPT | Performed by: INTERNAL MEDICINE

## 2019-11-20 RX ORDER — CHLORHEXIDINE GLUCONATE 4 G/100ML
SOLUTION TOPICAL ONCE
Status: DISCONTINUED | OUTPATIENT
Start: 2019-11-20 | End: 2019-11-20 | Stop reason: HOSPADM

## 2019-11-20 RX ORDER — SODIUM CHLORIDE 9 MG/ML
INJECTION, SOLUTION INTRAVENOUS CONTINUOUS
Status: DISCONTINUED | OUTPATIENT
Start: 2019-11-20 | End: 2019-11-20 | Stop reason: HOSPADM

## 2019-11-20 RX ORDER — ACETAMINOPHEN 325 MG/1
650 TABLET ORAL EVERY 4 HOURS PRN
Status: DISCONTINUED | OUTPATIENT
Start: 2019-11-20 | End: 2019-11-20 | Stop reason: HOSPADM

## 2019-11-20 RX ORDER — HYDROCODONE BITARTRATE AND ACETAMINOPHEN 5; 325 MG/1; MG/1
2 TABLET ORAL EVERY 4 HOURS PRN
Status: DISCONTINUED | OUTPATIENT
Start: 2019-11-20 | End: 2019-11-20 | Stop reason: HOSPADM

## 2019-11-20 RX ORDER — HYDROCODONE BITARTRATE AND ACETAMINOPHEN 5; 325 MG/1; MG/1
1 TABLET ORAL EVERY 4 HOURS PRN
Status: DISCONTINUED | OUTPATIENT
Start: 2019-11-20 | End: 2019-11-20 | Stop reason: HOSPADM

## 2019-11-20 RX ORDER — SODIUM CHLORIDE 0.9 % (FLUSH) 0.9 %
10 SYRINGE (ML) INJECTION PRN
Status: DISCONTINUED | OUTPATIENT
Start: 2019-11-20 | End: 2019-11-20 | Stop reason: HOSPADM

## 2019-11-20 RX ORDER — ONDANSETRON 2 MG/ML
4 INJECTION INTRAMUSCULAR; INTRAVENOUS EVERY 6 HOURS PRN
Status: DISCONTINUED | OUTPATIENT
Start: 2019-11-20 | End: 2019-11-20 | Stop reason: HOSPADM

## 2019-11-20 RX ADMIN — SODIUM CHLORIDE: 9 INJECTION, SOLUTION INTRAVENOUS at 11:27

## 2019-12-03 ENCOUNTER — NURSE ONLY (OUTPATIENT)
Dept: CARDIOLOGY CLINIC | Age: 69
End: 2019-12-03
Payer: MEDICARE

## 2019-12-03 DIAGNOSIS — Z95.0 PACEMAKER: Primary | ICD-10-CM

## 2019-12-03 PROCEDURE — 93280 PM DEVICE PROGR EVAL DUAL: CPT | Performed by: INTERNAL MEDICINE

## 2019-12-17 ENCOUNTER — NURSE ONLY (OUTPATIENT)
Dept: LAB | Age: 69
End: 2019-12-17

## 2019-12-17 ENCOUNTER — TELEPHONE (OUTPATIENT)
Dept: FAMILY MEDICINE CLINIC | Age: 69
End: 2019-12-17

## 2019-12-17 ENCOUNTER — NURSE ONLY (OUTPATIENT)
Dept: FAMILY MEDICINE CLINIC | Age: 69
End: 2019-12-17
Payer: MEDICARE

## 2019-12-17 DIAGNOSIS — E87.1 LOW SODIUM LEVELS: ICD-10-CM

## 2019-12-17 DIAGNOSIS — E53.8 VITAMIN B12 DEFICIENCY: Primary | ICD-10-CM

## 2019-12-17 LAB — SODIUM BLD-SCNC: 132 MEQ/L (ref 135–145)

## 2019-12-17 PROCEDURE — 96372 THER/PROPH/DIAG INJ SC/IM: CPT | Performed by: FAMILY MEDICINE

## 2019-12-17 RX ORDER — CYANOCOBALAMIN 1000 UG/ML
1000 INJECTION INTRAMUSCULAR; SUBCUTANEOUS ONCE
Status: COMPLETED | OUTPATIENT
Start: 2019-12-17 | End: 2019-12-17

## 2019-12-17 RX ADMIN — CYANOCOBALAMIN 1000 MCG: 1000 INJECTION INTRAMUSCULAR; SUBCUTANEOUS at 13:07

## 2020-01-15 ENCOUNTER — OFFICE VISIT (OUTPATIENT)
Dept: CARDIOLOGY CLINIC | Age: 70
End: 2020-01-15
Payer: MEDICARE

## 2020-01-15 VITALS
DIASTOLIC BLOOD PRESSURE: 70 MMHG | HEART RATE: 60 BPM | HEIGHT: 71 IN | BODY MASS INDEX: 31.64 KG/M2 | SYSTOLIC BLOOD PRESSURE: 130 MMHG | WEIGHT: 226 LBS

## 2020-01-15 PROCEDURE — 3017F COLORECTAL CA SCREEN DOC REV: CPT | Performed by: PHYSICIAN ASSISTANT

## 2020-01-15 PROCEDURE — 1036F TOBACCO NON-USER: CPT | Performed by: PHYSICIAN ASSISTANT

## 2020-01-15 PROCEDURE — G8427 DOCREV CUR MEDS BY ELIG CLIN: HCPCS | Performed by: PHYSICIAN ASSISTANT

## 2020-01-15 PROCEDURE — 99213 OFFICE O/P EST LOW 20 MIN: CPT | Performed by: PHYSICIAN ASSISTANT

## 2020-01-15 PROCEDURE — 1123F ACP DISCUSS/DSCN MKR DOCD: CPT | Performed by: PHYSICIAN ASSISTANT

## 2020-01-15 PROCEDURE — G8482 FLU IMMUNIZE ORDER/ADMIN: HCPCS | Performed by: PHYSICIAN ASSISTANT

## 2020-01-15 PROCEDURE — G8417 CALC BMI ABV UP PARAM F/U: HCPCS | Performed by: PHYSICIAN ASSISTANT

## 2020-01-15 PROCEDURE — 4040F PNEUMOC VAC/ADMIN/RCVD: CPT | Performed by: PHYSICIAN ASSISTANT

## 2020-01-15 NOTE — PROGRESS NOTES
Pt here for 1 year check up     Denies chest pain, palpitations, CYRUS,  fatigue or dizziness. Pt still has some tenderness when the pacer was replaced (11/20/2019).

## 2020-01-15 NOTE — PROGRESS NOTES
[facility] PROFESSIONAL SERVICES  [facility] [address]   [address] [address]   Dept: [phone]   Dept Fax: [phone]   Loc: [phone]      Chief Complaint   Patient presents with    1 Year Follow Up     Patient with a history of permanent pacemaker presents for follow-up. Since his previous visit patient has had a generator change. He still has some residual intermittent tenderness around his pacer site. He did not have any issues with his incision or with any swelling or redness. He denies any chest pain, palpitations or shortness of breath. General:   No fever, no chills, No fatigue or weight loss  Pulmonary:    No dyspnea, no wheezing  Cardiac:    Denies recent chest pain   GI:     No nausea or vomiting, no abdominal pain  Neuro:    No dizziness or light headedness  Musculoskeletal:  No recent active issues  Extremities:   No edema, good peripheral pulses      Past Medical History:   Diagnosis Date    Asthma     [employer] dual pacemaker 4/9/2014    Bronchitis, chronic (HCC)     Carotid artery stenosis     Dr Vanesa Glasgow    Chickenpox     CVA (cerebral infarction)     GERD (gastroesophageal reflux disease)     Hemorrhoids     Hyperlipidemia     Hypertension     Mild intermittent asthma without complication 38/8/8362    Nausea & vomiting     [facility]    Osteoarthritis     Peripheral neuropathy     [facility] stress disorder ([facility]SD)     [facility]    S/P cardiac catheterization: 11/20/2013: No obstructive lesions. Moderate LI's in the mid LAD and in the RCA. 11/20/2013 11/20/2013: No obstructive lesions. Moderate LI's in the mid LAD and in the RCA.  Dr. Duran Nest Tremor of both hands     [facility]    Type II or unspecified type diabetes mellitus without mention of complication, not stated as uncontrolled     Dr Lin garcía    Unspecified sleep apnea     Dr Tr Carroll       Allergies   Allergen Reactions    Latex Rash     itching    Doxycycline Calcium [Doxycycline Calcium]     Lactose Diarrhea    Nasacort [Triamcinolone] Other (See Comments)     Unable to remember    Wellbutrin [Bupropion] Other (See Comments)     Does not remember       Current Outpatient Medications   Medication Sig Dispense Refill    tamsulosin (FLOMAX) 0.4 MG capsule Take 0.4 mg by mouth daily      topiramate (TOPAMAX SPRINKLE) 25 MG capsule Take 25 mg by mouth 2 times daily      VENTOLIN  (90 Base) MCG/ACT inhaler INHALE 2 PUFFS EVERY 6 HOURS AS NEEDED FOR WHEEZING. 18 g 5    insulin detemir (LEVEMIR FLEXTOUCH) 100 UNIT/ML injection pen Inject 33 Units into the skin nightly 5 pen 11    DULoxetine (CYMBALTA) 60 MG extended release capsule Take 60 mg by mouth daily      metFORMIN (GLUCOPHAGE XR) 500 MG extended release tablet Take 2 tablets by mouth 2 times daily (Patient taking differently: Take 500 mg by mouth 2 times daily ) 60 tablet 11    blood glucose test strips (ACCU-CHEK ERVIN) strip 1 each by In Vitro route 2 times daily As needed. 100 each 3    Lancets MISC Testing  each 3    vitamin D (CHOLECALCIFEROL) 1000 UNIT TABS tablet Take 1 tablet by mouth 2 times daily 60 tablet 11    olodaterol (STRIVERDI RESPIMAT) 2.5 MCG/ACT inhaler Inhale 2 puffs into the lungs daily 3 Inhaler 3    Omega-3 Fatty Acids (FISH OIL) 500 MG CAPS Take by mouth 2 times daily      loratadine (CLARITIN) 10 MG tablet Take 10 mg by mouth daily      rivaroxaban (XARELTO) 20 MG TABS tablet Take 20 mg by mouth daily (with breakfast)      gabapentin (NEURONTIN) 300 MG capsule Take 300 mg by mouth 2 times daily .       amLODIPine (NORVASC) 5 MG tablet TAKE 1 TABLET BY MOUTH DAILY (Patient taking differently: Take 5 mg by mouth daily ) 90 tablet 3    atorvastatin (LIPITOR) 20 MG tablet take 1 tablet by mouth once daily (Patient taking differently: 40 mg take 1 tablet by mouth once daily) 90 tablet 3    omeprazole (PRILOSEC) 40 MG capsule Take 1 capsule by mouth daily symptoms and also any potential side effects. Return for care if become worse or seek medical attention immediately. The patient is educated on symptoms of heart disease that include chest pain and passing out, dizziness, etc and to report them if there is any change or go to emergency room.      Follow up with myself in 1 year or sooner if needed  (Please note that portions of this note were completed with a voice recognition program.  Efforts were made to edit the dictation but occasionally words are mis-transcribed.)      Gildardo Kay PA-C

## 2020-01-16 ENCOUNTER — NURSE ONLY (OUTPATIENT)
Dept: LAB | Age: 70
End: 2020-01-16

## 2020-01-16 ENCOUNTER — NURSE ONLY (OUTPATIENT)
Dept: FAMILY MEDICINE CLINIC | Age: 70
End: 2020-01-16
Payer: MEDICARE

## 2020-01-16 LAB — SODIUM BLD-SCNC: 129 MEQ/L (ref 135–145)

## 2020-01-16 PROCEDURE — 96372 THER/PROPH/DIAG INJ SC/IM: CPT | Performed by: FAMILY MEDICINE

## 2020-01-16 RX ORDER — CYANOCOBALAMIN 1000 UG/ML
1000 INJECTION INTRAMUSCULAR; SUBCUTANEOUS ONCE
Status: COMPLETED | OUTPATIENT
Start: 2020-01-16 | End: 2020-01-16

## 2020-01-16 RX ADMIN — CYANOCOBALAMIN 1000 MCG: 1000 INJECTION INTRAMUSCULAR; SUBCUTANEOUS at 10:55

## 2020-01-17 ENCOUNTER — TELEPHONE (OUTPATIENT)
Dept: FAMILY MEDICINE CLINIC | Age: 70
End: 2020-01-17

## 2020-01-24 ENCOUNTER — NURSE ONLY (OUTPATIENT)
Dept: LAB | Age: 70
End: 2020-01-24

## 2020-01-24 LAB — SODIUM BLD-SCNC: 131 MEQ/L (ref 135–145)

## 2020-02-19 ENCOUNTER — OFFICE VISIT (OUTPATIENT)
Dept: FAMILY MEDICINE CLINIC | Age: 70
End: 2020-02-19
Payer: MEDICARE

## 2020-02-19 VITALS
BODY MASS INDEX: 31.81 KG/M2 | SYSTOLIC BLOOD PRESSURE: 130 MMHG | RESPIRATION RATE: 18 BRPM | HEART RATE: 71 BPM | DIASTOLIC BLOOD PRESSURE: 72 MMHG | WEIGHT: 227.2 LBS | HEIGHT: 71 IN | OXYGEN SATURATION: 96 % | TEMPERATURE: 96.4 F

## 2020-02-19 PROCEDURE — 96372 THER/PROPH/DIAG INJ SC/IM: CPT | Performed by: FAMILY MEDICINE

## 2020-02-19 PROCEDURE — 2022F DILAT RTA XM EVC RTNOPTHY: CPT | Performed by: FAMILY MEDICINE

## 2020-02-19 PROCEDURE — 99214 OFFICE O/P EST MOD 30 MIN: CPT | Performed by: FAMILY MEDICINE

## 2020-02-19 PROCEDURE — 1123F ACP DISCUSS/DSCN MKR DOCD: CPT | Performed by: FAMILY MEDICINE

## 2020-02-19 PROCEDURE — G8482 FLU IMMUNIZE ORDER/ADMIN: HCPCS | Performed by: FAMILY MEDICINE

## 2020-02-19 PROCEDURE — 3046F HEMOGLOBIN A1C LEVEL >9.0%: CPT | Performed by: FAMILY MEDICINE

## 2020-02-19 PROCEDURE — 1036F TOBACCO NON-USER: CPT | Performed by: FAMILY MEDICINE

## 2020-02-19 PROCEDURE — G8427 DOCREV CUR MEDS BY ELIG CLIN: HCPCS | Performed by: FAMILY MEDICINE

## 2020-02-19 PROCEDURE — 4040F PNEUMOC VAC/ADMIN/RCVD: CPT | Performed by: FAMILY MEDICINE

## 2020-02-19 PROCEDURE — 3017F COLORECTAL CA SCREEN DOC REV: CPT | Performed by: FAMILY MEDICINE

## 2020-02-19 PROCEDURE — G8417 CALC BMI ABV UP PARAM F/U: HCPCS | Performed by: FAMILY MEDICINE

## 2020-02-19 RX ORDER — CYANOCOBALAMIN 1000 UG/ML
1000 INJECTION INTRAMUSCULAR; SUBCUTANEOUS ONCE
Status: COMPLETED | OUTPATIENT
Start: 2020-02-19 | End: 2020-02-19

## 2020-02-19 RX ADMIN — CYANOCOBALAMIN 1000 MCG: 1000 INJECTION INTRAMUSCULAR; SUBCUTANEOUS at 11:17

## 2020-02-19 ASSESSMENT — PATIENT HEALTH QUESTIONNAIRE - PHQ9
SUM OF ALL RESPONSES TO PHQ9 QUESTIONS 1 & 2: 0
SUM OF ALL RESPONSES TO PHQ QUESTIONS 1-9: 0
2. FEELING DOWN, DEPRESSED OR HOPELESS: 0
1. LITTLE INTEREST OR PLEASURE IN DOING THINGS: 0
SUM OF ALL RESPONSES TO PHQ QUESTIONS 1-9: 0

## 2020-02-19 NOTE — PROGRESS NOTES
Administrations This Visit     cyanocobalamin injection 1,000 mcg     Admin Date  02/19/2020  11:17 Action  Given Dose  1000 mcg Route  Intramuscular Site  Deltoid Right Administered By  Suyapa Aleman LPN    Ordering Provider:  Roxie Julien MD    [address]:  [phone]    Lot#:  [id number]    :  [facility]    Patient Supplied?:  No    Comments:  Exp- 07/2021                Patient instructed to report any adverse reaction to me immediately.

## 2020-02-19 NOTE — PROGRESS NOTES
[address] [place] [id number]  Dept: [phone]  Dept Fax: [phone]  Loc: [address] [place] [address] Vanita Ricks is a 78 y.o. male who presents today for:  Chief Complaint   Patient presents with    3 Month Follow-Up           HPI:     HPI    He is seeing primarily the [place] due to medications and treatment for PTSD but comes here 3 times yearly to go over all the information as well as maintenance of diabetes. PTSD did have a flare during a recent pacemaker placement when thye did not give him enough pain medication. He reports thoughts of hurting the surgeon but did not act on them when redirected by the nurse. He has since worked this through with the counselor at the [place]. Hypertension: Patient here for follow-up of elevated blood pressure. He is not exercising and is adherent to low salt diet. Blood pressure is well controlled at home. Cardiac symptoms none. Patient denies chest pain, dyspnea and palpitations. Cardiovascular risk factors: advanced age (older than 54 for men, 72 for women), dyslipidemia, hypertension and male gender. Use of agents associated with hypertension: none. History of target organ damage: stroke, paroxysmal afib, and CAD diagnosed by cath in 2013 but no stents. Hyperlipidemia: Patient presents with hyperlipidemia. He was tested because hypertension and CVA.   His last labs showed   Lab Results   Component Value Date    CHOL 117 01/28/2019    CHOL 121 10/24/2017    CHOL 122 04/13/2017     Lab Results   Component Value Date    TRIG 77 01/28/2019    TRIG 89 10/24/2017    TRIG 82 04/13/2017     Lab Results   Component Value Date    HDL 49 01/28/2019    HDL 52 10/24/2017    HDL 45 04/13/2017     Lab Results   Component Value Date    LDLCALC 53 01/28/2019    LDLCALC 51 10/24/2017    LDLCALC 61 04/13/2017     No results found for: LABVLDL, VLDL  No results found for: CHOLHDLRATIO  There is medications. Health Maintenance   Topic Date Due    DTaP/Tdap/Td vaccine (1 - Tdap) 04/02/1960    Diabetic microalbuminuria test  09/26/2019    Lipid screen  01/28/2020    Diabetic retinal exam  03/19/2020    Annual Wellness Visit (AWV)  08/20/2020    A1C test (Diabetic or Prediabetic)  11/14/2020    Diabetic foot exam  11/19/2020    Colon cancer screen colonoscopy  10/04/2029    Hepatitis B vaccine  Completed    Flu vaccine  Completed    Shingles Vaccine  Completed    Pneumococcal 65+ years Vaccine  Completed    Hepatitis A vaccine  Aged Out    Hib vaccine  Aged Out    Meningococcal (ACWY) vaccine  Aged Out    AAA screen  Discontinued    Hepatitis C screen  Discontinued       Subjective:      Constitutional:Negative for fever, chills, diaphoresis, activity change, appetite change and fatigue. HENT: Negative for hearing loss, ear pain, congestion, sore throat, rhinorrhea, postnasal drip and ear discharge. Eyes: Negative for photophobia and visual disturbance. Respiratory: Negative for cough, chest tightness, shortness of breath and wheezing. Cardiovascular: Negative for chest pain and leg swelling. Gastrointestinal: Negative for nausea, vomiting, abdominal pain, diarrhea and constipation. Genitourinary: Negative for dysuria, urgency and frequency. Neurological: Negative for weakness, light-headedness and headaches. Psychiatric/Behavioral: Negative for sleep disturbance. Objective:     Vitals:    02/19/20 1034   BP: 130/72   Site: Left Upper Arm   Position: Sitting   Cuff Size: Large Adult   Pulse: 71   Resp: 18   Temp: 96.4 °F (35.8 °C)   TempSrc: Temporal   SpO2: 96%   Weight: 227 lb 3.2 oz (103.1 kg)   Height: 5' 10.98\" (1.803 m)     Wt Readings from Last 3 Encounters:   02/19/20 227 lb 3.2 oz (103.1 kg)   01/15/20 226 lb (102.5 kg)   11/20/19 223 lb (101.2 kg)       Physical Exam   Constitutional: Vital signs are normal. He appears well-developed and well-nourished.  He is active. HENT:   Head: Normocephalic and atraumatic. Right Ear: Tympanic membrane, external ear and ear canal normal. No drainage or tenderness. Left Ear: Tympanic membrane, external ear and ear canal normal. No drainage or tenderness. Nose: Nose normal. No mucosal edema or rhinorrhea. Mouth/Throat: Uvula is midline, oropharynx is clear and moist and mucous membranes are normal. Mucous membranes are not pale. Normal dentition. No posterior oropharyngeal edema or posterior oropharyngeal erythema. Eyes: Lids are normal. Right eye exhibits no chemosis and no discharge. Left eye exhibits no chemosis and no drainage. Right conjunctiva has no hemorrhage. Left conjunctiva has no hemorrhage. Right eye exhibits normal extraocular motion. Left eye exhibits normal extraocular motion. Right pupil is round and reactive. Left pupil is round and reactive. Pupils are equal.   Cardiovascular: Normal rate, regular rhythm, S1 normal, S2 normal and normal heart sounds. Exam reveals no gallop. No murmur heard. Pulmonary/Chest: Effort normal and breath sounds normal. No respiratory distress. He has no wheezes. He has no rhonchi. He has no rales. Abdominal: Soft. Normal appearance and bowel sounds are normal. He exhibits no distension and no mass. There is no hepatosplenomegaly. No tenderness. He has no rigidity, no rebound and no guarding. No hernia. Musculoskeletal:        Right lower leg: He exhibits no edema. Left lower leg: He exhibits no edema. Neurological: He is alert. Oriented and pleasent        Assessment/Plan   Mercy Hurst was seen today for 3 month follow-up.     Diagnoses and all orders for this visit:    Uncontrolled type 2 diabetes mellitus with hyperglycemia (HCC)    Vitamin B12 deficiency  -     cyanocobalamin injection 1,000 mcg    Iron deficiency anemia, unspecified iron deficiency anemia type  -     cyanocobalamin injection 1,000 mcg    Essential hypertension    Hyperlipidemia with target LDL less than 100    Paroxysmal atrial fibrillation (HCC)    Mild intermittent asthma without complication    Essential tremor    Low sodium levels    Obstructive sleep apnea on CPAP    Post traumatic stress disorder    BPH with obstruction/lower urinary tract symptoms    Agent orange exposure    No change to medication   Continue healthy diet and exercise  Yearly eye exam  Daily foot inspection  Yearly flu shot  Monitor glucose regularly  Daily aspirin  Regular labs : A1c quarterly, lipids every 6 months and BMP quaterly    Discussed use, benefit, and side effectsof prescribed medications. All patient questions answered. Pt voiced understanding. Reviewed health maintenance. Instructed to continue current medications, diet and exercise. Patient agreed with treatment plan. Followup as directed.      Electronically signed by Sebastien Demarco MD

## 2020-03-10 ENCOUNTER — NURSE ONLY (OUTPATIENT)
Dept: LAB | Age: 70
End: 2020-03-10

## 2020-03-10 ENCOUNTER — PROCEDURE VISIT (OUTPATIENT)
Dept: CARDIOLOGY CLINIC | Age: 70
End: 2020-03-10
Payer: MEDICARE

## 2020-03-10 LAB
ALBUMIN SERPL-MCNC: 4.5 G/DL (ref 3.5–5.1)
ALP BLD-CCNC: 132 U/L (ref 38–126)
ALT SERPL-CCNC: 30 U/L (ref 11–66)
ANION GAP SERPL CALCULATED.3IONS-SCNC: 15 MEQ/L (ref 8–16)
AST SERPL-CCNC: 21 U/L (ref 5–40)
AVERAGE GLUCOSE: 171 MG/DL (ref 70–126)
BILIRUB SERPL-MCNC: 0.5 MG/DL (ref 0.3–1.2)
BUN BLDV-MCNC: 13 MG/DL (ref 7–22)
CALCIUM SERPL-MCNC: 9.1 MG/DL (ref 8.5–10.5)
CHLORIDE BLD-SCNC: 97 MEQ/L (ref 98–111)
CHOLESTEROL, TOTAL: 119 MG/DL (ref 100–199)
CO2: 24 MEQ/L (ref 23–33)
CREAT SERPL-MCNC: 1 MG/DL (ref 0.4–1.2)
CREATININE, URINE: 114.6 MG/DL
ERYTHROCYTE [DISTWIDTH] IN BLOOD BY AUTOMATED COUNT: 13.2 % (ref 11.5–14.5)
ERYTHROCYTE [DISTWIDTH] IN BLOOD BY AUTOMATED COUNT: 44.6 FL (ref 35–45)
FOLATE: > 20 NG/ML (ref 4.8–24.2)
GFR SERPL CREATININE-BSD FRML MDRD: 74 ML/MIN/1.73M2
GLUCOSE BLD-MCNC: 135 MG/DL (ref 70–108)
HBA1C MFR BLD: 7.7 % (ref 4.4–6.4)
HCT VFR BLD CALC: 42.3 % (ref 42–52)
HDLC SERPL-MCNC: 46 MG/DL
HEMOGLOBIN: 13.5 GM/DL (ref 14–18)
LDL CHOLESTEROL CALCULATED: 54 MG/DL
MCH RBC QN AUTO: 29.7 PG (ref 26–33)
MCHC RBC AUTO-ENTMCNC: 31.9 GM/DL (ref 32.2–35.5)
MCV RBC AUTO: 93.2 FL (ref 80–94)
MICROALBUMIN UR-MCNC: < 1.2 MG/DL
MICROALBUMIN/CREAT UR-RTO: 10 MG/G (ref 0–30)
PLATELET # BLD: 255 THOU/MM3 (ref 130–400)
PMV BLD AUTO: 9.5 FL (ref 9.4–12.4)
POTASSIUM SERPL-SCNC: 4.1 MEQ/L (ref 3.5–5.2)
PROSTATE SPECIFIC ANTIGEN: 5.03 NG/ML (ref 0–1)
RBC # BLD: 4.54 MILL/MM3 (ref 4.7–6.1)
SODIUM BLD-SCNC: 136 MEQ/L (ref 135–145)
TOTAL PROTEIN: 7.2 G/DL (ref 6.1–8)
TRIGL SERPL-MCNC: 96 MG/DL (ref 0–199)
TSH SERPL DL<=0.05 MIU/L-ACNC: 3.05 UIU/ML (ref 0.4–4.2)
VITAMIN B-12: 957 PG/ML (ref 211–911)
WBC # BLD: 5.2 THOU/MM3 (ref 4.8–10.8)

## 2020-03-10 PROCEDURE — 93294 REM INTERROG EVL PM/LDLS PM: CPT | Performed by: INTERNAL MEDICINE

## 2020-03-10 PROCEDURE — 93296 REM INTERROG EVL PM/IDS: CPT | Performed by: INTERNAL MEDICINE

## 2020-03-10 NOTE — PROGRESS NOTES
[employer] dual pacemaker  Battery 7yrs  A paced 14 %  V paced 100%  p wave 3.9mV  r wave not recorded  Atrial impedance 513ohms  Ventricle impedance 716ohms  A fib burden 2%   Episode of a fib included

## 2020-03-11 ENCOUNTER — TELEPHONE (OUTPATIENT)
Dept: FAMILY MEDICINE CLINIC | Age: 70
End: 2020-03-11

## 2020-03-12 ENCOUNTER — TELEPHONE (OUTPATIENT)
Dept: UROLOGY | Age: 70
End: 2020-03-12

## 2020-03-12 NOTE — TELEPHONE ENCOUNTER
Received a Referral from Dr Adela King, [address] is current and has appt in Oct with Aaron Mars. Called Pt and asked him if he needed to come in Sooner. He stated he did not know, He is not a doctor. He verified that he had the Oct appt written on his calender.  I advised that I would send a message to Aaron Mars and ask her if he needed to be seen sooner with the recent PSA results

## 2020-03-16 ENCOUNTER — TELEPHONE (OUTPATIENT)
Dept: UROLOGY | Age: 70
End: 2020-03-16

## 2020-03-16 NOTE — TELEPHONE ENCOUNTER
Message left on home phone to return call to office to reschedule tomorrows appt due to COVID-19. I called pt today ( 3-. Doing well. No new urinary symptoms or pain. On Flomax. PSA up to 5.03. will repeat prior to next appt in June. Will order PSA, can we mail copy of order to his house?     Thank you

## 2020-03-30 ENCOUNTER — TELEPHONE (OUTPATIENT)
Dept: FAMILY MEDICINE CLINIC | Age: 70
End: 2020-03-30

## 2020-03-30 NOTE — TELEPHONE ENCOUNTER
[facility] to do as a nurse visit  We have been doing them in the parking lot if necessary

## 2020-03-31 NOTE — TELEPHONE ENCOUNTER
I called and spoke to the patient's wife, Ousmane Lamar. I let her know that her 's appointment for the injection is good to go for this Thursday at 10:00 am at the [facility] office. The patient's wife, Ousmane Lamar voiced understanding.

## 2020-04-02 ENCOUNTER — OFFICE VISIT (OUTPATIENT)
Dept: PRIMARY CARE CLINIC | Age: 70
End: 2020-04-02
Payer: MEDICARE

## 2020-04-02 PROCEDURE — 96372 THER/PROPH/DIAG INJ SC/IM: CPT | Performed by: FAMILY MEDICINE

## 2020-04-02 RX ORDER — CYANOCOBALAMIN 1000 UG/ML
1000 INJECTION INTRAMUSCULAR; SUBCUTANEOUS ONCE
Status: COMPLETED | OUTPATIENT
Start: 2020-04-02 | End: 2020-04-02

## 2020-04-02 RX ADMIN — CYANOCOBALAMIN 1000 MCG: 1000 INJECTION INTRAMUSCULAR; SUBCUTANEOUS at 10:10

## 2020-04-17 ENCOUNTER — TELEPHONE (OUTPATIENT)
Dept: CARDIOLOGY CLINIC | Age: 70
End: 2020-04-17

## 2020-05-04 ENCOUNTER — OFFICE VISIT (OUTPATIENT)
Dept: PRIMARY CARE CLINIC | Age: 70
End: 2020-05-04
Payer: MEDICARE

## 2020-05-04 PROCEDURE — 96372 THER/PROPH/DIAG INJ SC/IM: CPT | Performed by: FAMILY MEDICINE

## 2020-05-04 RX ORDER — CYANOCOBALAMIN 1000 UG/ML
1000 INJECTION INTRAMUSCULAR; SUBCUTANEOUS ONCE
Status: COMPLETED | OUTPATIENT
Start: 2020-05-04 | End: 2020-05-04

## 2020-05-04 RX ADMIN — CYANOCOBALAMIN 1000 MCG: 1000 INJECTION INTRAMUSCULAR; SUBCUTANEOUS at 10:12

## 2020-05-12 ENCOUNTER — OFFICE VISIT (OUTPATIENT)
Dept: PULMONOLOGY | Age: 70
End: 2020-05-12
Payer: MEDICARE

## 2020-05-12 VITALS
TEMPERATURE: 97.8 F | DIASTOLIC BLOOD PRESSURE: 74 MMHG | BODY MASS INDEX: 30.94 KG/M2 | RESPIRATION RATE: 20 BRPM | WEIGHT: 221 LBS | HEART RATE: 73 BPM | SYSTOLIC BLOOD PRESSURE: 130 MMHG | HEIGHT: 71 IN | OXYGEN SATURATION: 99 %

## 2020-05-12 PROCEDURE — G8427 DOCREV CUR MEDS BY ELIG CLIN: HCPCS | Performed by: NURSE PRACTITIONER

## 2020-05-12 PROCEDURE — 3017F COLORECTAL CA SCREEN DOC REV: CPT | Performed by: NURSE PRACTITIONER

## 2020-05-12 PROCEDURE — 1123F ACP DISCUSS/DSCN MKR DOCD: CPT | Performed by: NURSE PRACTITIONER

## 2020-05-12 PROCEDURE — 4040F PNEUMOC VAC/ADMIN/RCVD: CPT | Performed by: NURSE PRACTITIONER

## 2020-05-12 PROCEDURE — G8926 SPIRO NO PERF OR DOC: HCPCS | Performed by: NURSE PRACTITIONER

## 2020-05-12 PROCEDURE — 3023F SPIROM DOC REV: CPT | Performed by: NURSE PRACTITIONER

## 2020-05-12 PROCEDURE — G8417 CALC BMI ABV UP PARAM F/U: HCPCS | Performed by: NURSE PRACTITIONER

## 2020-05-12 PROCEDURE — 1036F TOBACCO NON-USER: CPT | Performed by: NURSE PRACTITIONER

## 2020-05-12 PROCEDURE — 99213 OFFICE O/P EST LOW 20 MIN: CPT | Performed by: NURSE PRACTITIONER

## 2020-05-12 ASSESSMENT — ENCOUNTER SYMPTOMS
NAUSEA: 0
WHEEZING: 0
COUGH: 0
ABDOMINAL PAIN: 0
CHEST TIGHTNESS: 0
SHORTNESS OF BREATH: 0
DIARRHEA: 0
EYES NEGATIVE: 1
VOMITING: 0

## 2020-05-12 NOTE — PROGRESS NOTES
Psychiatric:         Mood and Affect: Mood normal.         Behavior: Behavior normal.         Thought Content: Thought content normal.         Judgment: Judgment normal.          Test results   Lung Nodule Screening     [] Qualifies    [x]Does not qualify   [] Declined    [] Completed    Assessment      Diagnosis Orders   1. COPD with asthma ([facility] [address]     2.  PTSD (post-traumatic stress disorder)        Plan   Continue current meds  [facility]    Will see Monika Hanson in: 1 year    Electronically signed by FERDINAND Abdalla CNP on 5/12/2020 at 10:01 AM

## 2020-05-19 ENCOUNTER — TELEPHONE (OUTPATIENT)
Dept: UROLOGY | Age: 70
End: 2020-05-19

## 2020-05-20 ENCOUNTER — OFFICE VISIT (OUTPATIENT)
Dept: FAMILY MEDICINE CLINIC | Age: 70
End: 2020-05-20
Payer: MEDICARE

## 2020-05-20 VITALS
TEMPERATURE: 98.2 F | BODY MASS INDEX: 30.8 KG/M2 | SYSTOLIC BLOOD PRESSURE: 112 MMHG | DIASTOLIC BLOOD PRESSURE: 64 MMHG | HEART RATE: 73 BPM | RESPIRATION RATE: 16 BRPM | HEIGHT: 71 IN | WEIGHT: 220 LBS | OXYGEN SATURATION: 98 %

## 2020-05-20 PROCEDURE — 99214 OFFICE O/P EST MOD 30 MIN: CPT | Performed by: FAMILY MEDICINE

## 2020-05-20 PROCEDURE — 2022F DILAT RTA XM EVC RTNOPTHY: CPT | Performed by: FAMILY MEDICINE

## 2020-05-20 PROCEDURE — 1036F TOBACCO NON-USER: CPT | Performed by: FAMILY MEDICINE

## 2020-05-20 PROCEDURE — 1123F ACP DISCUSS/DSCN MKR DOCD: CPT | Performed by: FAMILY MEDICINE

## 2020-05-20 PROCEDURE — G8417 CALC BMI ABV UP PARAM F/U: HCPCS | Performed by: FAMILY MEDICINE

## 2020-05-20 PROCEDURE — 3051F HG A1C>EQUAL 7.0%<8.0%: CPT | Performed by: FAMILY MEDICINE

## 2020-05-20 PROCEDURE — 4040F PNEUMOC VAC/ADMIN/RCVD: CPT | Performed by: FAMILY MEDICINE

## 2020-05-20 PROCEDURE — G8427 DOCREV CUR MEDS BY ELIG CLIN: HCPCS | Performed by: FAMILY MEDICINE

## 2020-05-20 PROCEDURE — 3017F COLORECTAL CA SCREEN DOC REV: CPT | Performed by: FAMILY MEDICINE

## 2020-05-20 NOTE — PROGRESS NOTES
[address] [place] [id number]  Dept: [phone]  Dept Fax: [phone]  Loc: [address] [place] [address] [place] Bassem Sabillon is a 69 y.o. male who presents today for:  Chief Complaint   Patient presents with    3 Month Follow-Up           HPI:     HPI    He is seeing primarily the [place] due to medications and treatment for PTSD but comes here 3 times yearly to go over all the information as well as maintenance of diabetes. PTSD did have a flare during a recent pacemaker placement when thye did not give him enough pain medication. He reports thoughts of hurting the surgeon but did not act on them when redirected by the nurse. He has since worked this through with the counselor at the [place]. He is also flaring a little bit with the current pandemic and dealing with being put off for doctor visits while he is still not sure if he has prostate cancer. Hypertension: Patient here for follow-up of elevated blood pressure. He is not exercising and is adherent to low salt diet. Blood pressure is well controlled at home. Cardiac symptoms none. Patient denies chest pain, dyspnea and palpitations. Cardiovascular risk factors: advanced age (older than 54 for men, 72 for women), dyslipidemia, hypertension and male gender. Use of agents associated with hypertension: none. History of target organ damage: stroke, paroxysmal afib, and CAD diagnosed by cath in 2013 but no stents. Hyperlipidemia: Patient presents with hyperlipidemia. He was tested because hypertension and CVA.   His last labs showed   Lab Results   Component Value Date    CHOL 119 03/10/2020    CHOL 117 01/28/2019    CHOL 121 10/24/2017     Lab Results   Component Value Date    TRIG 96 03/10/2020    TRIG 77 01/28/2019    TRIG 89 10/24/2017     Lab Results   Component Value Date    HDL 46 03/10/2020    HDL 49 01/28/2019    HDL 52 10/24/2017     Lab Results   Component Value Date of urology. Low sodium is under watch. Anemia is under regular watch. Reviewed chart forpast medical history , surgical history , allergies, social history , family history and medications. Health Maintenance   Topic Date Due    DTaP/Tdap/Td vaccine (1 - Tdap) 04/02/1968    Annual Wellness Visit (AWV)  08/20/2020    Diabetic foot exam  11/19/2020    Diabetic retinal exam  03/03/2021    A1C test (Diabetic or Prediabetic)  03/10/2021    Diabetic microalbuminuria test  03/10/2021    Lipid screen  03/10/2021    PSA counseling  03/10/2021    Colon cancer screen colonoscopy  10/04/2029    Flu vaccine  Completed    Shingles Vaccine  Completed    Pneumococcal 65+ years Vaccine  Completed    Hepatitis A vaccine  Aged Out    Hib vaccine  Aged Out    Meningococcal (ACWY) vaccine  Aged Out    AAA screen  Discontinued    Hepatitis C screen  Discontinued       Subjective:      Constitutional:Negative for fever, chills, diaphoresis, activity change, appetite change and fatigue. HENT: Negative for hearing loss, ear pain, congestion, sore throat, rhinorrhea, postnasal drip and ear discharge. Eyes: Negative for photophobia and visual disturbance. Respiratory: Negative for cough, chest tightness, shortness of breath and wheezing. Cardiovascular: Negative for chest pain and leg swelling. Gastrointestinal: Negative for nausea, vomiting, abdominal pain, diarrhea and constipation. Genitourinary: Negative for dysuria, urgency and frequency. Neurological: Negative for weakness, light-headedness and headaches. Psychiatric/Behavioral: Negative for sleep disturbance.       Objective:     Vitals:    05/20/20 1012   BP: 112/64   Site: Left Upper Arm   Position: Sitting   Pulse: 73   Resp: 16   Temp: 98.2 °F (36.8 °C)   TempSrc: Oral   SpO2: 98%   Weight: 220 lb (99.8 kg)   Height: 5' 11\" (1.803 m)     Wt Readings from Last 3 Encounters:   05/20/20 220 lb (99.8 kg)   05/12/20 221 lb (100.2 kg) 02/19/20 227 lb 3.2 oz (103.1 kg)       Physical Exam  Constitutional: Vital signs are normal. He appears well-developed and well-nourished. He is active. HENT:   Head: Normocephalic and atraumatic. Right Ear: Tympanic membrane, external ear and ear canal normal. No drainage or tenderness. Left Ear: Tympanic membrane, external ear and ear canal normal. No drainage or tenderness. Nose: Nose normal. No mucosal edema or rhinorrhea. Mouth/Throat: Uvula is midline, oropharynx is clear and moist and mucous membranes are normal. Mucous membranes are not pale. Normal dentition. No posterior oropharyngeal edema or posterior oropharyngeal erythema. Eyes: Lids are normal. Right eye exhibits no chemosis and no discharge. Left eye exhibits no chemosis and no drainage. Right conjunctiva has no hemorrhage. Left conjunctiva has no hemorrhage. Right eye exhibits normal extraocular motion. Left eye exhibits normal extraocular motion. Right pupil is round and reactive. Left pupil is round and reactive. Pupils are equal.   Cardiovascular: Normal rate, regular rhythm, S1 normal, S2 normal and normal heart sounds. Exam reveals no gallop. No murmur heard. Pulmonary/Chest: Effort normal and breath sounds normal. No respiratory distress. He has no wheezes. He has no rhonchi. He has no rales. Abdominal: Soft. Normal appearance and bowel sounds are normal. He exhibits no distension and no mass. There is no hepatosplenomegaly. No tenderness. He has no rigidity, no rebound and no guarding. No hernia. Musculoskeletal:        Right lower leg: He exhibits no edema. Left lower leg: He exhibits no edema. Neurological: He is alert. Oriented and pleasant  Psych:  Not suicidal or homicidal  I was able to help him understand the situation with the visits. Assessment/Plan   Camille Casas was seen today for 3 month follow-up.     Diagnoses and all orders for this visit:    Uncontrolled type 2 diabetes mellitus with

## 2020-06-05 ENCOUNTER — HOSPITAL ENCOUNTER (OUTPATIENT)
Age: 70
Discharge: HOME | End: 2020-06-05
Payer: MEDICARE

## 2020-06-05 ENCOUNTER — NURSE ONLY (OUTPATIENT)
Dept: FAMILY MEDICINE CLINIC | Age: 70
End: 2020-06-05
Payer: MEDICARE

## 2020-06-05 DIAGNOSIS — E11.65 UNCONTROLLED TYPE 2 DIABETES MELLITUS WITH HYPERGLYCEMIA (HCC): ICD-10-CM

## 2020-06-05 DIAGNOSIS — N13.8 BPH WITH OBSTRUCTION/LOWER URINARY TRACT SYMPTOMS: ICD-10-CM

## 2020-06-05 DIAGNOSIS — N40.1 BPH WITH OBSTRUCTION/LOWER URINARY TRACT SYMPTOMS: ICD-10-CM

## 2020-06-05 LAB
ANION GAP SERPL CALCULATED.3IONS-SCNC: 11 MEQ/L (ref 8–16)
AVERAGE GLUCOSE: 159 MG/DL (ref 70–126)
BUN BLDV-MCNC: 13 MG/DL (ref 7–22)
CALCIUM SERPL-MCNC: 9.8 MG/DL (ref 8.5–10.5)
CHLORIDE BLD-SCNC: 97 MEQ/L (ref 98–111)
CO2: 25 MEQ/L (ref 23–33)
CREAT SERPL-MCNC: 1 MG/DL (ref 0.4–1.2)
GFR SERPL CREATININE-BSD FRML MDRD: 74 ML/MIN/1.73M2
GLUCOSE BLD-MCNC: 253 MG/DL (ref 70–108)
HBA1C MFR BLD: 7.3 % (ref 4.4–6.4)
POTASSIUM SERPL-SCNC: 4.7 MEQ/L (ref 3.5–5.2)
PROSTATE SPECIFIC ANTIGEN: 3.87 NG/ML (ref 0–1)
SODIUM BLD-SCNC: 133 MEQ/L (ref 135–145)

## 2020-06-05 PROCEDURE — 36415 COLL VENOUS BLD VENIPUNCTURE: CPT

## 2020-06-05 PROCEDURE — 84153 ASSAY OF PSA TOTAL: CPT

## 2020-06-05 PROCEDURE — 80048 BASIC METABOLIC PNL TOTAL CA: CPT

## 2020-06-05 PROCEDURE — 83036 HEMOGLOBIN GLYCOSYLATED A1C: CPT

## 2020-06-05 PROCEDURE — 96372 THER/PROPH/DIAG INJ SC/IM: CPT | Performed by: FAMILY MEDICINE

## 2020-06-05 RX ORDER — CYANOCOBALAMIN 1000 UG/ML
1000 INJECTION INTRAMUSCULAR; SUBCUTANEOUS ONCE
Status: COMPLETED | OUTPATIENT
Start: 2020-06-05 | End: 2020-06-05

## 2020-06-05 RX ADMIN — CYANOCOBALAMIN 1000 MCG: 1000 INJECTION INTRAMUSCULAR; SUBCUTANEOUS at 09:53

## 2020-06-05 NOTE — PROGRESS NOTES
Administrations This Visit     cyanocobalamin injection 1,000 mcg     Admin Date  06/05/2020  09:53 Action  Given Dose  1000 mcg Route  Intramuscular Site  Deltoid Left Administered By  Tigist Collins LPN    Ordering Provider:  Yesenia Fortune MD    [address]:  [id number]    Lot#:  [id number]    :  [facility]    Patient Supplied?:  No    Comments:  EXP- 07/2021                Patient instructed to report any adverse reaction to me immediately.

## 2020-06-08 ENCOUNTER — TELEPHONE (OUTPATIENT)
Dept: FAMILY MEDICINE CLINIC | Age: 70
End: 2020-06-08

## 2020-06-08 NOTE — TELEPHONE ENCOUNTER
Sodium is low  Otherwise labs look good    40 oz of gatorade daily but no water    recheck sodium in 1 week    Call patient

## 2020-06-11 ENCOUNTER — TELEPHONE (OUTPATIENT)
Dept: UROLOGY | Age: 70
End: 2020-06-11

## 2020-06-11 NOTE — TELEPHONE ENCOUNTER
----- Message from FERDINAND Duarte CNP sent at 6/11/2020 12:32 PM EDT -----  psa came down some, to 3.87

## 2020-06-11 NOTE — TELEPHONE ENCOUNTER
I called and notified the patient that his psa came down some to 3.87. Patient voiced understanding.

## 2020-06-16 ENCOUNTER — TELEPHONE (OUTPATIENT)
Dept: CARDIOLOGY CLINIC | Age: 70
End: 2020-06-16

## 2020-06-16 ENCOUNTER — PROCEDURE VISIT (OUTPATIENT)
Dept: CARDIOLOGY CLINIC | Age: 70
End: 2020-06-16
Payer: MEDICARE

## 2020-06-16 PROCEDURE — 93294 REM INTERROG EVL PM/LDLS PM: CPT | Performed by: NUCLEAR MEDICINE

## 2020-06-16 PROCEDURE — 93296 REM INTERROG EVL PM/IDS: CPT | Performed by: NUCLEAR MEDICINE

## 2020-06-16 NOTE — PROGRESS NOTES
THIS PT SEES [employer]UAL [employer] REMOTE     BATTERY 7 YRS REMAINING    KNOWN ATRIAL FIB/ XARELTO   AFIB BURDEN 2%    ATRIAL IMPEDENCE 499  VENT IMPEDENCE 636    P WAVES 3.7  RV WAVES NOT MEASURED PER THE DEVICE    NO THRESHOLDS OBTAINED PER THE DEVICE  ATRIAL AMPLITUDE PROGRAMED AT 2.5 @ 0.4  VENT AMPLITUDE PROGRAMMED AT 2.5 @ 0.4    DDDR     A PACED 13%  V PACED 100%    PT HAD 1 ALERT ON HIS DOWNLOAD THAT THE MAGDA SENSOR (SIGNAL ARTIFACT MONITOR ) SWITCHED. THIS WILL OCCUR ON A [employer] DEVICE IF THE DEVICE SEES NOISE ON THE ATRIAL CHANNEL   PT DID HAVE AN EPISODE OF NOISE ON ATRIAL LEAD ON 4/14/2020. I CALLED THE PT TO SEE IF HE WAS AROUND ANY MAGNETIC FIELDS  AND HE WAS NOT. TOLD HIM WE WILL CONTINUE TO MONITOR AND IF IT OCCURS AGAIN WE WILL LET HIM KNOW.

## 2020-07-06 ENCOUNTER — NURSE ONLY (OUTPATIENT)
Dept: FAMILY MEDICINE CLINIC | Age: 70
End: 2020-07-06
Payer: MEDICARE

## 2020-07-06 PROCEDURE — 96372 THER/PROPH/DIAG INJ SC/IM: CPT | Performed by: FAMILY MEDICINE

## 2020-07-06 RX ORDER — CYANOCOBALAMIN 1000 UG/ML
1000 INJECTION INTRAMUSCULAR; SUBCUTANEOUS ONCE
Status: COMPLETED | OUTPATIENT
Start: 2020-07-06 | End: 2020-07-06

## 2020-07-06 RX ADMIN — CYANOCOBALAMIN 1000 MCG: 1000 INJECTION INTRAMUSCULAR; SUBCUTANEOUS at 09:13

## 2020-07-06 NOTE — PROGRESS NOTES
Administrations This Visit     cyanocobalamin injection 1,000 mcg     Admin Date  07/06/2020  09:13 Action  Given Dose  1000 mcg Route  Intramuscular Site  Deltoid Left Administered By  Otilia Leon LPN    Ordering Provider:  Brianna Temple MD    [address]:  [id number]    Lot#:  [id number]    :  [facility]    Patient Supplied?:  No    Comments:  exp- 07/21                Patient instructed to report any adverse reaction to me immediately.

## 2020-07-21 ENCOUNTER — OFFICE VISIT (OUTPATIENT)
Dept: UROLOGY | Age: 70
End: 2020-07-21
Payer: MEDICARE

## 2020-07-21 VITALS — HEIGHT: 71 IN | BODY MASS INDEX: 30.8 KG/M2 | WEIGHT: 220 LBS | TEMPERATURE: 96.7 F

## 2020-07-21 PROCEDURE — 1036F TOBACCO NON-USER: CPT | Performed by: NURSE PRACTITIONER

## 2020-07-21 PROCEDURE — 1123F ACP DISCUSS/DSCN MKR DOCD: CPT | Performed by: NURSE PRACTITIONER

## 2020-07-21 PROCEDURE — 99213 OFFICE O/P EST LOW 20 MIN: CPT | Performed by: NURSE PRACTITIONER

## 2020-07-21 PROCEDURE — G8417 CALC BMI ABV UP PARAM F/U: HCPCS | Performed by: NURSE PRACTITIONER

## 2020-07-21 PROCEDURE — 4040F PNEUMOC VAC/ADMIN/RCVD: CPT | Performed by: NURSE PRACTITIONER

## 2020-07-21 PROCEDURE — 81002 URINALYSIS NONAUTO W/O SCOPE: CPT | Performed by: NURSE PRACTITIONER

## 2020-07-21 PROCEDURE — 3017F COLORECTAL CA SCREEN DOC REV: CPT | Performed by: NURSE PRACTITIONER

## 2020-07-21 PROCEDURE — G8427 DOCREV CUR MEDS BY ELIG CLIN: HCPCS | Performed by: NURSE PRACTITIONER

## 2020-07-21 NOTE — PATIENT INSTRUCTIONS
At Risk for Falls    • # Patient does not fall Outcome Met, Continue evaluating goal progress toward completion    • # Takes action to control fall-related risks Outcome Met, Continue evaluating goal progress toward completion        At Risk for Injury Due to Fall    • # Patient does not fall Outcome Met, Continue evaluating goal progress toward completion    • # Takes action to control condition specific risks Outcome Met, Continue evaluating goal progress toward completion        Delirium, Risk for    • # No symptoms of delirium Outcome Met, Continue evaluating goal progress toward completion        Dysphagia    • # Exhibits no s/s of aspiration Outcome Met, Continue evaluating goal progress toward completion        Fluid Volume Excess    • # Oxygenation is maintained (SpO2 greater than or equal to 90% or as ordered) Outcome Met, Continue evaluating goal progress toward completion        Pressure Injury, Risk for    • # Skin remains intact Outcome Met, Continue evaluating goal progress toward completion    • No new pressure injury (PI) development Outcome Met, Continue evaluating goal progress toward completion        VTE, Risk for    • # No s/s of VTE Outcome Met, Continue evaluating goal progress toward completion           You may receive a survey regarding the care you received during your visit. Your input is valuable to us. We encourage you to complete and return your survey. We hope you will choose us in the future for your healthcare needs.

## 2020-07-21 NOTE — PROGRESS NOTES
[address]  [address] [facility]  Dept: [phone]  Loc: [phone]  Visit Date: 2020      HPI:     Jeff Rebolledo f/u for BPH. He is doing well, reports his urinary symptoms are stable. He remains on Flomax 0.4 mg daily, tolerating well with no side effects. Has nocturia of 1-2 times nightly. Denies weak urinary stream, incomplete bladder emptying, frequency or urgency. Trend of PSA:    3.87    2020  5.03    2020  3.44 10/2019  3.08 2018  3.16 10/2016  2.04 2015    He notes family hx of prostate cancer in his father and uncle, who  from prostate cancer. He was exposed to Agent orange. He comes in today by himself. Hx is obtained from the patient and medical record. Current Outpatient Medications   Medication Sig Dispense Refill    tamsulosin (FLOMAX) 0.4 MG capsule Take 0.4 mg by mouth daily      topiramate (TOPAMAX SPRINKLE) 25 MG capsule Take 25 mg by mouth 2 times daily      VENTOLIN  (90 Base) MCG/ACT inhaler INHALE 2 PUFFS EVERY 6 HOURS AS NEEDED FOR WHEEZING. 18 g 5    insulin detemir (LEVEMIR FLEXTOUCH) 100 UNIT/ML injection pen Inject 33 Units into the skin nightly 5 pen 11    DULoxetine (CYMBALTA) 60 MG extended release capsule Take 60 mg by mouth daily      metFORMIN (GLUCOPHAGE XR) 500 MG extended release tablet Take 2 tablets by mouth 2 times daily (Patient taking differently: Take 500 mg by mouth 2 times daily ) 60 tablet 11    blood glucose test strips (ACCU-CHEK ERVIN) strip 1 each by In Vitro route 2 times daily As needed.  100 each 3    Lancets MISC Testing  each 3    vitamin D (CHOLECALCIFEROL) 1000 UNIT TABS tablet Take 1 tablet by mouth 2 times daily 60 tablet 11    olodaterol (STRIVERDI RESPIMAT) 2.5 MCG/ACT inhaler Inhale 2 puffs into the lungs daily 3 Inhaler 3    Omega-3 Fatty Acids (FISH OIL) 500 MG CAPS Take by mouth 2 times daily      loratadine (CLARITIN) 10 MG tablet Take 10 mg by mouth daily      rivaroxaban (XARELTO) 20 MG TABS tablet Take 20 mg by mouth daily (with breakfast)      gabapentin (NEURONTIN) 300 MG capsule Take 300 mg by mouth 2 times daily .  amLODIPine (NORVASC) 5 MG tablet TAKE 1 TABLET BY MOUTH DAILY (Patient taking differently: Take 5 mg by mouth daily ) 90 tablet 3    atorvastatin (LIPITOR) 20 MG tablet take 1 tablet by mouth once daily (Patient taking differently: 40 mg take 1 tablet by mouth once daily) 90 tablet 3    omeprazole (PRILOSEC) 40 MG capsule Take 1 capsule by mouth daily (Patient taking differently: Take 20 mg by mouth 2 times daily ) 90 capsule 3    propranolol (INDERAL LA) 120 MG CR capsule Take 120 mg by mouth 2 times daily       montelukast (SINGULAIR) 10 MG tablet take 1 tablet by mouth once daily 90 tablet 3    b complex vitamins capsule Take 1 capsule by mouth 2 times daily      BUSPIRONE HCL PO Take 10 mg by mouth 3 times daily Take 2 tablet by mouth 3 times daily Disp 270 tablets  R-3      fluticasone (FLONASE) 50 MCG/ACT nasal spray USE 2 SPRAYS NASALLY DAILY 3 Bottle 3    cyanocobalamin 1000 MCG/ML injection Inject 1 mL into the muscle every 30 days. 1 vial 2    aspirin 81 MG EC tablet Take 81 mg by mouth daily. No current facility-administered medications for this visit. Past Medical History  Renu Fields  has a past medical history of Asthma, [employer] dual pacemaker, Bronchitis, chronic ([address] Carotid artery stenosis, Chickenpox, CVA (cerebral infarction), GERD (gastroesophageal reflux disease), Hemorrhoids, Hyperlipidemia, Hypertension, Mild intermittent asthma without complication, Nausea & vomiting, Neuropathy, Osteoarthritis, Peripheral neuropathy, Post traumatic stress disorder (PTSD), S/P cardiac catheterization: 11/20/2013: No obstructive lesions.  Moderate LI's in the mid LAD and in the RCA., Tremor of both hands, Type II or unspecified type diabetes Chest symmetric with normal A/P diameter,  Normal respiratory rate and rhthym. No use of accessory muscles. Abdominal:         Soft. No tenderness. Bowel sounds present. Musculoskeletal:         Normal range of motion. No edema or tenderness of lower extremities. Extremities: No cyanosis, clubbing, or edema present. Neurological:        Alert and oriented. No cranial nerve deficit. Mary Ellen Sleet Psychiatric:        Normal mood and affect. Rectal: moderate enlarged prostate, 40 cc,normal tone, no masses, nodules, induration palpated, smooth and freely movable right sided firmness      Labs   Urine dip demonstrates   Results for POC orders placed in visit on 07/21/20   POCT Urinalysis no Micro   Result Value Ref Range    Color, UA      Clarity, UA      Glucose, UA POC      Bilirubin, UA      Ketones, UA      Spec Grav, UA      Blood, UA POC neg     pH, UA      Protein, UA POC      Urobilinogen, UA      Leukocytes, UA neg     Nitrite, UA neg        Patients recent PSA values are as follows  Lab Results   Component Value Date    PSA 3.87 (H) 06/05/2020    PSA 5.03 (H) 03/10/2020    PSA 3.44 (H) 10/10/2019        Recent BUN/Creatinine:  Lab Results   Component Value Date    BUN 13 06/05/2020    CREATININE 1.0 06/05/2020       Radiology  No recent imaging       Assessment & Plan:     BPH w/LUTS  Elevated PSA  Family history of prostate cancer      Alexus f/u for BPH. He is doing well, LUTS are stable, he remains on Flomax, tolerating well. Significant family history of prostate cancer  PSA jumped up to 5 in March, down to 3.87. We had long discussion about prostate cancer and biopsy. His wife is currently recovering from back surgery, he is sole caretaker. He would like to hold off on biopsy for now. Will repeat PSA in 6 weeks. If still elevated or goes up, it was recommended he undergo biopsy. This was discussed with pt. Denies any new bone pain or abdominal pain    Will get PSA in 6 weeks, will call with results. Nik Diaz, APRN  Urology

## 2020-08-10 ENCOUNTER — OFFICE VISIT (OUTPATIENT)
Dept: PULMONOLOGY | Age: 70
End: 2020-08-10
Payer: MEDICARE

## 2020-08-10 VITALS
HEIGHT: 71 IN | SYSTOLIC BLOOD PRESSURE: 118 MMHG | DIASTOLIC BLOOD PRESSURE: 72 MMHG | BODY MASS INDEX: 30.46 KG/M2 | HEART RATE: 71 BPM | OXYGEN SATURATION: 98 % | WEIGHT: 217.6 LBS

## 2020-08-10 PROCEDURE — 4040F PNEUMOC VAC/ADMIN/RCVD: CPT | Performed by: PHYSICIAN ASSISTANT

## 2020-08-10 PROCEDURE — G8417 CALC BMI ABV UP PARAM F/U: HCPCS | Performed by: PHYSICIAN ASSISTANT

## 2020-08-10 PROCEDURE — 3023F SPIROM DOC REV: CPT | Performed by: PHYSICIAN ASSISTANT

## 2020-08-10 PROCEDURE — 1036F TOBACCO NON-USER: CPT | Performed by: PHYSICIAN ASSISTANT

## 2020-08-10 PROCEDURE — G8427 DOCREV CUR MEDS BY ELIG CLIN: HCPCS | Performed by: PHYSICIAN ASSISTANT

## 2020-08-10 PROCEDURE — G8926 SPIRO NO PERF OR DOC: HCPCS | Performed by: PHYSICIAN ASSISTANT

## 2020-08-10 PROCEDURE — 3017F COLORECTAL CA SCREEN DOC REV: CPT | Performed by: PHYSICIAN ASSISTANT

## 2020-08-10 PROCEDURE — 99214 OFFICE O/P EST MOD 30 MIN: CPT | Performed by: PHYSICIAN ASSISTANT

## 2020-08-10 PROCEDURE — 1123F ACP DISCUSS/DSCN MKR DOCD: CPT | Performed by: PHYSICIAN ASSISTANT

## 2020-08-10 ASSESSMENT — ENCOUNTER SYMPTOMS
COUGH: 0
WHEEZING: 1
CHEST TIGHTNESS: 0
EYES NEGATIVE: 1
SHORTNESS OF BREATH: 0
DIARRHEA: 0
BACK PAIN: 0
NAUSEA: 0
STRIDOR: 0
ALLERGIC/IMMUNOLOGIC NEGATIVE: 1

## 2020-08-10 NOTE — PROGRESS NOTES
[facility]      Hiro Ferraro         [id number]  8/10/2020   Chief Complaint   Patient presents with    Follow-up     1 year f/u with a download         Pt of Dr. Melanie Lacy    PAP Download:   Original or initial AHI:  52.2    Date of initial study: 10/11/13    Compliant 100 %     Noncompliant 0 %     PAP Type  cpap Level  14  Avg Hrs/Day [id number]  AHI: 5.2   Recorded compliance dates , 7/11/20  to 8/9/20   Machine/Mfg:   [] ResMed    [x] Respironics/Dreamstation   Interface:   [] Nasal    [] Nasal pillows   [x] FFM      Provider:      [x] [facility]     [][facility]     [] [employer]    [] Nils Mings    [] [place]               [] [facility]      [] [employer]    [] [address]      [] Other    Neck Size: 16  Mallampati 1  ESS:  7  Here is a scan of the most recent download:          Presentation:   Anastasia Nobles presents for sleep medicine follow up for obstructive sleep apnea  Since the last visit, Anastasia Nobles is doing well with PAP. He is complaining of sinus congestion. He is on antihistamine  And nasal steroid. He has not been sleeping well secondary to caring for his wife. Before that, he was sleeping well. He is having an occ wheeze at night about 2-3 times a week and has improvement with albuterol. Equipment issues: The pressure is  acceptable, the mask is acceptable     Sleep issues:  Do you feel better? Yes  More rested? Yes   Better concentration? yes    Progress History:   Since last visit any new medical issues? No  New ER or hospitlal visits? No  Any new or changes in medicines? No  Any new sleep medicines?  No        Past Medical History:   Diagnosis Date    Asthma     [employer] dual pacemaker 4/9/2014    Bronchitis, chronic (HCC)     Carotid artery stenosis     Dr Ruchi Apple Chickenpox     CVA (cerebral infarction)     GERD (gastroesophageal reflux disease)     Hemorrhoids     Hyperlipidemia     Hypertension     Mild intermittent asthma without complication     Nausea & vomiting     [facility]    Osteoarthritis     Peripheral neuropathy     Post traumatic stress disorder (PTSD)     [facility]    S/P cardiac catheterization: 2013: No obstructive lesions. Moderate LI's in the mid LAD and in the RCA. 2013: No obstructive lesions. Moderate LI's in the mid LAD and in the RCA.  Dr. Margaux Murphy Tremor of both hands     [facility]    Type II or unspecified type diabetes mellitus without mention of complication, not stated as uncontrolled     Dr Lin office    Unspecified sleep apnea     Dr Bush [place]       Past Surgical History:   Procedure Laterality Date    APPENDECTOMY  age 14   [facility] BLEPHAROPLASTY Bilateral 2017    CARDIAC CATHETERIZATION  2013    no stents    CARPAL TUNNEL RELEASE Right 2017    COLONOSCOPY      Dr. Abby Mchugh      right eye clog oil duct    EYE SURGERY Bilateral 2017    HAND SURGERY Right 2017    [facility] - carpal tunnel    MANDIBLE SURGERY N/A 2017    jaw surgery lower front of mouth    PACEMAKER INSERTION      PACEMAKER INSERTION  2019    TONSILLECTOMY AND ADENOIDECTOMY  as a child        Social History     Tobacco Use    Smoking status: Former Smoker     Packs/day: 2.00     Years: 6.00     Pack years: 12.00     Types: Cigarettes     Last attempt to quit: 1971     Years since quittin.7    Smokeless tobacco: Former User   Substance Use Topics    Alcohol use: No     Alcohol/week: 0.0 standard drinks    Drug use: Yes     Types: Marijuana     Comment: quit at 26 yoa       Allergies   Allergen Reactions    Latex Rash     itching    Doxycycline Calcium [Doxycycline Calcium]     Lactose Diarrhea    Nasacort [Triamcinolone] Other (See Comments)     Unable to remember    Wellbutrin [Bupropion] Other (See Comments)     Does not remember       Current Outpatient Medications   Medication Sig Dispense Refill    tamsulosin (FLOMAX) 0.4 2 times daily      BUSPIRONE HCL PO Take 10 mg by mouth 3 times daily Take 2 tablet by mouth 3 times daily Disp 270 tablets  R-3      fluticasone (FLONASE) 50 MCG/ACT nasal spray USE 2 SPRAYS NASALLY DAILY 3 Bottle 3    cyanocobalamin 1000 MCG/ML injection Inject 1 mL into the muscle every 30 days. 1 vial 2    aspirin 81 MG EC tablet Take 81 mg by mouth daily. No current facility-administered medications for this visit. Family History   Problem Relation Age of Onset    High Cholesterol Father     High Blood Pressure Father     Heart Disease Father     Stroke Father     Diabetes Mother     Heart Disease Mother     High Blood Pressure Mother     High Cholesterol Mother     Kidney Disease Mother     Cancer Paternal Grandfather         Review of Systems -   Review of Systems   Constitutional: Negative for activity change, appetite change, chills and fever. HENT: Negative for congestion and postnasal drip. Eyes: Negative. Respiratory: Positive for wheezing. Negative for cough, chest tightness, shortness of breath and stridor. Cardiovascular: Negative for chest pain and leg swelling. Gastrointestinal: Negative for diarrhea and nausea. Endocrine: Negative. Genitourinary: Negative. Musculoskeletal: Negative. Negative for arthralgias and back pain. Skin: Negative. Allergic/Immunologic: Negative. Neurological: Negative. Negative for dizziness and light-headedness. Psychiatric/Behavioral: Negative. All other systems reviewed and are negative. Physical Exam:    BMI:  Body mass index is 30.35 kg/m².     Wt Readings from Last 3 Encounters:   08/10/20 217 lb 9.6 oz (98.7 kg)   07/21/20 220 lb (99.8 kg)   05/20/20 220 lb (99.8 kg)     Weight stable / unchanged  Vitals: /72 (Site: Left Upper Arm, Position: Sitting, Cuff Size: Medium Adult)   Pulse 71   Ht 5' 11\" (1.803 m)   Wt 217 lb 9.6 oz (98.7 kg)   SpO2 98% Comment: room air at rest  BMI 30.35 kg/m² Physical Exam  Constitutional:       Appearance: He is well-developed. HENT:      Head: Normocephalic and atraumatic. Right Ear: External ear normal.      Left Ear: External ear normal.   Eyes:      Conjunctiva/sclera: Conjunctivae normal.      Pupils: Pupils are equal, round, and reactive to light. Neck:      Musculoskeletal: Normal range of motion and neck supple. Cardiovascular:      Rate and Rhythm: Normal rate and regular rhythm. Heart sounds: Normal heart sounds. Pulmonary:      Effort: Pulmonary effort is normal.      Breath sounds: Normal breath sounds. Musculoskeletal: Normal range of motion. Skin:     General: Skin is warm and dry. Neurological:      Mental Status: He is alert and oriented to person, place, and time. Psychiatric:         Behavior: Behavior normal.         Thought Content: Thought content normal.         Judgment: Judgment normal.           ASSESSMENT/DIAGNOSIS     Diagnosis Orders   1. HARINDER on CPAP     2. Obesity (BMI 30-39.9)     3. COPD with asthma ([facility] [address]              Plan   Do you need any equipment today? Yes update supplies  - May adjust humidity to see if improves sinus issues  - Will have him see Tangela sooner for increased wheeze  - He  was advised to continue current positive airway pressure therapy with above described pressure. - He  advised to keep good compliance with current recommended pressure to get optimal results and clinical improvement  - Recommend 7-9 hours of sleep with PAP  - He was advised to call [employer] regarding supplies if needed.   -He call my office for earlier appointment if needed for worsening of sleep symptoms.   - He was instructed on weight loss  - Simin Garibay was educated about my impression and plan. Patient verbalizesunderstanding.   We will see Mich Mendiola back in: 1 year with download    Information added by my medical assistant/LPN was reviewed today         Aislinn Platt PA-C, MPAS  8/10/2020

## 2020-08-20 ENCOUNTER — OFFICE VISIT (OUTPATIENT)
Dept: PULMONOLOGY | Age: 70
End: 2020-08-20
Payer: MEDICARE

## 2020-08-20 VITALS
DIASTOLIC BLOOD PRESSURE: 70 MMHG | BODY MASS INDEX: 30.35 KG/M2 | HEIGHT: 71 IN | TEMPERATURE: 97.5 F | HEART RATE: 69 BPM | OXYGEN SATURATION: 97 % | WEIGHT: 216.8 LBS | SYSTOLIC BLOOD PRESSURE: 122 MMHG

## 2020-08-20 PROCEDURE — 1036F TOBACCO NON-USER: CPT | Performed by: NURSE PRACTITIONER

## 2020-08-20 PROCEDURE — 3023F SPIROM DOC REV: CPT | Performed by: NURSE PRACTITIONER

## 2020-08-20 PROCEDURE — G8427 DOCREV CUR MEDS BY ELIG CLIN: HCPCS | Performed by: NURSE PRACTITIONER

## 2020-08-20 PROCEDURE — 4040F PNEUMOC VAC/ADMIN/RCVD: CPT | Performed by: NURSE PRACTITIONER

## 2020-08-20 PROCEDURE — 1123F ACP DISCUSS/DSCN MKR DOCD: CPT | Performed by: NURSE PRACTITIONER

## 2020-08-20 PROCEDURE — 3017F COLORECTAL CA SCREEN DOC REV: CPT | Performed by: NURSE PRACTITIONER

## 2020-08-20 PROCEDURE — G8926 SPIRO NO PERF OR DOC: HCPCS | Performed by: NURSE PRACTITIONER

## 2020-08-20 PROCEDURE — 94664 DEMO&/EVAL PT USE INHALER: CPT | Performed by: NURSE PRACTITIONER

## 2020-08-20 PROCEDURE — G8417 CALC BMI ABV UP PARAM F/U: HCPCS | Performed by: NURSE PRACTITIONER

## 2020-08-20 PROCEDURE — 99213 OFFICE O/P EST LOW 20 MIN: CPT | Performed by: NURSE PRACTITIONER

## 2020-08-20 RX ORDER — FLUTICASONE FUROATE, UMECLIDINIUM BROMIDE AND VILANTEROL TRIFENATATE 100; 62.5; 25 UG/1; UG/1; UG/1
1 POWDER RESPIRATORY (INHALATION) DAILY
Qty: 90 EACH | Refills: 3 | Status: SHIPPED | OUTPATIENT
Start: 2020-08-20 | End: 2020-08-20

## 2020-08-20 ASSESSMENT — ENCOUNTER SYMPTOMS
EYES NEGATIVE: 1
SHORTNESS OF BREATH: 1
ABDOMINAL PAIN: 0
WHEEZING: 1
CHEST TIGHTNESS: 1
DIARRHEA: 0
VOMITING: 0
NAUSEA: 0
COUGH: 1

## 2020-08-20 NOTE — Clinical Note
Please call patient , [place] will not cover the Trelegy so I am faxing script for [facility] , which is used exact same way as I showed him in office. Go ahead and stop Striverdi as instructed and use the samples I gave him before starting the [facility] from the [place].

## 2020-08-20 NOTE — PROGRESS NOTES
HISTORY:  Past Surgical History:   Procedure Laterality Date    APPENDECTOMY  age 14   [facility] BLEPHAROPLASTY Bilateral 2017    CARDIAC CATHETERIZATION  2013    no stents    CARPAL TUNNEL RELEASE Right 2017    COLONOSCOPY  2005    Dr. Edgar Lombardi      right eye clog oil duct    EYE SURGERY Bilateral 2017    HAND SURGERY Right 2017    [facility] - carpal tunnel    MANDIBLE SURGERY N/A 2017    jaw surgery lower front of mouth    PACEMAKER INSERTION      PACEMAKER INSERTION  2019    TONSILLECTOMY AND ADENOIDECTOMY  as a child      SOCIAL HISTORY:  Social History     Tobacco Use    Smoking status: Former Smoker     Packs/day: 2.00     Years: 6.00     Pack years: 12.00     Types: Cigarettes     Last attempt to quit: 1971     Years since quittin.8    Smokeless tobacco: Former User   Substance Use Topics    Alcohol use: No     Alcohol/week: 0.0 standard drinks    Drug use: Yes     Types: Marijuana     Comment: quit at 31 yoa     ALLERGIES:  Allergies   Allergen Reactions    Latex Rash     itching    Doxycycline Calcium [Doxycycline Calcium]     Lactose Diarrhea    Nasacort [Triamcinolone] Other (See Comments)     Unable to remember    Wellbutrin [Bupropion] Other (See Comments)     Does not remember     FAMILY HISTORY:  Family History   Problem Relation Age of Onset    High Cholesterol Father     High Blood Pressure Father     Heart Disease Father     Stroke Father     Diabetes Mother     Heart Disease Mother     High Blood Pressure Mother     High Cholesterol Mother     Kidney Disease Mother     Cancer Paternal Grandfather      CURRENT MEDICATIONS:  Current Outpatient Medications   Medication Sig Dispense Refill    fluticasone-umeclidin-vilant (TRELEGY ELLIPTA) 100-62.5-25 MCG/INH AEPB Inhale 1 puff into the lungs daily 90 each 3    tamsulosin (FLOMAX) 0.4 MG capsule Take 0.4 mg by mouth daily      topiramate (TOPAMAX SPRINKLE) 25 MG capsule Take 25 mg by mouth 2 times daily      VENTOLIN  (90 Base) MCG/ACT inhaler INHALE 2 PUFFS EVERY 6 HOURS AS NEEDED FOR WHEEZING. 18 g 5    insulin detemir (LEVEMIR FLEXTOUCH) 100 UNIT/ML injection pen Inject 33 Units into the skin nightly 5 pen 11    DULoxetine (CYMBALTA) 60 MG extended release capsule Take 60 mg by mouth daily      metFORMIN (GLUCOPHAGE XR) 500 MG extended release tablet Take 2 tablets by mouth 2 times daily (Patient taking differently: Take 500 mg by mouth 2 times daily ) 60 tablet 11    blood glucose test strips (ACCU-CHEK ERVIN) strip 1 each by In Vitro route 2 times daily As needed. 100 each 3    Lancets MISC Testing  each 3    vitamin D (CHOLECALCIFEROL) 1000 UNIT TABS tablet Take 1 tablet by mouth 2 times daily 60 tablet 11    Omega-3 Fatty Acids (FISH OIL) 500 MG CAPS Take by mouth 2 times daily      loratadine (CLARITIN) 10 MG tablet Take 10 mg by mouth daily      rivaroxaban (XARELTO) 20 MG TABS tablet Take 20 mg by mouth daily (with breakfast)      gabapentin (NEURONTIN) 300 MG capsule Take 300 mg by mouth 2 times daily .       amLODIPine (NORVASC) 5 MG tablet TAKE 1 TABLET BY MOUTH DAILY (Patient taking differently: Take 5 mg by mouth daily ) 90 tablet 3    atorvastatin (LIPITOR) 20 MG tablet take 1 tablet by mouth once daily (Patient taking differently: 40 mg take 1 tablet by mouth once daily) 90 tablet 3    omeprazole (PRILOSEC) 40 MG capsule Take 1 capsule by mouth daily (Patient taking differently: Take 20 mg by mouth 2 times daily ) 90 capsule 3    propranolol (INDERAL LA) 120 MG CR capsule Take 120 mg by mouth 2 times daily       montelukast (SINGULAIR) 10 MG tablet take 1 tablet by mouth once daily 90 tablet 3    b complex vitamins capsule Take 1 capsule by mouth 2 times daily      BUSPIRONE HCL PO Take 10 mg by mouth 3 times daily Take 2 tablet by mouth 3 times daily Disp 270 tablets  R-3      fluticasone (FLONASE) 50 MCG/ACT nasal spray USE 2 SPRAYS NASALLY DAILY 3 Bottle 3    cyanocobalamin 1000 MCG/ML injection Inject 1 mL into the muscle every 30 days. 1 vial 2    aspirin 81 MG EC tablet Take 81 mg by mouth daily. No current facility-administered medications for this visit. Tiffani BELTRAN   Review of Systems   Constitutional: Negative for activity change, appetite change, chills, fever and unexpected weight change. HENT: Negative. Eyes: Negative. Respiratory: Positive for cough, chest tightness, shortness of breath and wheezing. Cardiovascular: Negative for chest pain, palpitations and leg swelling. Gastrointestinal: Negative for abdominal pain, diarrhea, nausea and vomiting. Genitourinary: Negative. Musculoskeletal: Negative. Skin: Negative. Neurological: Negative. Hematological: Does not bruise/bleed easily. Psychiatric/Behavioral: Negative for sleep disturbance and suicidal ideas. Physical exam   /70 (Site: Left Upper Arm, Position: Sitting, Cuff Size: Medium Adult)   Pulse 69   Temp 97.5 °F (36.4 °C)   Ht 5' 11\" (1.803 m)   Wt 216 lb 12.8 oz (98.3 kg)   SpO2 97% Comment: on room air  BMI 30.24 kg/m²        Physical Exam  Vitals signs and nursing note reviewed. Constitutional:       General: He is not in acute distress. Appearance: He is well-developed and overweight. Interventions: Face mask in place. Comments: Face mask on due to COVID     HENT:      Mouth/Throat:      Lips: Pink. Mouth: Mucous membranes are moist.      Pharynx: Oropharynx is clear. No oropharyngeal exudate or posterior oropharyngeal erythema. Eyes:      Conjunctiva/sclera: Conjunctivae normal.   Neck:      Vascular: No JVD. Cardiovascular:      Rate and Rhythm: Normal rate and regular rhythm. Heart sounds: No murmur. No friction rub. Pulmonary:      Effort: Pulmonary effort is normal. No accessory muscle usage or respiratory distress. Breath sounds: Normal breath sounds.  No wheezing, rhonchi or rales. Chest:      Chest wall: No tenderness. Musculoskeletal:      Right lower leg: No edema. Left lower leg: No edema. Skin:     General: Skin is warm and dry. Capillary Refill: Capillary refill takes less than 2 seconds. Nails: There is no clubbing. Neurological:      Mental Status: He is alert. Psychiatric:         Mood and Affect: Mood normal.         Behavior: Behavior normal.         Thought Content: Thought content normal.         Judgment: Judgment normal.          Test results   Lung Nodule Screening     [] Qualifies    [x]Does not qualify   [] Declined    [] Completed    Assessment      Diagnosis Orders   1. COPD with asthma ([facility] [address]  fluticasone-umeclidin-vilant (Ilan Venegasomons) 100-62.5-25 MCG/INH AEPB       currently not under good control     Plan   -needs addition of ICS for wheezing., discussed different options. He would like one inhlaer vs. Adding additional one to current therapy   -will try Trelegy ellipta, ,instructed on proper use with use of demo inhaler in room. Educated on potential side effects. Instructed to stop use if any GI issues and call office with his underlying lactose intolerance. Rinse after use. Was provided with 2 samples. Script printed and fax to [place] in [facility] per pt request.   -Discontinue Striverdi Respimat  -ok to continue PRN Ventolin     Will see Roula Valera in: 3 months    Electronically signed by FERDINAND Mays CNP on 8/20/2020 at 11:54 AM     Addendum:   [address] to get fax number, they stated that Trelegy is non formulary. We checked and Sean Scott is formulary, will change script.    Electronically signed by FERDINAND Mays CNP on 8/20/2020 at 12:10 PM

## 2020-09-02 ENCOUNTER — HOSPITAL ENCOUNTER (OUTPATIENT)
Age: 70
Discharge: HOME | End: 2020-09-02
Payer: MEDICARE

## 2020-09-02 DIAGNOSIS — N40.1 BPH WITH OBSTRUCTION/LOWER URINARY TRACT SYMPTOMS: ICD-10-CM

## 2020-09-02 DIAGNOSIS — N13.8 BPH WITH OBSTRUCTION/LOWER URINARY TRACT SYMPTOMS: ICD-10-CM

## 2020-09-02 PROCEDURE — 84153 ASSAY OF PSA TOTAL: CPT

## 2020-09-02 PROCEDURE — 36415 COLL VENOUS BLD VENIPUNCTURE: CPT

## 2020-09-03 ENCOUNTER — TELEPHONE (OUTPATIENT)
Dept: UROLOGY | Age: 70
End: 2020-09-03

## 2020-09-03 LAB — PROSTATE SPECIFIC ANTIGEN: 4.38 NG/ML (ref 0–1)

## 2020-09-03 NOTE — TELEPHONE ENCOUNTER
Patient was advised of the PSA results. He voiced understanding. Does he need to have any other testing done? Please advise. Thank you.

## 2020-09-03 NOTE — TELEPHONE ENCOUNTER
Patient voiced understanding to repeat the PSA in 2 months and follow up appointment scheduled.  Order sent via mail

## 2020-09-03 NOTE — TELEPHONE ENCOUNTER
At last office visit we discussed biopsy but his wife at that time was to be undergoing surgery and he would be her caretaker. We can recheck PSA in 2 more months with a follow up, and if still elevated we can schedule for biopsy.

## 2020-09-09 NOTE — TELEPHONE ENCOUNTER
Trelegy was nonformulary so it was switched to [facility] and faxed to [facility] on 8/20/2020. I talk to patient wife and advised her of this. [facility]

## 2020-09-14 ENCOUNTER — OFFICE VISIT (OUTPATIENT)
Dept: FAMILY MEDICINE CLINIC | Age: 70
End: 2020-09-14
Payer: MEDICARE

## 2020-09-14 VITALS
TEMPERATURE: 96.6 F | RESPIRATION RATE: 16 BRPM | DIASTOLIC BLOOD PRESSURE: 56 MMHG | WEIGHT: 217.8 LBS | BODY MASS INDEX: 30.49 KG/M2 | OXYGEN SATURATION: 97 % | SYSTOLIC BLOOD PRESSURE: 114 MMHG | HEIGHT: 71 IN | HEART RATE: 68 BPM

## 2020-09-14 PROCEDURE — 99214 OFFICE O/P EST MOD 30 MIN: CPT | Performed by: FAMILY MEDICINE

## 2020-09-14 PROCEDURE — 96372 THER/PROPH/DIAG INJ SC/IM: CPT | Performed by: FAMILY MEDICINE

## 2020-09-14 PROCEDURE — G8417 CALC BMI ABV UP PARAM F/U: HCPCS | Performed by: FAMILY MEDICINE

## 2020-09-14 PROCEDURE — 3017F COLORECTAL CA SCREEN DOC REV: CPT | Performed by: FAMILY MEDICINE

## 2020-09-14 PROCEDURE — 2022F DILAT RTA XM EVC RTNOPTHY: CPT | Performed by: FAMILY MEDICINE

## 2020-09-14 PROCEDURE — 1036F TOBACCO NON-USER: CPT | Performed by: FAMILY MEDICINE

## 2020-09-14 PROCEDURE — 1123F ACP DISCUSS/DSCN MKR DOCD: CPT | Performed by: FAMILY MEDICINE

## 2020-09-14 PROCEDURE — 4040F PNEUMOC VAC/ADMIN/RCVD: CPT | Performed by: FAMILY MEDICINE

## 2020-09-14 PROCEDURE — 3051F HG A1C>EQUAL 7.0%<8.0%: CPT | Performed by: FAMILY MEDICINE

## 2020-09-14 PROCEDURE — G8427 DOCREV CUR MEDS BY ELIG CLIN: HCPCS | Performed by: FAMILY MEDICINE

## 2020-09-14 RX ORDER — FLUTICASONE FUROATE, UMECLIDINIUM BROMIDE AND VILANTEROL TRIFENATATE 100; 62.5; 25 UG/1; UG/1; UG/1
1 POWDER RESPIRATORY (INHALATION) DAILY
COMMUNITY
End: 2020-10-07

## 2020-09-14 RX ORDER — ALBUTEROL SULFATE 90 UG/1
AEROSOL, METERED RESPIRATORY (INHALATION)
Qty: 18 G | Refills: 11 | Status: SHIPPED | OUTPATIENT
Start: 2020-09-14

## 2020-09-14 RX ORDER — CYANOCOBALAMIN 1000 UG/ML
1000 INJECTION INTRAMUSCULAR; SUBCUTANEOUS ONCE
Status: COMPLETED | OUTPATIENT
Start: 2020-09-14 | End: 2020-09-14

## 2020-09-14 RX ADMIN — CYANOCOBALAMIN 1000 MCG: 1000 INJECTION INTRAMUSCULAR; SUBCUTANEOUS at 11:09

## 2020-09-14 NOTE — PROGRESS NOTES
[address] [place] [id number]  Dept: [phone]  Dept Fax: [phone]  Loc: [phone]    Allison Barney is a 69 y.o. male who presents today for:  Chief Complaint   Patient presents with    Follow-up     4 month- dm2, b12 deficiency            HPI:     HPI    He is seeing primarily the [place] due to medications and treatment for PTSD but comes here 3 times yearly to go over all the information as well as maintenance of diabetes. PTSD did have a flare during a recent pacemaker placement when thye did not give him enough pain medication. He reports thoughts of hurting the surgeon but did not act on them when redirected by the nurse. He has since worked this through with the counselor at the [place]. He is also flaring a little bit with the current pandemic. Hypertension: Patient here for follow-up of elevated blood pressure. He is not exercising and is adherent to low salt diet. Blood pressure is well controlled at home. Cardiac symptoms none. Patient denies chest pain, dyspnea and palpitations. Cardiovascular risk factors: advanced age (older than 54 for men, 72 for women), dyslipidemia, hypertension and male gender. Use of agents associated with hypertension: none. History of target organ damage: stroke, paroxysmal afib, and CAD diagnosed by cath in 2013 but no stents. Hyperlipidemia: Patient presents with hyperlipidemia. He was tested because hypertension and CVA.   His last labs showed   Lab Results   Component Value Date    CHOL 119 03/10/2020    CHOL 117 01/28/2019    CHOL 121 10/24/2017     Lab Results   Component Value Date    TRIG 96 03/10/2020    TRIG 77 01/28/2019    TRIG 89 10/24/2017     Lab Results   Component Value Date    HDL 46 03/10/2020    HDL 49 01/28/2019    HDL 52 10/24/2017     Lab Results   Component Value Date    LDLCALC 54 03/10/2020    LDLCALC 53 01/28/2019    LDLCALC 51 10/24/2017 No results found for: LABVLDL, VLDL  No results found for: CHOLHDLRATIO  There is not a family history of hyperlipidemia. There is not a family history of early ischemia heart disease. GERD: Rene complains of heartburn. This has been associated with heartburn and hoarseness. He denies belching, difficulty swallowing and melena. Symptoms have been present for several years. He denies dysphagia. He has not lost weight. He denies melena, hematochezia, hematemesis, and coffee ground emesis. Medical therapy in the past has included proton pump inhibitors. Elevated glucose need follow up. He is no longer seeing an endocrinologist for diabetes but his medications are through the [facility]. His endocrinologist left town and we are now managing the DM through here until he can find a new endocrinologist.  Fasting blood sugars are 127-140. Lab Results   Component Value Date    LABA1C 7.3 (H) 06/05/2020     No results found for: EAG    Being followed currently by the [facility] for hypertension, afib, and hyperlipidemia. Vitamin b12  was at the low end of normal but with symptoms of fatigue [facility]. He is feeling better and has also started vit d PO as well. Last b12 level is high and he is instructed to go to every 8 weeks instead of once a month. Lab Results   Component Value Date    [id number] 470 (H) 03/10/2020       Tremor controlled better with current medications. He has seen the neurologist at the [facility] who does not think that he has parkinson's but occasionally the tremor does get bad. He recalls once when he spilled his cereal right out of the bowl that he was holding. This is more controlled now. Al lot of these conditions are related to agent orange exposure in [facility] as well as a water contamination in [employer]. HARINDER controlled on CPAP. BPH is under the management of urology.      Lab Results   Component Value Date    PSA 4.38 (H) 09/02/2020 PSA 3.87 (H) 06/05/2020    PSA 5.03 (H) 03/10/2020       Low sodium is under watch. Anemia is under regular watch. Reviewed chart forpast medical history , surgical history , allergies, social history , family history and medications. Health Maintenance   Topic Date Due    DTaP/Tdap/Td vaccine (1 - Tdap) 04[birth year]02/1968    Annual Wellness Visit (AWV)  05/29/2019    Flu vaccine (1) 09/01/2020    Diabetic foot exam  11/19/2020    Diabetic microalbuminuria test  03/10/2021    Lipid screen  03/10/2021    A1C test (Diabetic or Prediabetic)  06/05/2021    Diabetic retinal exam  09/01/2021    PSA counseling  09/02/2021    Colon cancer screen colonoscopy  10/04/2029    Shingles Vaccine  Completed    Pneumococcal 65+ years Vaccine  Completed    Hepatitis A vaccine  Aged Out    Hib vaccine  Aged Out    Meningococcal (ACWY) vaccine  Aged Out    AAA screen  Discontinued    Hepatitis C screen  Discontinued       Subjective:      Constitutional:Negative for fever, chills, diaphoresis, activity change, appetite change and fatigue. HENT: Negative for hearing loss, ear pain, congestion, sore throat, rhinorrhea, postnasal drip and ear discharge. Eyes: Negative for photophobia and visual disturbance. Respiratory: Negative for cough, chest tightness, shortness of breath and wheezing. Cardiovascular: Negative for chest pain and leg swelling. Gastrointestinal: Negative for nausea, vomiting, abdominal pain, diarrhea and constipation. Genitourinary: Negative for dysuria, urgency and frequency. Neurological: Negative for weakness, light-headedness and headaches. Psychiatric/Behavioral: Negative for sleep disturbance.       Objective:     Vitals:    09/14/20 1012   BP: (!) 114/56   Site: Left Upper Arm   Position: Sitting   Cuff Size: Medium Adult   Pulse: 68   Resp: 16   Temp: 96.6 °F (35.9 °C)   TempSrc: Temporal   SpO2: 97%   Weight: 217 lb 12.8 oz (98.8 kg)   Height: 5' 10.98\" (1.803 m) Wt Readings from Last 3 Encounters:   09/14/20 217 lb 12.8 oz (98.8 kg)   08/20/20 216 lb 12.8 oz (98.3 kg)   08/10/20 217 lb 9.6 oz (98.7 kg)       Physical Exam  Physical Exam   Constitutional: Vital signs are normal. He appears well-developed and well-nourished. He is active. HENT:   Head: Normocephalic and atraumatic. Right Ear: Tympanic membrane, external ear and ear canal normal. No drainage or tenderness. Left Ear: Tympanic membrane, external ear and ear canal normal. No drainage or tenderness. Nose: Nose normal. No mucosal edema or rhinorrhea. Mouth/Throat: Uvula is midline, oropharynx is clear and moist and mucous membranes are normal. Mucous membranes are not pale. Normal dentition. No posterior oropharyngeal edema or posterior oropharyngeal erythema. Eyes: Lids are normal. Right eye exhibits no chemosis and no discharge. Left eye exhibits no chemosis and no drainage. Right conjunctiva has no hemorrhage. Left conjunctiva has no hemorrhage. Right eye exhibits normal extraocular motion. Left eye exhibits normal extraocular motion. Right pupil is round and reactive. Left pupil is round and reactive. Pupils are equal.   Cardiovascular: Normal rate, regular rhythm, S1 normal, S2 normal and normal heart sounds. Exam reveals no gallop. No murmur heard. Pulmonary/Chest: Effort normal and breath sounds normal. No respiratory distress. He has no wheezes. He has no rhonchi. He has no rales. Abdominal: Soft. Normal appearance and bowel sounds are normal. He exhibits no distension and no mass. There is no hepatosplenomegaly. No tenderness. He has no rigidity, no rebound and no guarding. No hernia. Musculoskeletal:        Right lower leg: He exhibits no edema. Left lower leg: He exhibits no edema. Neurological: He is alert. Oriented and pleasent        Assessment/Plan   Kai Gregorio was seen today for follow-up.     Diagnoses and all orders for this visit:    Uncontrolled type 2 diabetes mellitus with hyperglycemia ([facility] [address]  -     Basic Metabolic Panel; Future  -     Hemoglobin A1C; Future    Essential hypertension    Hyperlipidemia with target LDL less than 100    Vitamin B12 deficiency  -     cyanocobalamin injection 1,000 mcg    Hyponatremia    Paroxysmal atrial fibrillation (HCC)    Mild intermittent asthma without complication  -     albuterol sulfate HFA (VENTOLIN HFA) 108 (90 Base) MCG/ACT inhaler; INHALE 2 PUFFS EVERY 6 HOURS AS NEEDED FOR WHEEZING. BPH with obstruction/lower urinary tract symptoms    Iron deficiency anemia, unspecified iron deficiency anemia type    Essential tremor    Obstructive sleep apnea on CPAP    Post traumatic stress disorder    S/P cardiac catheterization: 11/20/2013: No obstructive lesions. Moderate LI's in the mid LAD and in the RCA. No change to medication   Continue healthy diet and exercise  Yearly eye exam  Daily foot inspection  Yearly flu shot  Monitor glucose regularly  Daily aspirin  Regular labs : A1c quarterly, lipids every 6 months and BMP quaterly    Discussed use, benefit, and side effectsof prescribed medications. All patient questions answered. Pt voiced understanding. Reviewed health maintenance. Instructed to continue current medications, diet and exercise. Patient agreed with treatment plan. Followup as directed.      Electronically signed by Derrick Mason MD

## 2020-09-22 ENCOUNTER — PROCEDURE VISIT (OUTPATIENT)
Dept: CARDIOLOGY CLINIC | Age: 70
End: 2020-09-22
Payer: MEDICARE

## 2020-09-22 PROCEDURE — 93296 REM INTERROG EVL PM/IDS: CPT | Performed by: NUCLEAR MEDICINE

## 2020-09-22 PROCEDURE — 93294 REM INTERROG EVL PM/LDLS PM: CPT | Performed by: NUCLEAR MEDICINE

## 2020-09-22 NOTE — PROGRESS NOTES
FORMER PT OF [facility]    KNOWN AFIB/ XARELTO   AFIB BURDEN 2%  DDDR     NXT BOSTON AMEE DUAL PACEMAKER REMOTE   BATTERY 7 YRS REMAINING    ATRIAL IMPEDENCE 501  VENT IMPEDENCE 712  P WAVES 3.6  RV WAVES NOT OBTAINED PER THE DEVICE  NO THRESHOLDS OBTAINED PER THE DEVICE    A PACED 15%  V PACED 100%  ON 9/20/2020 1 EPISODE OF NS VT   PT DID HAVE A MAGDA EPISODE(HAS TO DO WITH MINUTE VENTILATION, WHICH CAN HAPPEN WHEN A PT HAS AFIB BECAUSE THE SENSOR THINKS IT MIGHT BE NOISE SO IT CAN TURN THE MINUTE VENTILATION OFF. IT WILL ONLY TURN OFF IF PT HAS MORE THAN ONE MAGDA EPISODE.  THIS PT ONLY HAD ONE AND HIS MINUTE VENTILATION IS STILL ON   PT DID HAVE AN EPISODE OF ATRIAL NOISE ON 4/14/2020 AND NONE SINCE

## 2020-10-02 ENCOUNTER — TELEPHONE (OUTPATIENT)
Dept: PULMONOLOGY | Age: 70
End: 2020-10-02

## 2020-10-02 NOTE — TELEPHONE ENCOUNTER
Patient left message yesterday about [address] covering Symbicort instead of Breo. Please advise.

## 2020-10-07 RX ORDER — BUDESONIDE AND FORMOTEROL FUMARATE DIHYDRATE 160; 4.5 UG/1; UG/1
2 AEROSOL RESPIRATORY (INHALATION) 2 TIMES DAILY
Qty: 3 INHALER | Refills: 3 | Status: SHIPPED | OUTPATIENT
Start: 2020-10-07 | End: 2021-08-10

## 2020-10-14 ENCOUNTER — NURSE ONLY (OUTPATIENT)
Dept: FAMILY MEDICINE CLINIC | Age: 70
End: 2020-10-14
Payer: MEDICARE

## 2020-10-14 PROCEDURE — 96372 THER/PROPH/DIAG INJ SC/IM: CPT | Performed by: FAMILY MEDICINE

## 2020-10-14 RX ORDER — CYANOCOBALAMIN 1000 UG/ML
1000 INJECTION INTRAMUSCULAR; SUBCUTANEOUS ONCE
Status: COMPLETED | OUTPATIENT
Start: 2020-10-14 | End: 2020-10-14

## 2020-10-14 RX ADMIN — CYANOCOBALAMIN 1000 MCG: 1000 INJECTION INTRAMUSCULAR; SUBCUTANEOUS at 10:15

## 2020-10-14 NOTE — PROGRESS NOTES
Administrations This Visit     cyanocobalamin injection 1,000 mcg     Admin Date  10/14/2020  10:15 Action  Given Dose  1000 mcg Route  Intramuscular Site  Deltoid Left Administered By  Lory Spangler CMA ([address] [place] [address])    Ordering Provider:  Abdiel Enamorado MD    NDC:  65964-304-68    Lot#:  [id number]    :  [address] [place] [address]    Patient Supplied?:  No    Comments:  exp: 10/2021                Patient instructed to remain in clinic for 20 minutes after injection and was advised to report any adverse reaction to me immediately.

## 2020-10-28 ENCOUNTER — HOSPITAL ENCOUNTER (OUTPATIENT)
Age: 70
Discharge: HOME | End: 2020-10-28
Payer: MEDICARE

## 2020-10-28 DIAGNOSIS — N13.8 BPH WITH OBSTRUCTION/LOWER URINARY TRACT SYMPTOMS: ICD-10-CM

## 2020-10-28 DIAGNOSIS — E11.65 UNCONTROLLED TYPE 2 DIABETES MELLITUS WITH HYPERGLYCEMIA (HCC): ICD-10-CM

## 2020-10-28 DIAGNOSIS — N40.1 BPH WITH OBSTRUCTION/LOWER URINARY TRACT SYMPTOMS: ICD-10-CM

## 2020-10-28 LAB
ANION GAP SERPL CALCULATED.3IONS-SCNC: 12 MEQ/L (ref 8–16)
AVERAGE GLUCOSE: 153 MG/DL (ref 70–126)
BUN BLDV-MCNC: 12 MG/DL (ref 7–22)
CALCIUM SERPL-MCNC: 10.1 MG/DL (ref 8.5–10.5)
CHLORIDE BLD-SCNC: 98 MEQ/L (ref 98–111)
CO2: 26 MEQ/L (ref 23–33)
CREAT SERPL-MCNC: 0.9 MG/DL (ref 0.4–1.2)
GFR SERPL CREATININE-BSD FRML MDRD: 83 ML/MIN/1.73M2
GLUCOSE BLD-MCNC: 144 MG/DL (ref 70–108)
HBA1C MFR BLD: 7.1 % (ref 4.4–6.4)
POTASSIUM SERPL-SCNC: 5.1 MEQ/L (ref 3.5–5.2)
PROSTATE SPECIFIC ANTIGEN: 3.98 NG/ML (ref 0–1)
SODIUM BLD-SCNC: 136 MEQ/L (ref 135–145)

## 2020-10-28 PROCEDURE — 80048 BASIC METABOLIC PNL TOTAL CA: CPT

## 2020-10-28 PROCEDURE — 84153 ASSAY OF PSA TOTAL: CPT

## 2020-10-28 PROCEDURE — 36415 COLL VENOUS BLD VENIPUNCTURE: CPT

## 2020-10-28 PROCEDURE — 83036 HEMOGLOBIN GLYCOSYLATED A1C: CPT

## 2020-10-29 ENCOUNTER — TELEPHONE (OUTPATIENT)
Dept: UROLOGY | Age: 70
End: 2020-10-29

## 2020-10-29 NOTE — TELEPHONE ENCOUNTER
I called and notified that patients wife Joanne Barrera (on [employer]) that his psa is 3.98. Joanne Barrera voiced understanding.

## 2020-11-02 NOTE — PROGRESS NOTES
[facility]  [address] [address]  Dept: [phone]  Loc: [phone]  Visit Date: 11/3/2020      HPI:     Jeff Never f/u for BPH and elevated PSA. He is doing well, reports his urinary symptoms are stable. He remains on Flomax 0.4 mg daily, tolerating well with no side effects. Has nocturia of 1-2 times nightly. Denies weak urinary stream, incomplete bladder emptying, frequency or urgency. Trend of PSA:    3.98     10/2020  4.38      2020  3.87      2020  5.03      2020  3.44 10/2019  3.08  2018  3.1        10/2017     26% free  3.16 10/2016  2.04 2015    He notes family hx of prostate cancer in his father and uncle, who  from prostate cancer. He was exposed to Agent orange. He comes in today by himself. Hx is obtained from the patient and medical record. Current Outpatient Medications   Medication Sig Dispense Refill    budesonide-formoterol (SYMBICORT) 160-4.5 MCG/ACT AERO Inhale 2 puffs into the lungs 2 times daily Rinse mouth after its use. 3 Inhaler 3    Cannabinoids (THC FREE PO) Take by mouth Gummi bears, Dr in [facility].VIERTEL      albuterol sulfate HFA (VENTOLIN HFA) 108 (90 Base) MCG/ACT inhaler INHALE 2 PUFFS EVERY 6 HOURS AS NEEDED FOR WHEEZING. 18 g 11    tamsulosin (FLOMAX) 0.4 MG capsule Take 0.4 mg by mouth daily      topiramate (TOPAMAX SPRINKLE) 25 MG capsule Take 25 mg by mouth 2 times daily      insulin detemir (LEVEMIR FLEXTOUCH) 100 UNIT/ML injection pen Inject 33 Units into the skin nightly 5 pen 11    DULoxetine (CYMBALTA) 60 MG extended release capsule Take 60 mg by mouth daily      metFORMIN (GLUCOPHAGE XR) 500 MG extended release tablet Take 2 tablets by mouth 2 times daily (Patient taking differently: Take 500 mg by mouth 2 times daily ) 60 tablet 11    blood glucose test strips (ACCU-CHEK ERVIN) strip 1 each by In Vitro route 2 times daily As needed.  100 each 3    Lancets MISC traumatic stress disorder (PTSD), S/P cardiac catheterization: 11/20/2013: No obstructive lesions. Moderate LI's in the mid LAD and in the RCA., Tremor of both hands, Type II or unspecified type diabetes mellitus without mention of complication, not stated as uncontrolled, and Unspecified sleep apnea. Past Surgical History  The patient  has a past surgical history that includes Appendectomy (age 14); Tonsillectomy and adenoidectomy (as a child ); Pacemaker insertion (2000/2010); Colonoscopy (2005); Cardiac catheterization (11/2013); Hand surgery (Right, 01/19/2017); Carpal tunnel release (Right, 01/2017); eye surgery; eye surgery (Bilateral, 01/2017); Mandible surgery (N/A, 05/2017); Blepharoplasty (Bilateral, 05/2017); and Pacemaker insertion (11/20/2019). Family History  This patient's family history includes Cancer in his paternal grandfather; Diabetes in his mother; Heart Disease in his father and mother; High Blood Pressure in his father and mother; High Cholesterol in his father and mother; Kidney Disease in his mother; Stroke in his father. Social History  [employer] [facility]  reports that he quit smoking about 49 years ago. His smoking use included cigarettes. He has a 12.00 pack-year smoking history. He has quit using smokeless tobacco. He reports current drug use. Drug: Marijuana. He reports that he does not drink alcohol. Subjective:     Review of Systems  No problems with ears, nose or throat. No problems with eyes. No chest pain, shortness of breath, abdominal pain, extremity pain or weakness, and no neurological deficits. No rashes.  symptoms per HPI. The remainder of the review of symptoms is negative. Objective:     PE:   Vitals:    11/03/20 0827   Temp: 97.3 °F (36.3 °C)   Weight: 226 lb (102.5 kg)   Height: 5' 10\" (1.778 m)       Constitutional: Alert and oriented times 3, no acute distress and cooperative to examination with appropriate mood and affect.    HENT:   Head: Normocephalic and atraumatic. Mouth/Throat:         Mucous membranes are normal.   Eyes:         EOM are normal. No scleral icterus. PERRLA. Neck:        Supple, symmetrical, trachea midline  Pulmonary/Chest:      Chest symmetric with normal A/P diameter,  Normal respiratory rate and rhthym. No use of accessory muscles. Abdominal:         Soft. No tenderness. Bowel sounds present. Musculoskeletal:         Normal range of motion. No edema or tenderness of lower extremities. Extremities: No cyanosis, clubbing, or edema present. Neurological:        Alert and oriented. No cranial nerve deficit. Gabbi Moncada Psychiatric:        Normal mood and affect. Rectal: moderate enlarged prostate, 40 cc,normal tone, no masses, nodules, induration palpated, smooth and freely movable right sided firmness      Labs   Urine dip demonstrates   Results for POC orders placed in visit on 11/03/20   POCT Urinalysis no Micro   Result Value Ref Range    Color, UA      Clarity, UA      Glucose, UA POC      Bilirubin, UA      Ketones, UA      Spec Grav, UA      Blood, UA POC neg     pH, UA      Protein, UA POC      Urobilinogen, UA      Leukocytes, UA neg     Nitrite, UA neg        Patients recent PSA values are as follows  Lab Results   Component Value Date    PSA 3.98 (H) 10/28/2020    PSA 4.38 (H) 09/02/2020    PSA 3.87 (H) 06/05/2020        Recent BUN/Creatinine:  Lab Results   Component Value Date    BUN 12 10/28/2020    CREATININE 0.9 10/28/2020       Radiology  No recent imaging       Assessment & Plan:     BPH w/LUTS  Elevated PSA  Family history of prostate cancer      Alexus f/u for BPH. He is doing well, LUTS are stable, he remains on Flomax, tolerating well.     +family history of prostate cancer  PSA jumped up to 5 in March, down to 3.98. We had long discussion about prostate cancer and biopsy. He was caring for his wife while she recovered from back surgery,  he is sole caretaker.  He would like to hold off on biopsy for now. We discussed MRI, but he has pacemaker. We discussed exosome dx testing. He would like to repeat PSA in 3 months. Denies any new bone pain or abdominal pain      Will get PSA in 3 month and 6 months with a follow up visit in 6 months. He will call sooner with concerns or questions.        FERDINAND Olivas  Urology

## 2020-11-03 ENCOUNTER — OFFICE VISIT (OUTPATIENT)
Dept: UROLOGY | Age: 70
End: 2020-11-03
Payer: MEDICARE

## 2020-11-03 VITALS — TEMPERATURE: 97.3 F | HEIGHT: 70 IN | BODY MASS INDEX: 32.35 KG/M2 | WEIGHT: 226 LBS

## 2020-11-03 PROCEDURE — 1123F ACP DISCUSS/DSCN MKR DOCD: CPT | Performed by: NURSE PRACTITIONER

## 2020-11-03 PROCEDURE — 1036F TOBACCO NON-USER: CPT | Performed by: NURSE PRACTITIONER

## 2020-11-03 PROCEDURE — G8417 CALC BMI ABV UP PARAM F/U: HCPCS | Performed by: NURSE PRACTITIONER

## 2020-11-03 PROCEDURE — 3017F COLORECTAL CA SCREEN DOC REV: CPT | Performed by: NURSE PRACTITIONER

## 2020-11-03 PROCEDURE — 4040F PNEUMOC VAC/ADMIN/RCVD: CPT | Performed by: NURSE PRACTITIONER

## 2020-11-03 PROCEDURE — 99214 OFFICE O/P EST MOD 30 MIN: CPT | Performed by: NURSE PRACTITIONER

## 2020-11-03 PROCEDURE — G8427 DOCREV CUR MEDS BY ELIG CLIN: HCPCS | Performed by: NURSE PRACTITIONER

## 2020-11-03 PROCEDURE — G8484 FLU IMMUNIZE NO ADMIN: HCPCS | Performed by: NURSE PRACTITIONER

## 2020-11-03 PROCEDURE — 81002 URINALYSIS NONAUTO W/O SCOPE: CPT | Performed by: NURSE PRACTITIONER

## 2020-11-17 ENCOUNTER — NURSE ONLY (OUTPATIENT)
Dept: FAMILY MEDICINE CLINIC | Age: 70
End: 2020-11-17
Payer: MEDICARE

## 2020-11-17 PROCEDURE — 96372 THER/PROPH/DIAG INJ SC/IM: CPT | Performed by: FAMILY MEDICINE

## 2020-11-17 RX ORDER — CYANOCOBALAMIN 1000 UG/ML
1000 INJECTION INTRAMUSCULAR; SUBCUTANEOUS ONCE
Status: COMPLETED | OUTPATIENT
Start: 2020-11-17 | End: 2020-11-17

## 2020-11-17 RX ADMIN — CYANOCOBALAMIN 1000 MCG: 1000 INJECTION INTRAMUSCULAR; SUBCUTANEOUS at 10:09

## 2020-11-17 NOTE — PROGRESS NOTES
Administrations This Visit     cyanocobalamin injection 1,000 mcg     Admin Date  11/17/2020  10:09 Action  Given Dose  1000 mcg Route  Intramuscular Site  Deltoid Right Administered By  Lyle Clarke CMA ([facility])    Ordering Provider:  Karey Rainey MD    NDC:  35336-882-21    Lot#:  [id number]    :  [address] [place] [address]    Patient Supplied?:  No    Comments:  EXP 10-21                Patient instructed to report any adverse reaction to me immediately.       PT TOLERATED WELl

## 2020-11-18 ENCOUNTER — OFFICE VISIT (OUTPATIENT)
Dept: PULMONOLOGY | Age: 70
End: 2020-11-18
Payer: MEDICARE

## 2020-11-18 VITALS
TEMPERATURE: 98 F | BODY MASS INDEX: 30.66 KG/M2 | SYSTOLIC BLOOD PRESSURE: 130 MMHG | DIASTOLIC BLOOD PRESSURE: 72 MMHG | HEART RATE: 74 BPM | HEIGHT: 71 IN | WEIGHT: 219 LBS | OXYGEN SATURATION: 97 %

## 2020-11-18 PROCEDURE — 4040F PNEUMOC VAC/ADMIN/RCVD: CPT | Performed by: NURSE PRACTITIONER

## 2020-11-18 PROCEDURE — 1123F ACP DISCUSS/DSCN MKR DOCD: CPT | Performed by: NURSE PRACTITIONER

## 2020-11-18 PROCEDURE — G8417 CALC BMI ABV UP PARAM F/U: HCPCS | Performed by: NURSE PRACTITIONER

## 2020-11-18 PROCEDURE — G8427 DOCREV CUR MEDS BY ELIG CLIN: HCPCS | Performed by: NURSE PRACTITIONER

## 2020-11-18 PROCEDURE — 99213 OFFICE O/P EST LOW 20 MIN: CPT | Performed by: NURSE PRACTITIONER

## 2020-11-18 PROCEDURE — G8484 FLU IMMUNIZE NO ADMIN: HCPCS | Performed by: NURSE PRACTITIONER

## 2020-11-18 PROCEDURE — 3023F SPIROM DOC REV: CPT | Performed by: NURSE PRACTITIONER

## 2020-11-18 PROCEDURE — G8926 SPIRO NO PERF OR DOC: HCPCS | Performed by: NURSE PRACTITIONER

## 2020-11-18 PROCEDURE — 3017F COLORECTAL CA SCREEN DOC REV: CPT | Performed by: NURSE PRACTITIONER

## 2020-11-18 PROCEDURE — 1036F TOBACCO NON-USER: CPT | Performed by: NURSE PRACTITIONER

## 2020-11-18 ASSESSMENT — ENCOUNTER SYMPTOMS
CHEST TIGHTNESS: 0
DIARRHEA: 0
EYES NEGATIVE: 1
NAUSEA: 0
COUGH: 0
ABDOMINAL PAIN: 0
VOMITING: 0
SHORTNESS OF BREATH: 1
WHEEZING: 0

## 2020-11-18 NOTE — PROGRESS NOTES
[facility]     Patient: Israel Corral, 69 y.o.   : 2020    Pt of Dr. Nain Jacobson   Patient presents with    Follow-up     3mo follow up, COPD. no test.        HPI  Delmar Schmitt is here for 3 month follow up for COPD med follow up   Stopped Striveridi due to uncontrolled symptoms, needed step up therapy , recommended ICS with underlying asthma   Started on Great Dream , insurance did not cover this. [facility] and Symbicort was preferred. Has been using Symbicort, has only been doing 2 puffs once a day and routinely needing his ProAir around dinner time. No recent oral steroids or atb   Recent check up with his PCP   No respiratory complaints,   Not on home O2   Past Medical hx   PMH:  Past Medical History:   Diagnosis Date    Asthma     [employer] dual pacemaker 2014    Bronchitis, chronic (HCC)     Carotid artery stenosis     Dr Deonna Schwartz    Chickenpox     CVA (cerebral infarction)     GERD (gastroesophageal reflux disease)     Hemorrhoids     Hyperlipidemia     Hypertension     Mild intermittent asthma without complication 655    Nausea & vomiting     [facility]uropathy     [facility]    Osteoarthritis     Peripheral neuropathy     Post traumatic stress disorder (PTSD)     [facility]    S/P cardiac catheterization: 2013: No obstructive lesions. Moderate LI's in the mid LAD and in the RCA. 2013: No obstructive lesions. Moderate LI's in the mid LAD and in the RCA.  Dr. Gerry Faustin Tremor of both hands     [facility]    Type II or unspecified type diabetes mellitus without mention of complication, not stated as uncontrolled     Dr Ceballos office    Unspecified sleep apnea     Dr Eugene Mcclelland:  Past Surgical History:   Procedure Laterality Date    APPENDECTOMY  age 15    BLEPHAROPLASTY Bilateral 2017    CARDIAC CATHETERIZATION  2013    no stents    CARPAL TUNNEL RELEASE Right 2017    COLONOSCOPY  2005    Dr. Irma Saxena      right eye clog oil duct    EYE SURGERY Bilateral 2017    HAND SURGERY Right 2017    [facility] - carpal tunnel    MANDIBLE SURGERY N/A 2017    jaw surgery lower front of mouth    PACEMAKER INSERTION      PACEMAKER INSERTION  2019    TONSILLECTOMY AND ADENOIDECTOMY  as a child      SOCIAL HISTORY:  Social History     Tobacco Use    Smoking status: Former Smoker     Packs/day: 2.00     Years: 6.00     Pack years: 12.00     Types: Cigarettes     Last attempt to quit: 1971     Years since quittin.0    Smokeless tobacco: Former User   Substance Use Topics    Alcohol use: No     Alcohol/week: 0.0 standard drinks    Drug use: Yes     Types: Marijuana     Comment: quit at 31 yoa     ALLERGIES:  Allergies   Allergen Reactions    Latex Rash     itching    Doxycycline Calcium [Doxycycline Calcium]     Lactose Diarrhea    Nasacort [Triamcinolone] Other (See Comments)     Unable to remember    Wellbutrin [Bupropion] Other (See Comments)     Does not remember     FAMILY HISTORY:  Family History   Problem Relation Age of Onset    High Cholesterol Father     High Blood Pressure Father     Heart Disease Father     Stroke Father     Diabetes Mother     Heart Disease Mother     High Blood Pressure Mother     High Cholesterol Mother     Kidney Disease Mother     Cancer Paternal Grandfather      CURRENT MEDICATIONS:  Current Outpatient Medications   Medication Sig Dispense Refill    budesonide-formoterol (SYMBICORT) 160-4.5 MCG/ACT AERO Inhale 2 puffs into the lungs 2 times daily Rinse mouth after its use. 3 Inhaler 3    Cannabinoids (THC FREE PO) Take by mouth [address]ha [address] in [address]      albuterol sulfate HFA (VENTOLIN HFA) 108 (90 Base) MCG/ACT inhaler INHALE 2 PUFFS EVERY 6 HOURS AS NEEDED FOR WHEEZING.  18 g 11    tamsulosin (FLOMAX) 0.4 MG capsule Take 0.4 mg by mouth daily  topiramate (TOPAMAX SPRINKLE) 25 MG capsule Take 25 mg by mouth 2 times daily      insulin detemir (LEVEMIR FLEXTOUCH) 100 UNIT/ML injection pen Inject 33 Units into the skin nightly 5 pen 11    DULoxetine (CYMBALTA) 60 MG extended release capsule Take 60 mg by mouth daily      metFORMIN (GLUCOPHAGE XR) 500 MG extended release tablet Take 2 tablets by mouth 2 times daily (Patient taking differently: Take 500 mg by mouth 2 times daily ) 60 tablet 11    blood glucose test strips (ACCU-CHEK ERVIN) strip 1 each by In Vitro route 2 times daily As needed. 100 each 3    Lancets MISC Testing  each 3    vitamin D (CHOLECALCIFEROL) 1000 UNIT TABS tablet Take 1 tablet by mouth 2 times daily 60 tablet 11    Omega-3 Fatty Acids (FISH OIL) 500 MG CAPS Take by mouth 2 times daily      loratadine (CLARITIN) 10 MG tablet Take 10 mg by mouth daily      rivaroxaban (XARELTO) 20 MG TABS tablet Take 20 mg by mouth daily (with breakfast)      gabapentin (NEURONTIN) 300 MG capsule Take 300 mg by mouth 2 times daily .       amLODIPine (NORVASC) 5 MG tablet TAKE 1 TABLET BY MOUTH DAILY (Patient taking differently: Take 5 mg by mouth daily ) 90 tablet 3    atorvastatin (LIPITOR) 20 MG tablet take 1 tablet by mouth once daily (Patient taking differently: 40 mg take 1 tablet by mouth once daily) 90 tablet 3    omeprazole (PRILOSEC) 40 MG capsule Take 1 capsule by mouth daily (Patient taking differently: Take 20 mg by mouth 2 times daily ) 90 capsule 3    propranolol (INDERAL LA) 120 MG CR capsule Take 120 mg by mouth 2 times daily       montelukast (SINGULAIR) 10 MG tablet take 1 tablet by mouth once daily 90 tablet 3    b complex vitamins capsule Take 1 capsule by mouth 2 times daily      BUSPIRONE HCL PO Take 10 mg by mouth 3 times daily Take 2 tablet by mouth 3 times daily Disp 270 tablets  R-3      fluticasone (FLONASE) 50 MCG/ACT nasal spray USE 2 SPRAYS NASALLY DAILY 3 Bottle 3    cyanocobalamin 1000 MCG/ML injection Inject 1 mL into the muscle every 30 days. 1 vial 2    aspirin 81 MG EC tablet Take 81 mg by mouth daily. No current facility-administered medications for this visit. Sharon BELTRAN   Review of Systems   Constitutional: Negative for chills and fever. HENT: Negative. Eyes: Negative. Respiratory: Positive for shortness of breath. Negative for cough, chest tightness and wheezing. Cardiovascular: Negative for chest pain, palpitations and leg swelling. Gastrointestinal: Negative for abdominal pain, diarrhea, nausea and vomiting. Genitourinary: Negative. Musculoskeletal: Negative. Skin: Negative. Neurological: Negative. Hematological: Does not bruise/bleed easily. Psychiatric/Behavioral: Negative for sleep disturbance and suicidal ideas. Physical exam   /72 (Site: Left Upper Arm, Position: Sitting, Cuff Size: Medium Adult)   Pulse 74   Temp 98 °F (36.7 °C) (Temporal)   Ht 5' 11\" (1.803 m)   Wt 219 lb (99.3 kg)   SpO2 97% Comment: r/a  BMI 30.54 kg/m²      Wt Readings from Last 3 Encounters:   11/18/20 219 lb (99.3 kg)   11/03/20 226 lb (102.5 kg)   09/14/20 217 lb 12.8 oz (98.8 kg)     Physical Exam  Vitals signs and nursing note reviewed. Constitutional:       General: He is not in acute distress. Appearance: He is well-developed and overweight. HENT:      Mouth/Throat:      Lips: Pink. Mouth: Mucous membranes are moist.      Pharynx: Oropharynx is clear. No oropharyngeal exudate or posterior oropharyngeal erythema. Eyes:      Conjunctiva/sclera: Conjunctivae normal.   Neck:      Vascular: No JVD. Cardiovascular:      Rate and Rhythm: Normal rate and regular rhythm. Heart sounds: No murmur. No friction rub. Pulmonary:      Effort: Pulmonary effort is normal. No accessory muscle usage or respiratory distress. Breath sounds: Normal breath sounds. No wheezing, rhonchi or rales. Chest:      Chest wall: No tenderness. Musculoskeletal:      Right lower leg: No edema. Left lower leg: No edema. Skin:     General: Skin is warm and dry. Capillary Refill: Capillary refill takes less than 2 seconds. Nails: There is no clubbing. Neurological:      Mental Status: He is alert. Psychiatric:         Mood and Affect: Mood normal.         Behavior: Behavior normal.         Thought Content: Thought content normal.         Judgment: Judgment normal.          Test results   Lung Nodule Screening     [] Qualifies    [x]Does not qualify   [] Declined    [] Completed    Assessment      Diagnosis Orders   1. COPD with asthma ([facility] [address]     2. HARINDER on CPAP     3. Obesity (BMI 30-39. 9)       Plan   -continue Symbicort, educated on proper dose of 2 puffs twice daily, agreeable to start using twice daily  -continue PRN ProAir  -counseled on weight loss  -avoid ill contacts  -discuss vaccinations w/ PCP to ensure UTD    Will see Sonia Tanner in: 9 months    Electronically signed by FERDINAND Hdez CNP on 11/18/2020 at 4:29 PM

## 2020-12-15 ENCOUNTER — NURSE ONLY (OUTPATIENT)
Dept: FAMILY MEDICINE CLINIC | Age: 70
End: 2020-12-15
Payer: MEDICARE

## 2020-12-15 PROCEDURE — 96372 THER/PROPH/DIAG INJ SC/IM: CPT | Performed by: FAMILY MEDICINE

## 2020-12-15 RX ORDER — CYANOCOBALAMIN 1000 UG/ML
1000 INJECTION INTRAMUSCULAR; SUBCUTANEOUS ONCE
Status: COMPLETED | OUTPATIENT
Start: 2020-12-15 | End: 2020-12-15

## 2020-12-15 RX ADMIN — CYANOCOBALAMIN 1000 MCG: 1000 INJECTION INTRAMUSCULAR; SUBCUTANEOUS at 10:18

## 2020-12-15 NOTE — PROGRESS NOTES
Administrations This Visit     cyanocobalamin injection 1,000 mcg     Admin Date  12/15/2020  10:18 Action  Given Dose  1,000 mcg Route  Intramuscular Site  Deltoid Right Administered By  Kim Carlson CMA ([address] [place] [address])    Ordering Provider: Fletcher Styles MD    NDC: 8716-5337-97    Lot#: [id number]    : [employer]    Patient Supplied?: No                Patient instructed to report any adverse reaction to me immediately. Pt tolerated well.

## 2021-01-04 ENCOUNTER — NURSE ONLY (OUTPATIENT)
Dept: CARDIOLOGY CLINIC | Age: 71
End: 2021-01-04
Payer: MEDICARE

## 2021-01-04 DIAGNOSIS — Z95.0 PACEMAKER: Primary | ICD-10-CM

## 2021-01-04 PROCEDURE — 93280 PM DEVICE PROGR EVAL DUAL: CPT | Performed by: INTERNAL MEDICINE

## 2021-01-04 NOTE — PROGRESS NOTES
DR VICENTE PT  [employer] DUAL PACEMAKER CHECK IN OFFICE   PRESENTS IN ASVP 74  UNDERLYING IS DEPENDENT IN VENTRICLES    P WAVES 3.3  RV WAVES DEPENDENT     ATRIAL THRESHOLD 0.7 @ 0.4  VENT THRESHOLD 1.1 @ 0.4  ATRIAL AMPLITUDE 2.5 @ 0.4  VENT AMPLITUDE 2.5 @ 0.4    DDDR   1 NS VT EPISODES / 12 BTS NS VT ON 9/20/20

## 2021-01-06 ENCOUNTER — OFFICE VISIT (OUTPATIENT)
Dept: CARDIOLOGY CLINIC | Age: 71
End: 2021-01-06
Payer: MEDICARE

## 2021-01-06 VITALS
WEIGHT: 219 LBS | BODY MASS INDEX: 30.66 KG/M2 | SYSTOLIC BLOOD PRESSURE: 132 MMHG | DIASTOLIC BLOOD PRESSURE: 72 MMHG | HEART RATE: 68 BPM | HEIGHT: 71 IN

## 2021-01-06 DIAGNOSIS — I48.0 PAROXYSMAL ATRIAL FIBRILLATION (HCC): ICD-10-CM

## 2021-01-06 DIAGNOSIS — Z95.0 PACEMAKER: Primary | ICD-10-CM

## 2021-01-06 PROCEDURE — G8427 DOCREV CUR MEDS BY ELIG CLIN: HCPCS | Performed by: INTERNAL MEDICINE

## 2021-01-06 PROCEDURE — 4040F PNEUMOC VAC/ADMIN/RCVD: CPT | Performed by: INTERNAL MEDICINE

## 2021-01-06 PROCEDURE — G8417 CALC BMI ABV UP PARAM F/U: HCPCS | Performed by: INTERNAL MEDICINE

## 2021-01-06 PROCEDURE — 3017F COLORECTAL CA SCREEN DOC REV: CPT | Performed by: INTERNAL MEDICINE

## 2021-01-06 PROCEDURE — 1123F ACP DISCUSS/DSCN MKR DOCD: CPT | Performed by: INTERNAL MEDICINE

## 2021-01-06 PROCEDURE — G8484 FLU IMMUNIZE NO ADMIN: HCPCS | Performed by: INTERNAL MEDICINE

## 2021-01-06 PROCEDURE — 93000 ELECTROCARDIOGRAM COMPLETE: CPT | Performed by: INTERNAL MEDICINE

## 2021-01-06 PROCEDURE — 99213 OFFICE O/P EST LOW 20 MIN: CPT | Performed by: INTERNAL MEDICINE

## 2021-01-06 PROCEDURE — 1036F TOBACCO NON-USER: CPT | Performed by: INTERNAL MEDICINE

## 2021-01-06 RX ORDER — FLUTICASONE FUROATE, UMECLIDINIUM BROMIDE AND VILANTEROL TRIFENATATE 100; 62.5; 25 UG/1; UG/1; UG/1
1 POWDER RESPIRATORY (INHALATION) DAILY
COMMUNITY
End: 2021-08-31

## 2021-01-06 RX ORDER — LATANOPROST 50 UG/ML
SOLUTION/ DROPS OPHTHALMIC 2 TIMES DAILY
COMMUNITY
Start: 2020-12-15

## 2021-01-06 NOTE — PROGRESS NOTES
[address] [facility] [address]  Dept: [phone]  Dept Fax: [phone]  Loc: [phone]    Visit Date: 1/6/2021    Mr. Lavanda Holter is a 69 y.o. male  who presented for:  Chief Complaint   Patient presents with    Check-Up     [employer] dual pacemaker       HPI:   70 yo M C hx of Afib on Xarelto, PPM, s/p PPM, DM, HTN, HLD is here for a follow up. He reports good exercise tolerance. Had recent PPM interrogated. Denies any chest pain, sob, palpitations, lightheadedness, dizziness, orthopnea, PND or pedal edema. No bleeding issues.          Current Outpatient Medications:     latanoprost (XALATAN) 0.005 % ophthalmic solution, , Disp: , Rfl:     fluticasone-umeclidin-vilant (TRELEGY ELLIPTA) 100-62.5-25 MCG/INH AEPB, Inhale 1 puff into the lungs daily, Disp: , Rfl:     budesonide-formoterol (SYMBICORT) 160-4.5 MCG/ACT AERO, Inhale 2 puffs into the lungs 2 times daily Rinse mouth after its use., Disp: 3 Inhaler, Rfl: 3    Cannabinoids (THC FREE PO), Take by mouth Gummi bears, Dr in [facility].[address]: , Rfl:     albuterol sulfate HFA (VENTOLIN HFA) 108 (90 Base) MCG/ACT inhaler, INHALE 2 PUFFS EVERY 6 HOURS AS NEEDED FOR WHEEZING., Disp: 18 g, Rfl: 11    tamsulosin (FLOMAX) 0.4 MG capsule, Take 0.4 mg by mouth daily, Disp: , Rfl:     topiramate (TOPAMAX SPRINKLE) 25 MG capsule, Take 25 mg by mouth 2 times daily, Disp: , Rfl:     insulin detemir (LEVEMIR FLEXTOUCH) 100 UNIT/ML injection pen, Inject 33 Units into the skin nightly, Disp: 5 pen, Rfl: 11    DULoxetine (CYMBALTA) 60 MG extended release capsule, Take 60 mg by mouth daily, Disp: , Rfl:     metFORMIN (GLUCOPHAGE XR) 500 MG extended release tablet, Take 2 tablets by mouth 2 times daily (Patient taking differently: Take 500 mg by mouth 2 times daily ), Disp: 60 tablet, Rfl: 11    blood glucose test strips (ACCU-CHEK ERVIN) strip, 1 each by In Vitro route 2 times infarction), GERD (gastroesophageal reflux disease), Hemorrhoids, Hyperlipidemia, Hypertension, Mild intermittent asthma without complication, Nausea & vomiting, Neuropathy, Osteoarthritis, Peripheral neuropathy, Post traumatic stress disorder (PTSD), S/P cardiac catheterization: 11/20/2013: No obstructive lesions. Moderate LI's in the mid LAD and in the RCA., Tremor of both hands, Type II or unspecified type diabetes mellitus without mention of complication, not stated as uncontrolled, and Unspecified sleep apnea. Social History  Julian Martinez  reports that he quit smoking about 49 years ago. His smoking use included cigarettes. He has a 12.00 pack-year smoking history. He has quit using smokeless tobacco. He reports current drug use. Drug: Marijuana. He reports that he does not drink alcohol. Family History  Julian Martinez family history includes Cancer in his paternal grandfather; Diabetes in his mother; Heart Disease in his father and mother; High Blood Pressure in his father and mother; High Cholesterol in his father and mother; Kidney Disease in his mother; Stroke in his father. Past Surgical History   Past Surgical History:   Procedure Laterality Date    APPENDECTOMY  age 14   [facility] BLEPHAROPLASTY Bilateral 05/2017    CARDIAC CATHETERIZATION  11/2013    no stents    CARPAL TUNNEL RELEASE Right 01/2017    COLONOSCOPY  2005    Dr. Yuliana Adair      right eye clog oil duct    EYE SURGERY Bilateral 01/2017    HAND SURGERY Right 01/19/2017    [facility] - carpal tunnel    MANDIBLE SURGERY N/A 05/2017    jaw surgery lower front of mouth    PACEMAKER INSERTION  2000/2010    PACEMAKER INSERTION  11/20/2019    TONSILLECTOMY AND ADENOIDECTOMY  as a child        Subjective:     REVIEW OF SYSTEMS  Constitutional: denies sweats, chills and fever  HENT: denies  congestion, sinus pressure, sneezing and sore throat. Eyes: denies  pain, discharge, redness and itching.    Respiratory: denies apnea, cough  Gastrointestinal: denies blood in stool, constipation, diarrhea   Endocrine: denies cold intolerance, heat intolerance, polydipsia. Genitourinary: denies dysuria, enuresis, flank pain and hematuria. Musculoskeletal: denies arthralgias, joint swelling and neck pain. Neurological: denies numbness and headaches. Psychiatric/Behavioral: denies agitation, confusion, decreased concentration and dysphoric mood    All others reviewed and are negative. Objective:     BP (!) 148/82   Pulse 68   Ht 5' 11\" (1.803 m)   Wt 219 lb (99.3 kg)   BMI 30.54 kg/m²     Wt Readings from Last 3 Encounters:   01/06/21 219 lb (99.3 kg)   11/18/20 219 lb (99.3 kg)   11/03/20 226 lb (102.5 kg)     BP Readings from Last 3 Encounters:   01/06/21 (!) 148/82   11/18/20 130/72   09/14/20 (!) 114/56       PHYSICAL EXAM  Constitutional: Oriented to person, place, and time. Appears well-developed and well-nourished. HENT:   Head: Normocephalic and atraumatic. Eyes: EOM are normal. Pupils are equal, round, and reactive to light. Neck: Normal range of motion. Neck supple. No JVD present. Cardiovascular: Normal rate , normal heart sounds and intact distal pulses. Pulmonary/Chest: Effort normal and breath sounds normal. No respiratory distress. No wheezes. No rales. Abdominal: Soft. Bowel sounds are normal. No distension. There is no tenderness. Musculoskeletal: Normal range of motion. No edema. Neurological: Alert and oriented to person, place, and time. No cranial nerve deficit. Coordination normal.   Skin: Skin is warm and dry. Psychiatric: Normal mood and affect.        No results found for: CKTOTAL, CKMB, CKMBINDEX    Lab Results   Component Value Date    WBC 5.2 03/10/2020    RBC 4.54 03/10/2020    RBC 4.77 02/01/2012    HGB 13.5 03/10/2020    HCT 42.3 03/10/2020    MCV 93.2 03/10/2020    MCH 29.7 03/10/2020    MCHC 31.9 03/10/2020    RDW 13.7 05/25/2018     03/10/2020    MPV 9.5 03/10/2020       Lab Results   Component Value Date     10/28/2020    K 5.1 10/28/2020    K 4.9 11/20/2019    CL 98 10/28/2020    CO2 26 10/28/2020    BUN 12 10/28/2020    LABALBU 4.5 03/10/2020    CREATININE 0.9 10/28/2020    CALCIUM 10.1 10/28/2020    LABGLOM 83 10/28/2020    GLUCOSE 144 10/28/2020    GLUCOSE 121 02/01/2012       Lab Results   Component Value Date    ALKPHOS 132 03/10/2020    ALT 30 03/10/2020    AST 21 03/10/2020    PROT 7.2 03/10/2020    BILITOT 0.5 03/10/2020    BILIDIR 0.2 10/15/2014    LABALBU 4.5 03/10/2020       Lab Results   Component Value Date    MG 1.9 01/30/2018       Lab Results   Component Value Date    INR 1.03 11/20/2019    INR 0.85 11/19/2013         Lab Results   Component Value Date    LABA1C 7.1 10/28/2020       Lab Results   Component Value Date    TRIG 96 03/10/2020    HDL 46 03/10/2020    LDLCALC 54 03/10/2020       Lab Results   Component Value Date    TSH 3.050 03/10/2020         Testing Reviewed:      I haveindividually reviewed the below cardiac tests    EKG:    ECHO:   Results for orders placed during the hospital encounter of 03/30/17   ECHO Complete 2D W Doppler W Color    Narrative Transthoracic Echocardiography Report (TTE)     Demographics      Patient Name    Iris Montalvo  Gender               Male                   D      MR #            [id number]          Race                                                          Ethnicity      Account #       [de-identified]            Room Number      Accession       [id number]          Date of Study        03/30/2017   Number      Date of Birth   1950         Referring Physician  Sher Mahajan MD      Age             78 year(s)         Sonographer          Samara Portillo RDCS                                         Interpreting         Zhen Stoll DO                                      Physician     Procedure    Type of Study      TTE procedure:ECHOCARDIOGRAM COMPLETE 2D W DOPPLER W COLOR. Procedure Date  Date: 03/30/2017 Start: 11:00 AM    Study Location: Echo Lab  Technical Quality: Adequate visualization    Indications:Shortness of breath. Additional Medical History:Coronary artery disease, Hypertension,  Hyperlipidemia, GERD, Diabetes, Paroxysmal atrial fib, Arthritis, Asthma,  Remote stroke, Pacemaker, Family history of heart disease, Former smoker    Patient Status: Routine    Height: 71 inches Weight: 220 pounds BSA: 2.2 m^2 BMI: 30.68 kg/m^2    BP: 142/78 mmHg     Conclusions      Summary   Systolic function was normal.   Ejection fraction is visually estimated at 55%. Tricuspid valve is structurally normal.   Mild tricuspid regurgitation visualized. Signature      ----------------------------------------------------------------   Electronically signed by Devyn Stoner DO (Interpreting   physician) on 03/30/2017 at 10:10 PM   ----------------------------------------------------------------      Findings      Mitral Valve   Structurally normal mitral valve. Trace mitral regurgitation is present. Aortic Valve   The aortic valve appears to be trileaflet with good leaflet separation. Aortic valve leaflets are Mildly calcified. Tricuspid Valve   Tricuspid valve is structurally normal.   Mild tricuspid regurgitation visualized. Pulmonic Valve   Pulmonic valve is structurally normal.   Trivial pulmonic regurgitation visualized. Left Atrium   Normal size left atrium. Left Ventricle   Systolic function was normal.   Ejection fraction is visually estimated at 55%. Right Atrium   The right atrium is of normal size. Right Ventricle   Normal right ventricular size and function. Pericardial Effusion   There is a trivial pericardial effusion noted.      M-Mode/2D Measurements & Calculations      LV Diastolic    LV Systolic Dimension: 3.2  AV Cusp Separation: 2.2 cmLA   Dimension: 5.1  cm Dimension: 3.7 cmAO Root   cm              LV Volume Diastolic: 377 ml Dimension: 2.8 cm   LV FS:37.3 %    LV Volume Systolic: 41 ml   LV PW           LV EDV/LV EDV Index: 168   Diastolic: 1.2  TU/89 F^7DS ESV/LV ESV   cm              Index: 41 ml/19 m^2         RV Diastolic Dimension: 2.9 cm   Septum          EF Calculated: 34.6 %   Diastolic: 1.2                              LA/Aorta: 1.32   cm     Doppler Measurements & Calculations      MV Peak E-Wave: 85.4 cm/s  AV Peak Velocity: 130 LVOT Peak Velocity: 94.8   MV Peak A-Wave: 79.5 cm/s  cm/s                  cm/s   MV E/A Ratio: 1.07         AV Peak Gradient:     LVOT Peak Gradient: 4   MV Peak Gradient: 2.92     6.76 mmHg             mmHg   mmHg                                                    TV Peak E-Wave: 47.4 cm/s   MV Deceleration Time: 190                        TV Peak A-Wave: 36 cm/s   msec                                                    TV Peak Gradient: 0.9                                                    mmHg   MV E' Septal Velocity:                           TR Velocity:207 cm/s   4.97 cm/s                  AV DVI (Vmax):0.73    TR Gradient:17.14 mmHg   MV A' Septal Velocity:                           PV Peak Velocity: 54.8   5.07 cm/s                                        cm/s   MV E' Lateral Velocity:                          PV Peak Gradient: 1.2   6.34 cm/s                                        mmHg   MV A' Lateral Velocity:   6.63 cm/s   E/E' septal: 17.18   E/E' lateral: 13.47   MR Velocity: 259 cm/s     [email or web address]  [id number]       STRESS:    CATH:    Assessment/Plan       Diagnosis Orders   1. [address] dual pacemaker  EKG 12 lead   2.  Paroxysmal atrial fibrillation (HCC)  EKG 12 lead     Afib on Xarelto  PPM  DM  HTN  HLD  HARINDER on CPAP  Asthma    No cardiac symptoms  Reports good exercise tolerance  Consistent with CPAP  On Xarelto , no bleeding  [id number], high risk  Continue Aspirin, statin, amlodipine  EKG shows A-S, V-Paced rhythm  The patient is asked to make an attempt to improve diet and exercise patterns to aid in medical management of this problem. Advised more plant based nutrition/meditarrean diet   Advised patient to call office or seek immediate medical attention if there is any new onset of  any chest pain, sob, palpitations, lightheadedness, dizziness, orthopnea, PND or pedal edema. All medication side effects were discussed in details. Thank youfor allowing me to participate in the care of this patient. Please do not hesitate to contact me for any further questions. Return in about 8 months (around 9/6/2021), or if symptoms worsen or fail to improve, for Regular follow up, Review testing.        Electronically signed by Sherrie Berumen MD [facility]  1/6/2021 at 1:21 PM EST

## 2021-01-06 NOTE — PATIENT INSTRUCTIONS
Relevant Problems   NEURO   (+) Seizure disorder (HCC)      CARDIAC   (+) Aortic insufficiency   (+) Heart murmur       Physical Exam    Airway   Mallampati: II  TM distance: >3 FB  Neck ROM: full       Cardiovascular - normal exam  Rhythm: regular  Rate: normal  (-) murmur     Dental - normal exam           Pulmonary - normal exam  Breath sounds clear to auscultation     Abdominal    Neurological - normal exam                 Anesthesia Plan    ASA 2       Plan - general       Airway plan will be ETT        Induction: intravenous    Postoperative Plan: Postoperative administration of opioids is intended.    Pertinent diagnostic labs and testing reviewed    Informed Consent:    Anesthetic plan and risks discussed with patient.    Use of blood products discussed with: patient whom consented to blood products.          You may receive a survey regarding the care you received during your visit. Your input is valuable to us. We encourage you to complete and return your survey. We hope you will choose us in the future for your healthcare needs.

## 2021-01-14 ENCOUNTER — OFFICE VISIT (OUTPATIENT)
Dept: FAMILY MEDICINE CLINIC | Age: 71
End: 2021-01-14
Payer: MEDICARE

## 2021-01-14 VITALS
TEMPERATURE: 97 F | HEART RATE: 71 BPM | WEIGHT: 213 LBS | OXYGEN SATURATION: 97 % | DIASTOLIC BLOOD PRESSURE: 58 MMHG | RESPIRATION RATE: 14 BRPM | BODY MASS INDEX: 29.71 KG/M2 | SYSTOLIC BLOOD PRESSURE: 118 MMHG

## 2021-01-14 DIAGNOSIS — Z99.89 OBSTRUCTIVE SLEEP APNEA ON CPAP: ICD-10-CM

## 2021-01-14 DIAGNOSIS — D50.9 IRON DEFICIENCY ANEMIA, UNSPECIFIED IRON DEFICIENCY ANEMIA TYPE: ICD-10-CM

## 2021-01-14 DIAGNOSIS — N13.8 BPH WITH OBSTRUCTION/LOWER URINARY TRACT SYMPTOMS: ICD-10-CM

## 2021-01-14 DIAGNOSIS — E11.42 DIABETIC PERIPHERAL NEUROPATHY ASSOCIATED WITH TYPE 2 DIABETES MELLITUS (HCC): ICD-10-CM

## 2021-01-14 DIAGNOSIS — M21.619 BUNION OF GREAT TOE: ICD-10-CM

## 2021-01-14 DIAGNOSIS — G47.33 OBSTRUCTIVE SLEEP APNEA ON CPAP: ICD-10-CM

## 2021-01-14 DIAGNOSIS — E53.8 VITAMIN B12 DEFICIENCY: ICD-10-CM

## 2021-01-14 DIAGNOSIS — N40.1 BPH WITH OBSTRUCTION/LOWER URINARY TRACT SYMPTOMS: ICD-10-CM

## 2021-01-14 DIAGNOSIS — E78.5 HYPERLIPIDEMIA WITH TARGET LDL LESS THAN 100: ICD-10-CM

## 2021-01-14 DIAGNOSIS — I10 ESSENTIAL HYPERTENSION: ICD-10-CM

## 2021-01-14 DIAGNOSIS — E11.65 UNCONTROLLED TYPE 2 DIABETES MELLITUS WITH HYPERGLYCEMIA (HCC): ICD-10-CM

## 2021-01-14 DIAGNOSIS — J45.20 MILD INTERMITTENT ASTHMA WITHOUT COMPLICATION: ICD-10-CM

## 2021-01-14 DIAGNOSIS — L84 CALLUS OF FOOT: ICD-10-CM

## 2021-01-14 DIAGNOSIS — G25.0 ESSENTIAL TREMOR: ICD-10-CM

## 2021-01-14 DIAGNOSIS — I73.9 PVD (PERIPHERAL VASCULAR DISEASE) (HCC): ICD-10-CM

## 2021-01-14 DIAGNOSIS — Z00.00 ROUTINE GENERAL MEDICAL EXAMINATION AT A HEALTH CARE FACILITY: Primary | ICD-10-CM

## 2021-01-14 DIAGNOSIS — I48.0 PAROXYSMAL ATRIAL FIBRILLATION (HCC): ICD-10-CM

## 2021-01-14 PROCEDURE — 4040F PNEUMOC VAC/ADMIN/RCVD: CPT | Performed by: FAMILY MEDICINE

## 2021-01-14 PROCEDURE — G0439 PPPS, SUBSEQ VISIT: HCPCS | Performed by: FAMILY MEDICINE

## 2021-01-14 PROCEDURE — 1123F ACP DISCUSS/DSCN MKR DOCD: CPT | Performed by: FAMILY MEDICINE

## 2021-01-14 PROCEDURE — 96372 THER/PROPH/DIAG INJ SC/IM: CPT | Performed by: FAMILY MEDICINE

## 2021-01-14 PROCEDURE — G8484 FLU IMMUNIZE NO ADMIN: HCPCS | Performed by: FAMILY MEDICINE

## 2021-01-14 PROCEDURE — 3046F HEMOGLOBIN A1C LEVEL >9.0%: CPT | Performed by: FAMILY MEDICINE

## 2021-01-14 PROCEDURE — 3017F COLORECTAL CA SCREEN DOC REV: CPT | Performed by: FAMILY MEDICINE

## 2021-01-14 RX ORDER — CYANOCOBALAMIN 1000 UG/ML
1000 INJECTION INTRAMUSCULAR; SUBCUTANEOUS ONCE
Status: COMPLETED | OUTPATIENT
Start: 2021-01-14 | End: 2021-01-14

## 2021-01-14 RX ADMIN — CYANOCOBALAMIN 1000 MCG: 1000 INJECTION INTRAMUSCULAR; SUBCUTANEOUS at 11:43

## 2021-01-14 ASSESSMENT — PATIENT HEALTH QUESTIONNAIRE - PHQ9
SUM OF ALL RESPONSES TO PHQ9 QUESTIONS 1 & 2: 0
SUM OF ALL RESPONSES TO PHQ QUESTIONS 1-9: 0

## 2021-01-14 ASSESSMENT — LIFESTYLE VARIABLES: HOW OFTEN DO YOU HAVE A DRINK CONTAINING ALCOHOL: 0

## 2021-01-14 NOTE — PATIENT INSTRUCTIONS
Personalized Preventive Plan for Mayco Puls - 1/14/2021  Medicare offers a range of preventive health benefits. Some of the tests and screenings are paid in full while other may be subject to a deductible, co-insurance, and/or copay. Some of these benefits include a comprehensive review of your medical history including lifestyle, illnesses that may run in your family, and various assessments and screenings as appropriate. After reviewing your medical record and screening and assessments performed today your provider may have ordered immunizations, labs, imaging, and/or referrals for you. A list of these orders (if applicable) as well as your Preventive Care list are included within your After Visit Summary for your review. Other Preventive Recommendations:    · A preventive eye exam performed by an eye specialist is recommended every 1-2 years to screen for glaucoma; cataracts, macular degeneration, and other eye disorders. · A preventive dental visit is recommended every 6 months. · Try to get at least 150 minutes of exercise per week or 10,000 steps per day on a pedometer . · Order or download the FREE \"Exercise & Physical Activity: Your Everyday Guide\" from [employer]. Call 9-746.221.4114 or search [employer] online. · You need 8208-7294 mg of calcium and 4696-7716 IU of vitamin D per day. It is possible to meet your calcium requirement with diet alone, but a vitamin D supplement is usually necessary to meet this goal.  · When exposed to the sun, use a sunscreen that protects against both UVA and UVB radiation with an SPF of 30 or greater. Reapply every 2 to 3 hours or after sweating, drying off with a towel, or swimming. · Always wear a seat belt when traveling in a car. Always wear a helmet when riding a bicycle or motorcycle.

## 2021-01-14 NOTE — PROGRESS NOTES
Administrations This Visit     cyanocobalamin injection 1,000 mcg     Admin Date  01/14/2021  11:43 Action  Given Dose  1,000 mcg Route  Intramuscular Site  Deltoid Right Administered By  Shaheed Jacobson CMA ([address] [place] [address])    Ordering Provider: Mode Diaz MD    NDC: 5521-7586-84    Lot#: [id number]    : [employer]    Patient Supplied?: No                Patient instructed to remain in clinic for 20 minutes after injection and was advised to report any adverse reaction to me immediately.

## 2021-01-14 NOTE — PROGRESS NOTES
46 03/10/2020    HDL 49 01/28/2019    HDL 52 10/24/2017     Lab Results   Component Value Date    LDLCALC 54 03/10/2020    LDLCALC 53 01/28/2019    LDLCALC 51 10/24/2017     No results found for: LABVLDL, VLDL  No results found for: CHOLHDLRATIO  There is not a family history of hyperlipidemia. There is not a family history of early ischemia heart disease. GERD: Rene complains of heartburn. This has been associated with heartburn and hoarseness. He denies belching, difficulty swallowing and melena. Symptoms have been present for several years. He denies dysphagia. He has not lost weight. He denies melena, hematochezia, hematemesis, and coffee ground emesis. Medical therapy in the past has included proton pump inhibitors. Elevated glucose need follow up. He is no longer seeing an endocrinologist for diabetes but his medications are through the [address] [place] [address]. His endocrinologist left town and we are now managing the DM through here until he can find a new endocrinologist.  Fasting blood sugars are 120-130. Alexus's feet have  Along history of callus formation from bunion deformities, complicated by PVD. Lab Results   Component Value Date    LABA1C 7.0 (H) 01/24/2021     No results found for: EAG      Being followed currently by the [address] [place] [address] for hypertension, afib, and hyperlipidemia. Vitamin b12  was at the low end of normal but with symptoms of fatigue [facility]. He is feeling better and has also started vit d PO as well. Last b12 level is high and he is instructed to go to every 8 weeks instead of once a month. Lab Results   Component Value Date    [id number] 340 (H) 03/10/2020       Tremor controlled better with current medications. He has seen the neurologist at the [address] [place] [address] who does not think that he has parkinson's but occasionally the tremor does get bad. He recalls once when he spilled his cereal right out of the bowl that he was holding.   This is more controlled now. A lot of these conditions are related to agent orange exposure in [facility] as well as a water contamination in [employer]. HARINDER controlled on CPAP. BPH is under the management of urology. Lab Results   Component Value Date    PSA 3.26 (H) 01/24/2021    PSA 3.98 (H) 10/28/2020    PSA 4.38 (H) 09/02/2020       Low sodium is under watch. Anemia is under regular watch. Allergies   Allergen Reactions    Latex Rash     itching    Doxycycline Calcium [Doxycycline Calcium]     Lactose Diarrhea    Nasacort [Triamcinolone] Other (See Comments)     Unable to remember    Wellbutrin [Bupropion] Other (See Comments)     Does not remember         Prior to Visit Medications    Medication Sig Taking? Authorizing Provider   latanoprost (XALATAN) 0.005 % ophthalmic solution  Yes Historical Provider, MD   fluticasone-umeclidin-vilant (TRELEGY ELLIPTA) 100-62.5-25 MCG/INH AEPB Inhale 1 puff into the lungs daily Yes Historical Provider, MD   budesonide-formoterol (SYMBICORT) 160-4.5 MCG/ACT AERO Inhale 2 puffs into the lungs 2 times daily Rinse mouth after its use. Yes FERDINAND Maldonado - CNP   Cannabinoids (THC FREE PO) Take by mouth [address]ha [address]ada Dr in [facility] Yes Historical Provider, MD   albuterol sulfate HFA (VENTOLIN HFA) 108 (90 Base) MCG/ACT inhaler INHALE 2 PUFFS EVERY 6 HOURS AS NEEDED FOR WHEEZING.  Yes Sherren Battles, MD   tamsulosin (FLOMAX) 0.4 MG capsule Take 0.4 mg by mouth daily Yes Historical Provider, MD   topiramate (TOPAMAX SPRINKLE) 25 MG capsule Take 25 mg by mouth 2 times daily Yes Historical Provider, MD   insulin detemir (LEVEMIR FLEXTOUCH) 100 UNIT/ML injection pen Inject 33 Units into the skin nightly Yes Sherren Battles, MD   DULoxetine (CYMBALTA) 60 MG extended release capsule Take 60 mg by mouth daily Yes Historical Provider, MD   metFORMIN (GLUCOPHAGE XR) 500 MG extended release tablet Take 2 tablets by mouth 2 times daily  Patient taking differently: Take 500 mg by mouth 2 times daily  Yes Marco Sumner MD   blood glucose test strips (ACCU-CHEK ERVIN) strip 1 each by In Vitro route 2 times daily As needed. Yes Marco Sumner MD   Lancets MISC Testing BID Yes Marco Sumner MD   vitamin D (CHOLECALCIFEROL) 1000 UNIT TABS tablet Take 1 tablet by mouth 2 times daily Yes Marco Sumner MD   Omega-3 Fatty Acids (FISH OIL) 500 MG CAPS Take by mouth 2 times daily Yes Historical Provider, MD   loratadine (CLARITIN) 10 MG tablet Take 10 mg by mouth daily Yes Historical Provider, MD   rivaroxaban (XARELTO) 20 MG TABS tablet Take 20 mg by mouth daily (with breakfast) Yes Historical Provider, MD   gabapentin (NEURONTIN) 300 MG capsule Take 300 mg by mouth 2 times daily . Yes Historical Provider, MD   amLODIPine (NORVASC) 5 MG tablet TAKE 1 TABLET BY MOUTH DAILY  Patient taking differently: Take 5 mg by mouth daily  Yes Marco Sumner MD   atorvastatin (LIPITOR) 20 MG tablet take 1 tablet by mouth once daily  Patient taking differently: 40 mg take 1 tablet by mouth once daily Yes Marco Sumner MD   omeprazole (PRILOSEC) 40 MG capsule Take 1 capsule by mouth daily  Patient taking differently: Take 20 mg by mouth 2 times daily  Yes Marco Sumner MD   propranolol (INDERAL LA) 120 MG CR capsule Take 120 mg by mouth 2 times daily  Yes Historical Provider, MD   montelukast (SINGULAIR) 10 MG tablet take 1 tablet by mouth once daily Yes FERDINAND Jennings - CNP   b complex vitamins capsule Take 1 capsule by mouth 2 times daily Yes Historical Provider, MD   BUSPIRONE HCL PO Take 10 mg by mouth 3 times daily Take 2 tablet by mouth 3 times daily Disp 270 tablets  R-3 Yes Historical Provider, MD   fluticasone (FLONASE) 50 MCG/ACT nasal spray USE 2 SPRAYS NASALLY DAILY Yes Marco Sumner MD   cyanocobalamin 1000 MCG/ML injection Inject 1 mL into the muscle every 30 days. Yes Marco Sumner MD   aspirin 81 MG EC tablet Take 81 mg by mouth daily.    Yes Historical Provider, MD         Past Medical History:   Diagnosis Date    Asthma     [employer] dual pacemaker 4/9/2014    Bronchitis, chronic (HCC)     Carotid artery stenosis     Dr Elliott Mcknight Chickenpox     CVA (cerebral infarction)     GERD (gastroesophageal reflux disease)     Hemorrhoids     Hyperlipidemia     Hypertension     Mild intermittent asthma without complication 83/3/2956    Nausea & vomiting     [facility]    Osteoarthritis     Peripheral neuropathy     [facility] stress disorder [facility]PTSD)     [facility]    S/P cardiac catheterization: 11/20/2013: No obstructive lesions. Moderate LI's in the mid LAD and in the RCA. 11/20/2013 11/20/2013: No obstructive lesions. Moderate LI's in the mid LAD and in the RCA.  Dr. Estrada Sick Tremor of both hands     [facility]    Type II or unspecified type diabetes mellitus without mention of complication, not stated as uncontrolled     Dr Lin garcía    Unspecified sleep apnea     Dr Fred Daugherty       Past Surgical History:   Procedure Laterality Date    APPENDECTOMY  age 14   Dot Tutu BLEPHAROPLASTY Bilateral 05/2017    CARDIAC CATHETERIZATION  11/2013    no stents    CARPAL TUNNEL RELEASE Right 01/2017    COLONOSCOPY  2005    Dr. Michel Winkler      right eye clog oil duct    EYE SURGERY Bilateral 01/2017    HAND SURGERY Right 01/19/2017    OIO - carpal tunnel    MANDIBLE SURGERY N/A 05/2017    jaw surgery lower front of mouth    PACEMAKER INSERTION  2000/2010    PACEMAKER INSERTION  11/20/2019    TONSILLECTOMY AND ADENOIDECTOMY  as a child          Family History   Problem Relation Age of Onset    High Cholesterol Father     High Blood Pressure Father     Heart Disease Father     Stroke Father     Diabetes Mother     Heart Disease Mother     High Blood Pressure Mother     High Cholesterol Mother     Kidney Disease Mother     Cancer Paternal Yi Lamb (Including outside providers/suppliers regularly involved in providing care):   Patient Care Team: Derick Flores MD as PCP - Lily Meeks MD as PCP - [facility]  FERDINAND Gonzales CNP as Nurse Practitioner (Nurse Practitioner)  FERDINAND Eric CNP as Advanced Practice Nurse (Nurse Practitioner Family)    Wt Readings from Last 3 Encounters:   01/14/21 213 lb (96.6 kg)   01/06/21 219 lb (99.3 kg)   11/18/20 219 lb (99.3 kg)     Vitals:    01/14/21 1110   BP: (!) 118/58   Site: Right Upper Arm   Position: Sitting   Cuff Size: Large Adult   Pulse: 71   Resp: 14   Temp: 97 °F (36.1 °C)   TempSrc: Temporal   SpO2: 97%   Weight: 213 lb (96.6 kg)     Body mass index is 29.71 kg/m². Based upon direct observation of the patient, evaluation of cognition reveals recent and remote memory intact. Physical Exam   Constitutional: Vital signs are normal. He appears well-developed and well-nourished. He is active. HENT:   Head: Normocephalic and atraumatic. Right Ear: Tympanic membrane, external ear and ear canal normal. No drainage or tenderness. Left Ear: Tympanic membrane, external ear and ear canal normal. No drainage or tenderness. Nose: Nose normal. No mucosal edema or rhinorrhea. Mouth/Throat: Uvula is midline, oropharynx is clear and moist and mucous membranes are normal. Mucous membranes are not pale. Normal dentition. No posterior oropharyngeal edema or posterior oropharyngeal erythema. Eyes: Lids are normal. Right eye exhibits no chemosis and no discharge. Left eye exhibits no chemosis and no drainage. Right conjunctiva has no hemorrhage. Left conjunctiva has no hemorrhage. Right eye exhibits normal extraocular motion. Left eye exhibits normal extraocular motion. Right pupil is round and reactive. Left pupil is round and reactive. Pupils are equal.   Cardiovascular: Normal rate, regular rhythm, S1 normal, S2 normal and normal heart sounds. Exam reveals no gallop. No murmur heard.   Pulmonary/Chest: Effort normal and breath sounds normal. No respiratory distress. He has no wheezes. He has no rhonchi. He has no rales. Abdominal: Soft. Normal appearance and bowel sounds are normal. He exhibits no distension and no mass. There is no hepatosplenomegaly. No tenderness. He has no rigidity, no rebound and no guarding. No hernia. Musculoskeletal:        Right lower leg: He exhibits no edema. Left lower leg: He exhibits no edema. Neurological: He is alert. Oriented and pleasent      Patient's complete Health Risk Assessment and screening values have been reviewed and are found in Flowsheets. The following problems were reviewed today and where indicated follow up appointments were made and/or referrals ordered. Positive Risk Factor Screenings with Interventions:         Substance History:  Social History     Tobacco History     Smoking Status  Former Smoker Quit date  11/14/1971 Smoking Frequency  2 packs/day for 6 years (12 pk yrs) Smoking Tobacco Type  Cigarettes    Smokeless Tobacco Use  Former User          Alcohol History     Alcohol Use Status  No          Drug Use     Drug Use Status  Yes Types  Marijuana Comment  quit at 31 yoa          Sexual Activity     Sexually Active  Never               Alcohol Screening:       A score of 8 or more is associated with harmful or hazardous drinking. A score of 13 or more in women, and 15 or more in men, is likely to indicate alcohol dependence.   Substance Abuse Interventions:  · Recreational drug use:  in the past      Hearing/Vision:  No exam data present  Hearing/Vision  Do you or your family notice any trouble with your hearing that hasn't been managed with hearing aids?: (!) Yes  Do you have difficulty driving, watching TV, or doing any of your daily activities because of your eyesight?: (!) Yes  Have you had an eye exam within the past year?: Yes  Hearing/Vision Interventions:  · Vision concerns:  patient encouraged to make appointment with his/her eye specialist      Personalized Preventive Plan   Current Health Maintenance Status  Immunization History   Administered Date(s) Administered    Hepatitis B (Engerix-B) 02/22/1995, 03/27/1995, 09/14/1995    Influenza A (H1Z6-01) Vaccine PF IM 11/11/2009    Influenza Vaccine, unspecified formulation 09/01/2016    Influenza Virus Vaccine 09/28/2011, 09/14/2012, 09/24/2014, 08/28/2015, 10/03/2017    Influenza, High Dose (Fluzone 65 yrs and older) 09/27/2018    Influenza, Quadv, adjuvanted, 65 yrs +, IM, PF (Fluad) 10/07/2020    Influenza, Triv, inactivated, subunit, adjuvanted, IM (Fluad 65 yrs and older) 09/25/2019    Pneumococcal Conjugate 13-valent ([id number]) 03/26/2015, 09/28/2015    Pneumococcal Polysaccharide ([id number]) 01/24/2017    Td (Adult), 5 Lf Tetanus Toxoid, Pf (Tenivac, Decavac) 01/25/2017    Td, unspecified formulation 01/22/2013    Zoster Recombinant (Shingrix) 06/05/2018, 08/14/2018        Health Maintenance   Topic Date Due    COVID-19 Vaccine (1 of 2) [birth year]/1965    DTaP/Tdap/Td vaccine (1 - Tdap) 04/02/1968    Diabetic microalbuminuria test  03/10/2021    Lipid screen  03/10/2021    Diabetic retinal exam  09/01/2021    Diabetic foot exam  12/22/2021    Annual Wellness Visit (AWV)  01/15/2022    A1C test (Diabetic or Prediabetic)  01/24/2022    Colon cancer screen colonoscopy  10/04/2029    Flu vaccine  Completed    Shingles Vaccine  Completed    Pneumococcal 65+ years Vaccine  Completed    Hepatitis A vaccine  Aged Out    Hib vaccine  Aged Out    Meningococcal (ACWY) vaccine  Aged Out    AAA screen  Discontinued    Hepatitis C screen  Discontinued     Recommendations for Preventive Services Due: see orders and patient instructions/AVS.  . Recommended screening schedule for the next 5-10 years is provided to the patient in written form: see Patient Instructions/AVS.    Julian Martinez was seen today for injections and medicare awv.     Diagnoses and all orders for this visit:    Routine general medical examination at a [facility] facility    Vitamin B12 deficiency  -     cyanocobalamin injection 1,000 mcg  -     Vitamin B12 & Folate; Future    Uncontrolled type 2 diabetes mellitus with hyperglycemia ([facility]  -     Basic Metabolic Panel; Future  -     Hemoglobin A1C; Future  -     Comprehensive Metabolic Panel, Fasting; Future  -     Hemoglobin A1C; Future  -     Microalbumin / Creatinine Urine Ratio; Future    Essential hypertension    Hyperlipidemia with target LDL less than 100  -     Comprehensive Metabolic Panel, Fasting; Future  -     Lipid Panel; Future    Paroxysmal atrial fibrillation (HCC)  -     TSH With Reflex Ft4; Future    Mild intermittent asthma without complication    BPH with obstruction/lower urinary tract symptoms  -     PSA Prostatic Specific Antigen; Future    Iron deficiency anemia, unspecified iron deficiency anemia type  -     CBC;  Future    Obstructive sleep apnea on CPAP    Essential tremor    PVD (peripheral vascular disease) ([facility]  -     Diabetic Shoe    Diabetic peripheral neuropathy associated with type 2 diabetes mellitus ([facility]  -     Diabetic Shoe    Bunion of great toe  -     Diabetic Shoe    Callus of foot  -     Diabetic Shoe           No change to medication   Continue healthy diet and exercise  Yearly eye exam  Daily foot inspection  Yearly flu shot  Monitor glucose regularly  Daily aspirin  Regular labs : A1c quarterly, lipids every 6 months and BMP quaterly

## 2021-01-24 ENCOUNTER — HOSPITAL ENCOUNTER (OUTPATIENT)
Age: 71
Discharge: HOME | End: 2021-01-24
Payer: MEDICARE

## 2021-01-24 DIAGNOSIS — N13.8 BPH WITH OBSTRUCTION/LOWER URINARY TRACT SYMPTOMS: ICD-10-CM

## 2021-01-24 DIAGNOSIS — E11.65 UNCONTROLLED TYPE 2 DIABETES MELLITUS WITH HYPERGLYCEMIA (HCC): ICD-10-CM

## 2021-01-24 DIAGNOSIS — N40.1 BPH WITH OBSTRUCTION/LOWER URINARY TRACT SYMPTOMS: ICD-10-CM

## 2021-01-24 LAB
ANION GAP SERPL CALCULATED.3IONS-SCNC: 7 MEQ/L (ref 8–16)
AVERAGE GLUCOSE: 150 MG/DL (ref 70–126)
BUN BLDV-MCNC: 14 MG/DL (ref 7–22)
CALCIUM SERPL-MCNC: 9.5 MG/DL (ref 8.5–10.5)
CHLORIDE BLD-SCNC: 102 MEQ/L (ref 98–111)
CO2: 28 MEQ/L (ref 23–33)
CREAT SERPL-MCNC: 1 MG/DL (ref 0.4–1.2)
GFR SERPL CREATININE-BSD FRML MDRD: 73 ML/MIN/1.73M2
GLUCOSE BLD-MCNC: 131 MG/DL (ref 70–108)
HBA1C MFR BLD: 7 % (ref 4.4–6.4)
POTASSIUM SERPL-SCNC: 4.5 MEQ/L (ref 3.5–5.2)
PROSTATE SPECIFIC ANTIGEN: 3.26 NG/ML (ref 0–1)
SODIUM BLD-SCNC: 137 MEQ/L (ref 135–145)

## 2021-01-24 PROCEDURE — 80048 BASIC METABOLIC PNL TOTAL CA: CPT

## 2021-01-24 PROCEDURE — 83036 HEMOGLOBIN GLYCOSYLATED A1C: CPT

## 2021-01-24 PROCEDURE — 36415 COLL VENOUS BLD VENIPUNCTURE: CPT

## 2021-01-24 PROCEDURE — 84153 ASSAY OF PSA TOTAL: CPT

## 2021-01-29 ENCOUNTER — TELEPHONE (OUTPATIENT)
Dept: UROLOGY | Age: 71
End: 2021-01-29

## 2021-03-30 ENCOUNTER — PROCEDURE VISIT (OUTPATIENT)
Dept: CARDIOLOGY CLINIC | Age: 71
End: 2021-03-30
Payer: MEDICARE

## 2021-03-30 DIAGNOSIS — Z95.0 PACEMAKER: Primary | ICD-10-CM

## 2021-03-30 PROCEDURE — 93294 REM INTERROG EVL PM/LDLS PM: CPT | Performed by: INTERNAL MEDICINE

## 2021-03-30 PROCEDURE — 93296 REM INTERROG EVL PM/IDS: CPT | Performed by: INTERNAL MEDICINE

## 2021-03-30 NOTE — PROGRESS NOTES
DR Anson Lakhani PT Burak Castillo AFIB/ XARELTO     AFIB BURDEN <1%      [employer] DUAL PACEMAKER REMOTE   BATTERY 6.5 YRS REMAINING  ATRIAL IMPEDENCE 492  VENT IMPEDENCE 722  P WAVES 4.4  RV WAVES NOT MEASURED PER THE DEVICE  NO THRESHOLDS OBTAINED PER THE DEVICE     A PACED 33%  V PACED 100%  DDDR

## 2021-04-15 ENCOUNTER — HOSPITAL ENCOUNTER (OUTPATIENT)
Age: 71
Discharge: HOME | End: 2021-04-15
Payer: MEDICARE

## 2021-04-15 DIAGNOSIS — N13.8 BPH WITH OBSTRUCTION/LOWER URINARY TRACT SYMPTOMS: ICD-10-CM

## 2021-04-15 DIAGNOSIS — E53.8 VITAMIN B12 DEFICIENCY: ICD-10-CM

## 2021-04-15 DIAGNOSIS — D50.9 IRON DEFICIENCY ANEMIA, UNSPECIFIED IRON DEFICIENCY ANEMIA TYPE: ICD-10-CM

## 2021-04-15 DIAGNOSIS — E78.5 HYPERLIPIDEMIA WITH TARGET LDL LESS THAN 100: ICD-10-CM

## 2021-04-15 DIAGNOSIS — I48.0 PAROXYSMAL ATRIAL FIBRILLATION (HCC): ICD-10-CM

## 2021-04-15 DIAGNOSIS — E11.65 UNCONTROLLED TYPE 2 DIABETES MELLITUS WITH HYPERGLYCEMIA (HCC): ICD-10-CM

## 2021-04-15 DIAGNOSIS — N40.1 BPH WITH OBSTRUCTION/LOWER URINARY TRACT SYMPTOMS: ICD-10-CM

## 2021-04-15 LAB
ALBUMIN SERPL-MCNC: 4.6 G/DL (ref 3.5–5.1)
ALP BLD-CCNC: 120 U/L (ref 38–126)
ALT SERPL-CCNC: 38 U/L (ref 11–66)
ANION GAP SERPL CALCULATED.3IONS-SCNC: 9 MEQ/L (ref 8–16)
AST SERPL-CCNC: 28 U/L (ref 5–40)
BILIRUB SERPL-MCNC: 0.7 MG/DL (ref 0.3–1.2)
BUN BLDV-MCNC: 18 MG/DL (ref 7–22)
CALCIUM SERPL-MCNC: 9.5 MG/DL (ref 8.5–10.5)
CHLORIDE BLD-SCNC: 97 MEQ/L (ref 98–111)
CHOLESTEROL, TOTAL: 125 MG/DL (ref 100–199)
CO2: 27 MEQ/L (ref 23–33)
CREAT SERPL-MCNC: 1.1 MG/DL (ref 0.4–1.2)
CREATININE, URINE: 106.4 MG/DL
ERYTHROCYTE [DISTWIDTH] IN BLOOD BY AUTOMATED COUNT: 13.2 % (ref 11.5–14.5)
ERYTHROCYTE [DISTWIDTH] IN BLOOD BY AUTOMATED COUNT: 44.6 FL (ref 35–45)
GFR SERPL CREATININE-BSD FRML MDRD: 66 ML/MIN/1.73M2
GLUCOSE FASTING: 118 MG/DL (ref 70–108)
HCT VFR BLD CALC: 43.9 % (ref 42–52)
HDLC SERPL-MCNC: 53 MG/DL
HEMOGLOBIN: 14.1 GM/DL (ref 14–18)
LDL CHOLESTEROL CALCULATED: 60 MG/DL
MCH RBC QN AUTO: 30.3 PG (ref 26–33)
MCHC RBC AUTO-ENTMCNC: 32.1 GM/DL (ref 32.2–35.5)
MCV RBC AUTO: 94.4 FL (ref 80–94)
MICROALBUMIN UR-MCNC: < 1.2 MG/DL
MICROALBUMIN/CREAT UR-RTO: 11 MG/G (ref 0–30)
PLATELET # BLD: 223 THOU/MM3 (ref 130–400)
PMV BLD AUTO: 9.2 FL (ref 9.4–12.4)
POTASSIUM SERPL-SCNC: 4.9 MEQ/L (ref 3.5–5.2)
RBC # BLD: 4.65 MILL/MM3 (ref 4.7–6.1)
SODIUM BLD-SCNC: 133 MEQ/L (ref 135–145)
TOTAL PROTEIN: 7.1 G/DL (ref 6.1–8)
TRIGL SERPL-MCNC: 59 MG/DL (ref 0–199)
TSH SERPL DL<=0.05 MIU/L-ACNC: 2.86 UIU/ML (ref 0.4–4.2)
WBC # BLD: 5.6 THOU/MM3 (ref 4.8–10.8)

## 2021-04-15 PROCEDURE — 82043 UR ALBUMIN QUANTITATIVE: CPT

## 2021-04-15 PROCEDURE — 36415 COLL VENOUS BLD VENIPUNCTURE: CPT

## 2021-04-15 PROCEDURE — 84443 ASSAY THYROID STIM HORMONE: CPT

## 2021-04-15 PROCEDURE — 82607 VITAMIN B-12: CPT

## 2021-04-15 PROCEDURE — 80061 LIPID PANEL: CPT

## 2021-04-15 PROCEDURE — 85027 COMPLETE CBC AUTOMATED: CPT

## 2021-04-15 PROCEDURE — 80053 COMPREHEN METABOLIC PANEL: CPT

## 2021-04-15 PROCEDURE — 84153 ASSAY OF PSA TOTAL: CPT

## 2021-04-15 PROCEDURE — 83036 HEMOGLOBIN GLYCOSYLATED A1C: CPT

## 2021-04-15 PROCEDURE — 82746 ASSAY OF FOLIC ACID SERUM: CPT

## 2021-04-16 LAB
AVERAGE GLUCOSE: 141 MG/DL (ref 70–126)
FOLATE: > 20 NG/ML (ref 4.8–24.2)
HBA1C MFR BLD: 6.7 % (ref 4.4–6.4)
PROSTATE SPECIFIC ANTIGEN: 3.5 NG/ML (ref 0–1)
VITAMIN B-12: 601 PG/ML (ref 211–911)

## 2021-04-19 ENCOUNTER — TELEPHONE (OUTPATIENT)
Dept: UROLOGY | Age: 71
End: 2021-04-19

## 2021-04-26 ENCOUNTER — HOSPITAL ENCOUNTER (OUTPATIENT)
Dept: GENERAL RADIOLOGY | Age: 71
Discharge: HOME | End: 2021-04-26
Payer: MEDICARE

## 2021-04-26 ENCOUNTER — HOSPITAL ENCOUNTER (OUTPATIENT)
Age: 71
Discharge: HOME | End: 2021-04-26
Payer: MEDICARE

## 2021-04-26 ENCOUNTER — OFFICE VISIT (OUTPATIENT)
Dept: FAMILY MEDICINE CLINIC | Age: 71
End: 2021-04-26
Payer: MEDICARE

## 2021-04-26 VITALS
RESPIRATION RATE: 18 BRPM | WEIGHT: 215 LBS | DIASTOLIC BLOOD PRESSURE: 70 MMHG | SYSTOLIC BLOOD PRESSURE: 122 MMHG | TEMPERATURE: 97.1 F | BODY MASS INDEX: 29.99 KG/M2 | HEART RATE: 70 BPM

## 2021-04-26 DIAGNOSIS — J01.90 ACUTE BACTERIAL SINUSITIS: ICD-10-CM

## 2021-04-26 DIAGNOSIS — G25.0 ESSENTIAL TREMOR: ICD-10-CM

## 2021-04-26 DIAGNOSIS — E11.65 UNCONTROLLED TYPE 2 DIABETES MELLITUS WITH HYPERGLYCEMIA (HCC): Primary | ICD-10-CM

## 2021-04-26 DIAGNOSIS — R97.20 ELEVATED PSA: ICD-10-CM

## 2021-04-26 DIAGNOSIS — Z99.89 OBSTRUCTIVE SLEEP APNEA ON CPAP: ICD-10-CM

## 2021-04-26 DIAGNOSIS — M25.531 RIGHT WRIST PAIN: ICD-10-CM

## 2021-04-26 DIAGNOSIS — N40.1 BPH WITH OBSTRUCTION/LOWER URINARY TRACT SYMPTOMS: ICD-10-CM

## 2021-04-26 DIAGNOSIS — I73.9 PVD (PERIPHERAL VASCULAR DISEASE) (HCC): ICD-10-CM

## 2021-04-26 DIAGNOSIS — I10 ESSENTIAL HYPERTENSION: ICD-10-CM

## 2021-04-26 DIAGNOSIS — C44.622 SQUAMOUS CELL CARCINOMA OF RIGHT UPPER EXTREMITY: ICD-10-CM

## 2021-04-26 DIAGNOSIS — J44.9 COPD WITH ASTHMA (HCC): ICD-10-CM

## 2021-04-26 DIAGNOSIS — Z77.098 AGENT ORANGE EXPOSURE: ICD-10-CM

## 2021-04-26 DIAGNOSIS — N13.8 BPH WITH OBSTRUCTION/LOWER URINARY TRACT SYMPTOMS: ICD-10-CM

## 2021-04-26 DIAGNOSIS — G47.33 OBSTRUCTIVE SLEEP APNEA ON CPAP: ICD-10-CM

## 2021-04-26 DIAGNOSIS — E87.1 HYPONATREMIA: ICD-10-CM

## 2021-04-26 DIAGNOSIS — E11.42 DIABETIC PERIPHERAL NEUROPATHY ASSOCIATED WITH TYPE 2 DIABETES MELLITUS (HCC): ICD-10-CM

## 2021-04-26 DIAGNOSIS — Z98.890 S/P CARDIAC CATHETERIZATION: ICD-10-CM

## 2021-04-26 DIAGNOSIS — J45.20 MILD INTERMITTENT ASTHMA WITHOUT COMPLICATION: ICD-10-CM

## 2021-04-26 DIAGNOSIS — I48.0 PAROXYSMAL ATRIAL FIBRILLATION (HCC): ICD-10-CM

## 2021-04-26 DIAGNOSIS — E53.8 VITAMIN B12 DEFICIENCY: ICD-10-CM

## 2021-04-26 DIAGNOSIS — D50.9 IRON DEFICIENCY ANEMIA, UNSPECIFIED IRON DEFICIENCY ANEMIA TYPE: ICD-10-CM

## 2021-04-26 DIAGNOSIS — E78.5 HYPERLIPIDEMIA WITH TARGET LDL LESS THAN 100: ICD-10-CM

## 2021-04-26 DIAGNOSIS — F43.10 POST TRAUMATIC STRESS DISORDER: ICD-10-CM

## 2021-04-26 DIAGNOSIS — B96.89 ACUTE BACTERIAL SINUSITIS: ICD-10-CM

## 2021-04-26 PROBLEM — J44.89 COPD WITH ASTHMA: Status: ACTIVE | Noted: 2021-04-26

## 2021-04-26 PROCEDURE — 3044F HG A1C LEVEL LT 7.0%: CPT | Performed by: FAMILY MEDICINE

## 2021-04-26 PROCEDURE — 3023F SPIROM DOC REV: CPT | Performed by: FAMILY MEDICINE

## 2021-04-26 PROCEDURE — G8427 DOCREV CUR MEDS BY ELIG CLIN: HCPCS | Performed by: FAMILY MEDICINE

## 2021-04-26 PROCEDURE — 73110 X-RAY EXAM OF WRIST: CPT

## 2021-04-26 PROCEDURE — 4040F PNEUMOC VAC/ADMIN/RCVD: CPT | Performed by: FAMILY MEDICINE

## 2021-04-26 PROCEDURE — 1123F ACP DISCUSS/DSCN MKR DOCD: CPT | Performed by: FAMILY MEDICINE

## 2021-04-26 PROCEDURE — 99215 OFFICE O/P EST HI 40 MIN: CPT | Performed by: FAMILY MEDICINE

## 2021-04-26 PROCEDURE — 3017F COLORECTAL CA SCREEN DOC REV: CPT | Performed by: FAMILY MEDICINE

## 2021-04-26 PROCEDURE — 96372 THER/PROPH/DIAG INJ SC/IM: CPT | Performed by: FAMILY MEDICINE

## 2021-04-26 PROCEDURE — G8926 SPIRO NO PERF OR DOC: HCPCS | Performed by: FAMILY MEDICINE

## 2021-04-26 PROCEDURE — 1036F TOBACCO NON-USER: CPT | Performed by: FAMILY MEDICINE

## 2021-04-26 PROCEDURE — 2022F DILAT RTA XM EVC RTNOPTHY: CPT | Performed by: FAMILY MEDICINE

## 2021-04-26 PROCEDURE — 36415 COLL VENOUS BLD VENIPUNCTURE: CPT | Performed by: FAMILY MEDICINE

## 2021-04-26 PROCEDURE — G8417 CALC BMI ABV UP PARAM F/U: HCPCS | Performed by: FAMILY MEDICINE

## 2021-04-26 RX ORDER — CEFDINIR 300 MG/1
300 CAPSULE ORAL 2 TIMES DAILY
Qty: 20 CAPSULE | Refills: 0 | Status: SHIPPED | OUTPATIENT
Start: 2021-04-26 | End: 2021-05-06

## 2021-04-26 RX ORDER — CYANOCOBALAMIN 1000 UG/ML
1000 INJECTION INTRAMUSCULAR; SUBCUTANEOUS ONCE
Status: COMPLETED | OUTPATIENT
Start: 2021-04-26 | End: 2021-04-26

## 2021-04-26 RX ADMIN — CYANOCOBALAMIN 1000 MCG: 1000 INJECTION INTRAMUSCULAR; SUBCUTANEOUS at 14:23

## 2021-04-26 NOTE — PROGRESS NOTES
[address] [place] [id number]  Dept: [phone]  Dept Fax: [phone]  Loc: [phone]    Emmanuel Parker is a 66 y.o. male who presents today for:  Chief Complaint   Patient presents with    3 Month Follow-Up     DMII-documentation needed for diabetic shoes     Wrist Pain     right wrist pain radiates into ring/pniky finger x 1 month. HPI:     HPI  Here for regular checkup but also has right wrist pain in the medial compartment. This is described a burning and radiates to the 4th and 5th fingers. Sinus pressure worse this spring. Somewhat better with sinus medications an aspirin 81mg. He is seeing primarily the [place] due to medications and treatment for PTSD but comes here 3 times yearly to go over all the information as well as maintenance of diabetes. PTSD did have a flare during a pacemaker placement when they did not give him enough pain medication. He reports thoughts of hurting the surgeon but did not act on them when redirected by the nurse. He has since worked this through with the counselor at the [place]. He is also flaring a little bit with the current pandemic. Hypertension: Patient here for follow-up of elevated blood pressure. He is not exercising and is adherent to low salt diet. Blood pressure is well controlled at home. Cardiac symptoms none. Patient denies chest pain, dyspnea and palpitations. Cardiovascular risk factors: advanced age (older than 54 for men, 72 for women), dyslipidemia, hypertension and male gender. Use of agents associated with hypertension: none. History of target organ damage: stroke, paroxysmal afib, and CAD diagnosed by cath in 2013 but no stents. Hyperlipidemia: Patient presents with hyperlipidemia. He was tested because hypertension and CVA.   His last labs showed   Lab Results   Component Value Date    CHOL 125 04/15/2021    CHOL 119 03/10/2020    CHOL 117 01/28/2019     Lab Results   Component Value Date    TRIG 59 04/15/2021    TRIG 96 03/10/2020    TRIG 77 01/28/2019     Lab Results   Component Value Date    HDL 53 04/15/2021    HDL 46 03/10/2020    HDL 49 01/28/2019     Lab Results   Component Value Date    LDLCALC 60 04/15/2021    LDLCALC 54 03/10/2020    LDLCALC 53 01/28/2019     No results found for: LABVLDL, VLDL  No results found for: CHOLHDLRATIO  There is not a family history of hyperlipidemia. There is not a family history of early ischemia heart disease. GERD: Rene complains of heartburn. This has been associated with heartburn and hoarseness. He denies belching, difficulty swallowing and melena. Symptoms have been present for several years. He denies dysphagia. He has not lost weight. He denies melena, hematochezia, hematemesis, and coffee ground emesis. Medical therapy in the past has included proton pump inhibitors. Elevated glucose need follow up. He is no longer seeing an endocrinologist for diabetes but his medications are through the [place]. His endocrinologist left town and we are now managing the DM through here until he can find a new endocrinologist.  Fasting and nonfassting blood sugars are 120-130. Alexus's feet have  Along history of callus formation from bunion deformities, complicated by PVD. Lab Results   Component Value Date    LABA1C 6.7 (H) 04/15/2021     No results found for: EAG      Being followed currently by the [place] for hypertension, afib, and hyperlipidemia. Vitamin b12  was at the low end of normal but with symptoms of fatigue [facility]. He is feeling better and has also started vit d PO as well. Last b12 level is high and he is instructed to go to every 8 weeks instead of once a month. Lab Results   Component Value Date    [id number] 04/15/2021       Tremor controlled better with current medications.   He has seen the neurologist at the [facility] who does not think that he has parkinson's but occasionally the tremor does get bad. He recalls once when he spilled his cereal right out of the bowl that he was holding. This is more controlled now. A lot of these conditions are related to agent orange exposure in [facility] as well as a water contamination in [employer]. HARINDER controlled on CPAP. BPH is under the management of urology. Lab Results   Component Value Date    PSA 3.50 (H) 04/15/2021    PSA 3.26 (H) 01/24/2021    PSA 3.98 (H) 10/28/2020       Low sodium is under watch. Anemia is under regular watch. Reviewed chart forpast medical history , surgical history , allergies, social history , family history and medications. Health Maintenance   Topic Date Due    DTaP/Tdap/Td vaccine (1 - Tdap) 04/26/2022 (Originally 4/2/1969)    Diabetic retinal exam  09/01/2021    Diabetic foot exam  12/22/2021    Annual Wellness Visit (AWV)  01/15/2022    A1C test (Diabetic or Prediabetic)  04/15/2022    Diabetic microalbuminuria test  04/15/2022    Lipid screen  04/15/2022    Colon cancer screen colonoscopy  10/04/2029    Flu vaccine  Completed    Shingles Vaccine  Completed    Pneumococcal 65+ years Vaccine  Completed    COVID-19 Vaccine  Completed    Hepatitis A vaccine  Aged Out    Hib vaccine  Aged Out    Meningococcal (ACWY) vaccine  Aged Out    AAA screen  Discontinued    Hepatitis C screen  Discontinued       Subjective:      Constitutional:Negative for fever, chills, diaphoresis, activity change, appetite change and fatigue. HENT: Negative for hearing loss, ear pain, congestion, sore throat, rhinorrhea, postnasal drip and ear discharge. Eyes: Negative for photophobia and visual disturbance. Respiratory: Negative for cough, chest tightness, shortness of breath and wheezing. Cardiovascular: Negative for chest pain and leg swelling.    Gastrointestinal: Negative for nausea, vomiting, abdominal pain, diarrhea and constipation. Genitourinary: Negative for dysuria, urgency and frequency. Neurological: Negative for weakness, light-headedness and headaches. Psychiatric/Behavioral: Negative for sleep disturbance.      :     Vitals:    04/26/21 1316   BP: 122/70   Site: Left Upper Arm   Position: Sitting   Cuff Size: Large Adult   Pulse: 70   Resp: 18   Temp: 97.1 °F (36.2 °C)   TempSrc: Temporal   Weight: 215 lb (97.5 kg)     Wt Readings from Last 3 Encounters:   04/26/21 215 lb (97.5 kg)   01/14/21 213 lb (96.6 kg)   01/06/21 219 lb (99.3 kg)       Physical Exam  Constitutional: Vital signs are normal. He appears well-developed and well-nourished. He is active. HENT:   Head: Normocephalic and atraumatic. Right Ear: Tympanic membrane, external ear and ear canal normal. No drainage or tenderness. Left Ear: Tympanic membrane, external ear and ear canal normal. No drainage or tenderness. Nose: Nose normal. No mucosal edema or rhinorrhea. Mouth/Throat: Uvula is midline, oropharynx is clear and moist and mucous membranes are normal. Mucous membranes are not pale. Normal dentition. No posterior oropharyngeal edema or posterior oropharyngeal erythema. Eyes: Lids are normal. Right eye exhibits no chemosis and no discharge. Left eye exhibits no chemosis and no drainage. Right conjunctiva has no hemorrhage. Left conjunctiva has no hemorrhage. Right eye exhibits normal extraocular motion. Left eye exhibits normal extraocular motion. Right pupil is round and reactive. Left pupil is round and reactive. Pupils are equal.   Cardiovascular: Normal rate, regular rhythm, S1 normal, S2 normal and normal heart sounds. Exam reveals no gallop. No murmur heard. Pulmonary/Chest: Effort normal and breath sounds normal. No respiratory distress. He has no wheezes. He has no rhonchi. He has no rales. Abdominal: Soft. Normal appearance and bowel sounds are normal. He exhibits no distension and no mass.  There is no hepatosplenomegaly. No tenderness. He has no rigidity, no rebound and no guarding. No hernia. Musculoskeletal:        Right lower leg: He exhibits no edema. Left lower leg: He exhibits no edema. Neurological: He is alert. Oriented and pleasent        Assessment/Plan   Pam Ramirez was seen today for 3 month follow-up and wrist pain. Diagnoses and all orders for this visit:    Uncontrolled type 2 diabetes mellitus with hyperglycemia ([facility] [address]  -     Basic Metabolic Panel; Future  -     Hemoglobin A1C; Future    COPD with asthma ([facility] [address]    Essential hypertension    Hyperlipidemia with target LDL less than 100    Paroxysmal atrial fibrillation (HCC)    Vitamin B12 deficiency  -     cyanocobalamin injection 1,000 mcg    Mild intermittent asthma without complication    BPH with obstruction/lower urinary tract symptoms    Iron deficiency anemia, unspecified iron deficiency anemia type    Obstructive sleep apnea on CPAP    Essential tremor    PVD (peripheral vascular disease) (HCC)    Diabetic peripheral neuropathy associated with type 2 diabetes mellitus (HCC)    Hyponatremia  -     Sodium; Future  -     Sodium    Post traumatic stress disorder    S/P cardiac catheterization: 11/20/2013: No obstructive lesions. Moderate LI's in the mid LAD and in the RCA. 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 All patient questions answered. Pt voiced understanding. Reviewed health maintenance.

## 2021-04-26 NOTE — PROGRESS NOTES
Administrations This Visit     cyanocobalamin injection 1,000 mcg     Admin Date  04/26/2021  14:23 Action  Given Dose  1,000 mcg Route  Intramuscular Site  Deltoid Right Administered By  Kulwinder Zuniga CMA ([facility])    Ordering Provider: Sy Rocha MD    NDC: 7046-6819-69    Lot#: [id number]    : [employer]    Patient Supplied?: No                Patient instructed to remain in clinic for 20 minutes after injection and was advised to report any adverse reaction to me immediately. Venipuncture obtained from right arm. Patient tolerated the procedure without complication or complaint.

## 2021-04-27 LAB — SODIUM BLD-SCNC: 133 MEQ/L (ref 135–145)

## 2021-05-03 ENCOUNTER — TELEPHONE (OUTPATIENT)
Dept: FAMILY MEDICINE CLINIC | Age: 71
End: 2021-05-03

## 2021-05-03 NOTE — TELEPHONE ENCOUNTER
----- Message from Marcelle Pollo sent at 4/30/2021  2:59 PM EDT -----  Subject: Message to Provider    QUESTIONS  Information for Provider? [facility] called to get notes for date of service 4/21/21. Please give   Juan Mike a call back at [phone]  ---------------------------------------------------------------------------  --------------  CALL BACK INFO  What is the best way for the office to contact you? OK to leave message on   voicemail  Preferred Call Back Phone Number? [phone]  ---------------------------------------------------------------------------  --------------  SCRIPT ANSWERS  Relationship to Patient? Third Party  Representative Name?  Juan Mike

## 2021-05-04 ENCOUNTER — OFFICE VISIT (OUTPATIENT)
Dept: UROLOGY | Age: 71
End: 2021-05-04
Payer: MEDICARE

## 2021-05-04 VITALS
SYSTOLIC BLOOD PRESSURE: 116 MMHG | BODY MASS INDEX: 30.8 KG/M2 | DIASTOLIC BLOOD PRESSURE: 62 MMHG | HEIGHT: 71 IN | WEIGHT: 220 LBS

## 2021-05-04 DIAGNOSIS — N13.8 BPH WITH OBSTRUCTION/LOWER URINARY TRACT SYMPTOMS: Primary | ICD-10-CM

## 2021-05-04 DIAGNOSIS — R97.20 ELEVATED PSA: ICD-10-CM

## 2021-05-04 DIAGNOSIS — N40.1 BPH WITH OBSTRUCTION/LOWER URINARY TRACT SYMPTOMS: Primary | ICD-10-CM

## 2021-05-04 LAB
BILIRUBIN, POC: NEGATIVE
BLOOD URINE, POC: NEGATIVE
CLARITY, POC: CLEAR
COLOR, POC: YELLOW
GLUCOSE URINE, POC: NORMAL
KETONES, POC: NEGATIVE
LEUKOCYTE EST, POC: NEGATIVE
NITRITE, POC: NEGATIVE
PH, POC: 6
POST VOID RESIDUAL (PVR): 16 ML
PROTEIN, POC: NEGATIVE
SPECIFIC GRAVITY, POC: 1.2
UROBILINOGEN, POC: NEGATIVE

## 2021-05-04 PROCEDURE — 99214 OFFICE O/P EST MOD 30 MIN: CPT | Performed by: NURSE PRACTITIONER

## 2021-05-04 PROCEDURE — 4040F PNEUMOC VAC/ADMIN/RCVD: CPT | Performed by: NURSE PRACTITIONER

## 2021-05-04 PROCEDURE — 81002 URINALYSIS NONAUTO W/O SCOPE: CPT | Performed by: NURSE PRACTITIONER

## 2021-05-04 PROCEDURE — G8427 DOCREV CUR MEDS BY ELIG CLIN: HCPCS | Performed by: NURSE PRACTITIONER

## 2021-05-04 PROCEDURE — G8417 CALC BMI ABV UP PARAM F/U: HCPCS | Performed by: NURSE PRACTITIONER

## 2021-05-04 PROCEDURE — 1123F ACP DISCUSS/DSCN MKR DOCD: CPT | Performed by: NURSE PRACTITIONER

## 2021-05-04 PROCEDURE — 51798 US URINE CAPACITY MEASURE: CPT | Performed by: NURSE PRACTITIONER

## 2021-05-04 PROCEDURE — 1036F TOBACCO NON-USER: CPT | Performed by: NURSE PRACTITIONER

## 2021-05-04 PROCEDURE — 3017F COLORECTAL CA SCREEN DOC REV: CPT | Performed by: NURSE PRACTITIONER

## 2021-05-04 NOTE — PROGRESS NOTES
Kehinde   [address] [facility] [address]  Dept: [phone]  Loc: [phone]  Visit Date: 2021      HPI:     Bailey Santos f/u for BPH and elevated PSA. He is doing well, reports his urinary symptoms are stable. He remains on Flomax 0.4 mg daily, tolerating well with no side effects. Has nocturia of 1-2 times nightly. Denies weak urinary stream, incomplete bladder emptying, frequency or urgency. No dysuria or hematuria. Trend of PSA:    3.5       2021  3.98     10/2020  4.38      2020  3.87      2020  5.03      2020  3.44 10/2019  3.08  2018  3.1        10/2017     26% free  3.16 10/2016  2.04 2015    He notes family hx of prostate cancer in his father and uncle, who  from prostate cancer. He was exposed to Agent orange. He comes in today by himself. Hx is obtained from the patient and medical record. Current Outpatient Medications   Medication Sig Dispense Refill    cefdinir (OMNICEF) 300 MG capsule Take 1 capsule by mouth 2 times daily for 10 days 20 capsule 0    Insulin Pen Needle 31G X 8 MM MISC 1 each by Does not apply route daily 100 each 3    latanoprost (XALATAN) 0.005 % ophthalmic solution       fluticasone-umeclidin-vilant (TRELEGY ELLIPTA) 100-62.5-25 MCG/INH AEPB Inhale 1 puff into the lungs daily      budesonide-formoterol (SYMBICORT) 160-4.5 MCG/ACT AERO Inhale 2 puffs into the lungs 2 times daily Rinse mouth after its use. 3 Inhaler 3    Cannabinoids (THC FREE PO) Take by mouth [address] in [facility]      albuterol sulfate HFA (VENTOLIN HFA) 108 (90 Base) MCG/ACT inhaler INHALE 2 PUFFS EVERY 6 HOURS AS NEEDED FOR WHEEZING.  18 g 11    tamsulosin (FLOMAX) 0.4 MG capsule Take 0.4 mg by mouth daily      topiramate (TOPAMAX SPRINKLE) 25 MG capsule Take 25 mg by mouth 2 times daily      insulin detemir (LEVEMIR FLEXTOUCH) 100 UNIT/ML injection pen Inject 33 Units into the skin nightly 5 pen 11    DULoxetine (CYMBALTA) 60 MG extended release capsule Take 60 mg by mouth daily      metFORMIN (GLUCOPHAGE XR) 500 MG extended release tablet Take 2 tablets by mouth 2 times daily (Patient taking differently: Take 500 mg by mouth 2 times daily ) 60 tablet 11    blood glucose test strips (ACCU-CHEK ERVIN) strip 1 each by In Vitro route 2 times daily As needed. 100 each 3    Lancets MISC Testing  each 3    vitamin D (CHOLECALCIFEROL) 1000 UNIT TABS tablet Take 1 tablet by mouth 2 times daily 60 tablet 11    Omega-3 Fatty Acids (FISH OIL) 500 MG CAPS Take by mouth 2 times daily      loratadine (CLARITIN) 10 MG tablet Take 10 mg by mouth daily      rivaroxaban (XARELTO) 20 MG TABS tablet Take 20 mg by mouth daily (with breakfast)      gabapentin (NEURONTIN) 300 MG capsule Take 300 mg by mouth 2 times daily .  amLODIPine (NORVASC) 5 MG tablet TAKE 1 TABLET BY MOUTH DAILY (Patient taking differently: Take 5 mg by mouth daily ) 90 tablet 3    atorvastatin (LIPITOR) 20 MG tablet take 1 tablet by mouth once daily (Patient taking differently: 40 mg take 1 tablet by mouth once daily) 90 tablet 3    omeprazole (PRILOSEC) 40 MG capsule Take 1 capsule by mouth daily (Patient taking differently: Take 20 mg by mouth 2 times daily ) 90 capsule 3    propranolol (INDERAL LA) 120 MG CR capsule Take 120 mg by mouth 2 times daily       montelukast (SINGULAIR) 10 MG tablet take 1 tablet by mouth once daily 90 tablet 3    b complex vitamins capsule Take 1 capsule by mouth 2 times daily      BUSPIRONE HCL PO Take 10 mg by mouth 3 times daily Take 2 tablet by mouth 3 times daily Disp 270 tablets  R-3      fluticasone (FLONASE) 50 MCG/ACT nasal spray USE 2 SPRAYS NASALLY DAILY 3 Bottle 3    aspirin 81 MG EC tablet Take 81 mg by mouth daily. No current facility-administered medications for this visit.         Past Medical History  Aleta Sellerstish  has a past medical history of Asthma, [facility] [employer] dual pacemaker, Bronchitis, chronic ([facility] [address] Carotid artery stenosis, Chickenpox, COPD with asthma ([facility] [address] CVA (cerebral infarction), GERD (gastroesophageal reflux disease), Hemorrhoids, Hyperlipidemia, Hypertension, Mild intermittent asthma without complication, Nausea & vomiting, Neuropathy, Osteoarthritis, Peripheral neuropathy, Post traumatic stress disorder (PTSD), S/P cardiac catheterization: 11/20/2013: No obstructive lesions. Moderate LI's in the mid LAD and in the RCA., Tremor of both hands, Type II or unspecified type diabetes mellitus without mention of complication, not stated as uncontrolled, and Unspecified sleep apnea. Past Surgical History  The patient  has a past surgical history that includes Appendectomy (age 14); Tonsillectomy and adenoidectomy (as a child ); Pacemaker insertion (2000/2010); Colonoscopy (2005); Cardiac catheterization (11/2013); Hand surgery (Right, 01/19/2017); Carpal tunnel release (Right, 01/2017); eye surgery; eye surgery (Bilateral, 01/2017); Mandible surgery (N/A, 05/2017); Blepharoplasty (Bilateral, 05/2017); and Pacemaker insertion (11/20/2019). Family History  This patient's family history includes Cancer in his paternal grandfather; Diabetes in his mother; Heart Disease in his father and mother; High Blood Pressure in his father and mother; High Cholesterol in his father and mother; Kidney Disease in his mother; Stroke in his father. Social History  Tavares Rubalcava  reports that he quit smoking about 49 years ago. His smoking use included cigarettes. He has a 12.00 pack-year smoking history. He has quit using smokeless tobacco. He reports current drug use. Drug: Marijuana. He reports that he does not drink alcohol. Subjective:     Review of Systems  No problems with ears, nose or throat. No problems with eyes. No chest pain, shortness of breath, abdominal pain, extremity pain or weakness, and no neurological deficits. No rashes.  symptoms per HPI.   The in 4 months, if abnormal, recommend biopsy.     FERDINAND Gunderson  Urology

## 2021-05-26 ENCOUNTER — NURSE ONLY (OUTPATIENT)
Dept: FAMILY MEDICINE CLINIC | Age: 71
End: 2021-05-26
Payer: MEDICARE

## 2021-05-26 DIAGNOSIS — E53.8 VITAMIN B12 DEFICIENCY: Primary | ICD-10-CM

## 2021-05-26 PROCEDURE — 96372 THER/PROPH/DIAG INJ SC/IM: CPT | Performed by: FAMILY MEDICINE

## 2021-05-26 RX ORDER — CYANOCOBALAMIN 1000 UG/ML
1000 INJECTION INTRAMUSCULAR; SUBCUTANEOUS ONCE
Status: COMPLETED | OUTPATIENT
Start: 2021-05-26 | End: 2021-05-26

## 2021-05-26 RX ADMIN — CYANOCOBALAMIN 1000 MCG: 1000 INJECTION INTRAMUSCULAR; SUBCUTANEOUS at 10:18

## 2021-05-26 NOTE — PROGRESS NOTES
Administrations This Visit     cyanocobalamin injection 1,000 mcg     Admin Date  05/26/2021  10:18 Action  Given Dose  1,000 mcg Route  Intramuscular Site  Deltoid Left Administered By  Adolfo Shook CMA ([facility])    Ordering Provider: Kina Martinez MD    NDC: 0678-5092-56    Lot#: [id number]    : [employer]    Patient Supplied?: No                Patient instructed to remain in clinic for 20 minutes after injection and was advised to report any adverse reaction to me immediately.

## 2021-06-25 ENCOUNTER — APPOINTMENT (OUTPATIENT)
Dept: CT IMAGING | Age: 71
End: 2021-06-25
Payer: MEDICARE

## 2021-06-25 ENCOUNTER — HOSPITAL ENCOUNTER (EMERGENCY)
Age: 71
Discharge: HOME | End: 2021-06-25
Attending: EMERGENCY MEDICINE
Payer: MEDICARE

## 2021-06-25 ENCOUNTER — APPOINTMENT (OUTPATIENT)
Dept: GENERAL RADIOLOGY | Age: 71
End: 2021-06-25
Payer: MEDICARE

## 2021-06-25 VITALS
RESPIRATION RATE: 18 BRPM | DIASTOLIC BLOOD PRESSURE: 77 MMHG | SYSTOLIC BLOOD PRESSURE: 125 MMHG | HEART RATE: 61 BPM | TEMPERATURE: 96.8 F | OXYGEN SATURATION: 99 %

## 2021-06-25 DIAGNOSIS — S50.02XA CONTUSION OF LEFT ELBOW, INITIAL ENCOUNTER: ICD-10-CM

## 2021-06-25 DIAGNOSIS — S09.90XA CLOSED HEAD INJURY, INITIAL ENCOUNTER: ICD-10-CM

## 2021-06-25 DIAGNOSIS — S16.1XXA STRAIN OF NECK MUSCLE, INITIAL ENCOUNTER: ICD-10-CM

## 2021-06-25 DIAGNOSIS — S20.212A RIB CONTUSION, LEFT, INITIAL ENCOUNTER: Primary | ICD-10-CM

## 2021-06-25 PROCEDURE — 71101 X-RAY EXAM UNILAT RIBS/CHEST: CPT

## 2021-06-25 PROCEDURE — 70450 CT HEAD/BRAIN W/O DYE: CPT

## 2021-06-25 PROCEDURE — 73080 X-RAY EXAM OF ELBOW: CPT

## 2021-06-25 PROCEDURE — 99284 EMERGENCY DEPT VISIT MOD MDM: CPT

## 2021-06-25 PROCEDURE — 6370000000 HC RX 637 (ALT 250 FOR IP): Performed by: EMERGENCY MEDICINE

## 2021-06-25 PROCEDURE — 72125 CT NECK SPINE W/O DYE: CPT

## 2021-06-25 RX ORDER — ACETAMINOPHEN 500 MG
1000 TABLET ORAL ONCE
Status: COMPLETED | OUTPATIENT
Start: 2021-06-25 | End: 2021-06-25

## 2021-06-25 RX ORDER — HYDROCODONE BITARTRATE AND ACETAMINOPHEN 5; 325 MG/1; MG/1
1 TABLET ORAL NIGHTLY PRN
Qty: 10 TABLET | Refills: 0 | Status: SHIPPED | OUTPATIENT
Start: 2021-06-25 | End: 2021-06-28

## 2021-06-25 RX ADMIN — ACETAMINOPHEN 1000 MG: 500 TABLET ORAL at 12:53

## 2021-06-25 ASSESSMENT — ENCOUNTER SYMPTOMS
ABDOMINAL PAIN: 0
SORE THROAT: 0
WHEEZING: 0
SHORTNESS OF BREATH: 0
BACK PAIN: 1
DIARRHEA: 0
BLOOD IN STOOL: 0
EYE DISCHARGE: 0
EYE PAIN: 0

## 2021-06-25 ASSESSMENT — PAIN DESCRIPTION - LOCATION: LOCATION: RIB CAGE

## 2021-06-25 ASSESSMENT — PAIN SCALES - GENERAL
PAINLEVEL_OUTOF10: 10
PAINLEVEL_OUTOF10: 8
PAINLEVEL_OUTOF10: 10

## 2021-06-25 ASSESSMENT — PAIN DESCRIPTION - PAIN TYPE: TYPE: ACUTE PAIN

## 2021-06-25 ASSESSMENT — PAIN DESCRIPTION - ORIENTATION: ORIENTATION: LEFT

## 2021-06-25 NOTE — ED TRIAGE NOTES
Pt fell last night at 3 am bc it was dark and he lost his balance. Pt fell on the carpet and hit his left elbow on the floor, the back of his head on the door frame, and his left front ribs. Pt is on xarelto and baby asa daily. Pt alert and oriented.

## 2021-06-25 NOTE — ED PROVIDER NOTES
[address] [facility] [address]  [address] [place] [address] [address]  Phone: [phone]    eMERGENCY dEPARTMENT eNCOUnter           [facility]       Chief Complaint   Patient presents with   [facility]       Nurses Notes reviewed and I agree except as noted in the HPI. HISTORY OF PRESENT ILLNESS    Catalina Peña is a 66 y.o. male who presented via private vehicle. Chief complaint: Fall, head ,neck, left ribs and left elbow injury. He lives at home, has a chronic problem with his gait due to Parkinson disease. He tripped and fell in his bedroom at 3 AM today. He hit the back of his head and neck. He is complaining of mild head and moderate neck picking. He is on anticoagulation, Xarelto. He denies chest pain. He also is complaining of moderate left lateral rib pain and left elbow pain. His pains are worse with movement. He has no loss of consciousness. He denies focal weakness or numbness. REVIEW OF SYSTEMS     Review of Systems   Constitutional: Negative for chills and fever. HENT: Negative for sore throat. Eyes: Negative for pain and discharge. Respiratory: Negative for shortness of breath and wheezing. Left rib pain   Cardiovascular: Negative for chest pain and palpitations. Gastrointestinal: Negative for abdominal pain, blood in stool and diarrhea. Genitourinary: Negative for dysuria and hematuria. Musculoskeletal: Positive for back pain. Negative for neck pain and neck stiffness. Left elbow pain   Neurological: Positive for headaches. Negative for seizures and syncope. Psychiatric/Behavioral: Negative for confusion.            PAST MEDICAL HISTORY    has a past medical history of Asthma, [employer] dual pacemaker, Bronchitis, chronic ([address] Carotid artery stenosis, Chickenpox, COPD with asthma ([address] CVA (cerebral infarction), GERD (gastroesophageal reflux disease), Hemorrhoids, Hyperlipidemia, Hypertension, Mild intermittent asthma without complication, Nausea & vomiting, Neuropathy, Osteoarthritis, Peripheral neuropathy, Post traumatic stress disorder (PTSD), S/P cardiac catheterization: 11/20/2013: No obstructive lesions. Moderate LI's in the mid LAD and in the RCA., Tremor of both hands, Type II or unspecified type diabetes mellitus without mention of complication, not stated as uncontrolled, and Unspecified sleep apnea. SURGICAL HISTORY      has a past surgical history that includes Appendectomy (age 14); Tonsillectomy and adenoidectomy (as a child ); Pacemaker insertion (2000/2010); Colonoscopy (2005); Cardiac catheterization (11/2013); Hand surgery (Right, 01/19/2017); Carpal tunnel release (Right, 01/2017); eye surgery; eye surgery (Bilateral, 01/2017); Mandible surgery (N/A, 05/2017); Blepharoplasty (Bilateral, 05/2017); and Pacemaker insertion (11/20/2019). CURRENT MEDICATIONS       Previous Medications    ALBUTEROL SULFATE HFA (VENTOLIN HFA) 108 (90 BASE) MCG/ACT INHALER    INHALE 2 PUFFS EVERY 6 HOURS AS NEEDED FOR WHEEZING. AMLODIPINE (NORVASC) 5 MG TABLET    TAKE 1 TABLET BY MOUTH DAILY    ASPIRIN 81 MG EC TABLET    Take 81 mg by mouth daily. ATORVASTATIN (LIPITOR) 20 MG TABLET    take 1 tablet by mouth once daily    B COMPLEX VITAMINS CAPSULE    Take 1 capsule by mouth 2 times daily    BLOOD GLUCOSE TEST STRIPS (ACCU-CHEK ERVIN) STRIP    1 each by In Vitro route 2 times daily As needed. BUDESONIDE-FORMOTEROL (SYMBICORT) 160-4.5 MCG/ACT AERO    Inhale 2 puffs into the lungs 2 times daily Rinse mouth after its use.     BUSPIRONE HCL PO    Take 10 mg by mouth 3 times daily Take 2 tablet by mouth 3 times daily Disp 270 tablets  R-3    CANNABINOIDS (THC FREE PO)    Take by mouth [address] in [address]    DULOXETINE (CYMBALTA) 60 MG EXTENDED RELEASE CAPSULE    Take 60 mg by mouth daily    FLUTICASONE (FLONASE) 50 MCG/ACT NASAL SPRAY    USE 2 SPRAYS NASALLY DAILY    FLUTICASONE-UMECLIDIN-VILANT (TRELEGY ELLIPTA) 100-62.5-25 MCG/INH AEPB    Inhale 1 puff into the lungs daily    GABAPENTIN (NEURONTIN) 300 MG CAPSULE    Take 300 mg by mouth 2 times daily . INSULIN DETEMIR (LEVEMIR FLEXTOUCH) 100 UNIT/ML INJECTION PEN    Inject 33 Units into the skin nightly    INSULIN PEN NEEDLE 31G X 8 MM MISC    1 each by Does not apply route daily    LANCETS MISC    Testing BID    LATANOPROST (XALATAN) 0.005 % OPHTHALMIC SOLUTION        LORATADINE (CLARITIN) 10 MG TABLET    Take 10 mg by mouth daily    METFORMIN (GLUCOPHAGE XR) 500 MG EXTENDED RELEASE TABLET    Take 2 tablets by mouth 2 times daily    MONTELUKAST (SINGULAIR) 10 MG TABLET    take 1 tablet by mouth once daily    OMEGA-3 FATTY ACIDS (FISH OIL) 500 MG CAPS    Take by mouth 2 times daily    OMEPRAZOLE (PRILOSEC) 40 MG CAPSULE    Take 1 capsule by mouth daily    PROPRANOLOL (INDERAL LA) 120 MG CR CAPSULE    Take 120 mg by mouth 2 times daily     RIVAROXABAN (XARELTO) 20 MG TABS TABLET    Take 20 mg by mouth daily (with breakfast)    TAMSULOSIN (FLOMAX) 0.4 MG CAPSULE    Take 0.4 mg by mouth daily    TOPIRAMATE (TOPAMAX SPRINKLE) 25 MG CAPSULE    Take 25 mg by mouth 2 times daily    VITAMIN D (CHOLECALCIFEROL) 1000 UNIT TABS TABLET    Take 1 tablet by mouth 2 times daily       ALLERGIES     is allergic to latex, doxycycline calcium [doxycycline calcium], lactose, nasacort [triamcinolone], and wellbutrin [bupropion]. FAMILY HISTORY     He indicated that his mother is . He indicated that his father is . He indicated that his sister is alive. He indicated that his brother is alive. He indicated that the status of his paternal grandfather is unknown.   family history includes Cancer in his paternal grandfather; Diabetes in his mother; Heart Disease in his father and mother; High Blood Pressure in his father and mother; High Cholesterol in his father and mother; Kidney Disease in his mother; Stroke in his father.     SOCIAL HISTORY      reports that he quit smoking about 49 years ago. His smoking use included cigarettes. He has a 12.00 pack-year smoking history. He has quit using smokeless tobacco. He reports current drug use. Drug: Marijuana. He reports that he does not drink alcohol. PHYSICAL EXAM     INITIAL VITALS:  temporal temperature is 96.8 °F (36 °C). His blood pressure is 127/79 and his pulse is 60. His respiration is 20 and oxygen saturation is 98%. Physical Exam  Vitals and nursing note reviewed. Constitutional:       General: He is in acute distress. Appearance: He is well-developed. Comments: He looks slightly uncomfortable but nontoxic   HENT:      Head:      Comments: There is slight tenderness to the upper occipital area with minimal swelling, no laceration. Eyes:      Conjunctiva/sclera: Conjunctivae normal.      Pupils: Pupils are equal, round, and reactive to light. Neck:      Thyroid: No thyromegaly. Vascular: No JVD. Trachea: No tracheal deviation. Comments: There is mild tenderness to palpation of the mid cervical spine  Cardiovascular:      Rate and Rhythm: Normal rate and regular rhythm. Heart sounds: No murmur heard. No friction rub. No gallop. Pulmonary:      Effort: Pulmonary effort is normal.      Breath sounds: Normal breath sounds. Comments: There is mild tenderness to palpation of the left lateral mid ribs, no crepitus. Chest:      Chest wall: Tenderness present. Abdominal:      General: Bowel sounds are normal.      Palpations: Abdomen is soft. Tenderness: There is no abdominal tenderness. Musculoskeletal:      Cervical back: Tenderness present. Comments: No tenderness to percussion of the lumbar and thoracic spines. Bilateral hip examination is normal.   Neurological:      General: No focal deficit present. Mental Status: He is alert and oriented to person, place, and time. Cranial Nerves: No cranial nerve deficit.              DIAGNOSTIC RESULTS       RADIOLOGY: Head CT scan without contrast, cervical CT scanning, left ribs x-ray and left elbow x-ray showed no acute injury. LABS:   Labs Reviewed - No data to display    EMERGENCY DEPARTMENT COURSE:   Vitals:    Vitals:    06/25/21 1243 06/25/21 1247   BP:  127/79   Pulse:  60   Resp:  20   Temp: 98.1 °F (36.7 °C) 96.8 °F (36 °C)   TempSrc: Temporal Temporal   SpO2:  98%     He received Tylenol 1 g p.o. He also received ice pack to his left elbow. Reevaluation at 2:20 PM:  He was awake alert, he reported improvement. I discussed with him the diagnosis and treatment plan    FINAL IMPRESSION      1. Rib contusion, left, initial encounter    2. Closed head injury, initial encounter    3. Strain of neck muscle, initial encounter    4. Contusion of left elbow, initial encounter          DISPOSITION/PLAN   He was discharged home in good condition. PATIENT REFERRED TO:  Shade Roberson MD  [address] [place] [address], [address]  [address] [place] [address]  [phone]    In 3 days        DISCHARGE MEDICATIONS:  New Prescriptions    HYDROCODONE-ACETAMINOPHEN (NORCO) 5-325 MG PER TABLET    Take 1 tablet by mouth nightly as needed for Pain for up to 3 days. Intended supply: 3 days.  Take lowest dose possible to manage pain       (Please note that portions of this note were completed with a voice recognition program.  Efforts were made to edit the dictations but occasionally words are mis-transcribed.)    MD Isael Fermin MD  06/25/21 3443

## 2021-06-28 ENCOUNTER — NURSE ONLY (OUTPATIENT)
Dept: FAMILY MEDICINE CLINIC | Age: 71
End: 2021-06-28
Payer: MEDICARE

## 2021-06-28 VITALS
SYSTOLIC BLOOD PRESSURE: 110 MMHG | HEART RATE: 75 BPM | RESPIRATION RATE: 20 BRPM | WEIGHT: 210.6 LBS | BODY MASS INDEX: 29.37 KG/M2 | DIASTOLIC BLOOD PRESSURE: 70 MMHG | OXYGEN SATURATION: 95 %

## 2021-06-28 DIAGNOSIS — S09.90XD CLOSED HEAD INJURY, SUBSEQUENT ENCOUNTER: ICD-10-CM

## 2021-06-28 DIAGNOSIS — E53.8 VITAMIN B12 DEFICIENCY: ICD-10-CM

## 2021-06-28 DIAGNOSIS — S20.212D RIB CONTUSION, LEFT, SUBSEQUENT ENCOUNTER: ICD-10-CM

## 2021-06-28 DIAGNOSIS — S40.022D: Primary | ICD-10-CM

## 2021-06-28 PROCEDURE — 99212 OFFICE O/P EST SF 10 MIN: CPT | Performed by: FAMILY MEDICINE

## 2021-06-28 PROCEDURE — 96372 THER/PROPH/DIAG INJ SC/IM: CPT | Performed by: FAMILY MEDICINE

## 2021-06-28 RX ORDER — CYANOCOBALAMIN 1000 UG/ML
1000 INJECTION INTRAMUSCULAR; SUBCUTANEOUS ONCE
Status: COMPLETED | OUTPATIENT
Start: 2021-06-28 | End: 2021-06-28

## 2021-06-28 RX ADMIN — CYANOCOBALAMIN 1000 MCG: 1000 INJECTION INTRAMUSCULAR; SUBCUTANEOUS at 13:07

## 2021-06-28 NOTE — PROGRESS NOTES
[address] [place] [id number]  Dept: [phone]  Dept Fax: [phone]  Loc: [phone]    Michael Robert is a 66 y.o. male who presents today for:  Chief Complaint   Patient presents with    Injections           HPI:     HPI  Here for recheck after a fall at home and large bruising on the left arm . Also hit the head and ribs but no fractures on xrays in the ER. Went home with norco and tylenol prn with relief. Still having minor headaches. Reviewed chart forpast medical history , surgical history , allergies, social history , family history and medications. Health Maintenance   Topic Date Due    DTaP/Tdap/Td vaccine (1 - Tdap) 04/26/2022 (Originally 1/26/2017)    Diabetic retinal exam  09/01/2021    Diabetic foot exam  12/22/2021    Annual Wellness Visit (AWV)  01/15/2022    A1C test (Diabetic or Prediabetic)  04/15/2022    Diabetic microalbuminuria test  04/15/2022    Lipid screen  04/15/2022    Colon cancer screen colonoscopy  10/04/2029    Flu vaccine  Completed    Shingles Vaccine  Completed    Pneumococcal 65+ years Vaccine  Completed    COVID-19 Vaccine  Completed    Hepatitis A vaccine  Aged Out    Hib vaccine  Aged Out    Meningococcal (ACWY) vaccine  Aged Out    AAA screen  Discontinued    Hepatitis C screen  Discontinued       Subjective:      Constitutional:Negative for fever, chills, diaphoresis, activity change, appetite change and fatigue. HENT: Negative for hearing loss, ear pain, congestion, sore throat, rhinorrhea, postnasal drip and ear discharge. Eyes: Negative for photophobia and visual disturbance. Respiratory: Negative for cough, chest tightness, shortness of breath and wheezing. Cardiovascular: Negative for chest pain and leg swelling. Gastrointestinal: Negative for nausea, vomiting, abdominal pain, diarrhea and constipation.    Genitourinary: Negative for dysuria, urgency and frequency. Neurological: Negative for weakness, light-headedness and headaches. Psychiatric/Behavioral: Negative for sleep disturbance.      :     Vitals:    06/28/21 1259   BP: 110/70   Site: Right Upper Arm   Position: Sitting   Pulse: 75   Resp: 20   SpO2: 95%   Weight: 210 lb 9.6 oz (95.5 kg)     Wt Readings from Last 3 Encounters:   06/28/21 210 lb 9.6 oz (95.5 kg)   05/04/21 220 lb (99.8 kg)   04/26/21 215 lb (97.5 kg)       Physical Exam  Physical Exam   Constitutional: Vital signs are normal. He appears well-developed and well-nourished. He is active. HENT:   Head: Normocephalic and atraumatic. Right Ear: Tympanic membrane, external ear and ear canal normal. No drainage or tenderness. Left Ear: Tympanic membrane, external ear and ear canal normal. No drainage or tenderness. Nose: Nose normal. No mucosal edema or rhinorrhea. Mouth/Throat: Uvula is midline, oropharynx is clear and moist and mucous membranes are normal. Mucous membranes are not pale. Normal dentition. No posterior oropharyngeal edema or posterior oropharyngeal erythema. Eyes: Lids are normal. Right eye exhibits no chemosis and no discharge. Left eye exhibits no chemosis and no drainage. Right conjunctiva has no hemorrhage. Left conjunctiva has no hemorrhage. Right eye exhibits normal extraocular motion. Left eye exhibits normal extraocular motion. Right pupil is round and reactive. Left pupil is round and reactive. Pupils are equal.   Cardiovascular: Normal rate, regular rhythm, S1 normal, S2 normal and normal heart sounds. Exam reveals no gallop. No murmur heard. Pulmonary/Chest: Effort normal and breath sounds normal. No respiratory distress. He has no wheezes. He has no rhonchi. He has no rales. Abdominal: Soft. Normal appearance and bowel sounds are normal. He exhibits no distension and no mass. There is no hepatosplenomegaly. No tenderness. He has no rigidity, no rebound and no guarding.  No hernia. Musculoskeletal:        Right lower leg: He exhibits no edema. Left lower leg: He exhibits no edema. Neurological: He is alert. Oriented and pleasent  Skin:  Left arm with deep purple bruise around the elbow and the central hematoma is receding. Tender to touch but good ROM. Assessment/Plan   Rosi Contreras was seen today for injections. Diagnoses and all orders for this visit:    Traumatic ecchymosis of multiple sites of left upper arm, subsequent encounter    Vitamin B12 deficiency  -     cyanocobalamin injection 1,000 mcg    Closed head injury, subsequent encounter    Rib contusion, left, subsequent encounter    ice for 1 week then alternate with heat    Watch for warmth and swelling and fever. Call or return to clinic prn if these symptoms worsen or fail to improve as anticipated. Discussed use, benefit, and side effectsof prescribed medications. All patient questions answered. Pt voiced understanding. Reviewed health maintenance. Instructed to continue current medications, diet and exercise. Patient agreed with treatment plan. Followup as directed.      Electronically signed by Chucho Santillan MD

## 2021-06-28 NOTE — PROGRESS NOTES
Administrations This Visit     cyanocobalamin injection 1,000 mcg     Admin Date  06/28/2021  13:07 Action  Given Dose  1,000 mcg Route  Intramuscular Site  Deltoid Right Administered By  Meg Mcclendon CMA ([facility])    Ordering Provider: Christopher Swann MD    NDC: 3278-2402-56    Lot#: [id number]    : [employer]    Patient Supplied?: No                Patient instructed to remain in clinic for 20 minutes after injection and was advised to report any adverse reaction to me immediately.

## 2021-07-06 ENCOUNTER — PROCEDURE VISIT (OUTPATIENT)
Dept: CARDIOLOGY CLINIC | Age: 71
End: 2021-07-06
Payer: MEDICARE

## 2021-07-06 DIAGNOSIS — Z95.0 PACEMAKER: Primary | ICD-10-CM

## 2021-07-06 NOTE — PROGRESS NOTES
DR VICENTE PT/KNOWN AFIB/ XARELTO   AFIB BURDEN 4%     [employer] DUAL PACEMAKER REMOTE   ABTTERY 6 YRS REMAINING  ATRIAL IMPEDENCE 480  VENT IMPEDENCE 617  P WAVES 3.2  RV WAVES NOT MEASURED PER THE DEVICE   NO THRESHOLDS OBTAINED PER THE DEVICE   A PACED 29%  V PACED 100%  DDDR 60-17/4/21 AFIB WITH RVR FOR 18 SECONDS     6/1/21 EPISODE OF NS VT FOR 15 SECONDS FOR 17 BTS   5/28/21 EPISODE OF NS VT FOR 15 SECONDS FOR 20 BTS    5/9/21 NS VT EPISODE FOR 15 SECONDS FOR 9 BTS

## 2021-07-07 PROCEDURE — 93296 REM INTERROG EVL PM/IDS: CPT | Performed by: INTERNAL MEDICINE

## 2021-07-07 PROCEDURE — 93294 REM INTERROG EVL PM/LDLS PM: CPT | Performed by: INTERNAL MEDICINE

## 2021-07-29 ENCOUNTER — OFFICE VISIT (OUTPATIENT)
Dept: FAMILY MEDICINE CLINIC | Age: 71
End: 2021-07-29
Payer: MEDICARE

## 2021-07-29 VITALS
DIASTOLIC BLOOD PRESSURE: 70 MMHG | WEIGHT: 214 LBS | TEMPERATURE: 97.8 F | HEART RATE: 72 BPM | RESPIRATION RATE: 16 BRPM | SYSTOLIC BLOOD PRESSURE: 122 MMHG | BODY MASS INDEX: 29.85 KG/M2

## 2021-07-29 DIAGNOSIS — J45.20 MILD INTERMITTENT ASTHMA WITHOUT COMPLICATION: ICD-10-CM

## 2021-07-29 DIAGNOSIS — I10 ESSENTIAL HYPERTENSION: ICD-10-CM

## 2021-07-29 DIAGNOSIS — N13.8 BPH WITH OBSTRUCTION/LOWER URINARY TRACT SYMPTOMS: ICD-10-CM

## 2021-07-29 DIAGNOSIS — E78.5 HYPERLIPIDEMIA WITH TARGET LDL LESS THAN 100: ICD-10-CM

## 2021-07-29 DIAGNOSIS — E87.1 HYPONATREMIA: ICD-10-CM

## 2021-07-29 DIAGNOSIS — Z98.890 S/P CARDIAC CATHETERIZATION: ICD-10-CM

## 2021-07-29 DIAGNOSIS — S20.212D RIB CONTUSION, LEFT, SUBSEQUENT ENCOUNTER: ICD-10-CM

## 2021-07-29 DIAGNOSIS — E11.65 UNCONTROLLED TYPE 2 DIABETES MELLITUS WITH HYPERGLYCEMIA (HCC): Primary | ICD-10-CM

## 2021-07-29 DIAGNOSIS — D50.9 IRON DEFICIENCY ANEMIA, UNSPECIFIED IRON DEFICIENCY ANEMIA TYPE: ICD-10-CM

## 2021-07-29 DIAGNOSIS — J44.9 COPD WITH ASTHMA (HCC): ICD-10-CM

## 2021-07-29 DIAGNOSIS — R97.20 ELEVATED PSA: ICD-10-CM

## 2021-07-29 DIAGNOSIS — Z77.098 AGENT ORANGE EXPOSURE: ICD-10-CM

## 2021-07-29 DIAGNOSIS — G47.33 OBSTRUCTIVE SLEEP APNEA ON CPAP: ICD-10-CM

## 2021-07-29 DIAGNOSIS — I48.0 PAROXYSMAL ATRIAL FIBRILLATION (HCC): ICD-10-CM

## 2021-07-29 DIAGNOSIS — S50.02XD TRAUMATIC HEMATOMA OF LEFT ELBOW, SUBSEQUENT ENCOUNTER: ICD-10-CM

## 2021-07-29 DIAGNOSIS — C44.622 SQUAMOUS CELL CARCINOMA OF RIGHT UPPER EXTREMITY: ICD-10-CM

## 2021-07-29 DIAGNOSIS — F43.10 POST TRAUMATIC STRESS DISORDER: ICD-10-CM

## 2021-07-29 DIAGNOSIS — G25.0 ESSENTIAL TREMOR: ICD-10-CM

## 2021-07-29 DIAGNOSIS — E53.8 VITAMIN B12 DEFICIENCY: ICD-10-CM

## 2021-07-29 DIAGNOSIS — N40.1 BPH WITH OBSTRUCTION/LOWER URINARY TRACT SYMPTOMS: ICD-10-CM

## 2021-07-29 DIAGNOSIS — E11.42 DIABETIC PERIPHERAL NEUROPATHY ASSOCIATED WITH TYPE 2 DIABETES MELLITUS (HCC): ICD-10-CM

## 2021-07-29 DIAGNOSIS — I73.9 PVD (PERIPHERAL VASCULAR DISEASE) (HCC): ICD-10-CM

## 2021-07-29 DIAGNOSIS — Z99.89 OBSTRUCTIVE SLEEP APNEA ON CPAP: ICD-10-CM

## 2021-07-29 PROCEDURE — G8926 SPIRO NO PERF OR DOC: HCPCS | Performed by: FAMILY MEDICINE

## 2021-07-29 PROCEDURE — 99214 OFFICE O/P EST MOD 30 MIN: CPT | Performed by: FAMILY MEDICINE

## 2021-07-29 PROCEDURE — 4040F PNEUMOC VAC/ADMIN/RCVD: CPT | Performed by: FAMILY MEDICINE

## 2021-07-29 PROCEDURE — 3023F SPIROM DOC REV: CPT | Performed by: FAMILY MEDICINE

## 2021-07-29 PROCEDURE — 96372 THER/PROPH/DIAG INJ SC/IM: CPT | Performed by: FAMILY MEDICINE

## 2021-07-29 PROCEDURE — 3017F COLORECTAL CA SCREEN DOC REV: CPT | Performed by: FAMILY MEDICINE

## 2021-07-29 PROCEDURE — G8417 CALC BMI ABV UP PARAM F/U: HCPCS | Performed by: FAMILY MEDICINE

## 2021-07-29 PROCEDURE — 3044F HG A1C LEVEL LT 7.0%: CPT | Performed by: FAMILY MEDICINE

## 2021-07-29 PROCEDURE — 2022F DILAT RTA XM EVC RTNOPTHY: CPT | Performed by: FAMILY MEDICINE

## 2021-07-29 PROCEDURE — G8427 DOCREV CUR MEDS BY ELIG CLIN: HCPCS | Performed by: FAMILY MEDICINE

## 2021-07-29 PROCEDURE — 1036F TOBACCO NON-USER: CPT | Performed by: FAMILY MEDICINE

## 2021-07-29 PROCEDURE — 1123F ACP DISCUSS/DSCN MKR DOCD: CPT | Performed by: FAMILY MEDICINE

## 2021-07-29 RX ORDER — CYANOCOBALAMIN 1000 UG/ML
1000 INJECTION INTRAMUSCULAR; SUBCUTANEOUS ONCE
Status: COMPLETED | OUTPATIENT
Start: 2021-07-29 | End: 2021-07-29

## 2021-07-29 RX ORDER — CEPHALEXIN 500 MG/1
500 CAPSULE ORAL 3 TIMES DAILY
Qty: 30 CAPSULE | Refills: 0 | Status: SHIPPED | OUTPATIENT
Start: 2021-07-29 | End: 2021-08-08

## 2021-07-29 RX ADMIN — CYANOCOBALAMIN 1000 MCG: 1000 INJECTION INTRAMUSCULAR; SUBCUTANEOUS at 14:33

## 2021-07-29 SDOH — ECONOMIC STABILITY: FOOD INSECURITY: WITHIN THE PAST 12 MONTHS, YOU WORRIED THAT YOUR FOOD WOULD RUN OUT BEFORE YOU GOT MONEY TO BUY MORE.: NEVER TRUE

## 2021-07-29 SDOH — ECONOMIC STABILITY: FOOD INSECURITY: WITHIN THE PAST 12 MONTHS, THE FOOD YOU BOUGHT JUST DIDN'T LAST AND YOU DIDN'T HAVE MONEY TO GET MORE.: NEVER TRUE

## 2021-07-29 ASSESSMENT — SOCIAL DETERMINANTS OF HEALTH (SDOH): HOW HARD IS IT FOR YOU TO PAY FOR THE VERY BASICS LIKE FOOD, HOUSING, MEDICAL CARE, AND HEATING?: NOT HARD AT ALL

## 2021-07-29 NOTE — PROGRESS NOTES
[address] [place] [id number]  Dept: [phone]  Dept Fax: [phone]  Loc: Elisa Luque is a 66 y.o. male who presents today for:  Chief Complaint   Patient presents with    3 Month Follow-Up     DMII    Elbow Pain     left elbow pain. Still painful after fall     Injections     B12           HPI:     HPI  Here for recheck and still having pain in the left elbow after a fall on 6/25/21. Warm hard area causes pain. He is seeing primarily the [facility] due to medications and treatment for PTSD but comes here 3 times yearly to go over all the information as well as maintenance of diabetes. PTSD did have a flare during a pacemaker placement when they did not give him enough pain medication. He reports thoughts of hurting the surgeon but did not act on them when redirected by the nurse. He has since worked this through with the counselor at the [facility]. He is also flaring a little bit with the current pandemic. Hypertension: Patient here for follow-up of elevated blood pressure. He is not exercising and is adherent to low salt diet. Blood pressure is well controlled at home. Cardiac symptoms none. Patient denies chest pain, dyspnea and palpitations. Cardiovascular risk factors: advanced age (older than 54 for men, 72 for women), dyslipidemia, hypertension and male gender. Use of agents associated with hypertension: none. History of target organ damage: stroke, paroxysmal afib, and CAD diagnosed by cath in 2013 but no stents. Hyperlipidemia: Patient presents with hyperlipidemia. He was tested because hypertension and CVA.   His last labs showed   Lab Results   Component Value Date    CHOL 125 04/15/2021    CHOL 119 03/10/2020    CHOL 117 01/28/2019     Lab Results   Component Value Date    TRIG 59 04/15/2021    TRIG 96 03/10/2020    TRIG 77 01/28/2019     Lab Results   Component Value Date HDL 53 04/15/2021    HDL 46 03/10/2020    HDL 49 01/28/2019     Lab Results   Component Value Date    LDLCALC 60 04/15/2021    LDLCALC 54 03/10/2020    LDLCALC 53 01/28/2019     No results found for: LABVLDL, VLDL  No results found for: CHOLHDLRATIO  There is not a family history of hyperlipidemia. There is not a family history of early ischemia heart disease. GERD: Rene complains of heartburn. This has been associated with heartburn and hoarseness. He denies belching, difficulty swallowing and melena. Symptoms have been present for several years. He denies dysphagia. He has not lost weight. He denies melena, hematochezia, hematemesis, and coffee ground emesis. Medical therapy in the past has included proton pump inhibitors. Elevated glucose need follow up. He is no longer seeing an endocrinologist for diabetes but his medications are through the [facility]. His endocrinologist left town and we are now managing the DM through here until he can find a new endocrinologist.  Fasting and nonfassting blood sugars are 120-130. Alexus's feet have  Along history of callus formation from bunion deformities, complicated by PVD. Lab Results   Component Value Date    LABA1C 6.7 (H) 04/15/2021     No results found for: EAG      Being followed currently by the [facility] for hypertension, afib, and hyperlipidemia. Vitamin b12  was at the low end of normal but with symptoms of fatigue [facility]. He is feeling better and has also started vit d PO as well. Last b12 level is high and he is instructed to go to every 8 weeks instead of once a month. Lab Results   Component Value Date    [id number] 628 04/15/2021       Tremor controlled better with current medications. He has seen the neurologist at the [facility] who does not think that he has parkinson's but occasionally the tremor does get bad. He recalls once when he spilled his cereal right out of the bowl that he was holding. This is more controlled now. A lot of these conditions are related to agent orange exposure in [facility] as well as a water contamination in [employer]. HARINDER controlled on CPAP. BPH is under the management of urology. Lab Results   Component Value Date    PSA 3.50 (H) 04/15/2021    PSA 3.26 (H) 01/24/2021    PSA 3.98 (H) 10/28/2020       Low sodium is under watch. Anemia is under regular watch. Reviewed chart forpast medical history , surgical history , allergies, social history , family history and medications. Health Maintenance   Topic Date Due    DTaP/Tdap/Td vaccine (1 - Tdap) 04/26/2022 (Originally 1/26/2017)    Diabetic retinal exam  09/01/2021    Flu vaccine (1) 09/01/2021    Diabetic foot exam  12/22/2021    Annual Wellness Visit (AWV)  01/15/2022    A1C test (Diabetic or Prediabetic)  04/15/2022    Diabetic microalbuminuria test  04/15/2022    Lipid screen  04/15/2022    Colon cancer screen colonoscopy  10/04/2029    Shingles Vaccine  Completed    Pneumococcal 65+ years Vaccine  Completed    COVID-19 Vaccine  Completed    Hepatitis A vaccine  Aged Out    Hib vaccine  Aged Out    Meningococcal (ACWY) vaccine  Aged Out    AAA screen  Discontinued    Hepatitis C screen  Discontinued       Subjective:      Constitutional:Negative for fever, chills, diaphoresis, activity change, appetite change and fatigue. HENT: Negative for hearing loss, ear pain, congestion, sore throat, rhinorrhea, postnasal drip and ear discharge. Eyes: Negative for photophobia and visual disturbance. Respiratory: Negative for cough, chest tightness, shortness of breath and wheezing. Cardiovascular: Negative for chest pain and leg swelling. Gastrointestinal: Negative for nausea, vomiting, abdominal pain, diarrhea and constipation. Genitourinary: Negative for dysuria, urgency and frequency. Neurological: Negative for weakness, light-headedness and headaches.    Psychiatric/Behavioral: Negative for sleep disturbance.      :     Vitals:    07/29/21 1356   BP: 122/70   Site: Right Upper Arm   Position: Sitting   Cuff Size: Medium Adult   Pulse: 72   Resp: 16   Temp: 97.8 °F (36.6 °C)   TempSrc: Temporal   Weight: 214 lb (97.1 kg)     Wt Readings from Last 3 Encounters:   07/29/21 214 lb (97.1 kg)   06/28/21 210 lb 9.6 oz (95.5 kg)   05/04/21 220 lb (99.8 kg)       Physical Exam  Constitutional: Vital signs are normal. He appears well-developed and well-nourished. He is active. HENT:   Head: Normocephalic and atraumatic. Right Ear: Tympanic membrane, external ear and ear canal normal. No drainage or tenderness. Left Ear: Tympanic membrane, external ear and ear canal normal. No drainage or tenderness. Nose: Nose normal. No mucosal edema or rhinorrhea. Mouth/Throat: Uvula is midline, oropharynx is clear and moist and mucous membranes are normal. Mucous membranes are not pale. Normal dentition. No posterior oropharyngeal edema or posterior oropharyngeal erythema. Eyes: Lids are normal. Right eye exhibits no chemosis and no discharge. Left eye exhibits no chemosis and no drainage. Right conjunctiva has no hemorrhage. Left conjunctiva has no hemorrhage. Right eye exhibits normal extraocular motion. Left eye exhibits normal extraocular motion. Right pupil is round and reactive. Left pupil is round and reactive. Pupils are equal.   Cardiovascular: Normal rate, regular rhythm, S1 normal, S2 normal and normal heart sounds. Exam reveals no gallop. No murmur heard. Pulmonary/Chest: Effort normal and breath sounds normal. No respiratory distress. He has no wheezes. He has no rhonchi. He has no rales. Abdominal: Soft. Normal appearance and bowel sounds are normal. He exhibits no distension and no mass. There is no hepatosplenomegaly. No tenderness. He has no rigidity, no rebound and no guarding. No hernia.    Musculoskeletal:   Left elbow with a walnut sized purple lump that is warm to the touch.   Healing bruises on the distal forearm. Right lower leg: He exhibits no edema. Left lower leg: He exhibits no edema. Neurological: He is alert. Oriented and pleasent        Assessment/Plan   Boy Mayorga was seen today for 3 month follow-up, elbow pain and injections. 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 Moderate LI's in the mid LAD and in the RCA. Diabetic peripheral neuropathy associated with type 2 diabetes mellitus (HCC)    Hyponatremia    Post traumatic stress disorder    PVD (peripheral vascular disease) (HCC)    Essential tremor    Essential hypertension    Obstructive sleep apnea on CPAP    Rib contusion, left, subsequent encounter    Mild intermittent asthma without complication    BPH with obstruction/lower urinary tract symptoms    Iron deficiency anemia, unspecified iron deficiency anemia type    COPD with asthma ([address]    Vitamin B12 deficiency    No change to medication   Continue healthy diet and exercise  Yearly eye exam  Daily foot inspection  Yearly flu shot  Monitor glucose regularly  Daily aspirin  Regular labs : A1c quarterly, lipids every 6 months and BMP quaterly      Warm compresses to the hematomas 2-3 times per day. Call or return to clinic prn if these symptoms worsen or fail to improve as anticipated. Discussed use, benefit, and side effectsof prescribed medications. All patient questions answered. Pt voiced understanding. Reviewed health maintenance. Instructed to continue current medications, diet and exercise. Patient agreed with treatment plan. Followup as directed.      Electronically signed by Nicolás Griggs MD

## 2021-07-29 NOTE — PROGRESS NOTES
Administrations This Visit     cyanocobalamin injection 1,000 mcg     Admin Date  07/29/2021  14:33 Action  Given Dose  1,000 mcg Route  Intramuscular Site  Deltoid Left Administered By  Halley Car CMA (Juvenal Carbone)    Ordering Provider: Pilo Faustin MD    ND: 2050-3500-04    Lot#: [id number]    : [employer]    Patient Supplied?: No                Patient instructed to remain in clinic for 20 minutes after injection and was advised to report any adverse reaction to me immediately.

## 2021-08-03 ENCOUNTER — NURSE ONLY (OUTPATIENT)
Dept: LAB | Age: 71
End: 2021-08-03

## 2021-08-03 DIAGNOSIS — E11.65 UNCONTROLLED TYPE 2 DIABETES MELLITUS WITH HYPERGLYCEMIA (HCC): ICD-10-CM

## 2021-08-04 LAB
ANION GAP SERPL CALCULATED.3IONS-SCNC: 13 MEQ/L (ref 8–16)
AVERAGE GLUCOSE: 138 MG/DL (ref 70–126)
BUN BLDV-MCNC: 12 MG/DL (ref 7–22)
CALCIUM SERPL-MCNC: 9.6 MG/DL (ref 8.5–10.5)
CHLORIDE BLD-SCNC: 96 MEQ/L (ref 98–111)
CO2: 24 MEQ/L (ref 23–33)
CREAT SERPL-MCNC: 0.9 MG/DL (ref 0.4–1.2)
GFR SERPL CREATININE-BSD FRML MDRD: 83 ML/MIN/1.73M2
GLUCOSE BLD-MCNC: 128 MG/DL (ref 70–108)
HBA1C MFR BLD: 6.6 % (ref 4.4–6.4)
POTASSIUM SERPL-SCNC: 4.8 MEQ/L (ref 3.5–5.2)
SODIUM BLD-SCNC: 133 MEQ/L (ref 135–145)

## 2021-08-10 ENCOUNTER — OFFICE VISIT (OUTPATIENT)
Dept: PULMONOLOGY | Age: 71
End: 2021-08-10
Payer: MEDICARE

## 2021-08-10 VITALS
BODY MASS INDEX: 29.46 KG/M2 | WEIGHT: 210.4 LBS | HEART RATE: 64 BPM | HEIGHT: 71 IN | DIASTOLIC BLOOD PRESSURE: 64 MMHG | TEMPERATURE: 97.8 F | OXYGEN SATURATION: 99 % | SYSTOLIC BLOOD PRESSURE: 124 MMHG

## 2021-08-10 DIAGNOSIS — Z99.89 OSA ON CPAP: Primary | ICD-10-CM

## 2021-08-10 DIAGNOSIS — G47.33 OSA ON CPAP: Primary | ICD-10-CM

## 2021-08-10 DIAGNOSIS — G47.10 HYPERSOMNIA: ICD-10-CM

## 2021-08-10 PROCEDURE — 4040F PNEUMOC VAC/ADMIN/RCVD: CPT | Performed by: PHYSICIAN ASSISTANT

## 2021-08-10 PROCEDURE — 99213 OFFICE O/P EST LOW 20 MIN: CPT | Performed by: PHYSICIAN ASSISTANT

## 2021-08-10 PROCEDURE — 3017F COLORECTAL CA SCREEN DOC REV: CPT | Performed by: PHYSICIAN ASSISTANT

## 2021-08-10 PROCEDURE — 1123F ACP DISCUSS/DSCN MKR DOCD: CPT | Performed by: PHYSICIAN ASSISTANT

## 2021-08-10 PROCEDURE — 1036F TOBACCO NON-USER: CPT | Performed by: PHYSICIAN ASSISTANT

## 2021-08-10 PROCEDURE — G8417 CALC BMI ABV UP PARAM F/U: HCPCS | Performed by: PHYSICIAN ASSISTANT

## 2021-08-10 PROCEDURE — G8427 DOCREV CUR MEDS BY ELIG CLIN: HCPCS | Performed by: PHYSICIAN ASSISTANT

## 2021-08-10 ASSESSMENT — ENCOUNTER SYMPTOMS
EYES NEGATIVE: 1
DIARRHEA: 0
WHEEZING: 0
ALLERGIC/IMMUNOLOGIC NEGATIVE: 1
COUGH: 0
NAUSEA: 0
STRIDOR: 0
CHEST TIGHTNESS: 0
SHORTNESS OF BREATH: 0
BACK PAIN: 0

## 2021-08-10 NOTE — PROGRESS NOTES
[facility]      Bailey Javier         [id number]  8/10/2021   Chief Complaint   Patient presents with    Follow-up     HARINDER 1 year with download         Pt of Dr. Keli Mohr    PAP Download:   Original or initial AHI: 52.2     Date of initial study: 10/11/2013      Compliant  100%     Noncompliant 0 %     PAP Type CPAP Level  14   Avg Hrs/Day 8 hr 25 min 12 sec  AHI: 3.2   Recorded compliance dates , 7/11/2021  to 8/9/2021   Machine/Mfg:   [] ResMed    [x] Respironics/Dreamstation   Interface:   [] Nasal    [] Nasal pillows   [x] FFM      Provider:      [x] [facility]     []Arin     [] Reilly    [] Elon Claude    [] [place]               [] [facility]      [] Adaptive    [] [address]      [] Other    Neck Size: 16  Mallampati Mallampati 1  ESS:  12  SAQLI: 44    Here is a scan of the most recent download:            Presentation:   Elizabeth Arevalo presents for sleep medicine follow up for obstructive sleep apnea  Since the last visit, Elizabeth Arevalo is doing well with PAP. He is sleeping ok. He feels tired at times but thinks it is related to medications. Equipment issues: The pressure is  acceptable, the mask is acceptable     Sleep issues:  Do you feel better? Yes and No  More rested? Sometimes   Better concentration? yes    Progress History:   Since last visit any new medical issues? tremors  New ER or hospital visits? No  Any new or changes in medicines? No  Any new sleep medicines? No    Review of Systems -   Review of Systems   Constitutional: Negative for activity change, appetite change, chills and fever. HENT: Negative for congestion and postnasal drip. Eyes: Negative. Respiratory: Negative for cough, chest tightness, shortness of breath, wheezing and stridor. Cardiovascular: Negative for chest pain and leg swelling. Gastrointestinal: Negative for diarrhea and nausea. Endocrine: Negative. Genitourinary: Negative. Musculoskeletal: Negative.   Negative for arthralgias and back pain. Skin: Negative. Allergic/Immunologic: Negative. Neurological: Positive for tremors. Negative for dizziness and light-headedness. Psychiatric/Behavioral: Negative. All other systems reviewed and are negative. Physical Exam:    BMI:  Body mass index is 29.34 kg/m². Wt Readings from Last 3 Encounters:   08/10/21 210 lb 6.4 oz (95.4 kg)   07/29/21 214 lb (97.1 kg)   06/28/21 210 lb 9.6 oz (95.5 kg)     Weight stable / unchanged  Vitals: /64 (Site: Right Upper Arm, Position: Sitting, Cuff Size: Large Adult)   Pulse 64   Temp 97.8 °F (36.6 °C) (Temporal)   Ht 5' 11\" (1.803 m)   Wt 210 lb 6.4 oz (95.4 kg)   SpO2 99% Comment: rm air at rest  BMI 29.34 kg/m²       Physical Exam  Constitutional:       Appearance: He is well-developed. HENT:      Head: Normocephalic and atraumatic. Right Ear: External ear normal.      Left Ear: External ear normal.   Eyes:      Conjunctiva/sclera: Conjunctivae normal.      Pupils: Pupils are equal, round, and reactive to light. Cardiovascular:      Rate and Rhythm: Normal rate and regular rhythm. Heart sounds: Normal heart sounds. Pulmonary:      Effort: Pulmonary effort is normal.      Breath sounds: Normal breath sounds. Musculoskeletal:         General: Normal range of motion. Cervical back: Normal range of motion and neck supple. Skin:     General: Skin is warm and dry. Neurological:      Mental Status: He is alert and oriented to person, place, and time. Psychiatric:         Behavior: Behavior normal.         Thought Content: Thought content normal.         Judgment: Judgment normal.           ASSESSMENT/DIAGNOSIS     Diagnosis Orders   1. HARINDER on CPAP     2. Hypersomnia           Plan   Do you need any equipment today? Yes update supplies  - Recall discussed with patient and the risk and benefits of continuing to use PAP were discussed. - Instructed to call DME for further instructions.   - Download reviewed and discussed with patient  - He  was advised to continue current positive airway pressure therapy with above described pressure. - He  advised to keep good compliance with current recommended pressure to get optimal results and clinical improvement  - Recommend 7-9 hours of sleep with PAP  - He was advised to call [employer] regarding supplies if needed.   -He call my office for earlier appointment if needed for worsening of sleep symptoms.   - He was instructed on weight loss  - Neli Awan was educated about my impression and plan. Patient verbalizesunderstanding.   We will see Victoria Shaffer back in: 1 year with download    Information added by my medical assistant/LPN was reviewed today         Cresencio Bear PA-C, MPAS  8/10/2021

## 2021-08-27 ENCOUNTER — HOSPITAL ENCOUNTER (OUTPATIENT)
Age: 71
Discharge: HOME | End: 2021-08-27
Payer: MEDICARE

## 2021-08-27 DIAGNOSIS — R97.20 ELEVATED PSA: ICD-10-CM

## 2021-08-27 PROCEDURE — 84154 ASSAY OF PSA FREE: CPT

## 2021-08-27 PROCEDURE — 36415 COLL VENOUS BLD VENIPUNCTURE: CPT

## 2021-08-27 PROCEDURE — 84153 ASSAY OF PSA TOTAL: CPT

## 2021-08-30 ENCOUNTER — NURSE ONLY (OUTPATIENT)
Dept: LAB | Age: 71
End: 2021-08-30

## 2021-08-30 ENCOUNTER — NURSE ONLY (OUTPATIENT)
Dept: FAMILY MEDICINE CLINIC | Age: 71
End: 2021-08-30
Payer: MEDICARE

## 2021-08-30 DIAGNOSIS — E87.1 LOW SODIUM LEVELS: ICD-10-CM

## 2021-08-30 DIAGNOSIS — D50.9 IRON DEFICIENCY ANEMIA, UNSPECIFIED IRON DEFICIENCY ANEMIA TYPE: Primary | ICD-10-CM

## 2021-08-30 LAB
PROSTATE SPECIFIC ANTIGEN FREE: NORMAL
SODIUM BLD-SCNC: 135 MEQ/L (ref 135–145)

## 2021-08-30 PROCEDURE — 96372 THER/PROPH/DIAG INJ SC/IM: CPT | Performed by: FAMILY MEDICINE

## 2021-08-30 RX ORDER — CYANOCOBALAMIN 1000 UG/ML
1000 INJECTION INTRAMUSCULAR; SUBCUTANEOUS ONCE
Status: COMPLETED | OUTPATIENT
Start: 2021-08-30 | End: 2021-08-30

## 2021-08-30 RX ADMIN — CYANOCOBALAMIN 1000 MCG: 1000 INJECTION INTRAMUSCULAR; SUBCUTANEOUS at 10:16

## 2021-08-30 NOTE — PROGRESS NOTES
Administrations This Visit     cyanocobalamin injection 1,000 mcg     Admin Date  08/30/2021  10:16 Action  Given Dose  1,000 mcg Route  IntraMUSCular Site  Deltoid Left Administered By  Aylin Aparicio CMA ([address] [place] [address])    Ordering Provider: Rich West MD    NDC: 1848-5848-44    Lot#: [id number]    : [employer]    Patient Supplied?: No                Patient instructed to report any adverse reaction to me immediately.

## 2021-08-31 ENCOUNTER — OFFICE VISIT (OUTPATIENT)
Dept: PULMONOLOGY | Age: 71
End: 2021-08-31
Payer: MEDICARE

## 2021-08-31 VITALS
BODY MASS INDEX: 29.51 KG/M2 | TEMPERATURE: 97.7 F | OXYGEN SATURATION: 98 % | HEART RATE: 65 BPM | SYSTOLIC BLOOD PRESSURE: 124 MMHG | HEIGHT: 71 IN | WEIGHT: 210.8 LBS | DIASTOLIC BLOOD PRESSURE: 68 MMHG

## 2021-08-31 DIAGNOSIS — Z99.89 OSA ON CPAP: ICD-10-CM

## 2021-08-31 DIAGNOSIS — E66.9 OBESITY (BMI 30-39.9): ICD-10-CM

## 2021-08-31 DIAGNOSIS — G47.33 OSA ON CPAP: ICD-10-CM

## 2021-08-31 DIAGNOSIS — J44.9 COPD WITH ASTHMA (HCC): Primary | ICD-10-CM

## 2021-08-31 PROCEDURE — 1036F TOBACCO NON-USER: CPT | Performed by: NURSE PRACTITIONER

## 2021-08-31 PROCEDURE — G8427 DOCREV CUR MEDS BY ELIG CLIN: HCPCS | Performed by: NURSE PRACTITIONER

## 2021-08-31 PROCEDURE — G8417 CALC BMI ABV UP PARAM F/U: HCPCS | Performed by: NURSE PRACTITIONER

## 2021-08-31 PROCEDURE — 3023F SPIROM DOC REV: CPT | Performed by: NURSE PRACTITIONER

## 2021-08-31 PROCEDURE — 99213 OFFICE O/P EST LOW 20 MIN: CPT | Performed by: NURSE PRACTITIONER

## 2021-08-31 PROCEDURE — G8926 SPIRO NO PERF OR DOC: HCPCS | Performed by: NURSE PRACTITIONER

## 2021-08-31 PROCEDURE — 4040F PNEUMOC VAC/ADMIN/RCVD: CPT | Performed by: NURSE PRACTITIONER

## 2021-08-31 PROCEDURE — 3017F COLORECTAL CA SCREEN DOC REV: CPT | Performed by: NURSE PRACTITIONER

## 2021-08-31 PROCEDURE — 1123F ACP DISCUSS/DSCN MKR DOCD: CPT | Performed by: NURSE PRACTITIONER

## 2021-08-31 RX ORDER — BUDESONIDE AND FORMOTEROL FUMARATE DIHYDRATE 160; 4.5 UG/1; UG/1
2 AEROSOL RESPIRATORY (INHALATION) 2 TIMES DAILY
COMMUNITY
End: 2022-08-31

## 2021-08-31 ASSESSMENT — ENCOUNTER SYMPTOMS
EYES NEGATIVE: 1
SHORTNESS OF BREATH: 1
VOMITING: 0
ABDOMINAL PAIN: 0
COUGH: 0
DIARRHEA: 0
CHEST TIGHTNESS: 0
NAUSEA: 0
WHEEZING: 0

## 2021-08-31 NOTE — PROGRESS NOTES
[facility]     Patient: Elvia Culver, 66 y.o.   : 1950    Pt of Dr. Rashmi Scott   Patient presents with    Follow-up     COPD 9 month pulmonary follow up with no testing         HPI  Edrudy Rees is here for 9 month follow up for COPD and Asthma   fell at home getting up at night to go to [place], was on Atb from PCP for abrasion on left elbow. Was seen in ED next day, \" no fractures\"   C/o headaches since the fall, PCP is aware     SOB at rest with mask on, otherwise chronic LOGAN -stable / at baseline   No wheeze or cough   Current Inhalers:  Symbicort 2 puffs BID, rinsing after use. No mouth sores.  Gets through the [place], using ProAir twice a day     Are the above symptoms at your baseline Yes  Any recent exacerbations that have required oral atb or steroids No  Any new medical issues since last visit : No    Fully vaccinated for COVID 19, no personal history of COVID 19   Last Spirometry : 2017- normal  Not on supplemental O2     SOCIAL HISTORY:  Social History     Tobacco Use    Smoking status: Former Smoker     Packs/day: 2.00     Years: 6.00     Pack years: 12.00     Types: Cigarettes     Quit date: 1971     Years since quittin.8    Smokeless tobacco: Former User   Substance Use Topics    Alcohol use: No     Alcohol/week: 0.0 standard drinks    Drug use: Yes     Types: Marijuana     Comment: quit at 31 yoa         CURRENT MEDICATIONS:  Current Outpatient Medications   Medication Sig Dispense Refill    budesonide-formoterol (SYMBICORT) 160-4.5 MCG/ACT AERO Inhale 2 puffs into the lungs 2 times daily      Insulin Pen Needle 31G X 8 MM MISC 1 each by Does not apply route daily 100 each 3    latanoprost (XALATAN) 0.005 % ophthalmic solution       Cannabinoids (THC FREE PO) Take by mouth Gummi bears, Dr in Deep Scales      albuterol sulfate HFA (VENTOLIN HFA) 108 (90 Base) MCG/ACT inhaler INHALE 2 PUFFS EVERY 6 HOURS AS NEEDED FOR WHEEZING. 18 g 11    tamsulosin (FLOMAX) 0.4 MG capsule Take 0.4 mg by mouth daily      topiramate (TOPAMAX SPRINKLE) 25 MG capsule Take 25 mg by mouth 2 times daily      insulin detemir (LEVEMIR FLEXTOUCH) 100 UNIT/ML injection pen Inject 33 Units into the skin nightly 5 pen 11    DULoxetine (CYMBALTA) 60 MG extended release capsule Take 60 mg by mouth daily      metFORMIN (GLUCOPHAGE XR) 500 MG extended release tablet Take 2 tablets by mouth 2 times daily (Patient taking differently: Take 500 mg by mouth 2 times daily ) 60 tablet 11    blood glucose test strips (ACCU-CHEK ERVIN) strip 1 each by In Vitro route 2 times daily As needed. 100 each 3    Lancets MISC Testing  each 3    vitamin D (CHOLECALCIFEROL) 1000 UNIT TABS tablet Take 1 tablet by mouth 2 times daily 60 tablet 11    Omega-3 Fatty Acids (FISH OIL) 500 MG CAPS Take by mouth 2 times daily      loratadine (CLARITIN) 10 MG tablet Take 10 mg by mouth daily      rivaroxaban (XARELTO) 20 MG TABS tablet Take 20 mg by mouth daily (with breakfast)      gabapentin (NEURONTIN) 300 MG capsule Take 300 mg by mouth 2 times daily .       amLODIPine (NORVASC) 5 MG tablet TAKE 1 TABLET BY MOUTH DAILY (Patient taking differently: Take 5 mg by mouth daily ) 90 tablet 3    atorvastatin (LIPITOR) 20 MG tablet take 1 tablet by mouth once daily (Patient taking differently: 40 mg take 1 tablet by mouth once daily) 90 tablet 3    omeprazole (PRILOSEC) 40 MG capsule Take 1 capsule by mouth daily (Patient taking differently: Take 20 mg by mouth 2 times daily ) 90 capsule 3    propranolol (INDERAL LA) 120 MG CR capsule Take 120 mg by mouth 2 times daily       montelukast (SINGULAIR) 10 MG tablet take 1 tablet by mouth once daily 90 tablet 3    b complex vitamins capsule Take 1 capsule by mouth 2 times daily      BUSPIRONE HCL PO Take 10 mg by mouth 3 times daily Take 2 tablet by mouth 3 times daily Disp 270 tablets  R-3      fluticasone (FLONASE) 50 MCG/ACT nasal spray USE 2 SPRAYS NASALLY DAILY 3 Bottle 3    aspirin 81 MG EC tablet Take 81 mg by mouth daily. No current facility-administered medications for this visit. Sonja BELTRAN   Review of Systems   Constitutional: Negative for activity change, appetite change, chills and fever. HENT: Negative. Eyes: Negative. Respiratory: Positive for shortness of breath. Negative for cough, chest tightness and wheezing. Cardiovascular: Negative for chest pain, palpitations and leg swelling. Gastrointestinal: Negative for abdominal pain, diarrhea, nausea and vomiting. Genitourinary: Negative. Musculoskeletal: Negative. Skin: Negative. Neurological: Negative. Hematological: Does not bruise/bleed easily. Psychiatric/Behavioral: Negative for suicidal ideas. Physical exam   /68 (Site: Left Upper Arm, Position: Sitting)   Pulse 65   Temp 97.7 °F (36.5 °C)   Ht 5' 11\" (1.803 m)   Wt 210 lb 12.8 oz (95.6 kg)   SpO2 98% Comment: on ra  BMI 29.40 kg/m²      Wt Readings from Last 3 Encounters:   08/31/21 210 lb 12.8 oz (95.6 kg)   08/10/21 210 lb 6.4 oz (95.4 kg)   07/29/21 214 lb (97.1 kg)     Physical Exam  Vitals and nursing note reviewed. Constitutional:       General: He is not in acute distress. Appearance: Normal appearance. He is well-developed. HENT:      Mouth/Throat:      Lips: Pink. Mouth: Mucous membranes are moist.      Pharynx: Oropharynx is clear. No oropharyngeal exudate or posterior oropharyngeal erythema. Eyes:      Conjunctiva/sclera: Conjunctivae normal.   Neck:      Vascular: No JVD. Cardiovascular:      Rate and Rhythm: Normal rate and regular rhythm. Heart sounds: No murmur heard. No friction rub. Pulmonary:      Effort: Pulmonary effort is normal. No accessory muscle usage or respiratory distress. Breath sounds: Normal breath sounds. No wheezing, rhonchi or rales. Chest:      Chest wall: No tenderness. Musculoskeletal:      Right lower leg: No edema. Left lower leg: No edema. Skin:     General: Skin is warm and dry. Capillary Refill: Capillary refill takes less than 2 seconds. Nails: There is no clubbing. Neurological:      Mental Status: He is alert and oriented to person, place, and time. Psychiatric:         Mood and Affect: Mood normal.         Behavior: Behavior is slowed. Thought Content: Thought content normal.         Judgment: Judgment normal.          Test results   Lung Nodule Screening     [] Qualifies    [x]Does not qualify   [] Declined    [] Completed     Assessment      Diagnosis Orders   1. COPD with asthma ([facility]     2. Obesity (BMI 30-39.9)     3.  HARINDER on CPAP         Plan    -stable COPD/asthma symptoms :continue Symbicort BID as prescribed/ PRN albuterol   -avoidance of ill contacts  -get flu vaccine in fall     Total time spent on encounter was 20 minutes    Will see Kar Daly in: 1 year, no testing     Electronically signed by FERDINAND Sifuentes CNP on 8/31/2021 at 4:13 PM

## 2021-09-07 ENCOUNTER — TELEPHONE (OUTPATIENT)
Dept: UROLOGY | Age: 71
End: 2021-09-07

## 2021-09-07 NOTE — TELEPHONE ENCOUNTER
----- Message from FERDINAND Garrido CNP sent at 9/6/2021  9:49 AM EDT -----  Psa 4.5, pt can consider biopsy, we can discuss at follow up appt as well. Can get exosome dx on arrival to appt.

## 2021-09-08 ENCOUNTER — OFFICE VISIT (OUTPATIENT)
Dept: CARDIOLOGY CLINIC | Age: 71
End: 2021-09-08
Payer: MEDICARE

## 2021-09-08 VITALS
BODY MASS INDEX: 29.54 KG/M2 | HEART RATE: 72 BPM | HEIGHT: 71 IN | WEIGHT: 211 LBS | DIASTOLIC BLOOD PRESSURE: 68 MMHG | SYSTOLIC BLOOD PRESSURE: 132 MMHG

## 2021-09-08 DIAGNOSIS — I48.91 ATRIAL FIBRILLATION, UNSPECIFIED TYPE (HCC): Primary | ICD-10-CM

## 2021-09-08 PROCEDURE — G8417 CALC BMI ABV UP PARAM F/U: HCPCS | Performed by: INTERNAL MEDICINE

## 2021-09-08 PROCEDURE — 3017F COLORECTAL CA SCREEN DOC REV: CPT | Performed by: INTERNAL MEDICINE

## 2021-09-08 PROCEDURE — 99214 OFFICE O/P EST MOD 30 MIN: CPT | Performed by: INTERNAL MEDICINE

## 2021-09-08 PROCEDURE — 1123F ACP DISCUSS/DSCN MKR DOCD: CPT | Performed by: INTERNAL MEDICINE

## 2021-09-08 PROCEDURE — 1036F TOBACCO NON-USER: CPT | Performed by: INTERNAL MEDICINE

## 2021-09-08 PROCEDURE — 4040F PNEUMOC VAC/ADMIN/RCVD: CPT | Performed by: INTERNAL MEDICINE

## 2021-09-08 PROCEDURE — G8428 CUR MEDS NOT DOCUMENT: HCPCS | Performed by: INTERNAL MEDICINE

## 2021-09-08 NOTE — PROGRESS NOTES
[address] [facility] [address]  Dept: [phone]  Dept Fax: [phone]  Loc: [phone]    Visit Date: 9/8/2021    Mr. Renard Gonzalez is a 66 y.o. male  who presented for:  Chief Complaint   Patient presents with    Check-Up       HPI:   68 yo M C hx of Afib on Xarelto, PPM, s/p PPM, DM, HTN, HLD is here for a follow up. He reports good exercise tolerance. Had recent PPM interrogated. Denies any chest pain, sob, palpitations, lightheadedness, dizziness, orthopnea, PND or pedal edema. No bleeding issues. Current Outpatient Medications:     budesonide-formoterol (SYMBICORT) 160-4.5 MCG/ACT AERO, Inhale 2 puffs into the lungs 2 times daily, Disp: , Rfl:     Insulin Pen Needle 31G X 8 MM MISC, 1 each by Does not apply route daily, Disp: 100 each, Rfl: 3    latanoprost (XALATAN) 0.005 % ophthalmic solution, , Disp: , Rfl:     Cannabinoids (THC FREE PO), Take by mouth Gummi bears, Dr in [facility], [address] [place] [address]: , Rfl:     albuterol sulfate HFA (VENTOLIN HFA) 108 (90 Base) MCG/ACT inhaler, INHALE 2 PUFFS EVERY 6 HOURS AS NEEDED FOR WHEEZING., Disp: 18 g, Rfl: 11    tamsulosin (FLOMAX) 0.4 MG capsule, Take 0.4 mg by mouth daily, Disp: , Rfl:     topiramate (TOPAMAX SPRINKLE) 25 MG capsule, Take 25 mg by mouth 2 times daily, Disp: , Rfl:     insulin detemir (LEVEMIR FLEXTOUCH) 100 UNIT/ML injection pen, Inject 33 Units into the skin nightly, Disp: 5 pen, Rfl: 11    DULoxetine (CYMBALTA) 60 MG extended release capsule, Take 60 mg by mouth daily, Disp: , Rfl:     metFORMIN (GLUCOPHAGE XR) 500 MG extended release tablet, Take 2 tablets by mouth 2 times daily (Patient taking differently: Take 500 mg by mouth 2 times daily ), Disp: 60 tablet, Rfl: 11    blood glucose test strips (ACCU-CHEK ERVIN) strip, 1 each by In Vitro route 2 times daily As needed. , Disp: 100 each, Rfl: 3    Lancets MISC, Testing BID, Disp: 100 each, Rfl: 3   vitamin D (CHOLECALCIFEROL) 1000 UNIT TABS tablet, Take 1 tablet by mouth 2 times daily, Disp: 60 tablet, Rfl: 11    Omega-3 Fatty Acids (FISH OIL) 500 MG CAPS, Take by mouth 2 times daily, Disp: , Rfl:     loratadine (CLARITIN) 10 MG tablet, Take 10 mg by mouth daily, Disp: , Rfl:     rivaroxaban (XARELTO) 20 MG TABS tablet, Take 20 mg by mouth daily (with breakfast), Disp: , Rfl:     gabapentin (NEURONTIN) 300 MG capsule, Take 300 mg by mouth 2 times daily . , Disp: , Rfl:     amLODIPine (NORVASC) 5 MG tablet, TAKE 1 TABLET BY MOUTH DAILY (Patient taking differently: Take 5 mg by mouth daily ), Disp: 90 tablet, Rfl: 3    atorvastatin (LIPITOR) 20 MG tablet, take 1 tablet by mouth once daily (Patient taking differently: 40 mg take 1 tablet by mouth once daily), Disp: 90 tablet, Rfl: 3    omeprazole (PRILOSEC) 40 MG capsule, Take 1 capsule by mouth daily (Patient taking differently: Take 20 mg by mouth 2 times daily ), Disp: 90 capsule, Rfl: 3    propranolol (INDERAL LA) 120 MG CR capsule, Take 120 mg by mouth 2 times daily , Disp: , Rfl:     montelukast (SINGULAIR) 10 MG tablet, take 1 tablet by mouth once daily, Disp: 90 tablet, Rfl: 3    b complex vitamins capsule, Take 1 capsule by mouth 2 times daily, Disp: , Rfl:     BUSPIRONE HCL PO, Take 10 mg by mouth 3 times daily Take 2 tablet by mouth 3 times daily Disp 270 tablets  R-3, Disp: , Rfl:     fluticasone (FLONASE) 50 MCG/ACT nasal spray, USE 2 SPRAYS NASALLY DAILY, Disp: 3 Bottle, Rfl: 3    aspirin 81 MG EC tablet, Take 81 mg by mouth daily.   , Disp: , Rfl:     Past Medical History  Sanaz Mauricio  has a past medical history of Asthma, [employer] dual pacemaker, Bronchitis, chronic ([address] Carotid artery stenosis, Chickenpox, COPD with asthma ([address] CVA (cerebral infarction), GERD (gastroesophageal reflux disease), Hemorrhoids, Hyperlipidemia, Hypertension, Mild intermittent asthma without complication, Nausea & vomiting, Neuropathy, Osteoarthritis, Peripheral neuropathy, Post traumatic stress disorder (PTSD), S/P cardiac catheterization: 11/20/2013: No obstructive lesions. Moderate LI's in the mid LAD and in the RCA., Tremor of both hands, Type II or unspecified type diabetes mellitus without mention of complication, not stated as uncontrolled, and Unspecified sleep apnea. Social History  Trinh Franks  reports that he quit smoking about 49 years ago. His smoking use included cigarettes. He has a 12.00 pack-year smoking history. He has quit using smokeless tobacco. He reports current drug use. Drug: Marijuana. He reports that he does not drink alcohol. Family History  Trinh Franks family history includes Cancer in his paternal grandfather; Diabetes in his mother; Heart Disease in his father and mother; High Blood Pressure in his father and mother; High Cholesterol in his father and mother; Kidney Disease in his mother; Stroke in his father. Past Surgical History   Past Surgical History:   Procedure Laterality Date    APPENDECTOMY  age 14   [facility] BLEPHAROPLASTY Bilateral 05/2017    CARDIAC CATHETERIZATION  11/2013    no stents    CARPAL TUNNEL RELEASE Right 01/2017    COLONOSCOPY  2005    Dr. Vazquez Peeling      right eye clog oil duct    EYE SURGERY Bilateral 01/2017    HAND SURGERY Right 01/19/2017    OIO - carpal tunnel    MANDIBLE SURGERY N/A 05/2017    jaw surgery lower front of mouth    PACEMAKER INSERTION  2000/2010    PACEMAKER INSERTION  11/20/2019    TONSILLECTOMY AND ADENOIDECTOMY  as a child        Subjective:     REVIEW OF SYSTEMS  Constitutional: denies sweats, chills and fever  HENT: denies  congestion, sinus pressure, sneezing and sore throat. Eyes: denies  pain, discharge, redness and itching. Respiratory: denies apnea, cough  Gastrointestinal: denies blood in stool, constipation, diarrhea   Endocrine: denies cold intolerance, heat intolerance, polydipsia.   Genitourinary: denies dysuria, enuresis, flank pain and hematuria. Musculoskeletal: denies arthralgias, joint swelling and neck pain. Neurological: denies numbness and headaches. Psychiatric/Behavioral: denies agitation, confusion, decreased concentration and dysphoric mood    All others reviewed and are negative. Objective:     /68   Pulse 72   Ht 5' 11\" (1.803 m)   Wt 211 lb (95.7 kg)   BMI 29.43 kg/m²     Wt Readings from Last 3 Encounters:   09/08/21 211 lb (95.7 kg)   08/31/21 210 lb 12.8 oz (95.6 kg)   08/10/21 210 lb 6.4 oz (95.4 kg)     BP Readings from Last 3 Encounters:   09/08/21 132/68   08/31/21 124/68   08/10/21 124/64       PHYSICAL EXAM  Constitutional: Oriented to person, place, and time. Appears well-developed and well-nourished. HENT:   Head: Normocephalic and atraumatic. Eyes: EOM are normal. Pupils are equal, round, and reactive to light. Neck: Normal range of motion. Neck supple. No JVD present. Cardiovascular: Normal rate , normal heart sounds and intact distal pulses. Pulmonary/Chest: Effort normal and breath sounds normal. No respiratory distress. No wheezes. No rales. Abdominal: Soft. Bowel sounds are normal. No distension. There is no tenderness. Musculoskeletal: Normal range of motion. No edema. Neurological: Alert and oriented to person, place, and time. No cranial nerve deficit. Coordination normal.   Skin: Skin is warm and dry. Psychiatric: Normal mood and affect.        No results found for: CKTOTAL, CKMB, CKMBINDEX    Lab Results   Component Value Date    WBC 5.6 04/15/2021    RBC 4.65 04/15/2021    RBC 4.77 02/01/2012    HGB 14.1 04/15/2021    HCT 43.9 04/15/2021    MCV 94.4 04/15/2021    MCH 30.3 04/15/2021    MCHC 32.1 04/15/2021    RDW 13.7 05/25/2018     04/15/2021    MPV 9.2 04/15/2021       Lab Results   Component Value Date     08/30/2021    K 4.8 08/03/2021    K 4.9 11/20/2019    CL 96 08/03/2021    CO2 24 08/03/2021    BUN 12 08/03/2021    LABALBU 4.6 04/15/2021 CREATININE 0.9 08/03/2021    CALCIUM 9.6 08/03/2021    LABGLOM 83 08/03/2021    GLUCOSE 128 08/03/2021    GLUCOSE 121 02/01/2012       Lab Results   Component Value Date    ALKPHOS 120 04/15/2021    ALT 38 04/15/2021    AST 28 04/15/2021    PROT 7.1 04/15/2021    BILITOT 0.7 04/15/2021    BILIDIR 0.2 10/15/2014    LABALBU 4.6 04/15/2021       Lab Results   Component Value Date    MG 1.9 01/30/2018       Lab Results   Component Value Date    INR 1.03 11/20/2019    INR 0.85 11/19/2013         Lab Results   Component Value Date    LABA1C 6.6 08/03/2021       Lab Results   Component Value Date    TRIG 59 04/15/2021    HDL 53 04/15/2021    LDLCALC 60 04/15/2021       Lab Results   Component Value Date    TSH 2.860 04/15/2021         Testing Reviewed:      I haveindividually reviewed the below cardiac tests    EKG:    ECHO:   Results for orders placed during the hospital encounter of 03/30/17   ECHO Complete 2D W Doppler W Color    Narrative Transthoracic Echocardiography Report (TTE)     Demographics      Patient Name    Aries Thomas  Gender               Male                   D      MR #            [id number]          Race                                                          Ethnicity      Account #       [de-identified]            Room Number      Accession       [id number]          Date of Study        03/30/2017   Number      Date of Birth   1950         Referring Physician  Denver Amble MD      Age             78 year(s)         Sonographer          Benitez Lock RDCS                                         Interpreting         Sarah Velez DO                                      Physician     Procedure    Type of Study      TTE procedure:ECHOCARDIOGRAM COMPLETE 2D W DOPPLER W COLOR.      Procedure Date  Date: 03/30/2017 Start: 11:00 AM    Study Location: Echo Lab  Technical Quality: Adequate visualization    Indications:Shortness of breath. Additional Medical History:Coronary artery disease, Hypertension,  Hyperlipidemia, GERD, Diabetes, Paroxysmal atrial fib, Arthritis, Asthma,  Remote stroke, Pacemaker, Family history of heart disease, Former smoker    Patient Status: Routine    Height: 71 inches Weight: 220 pounds BSA: 2.2 m^2 BMI: 30.68 kg/m^2    BP: 142/78 mmHg     Conclusions      Summary   Systolic function was normal.   Ejection fraction is visually estimated at 55%. Tricuspid valve is structurally normal.   Mild tricuspid regurgitation visualized. Signature      ----------------------------------------------------------------   Electronically signed by Eppie Goltz DO (Interpreting   physician) on 03/30/2017 at 10:10 PM   ----------------------------------------------------------------      Findings      Mitral Valve   Structurally normal mitral valve. Trace mitral regurgitation is present. Aortic Valve   The aortic valve appears to be trileaflet with good leaflet separation. Aortic valve leaflets are Mildly calcified. Tricuspid Valve   Tricuspid valve is structurally normal.   Mild tricuspid regurgitation visualized. Pulmonic Valve   Pulmonic valve is structurally normal.   Trivial pulmonic regurgitation visualized. Left Atrium   Normal size left atrium. Left Ventricle   Systolic function was normal.   Ejection fraction is visually estimated at 55%. Right Atrium   The right atrium is of normal size. Right Ventricle   Normal right ventricular size and function. Pericardial Effusion   There is a trivial pericardial effusion noted.      M-Mode/2D Measurements & Calculations      LV Diastolic    LV Systolic Dimension: 3.2  AV Cusp Separation: 2.2 cmLA   Dimension: 5.1  cm                          Dimension: 3.7 cmAO Root   cm              LV Volume Diastolic: 751 ml Dimension: 2.8 cm   LV FS:37.3 %    LV Volume Systolic: 41 ml   LV PW LV EDV/LV EDV Index: 524   Diastolic: 1.2  AZ/83 L^4MH ESV/LV ESV   cm              Index: 41 ml/19 m^2         RV Diastolic Dimension: 2.9 cm   Septum          EF Calculated: 28.1 %   Diastolic: 1.2                              LA/Aorta: 1.32   cm     Doppler Measurements & Calculations      MV Peak E-Wave: 85.4 cm/s  AV Peak Velocity: 130 LVOT Peak Velocity: 94.8   MV Peak A-Wave: 79.5 cm/s  cm/s                  cm/s   MV E/A Ratio: 1.07         AV Peak Gradient:     LVOT Peak Gradient: 4   MV Peak Gradient: 2.92     6.76 mmHg             mmHg   mmHg                                                    TV Peak E-Wave: 47.4 cm/s   MV Deceleration Time: 190                        TV Peak A-Wave: 36 cm/s   msec                                                    TV Peak Gradient: 0.9                                                    mmHg   MV E' Septal Velocity:                           TR Velocity:207 cm/s   4.97 cm/s                  AV DVI (Vmax):0.73    TR Gradient:17.14 mmHg   MV A' Septal Velocity:                           PV Peak Velocity: 54.8   5.07 cm/s                                        cm/s   MV E' Lateral Velocity:                          PV Peak Gradient: 1.2   6.34 cm/s                                        mmHg   MV A' Lateral Velocity:   6.63 cm/s   E/E' septal: 17.18   E/E' lateral: 13.47   MR Velocity: 259 cm/s     [id number]  [id number]       STRESS:    CATH:    Assessment/Plan       Diagnosis Orders   1.  Atrial fibrillation, unspecified type (HCC)       Afib on Xarelto  PPM  DM  HTN  HLD  HARINDER on CPAP  Asthma    No cardiac symptoms  Reports good exercise tolerance  Consistent with CPAP  On Xarelto , no bleeding, no side effects, continue for now  [id number]:3, high risk  Continue Aspirin, statin, amlodipine  EKG shows A-S, V-Paced rhythm  The patient is asked to make an attempt to improve diet and exercise patterns to aid in medical management of this problem. Advised more plant based nutrition/meditarrean diet   Advised patient to call office or seek immediate medical attention if there is any new onset of  any chest pain, sob, palpitations, lightheadedness, dizziness, orthopnea, PND or pedal edema. All medication side effects were discussed in details. Thank youfor allowing me to participate in the care of this patient. Please do not hesitate to contact me for any further questions. Return in about 1 year (around 9/8/2022), or if symptoms worsen or fail to improve, for Regular follow up, Review testing.        Electronically signed by Diamond Phoenix, MD [facility]  9/8/2021 at 1:21 PM EST

## 2021-09-21 ENCOUNTER — OFFICE VISIT (OUTPATIENT)
Dept: UROLOGY | Age: 71
End: 2021-09-21
Payer: MEDICARE

## 2021-09-21 VITALS
WEIGHT: 211 LBS | BODY MASS INDEX: 29.54 KG/M2 | HEIGHT: 71 IN | DIASTOLIC BLOOD PRESSURE: 68 MMHG | SYSTOLIC BLOOD PRESSURE: 122 MMHG

## 2021-09-21 DIAGNOSIS — N13.8 BPH WITH OBSTRUCTION/LOWER URINARY TRACT SYMPTOMS: Primary | ICD-10-CM

## 2021-09-21 DIAGNOSIS — N40.1 BPH WITH OBSTRUCTION/LOWER URINARY TRACT SYMPTOMS: Primary | ICD-10-CM

## 2021-09-21 DIAGNOSIS — R97.20 ELEVATED PSA: ICD-10-CM

## 2021-09-21 PROCEDURE — G8417 CALC BMI ABV UP PARAM F/U: HCPCS | Performed by: NURSE PRACTITIONER

## 2021-09-21 PROCEDURE — G8427 DOCREV CUR MEDS BY ELIG CLIN: HCPCS | Performed by: NURSE PRACTITIONER

## 2021-09-21 PROCEDURE — 4040F PNEUMOC VAC/ADMIN/RCVD: CPT | Performed by: NURSE PRACTITIONER

## 2021-09-21 PROCEDURE — 99214 OFFICE O/P EST MOD 30 MIN: CPT | Performed by: NURSE PRACTITIONER

## 2021-09-21 PROCEDURE — 1123F ACP DISCUSS/DSCN MKR DOCD: CPT | Performed by: NURSE PRACTITIONER

## 2021-09-21 PROCEDURE — 3017F COLORECTAL CA SCREEN DOC REV: CPT | Performed by: NURSE PRACTITIONER

## 2021-09-21 PROCEDURE — 1036F TOBACCO NON-USER: CPT | Performed by: NURSE PRACTITIONER

## 2021-09-21 RX ORDER — GENTAMICIN SULFATE 40 MG/ML
80 INJECTION, SOLUTION INTRAMUSCULAR; INTRAVENOUS ONCE
Qty: 2 ML | Refills: 0 | Status: SHIPPED | OUTPATIENT
Start: 2021-09-21 | End: 2021-09-21

## 2021-09-21 RX ORDER — CIPROFLOXACIN 500 MG/1
500 TABLET, FILM COATED ORAL 2 TIMES DAILY
Qty: 6 TABLET | Refills: 0 | Status: SHIPPED | OUTPATIENT
Start: 2021-09-21 | End: 2021-09-24

## 2021-09-21 NOTE — PROGRESS NOTES
100 UNIT/ML injection pen Inject 33 Units into the skin nightly 5 pen 11    DULoxetine (CYMBALTA) 60 MG extended release capsule Take 60 mg by mouth daily      metFORMIN (GLUCOPHAGE XR) 500 MG extended release tablet Take 2 tablets by mouth 2 times daily (Patient taking differently: Take 500 mg by mouth 2 times daily ) 60 tablet 11    blood glucose test strips (ACCU-CHEK ERVIN) strip 1 each by In Vitro route 2 times daily As needed. 100 each 3    Lancets MISC Testing  each 3    vitamin D (CHOLECALCIFEROL) 1000 UNIT TABS tablet Take 1 tablet by mouth 2 times daily 60 tablet 11    Omega-3 Fatty Acids (FISH OIL) 500 MG CAPS Take by mouth 2 times daily      loratadine (CLARITIN) 10 MG tablet Take 10 mg by mouth daily      rivaroxaban (XARELTO) 20 MG TABS tablet Take 20 mg by mouth daily (with breakfast)      gabapentin (NEURONTIN) 300 MG capsule Take 300 mg by mouth 2 times daily .  amLODIPine (NORVASC) 5 MG tablet TAKE 1 TABLET BY MOUTH DAILY (Patient taking differently: Take 5 mg by mouth daily ) 90 tablet 3    atorvastatin (LIPITOR) 20 MG tablet take 1 tablet by mouth once daily (Patient taking differently: 40 mg take 1 tablet by mouth once daily) 90 tablet 3    omeprazole (PRILOSEC) 40 MG capsule Take 1 capsule by mouth daily (Patient taking differently: Take 20 mg by mouth 2 times daily ) 90 capsule 3    propranolol (INDERAL LA) 120 MG CR capsule Take 120 mg by mouth 2 times daily       montelukast (SINGULAIR) 10 MG tablet take 1 tablet by mouth once daily 90 tablet 3    b complex vitamins capsule Take 1 capsule by mouth 2 times daily      BUSPIRONE HCL PO Take 10 mg by mouth 3 times daily Take 2 tablet by mouth 3 times daily Disp 270 tablets  R-3      fluticasone (FLONASE) 50 MCG/ACT nasal spray USE 2 SPRAYS NASALLY DAILY 3 Bottle 3    aspirin 81 MG EC tablet Take 81 mg by mouth daily. No current facility-administered medications for this visit.        Past Medical History  Junior Corona  has a past medical history of Asthma, [employer] dual pacemaker, Bronchitis, chronic ([address] Carotid artery stenosis, Chickenpox, COPD with asthma ([address] CVA (cerebral infarction), GERD (gastroesophageal reflux disease), Hemorrhoids, Hyperlipidemia, Hypertension, Mild intermittent asthma without complication, Nausea & vomiting, Neuropathy, Osteoarthritis, Peripheral neuropathy, Post traumatic stress disorder (PTSD), S/P cardiac catheterization: 11/20/2013: No obstructive lesions. Moderate LI's in the mid LAD and in the RCA., Tremor of both hands, Type II or unspecified type diabetes mellitus without mention of complication, not stated as uncontrolled, and Unspecified sleep apnea. Past Surgical History  The patient  has a past surgical history that includes Appendectomy (age 14); Tonsillectomy and adenoidectomy (as a child ); Pacemaker insertion (2000/2010); Colonoscopy (2005); Cardiac catheterization (11/2013); Hand surgery (Right, 01/19/2017); Carpal tunnel release (Right, 01/2017); eye surgery; eye surgery (Bilateral, 01/2017); Mandible surgery (N/A, 05/2017); Blepharoplasty (Bilateral, 05/2017); and Pacemaker insertion (11/20/2019). Family History  This patient's family history includes Cancer in his paternal grandfather; Diabetes in his mother; Heart Disease in his father and mother; High Blood Pressure in his father and mother; High Cholesterol in his father and mother; Kidney Disease in his mother; Stroke in his father. Social History  Junior Corona  reports that he quit smoking about 49 years ago. His smoking use included cigarettes. He has a 12.00 pack-year smoking history. He has quit using smokeless tobacco. He reports current drug use. Drug: Marijuana. He reports that he does not drink alcohol. Subjective:     Review of Systems  No problems with ears, nose or throat. No problems with eyes.   No chest pain, shortness of breath, abdominal pain, extremity pain or weakness, and no neurological deficits. No rashes.  symptoms per HPI. The remainder of the review of symptoms is negative. Objective:     PE:   Vitals:    09/21/21 1015   BP: 122/68   Weight: 211 lb (95.7 kg)   Height: 5' 11\" (1.803 m)       Constitutional: Alert and oriented times 3, no acute distress and cooperative to examination with appropriate mood and affect. HENT:   Head:        Normocephalic and atraumatic. Mouth/Throat:         Mucous membranes are normal.   Eyes:         EOM are normal. No scleral icterus. PERRLA. Neck:        Supple, symmetrical, trachea midline  Pulmonary/Chest:      Chest symmetric with normal A/P diameter,  Normal respiratory rate and rhthym. No use of accessory muscles. Abdominal:         Soft. No tenderness. Bowel sounds present. GAVIN last visit revealed right sided firmness  Musculoskeletal:         Normal range of motion. No edema or tenderness of lower extremities. Extremities: No cyanosis, clubbing, or edema present. Neurological:        Alert and oriented. Uses cane  Psychiatric:        Normal mood and affect      Labs   Urine dip demonstrates   No results found for this visit on 09/21/21. Patients recent PSA values are as follows  Lab Results   Component Value Date    PSA 3.50 (H) 04/15/2021    PSA 3.26 (H) 01/24/2021    PSA 3.98 (H) 10/28/2020        Recent BUN/Creatinine:  Lab Results   Component Value Date    BUN 12 08/03/2021    CREATININE 0.9 08/03/2021       Radiology  No recent imaging       Assessment & Plan:     BPH w/LUTS  Elevated PSA  Family history of prostate cancer  Exposure to agent orange    Alexus f/u for BPH. He is doing well, LUTS are stable, he remains on Flomax, tolerating well.     +family history of prostate cancer  PSA jumped up to 5 in March, 4.5 most recently. We had long discussion about prostate cancer and biopsy.  In the past he was caring for his wife while she recovered from back surgery,  he is sole caretaker  He is ready to proceed with biopsy and would like to do so. Denies any new bone pain or abdominal pain    We will schedule Pamela Ortiz for a prostate biopsy in the office in the next couple weeks. He was given a prescription for Cipro 500 mg to be taken BID the day before, day of, and day after the biopsy. Gentamicin was sent to his pharmacy. He will bring this to the biopsy appointment for IM injection. He will do an enema the morning of the biopsy. He will stop all anti-coagulants a few days prior to the biopsy. The patient is aware to seek treatment if he would develop a fever or chills the night of the biopsy. He will return a few weeks after the biopsy for results and further recommendations.       Cipro and gent sent to [address] [place] [address]  Urology

## 2021-09-23 ENCOUNTER — TELEPHONE (OUTPATIENT)
Dept: UROLOGY | Age: 71
End: 2021-09-23

## 2021-09-23 NOTE — TELEPHONE ENCOUNTER
Patient scheduled for a Transrectal ultrasound with prostate biopsy in the office with Dr Michael Branham on 10/19/21. Patient will need to hold the Eliquis 3 days prior and the aspirin 5 days prior. Will that be ok?

## 2021-09-23 NOTE — TELEPHONE ENCOUNTER
PROSTATE ULTRASOUND/BIOPSY WITH DR Gerri Melo      1950    You are scheduled for the above procedure on:    10/19/21   at:    3:00 PM  Your follow up appointment for your biopsy results is on:    10/29/21     At:   12:15 PM    Please note that you will be responsible for any charges that are not paid by your insurance. The prostate biopsy specimens are sent to a Lab and their charges are billed to you separate from the office charges. DESCRIPTION OF PROSTATIC ULTRASOUND AND BIOPSY  Ultrasound uses harmless sound waves to give us pictures of the prostate, and allows us to accurately guide a biopsy needle to areas of concern. The procedure is done in the office. Initially, a complete prostate exam is done. Next the ultrasound probe, finger-like in size and shape, is placed into the rectum. With slight movement of the probe, many different views are obtained. A prostate block may or may not be given at this time. A spring loaded fine needle is place through the probe and directed into the prostate. Six or more biopsies are usually taken. These biopsies are not usually painful. The entire exam takes 20-30 minutes. POSSIBLE RISKS OF THE PROCEDURE  Blood may be noted in the stool and urine for a few days. Blood may be seen in the semen for up to a month. Infection of the prostate or in the urine can occur even with antibiotic preparation. You should call if you develop fever, chills, severe pain, or have continuous or significant bleeding. If you have known hemorrhoids, you may have more bleeding and discomfort in the rectal area following the biopsy. This may last for 3-5 days afterwards. If any concerns arise, please call the office. PREPARATION  1.  DO NOT TAKE: ASPIRIN, MOTRIN, PLAVIX, IBUPROFEN, MOBIC/ MELOXICAM, COUMADIN, FISH OIL, AND VITAMIN E FOR 5 DAYS BEFORE THE BIOPSY AND 3 DAYS AFTER THE BIOPSY.   YOU MAY TAKE TYLENOL IF

## 2021-09-30 ENCOUNTER — NURSE ONLY (OUTPATIENT)
Dept: LAB | Age: 71
End: 2021-09-30

## 2021-09-30 ENCOUNTER — NURSE ONLY (OUTPATIENT)
Dept: FAMILY MEDICINE CLINIC | Age: 71
End: 2021-09-30
Payer: MEDICARE

## 2021-09-30 DIAGNOSIS — D50.9 IRON DEFICIENCY ANEMIA, UNSPECIFIED IRON DEFICIENCY ANEMIA TYPE: Primary | ICD-10-CM

## 2021-09-30 DIAGNOSIS — E87.1 LOW SODIUM LEVELS: ICD-10-CM

## 2021-09-30 LAB — SODIUM BLD-SCNC: 135 MEQ/L (ref 135–145)

## 2021-09-30 PROCEDURE — 96372 THER/PROPH/DIAG INJ SC/IM: CPT | Performed by: FAMILY MEDICINE

## 2021-09-30 RX ORDER — CYANOCOBALAMIN 1000 UG/ML
1000 INJECTION INTRAMUSCULAR; SUBCUTANEOUS ONCE
Status: COMPLETED | OUTPATIENT
Start: 2021-09-30 | End: 2021-09-30

## 2021-09-30 RX ADMIN — CYANOCOBALAMIN 1000 MCG: 1000 INJECTION INTRAMUSCULAR; SUBCUTANEOUS at 10:17

## 2021-10-08 ENCOUNTER — PROCEDURE VISIT (OUTPATIENT)
Dept: CARDIOLOGY CLINIC | Age: 71
End: 2021-10-08
Payer: MEDICARE

## 2021-10-08 DIAGNOSIS — Z95.0 PACEMAKER: Primary | ICD-10-CM

## 2021-10-08 PROCEDURE — 93294 REM INTERROG EVL PM/LDLS PM: CPT | Performed by: INTERNAL MEDICINE

## 2021-10-08 PROCEDURE — 93296 REM INTERROG EVL PM/IDS: CPT | Performed by: INTERNAL MEDICINE

## 2021-10-08 NOTE — PROGRESS NOTES
DR VICENTE PT / KNOWN AFIB /XARELTO   AFIB BURDEN 3%  [employer] DUAL PACEMAKER REMOTE  BATTERY 5 YRS REMAINING  ATRIAL IMPEDENCE 472  VENT IMPEDENCE 671  P WAVES 3.2  RV WAVES NOT OTAINED  NO THRESHOLDS OBTAINED PER THE DEVICE  A PACED 31%  V PACED 100%  DDDR         9/9/21 14 BTS NS VT 13 SECONDS  7/31/21 11 BTS NS VT 15 SECONDS

## 2021-10-19 ENCOUNTER — PROCEDURE VISIT (OUTPATIENT)
Dept: UROLOGY | Age: 71
End: 2021-10-19
Payer: MEDICARE

## 2021-10-19 VITALS — BODY MASS INDEX: 29.54 KG/M2 | WEIGHT: 211 LBS | HEIGHT: 71 IN | RESPIRATION RATE: 12 BRPM

## 2021-10-19 DIAGNOSIS — R97.20 ELEVATED PSA: ICD-10-CM

## 2021-10-19 DIAGNOSIS — N13.8 BPH WITH OBSTRUCTION/LOWER URINARY TRACT SYMPTOMS: Primary | ICD-10-CM

## 2021-10-19 DIAGNOSIS — N40.1 BPH WITH OBSTRUCTION/LOWER URINARY TRACT SYMPTOMS: Primary | ICD-10-CM

## 2021-10-19 PROCEDURE — 55700 PR BIOPSY OF PROSTATE,NEEDLE/PUNCH: CPT | Performed by: UROLOGY

## 2021-10-19 PROCEDURE — 96372 THER/PROPH/DIAG INJ SC/IM: CPT | Performed by: UROLOGY

## 2021-10-19 PROCEDURE — 76872 US TRANSRECTAL: CPT | Performed by: UROLOGY

## 2021-10-19 RX ORDER — GENTAMICIN SULFATE 40 MG/ML
80 INJECTION, SOLUTION INTRAMUSCULAR; INTRAVENOUS ONCE
Status: COMPLETED | OUTPATIENT
Start: 2021-10-19 | End: 2021-10-19

## 2021-10-19 RX ADMIN — GENTAMICIN SULFATE 80 MG: 40 INJECTION, SOLUTION INTRAMUSCULAR; INTRAVENOUS at 15:26

## 2021-10-19 NOTE — PROGRESS NOTES
Mr. Yajaira Almaguer was seen in follow up for his prostate biopsy. The biopsy was indicated and is being performed. The biopsy was done without difficulty or complication. TRUS prostate biopsy note:  After obtaining informed consent, the rectal ultrasound probe was passed per rectum and the prostate visualized. A local block was performed by instilling 2% lidocaine at the base Measurements were taken and the prostate measured for a total volume of 75 cc. At this point, prostate biopsy was performed. Two cores were taken at the base, the mid-portion, and the apex of the gland on either side for a total of 12 cores. The rectal probe was removed and there was minimal bleeding. Mr. Yajaira Almaguer tolerated the procedure well and there were no complications. Prostate size: 75 cc  Cores SDGWQ:67  Complications: none      Assessment:      Prostate biopsy performed without difficulty. Plan:        I will see Florinda Ko back to discuss the pathology in 1-2 weeks. Further recommendations will be based on these results.

## 2021-10-19 NOTE — PROGRESS NOTES
Procedure: Trus/Biopsy  Pt name and birth date verified Yes  Patient agrees Dr. Carmita Bruner is taking biopsies of the prostate Yes  Patient took Enema 2 hours prior to procedure Yes  Patient took 2 pill(s) of Cipro day before procedure, day of, and will the day after Yes  Has patient eaten today? Yes  Patient stopped all blood thinners prior to surgery Yes      Patient has given me verbal consent to perform Gentamicin Injection Yes    Following Dr. Casandra Bedolla of care.   Gentamicin 80MG/2ML GIVEN I.M right UOQ HIP  Lot Number: [id number]  Expiration Date: 01/01/2022  [address] #: [id number]    Patient supplied their own medications Yes

## 2021-10-26 NOTE — PROGRESS NOTES
Noted, continue to monitor OB ED Note    CC: cramping abdominal pain    HPI: Diane Hernandez is a 36 yo  who presents at 26w2d with complaints of bilateral cramping lower abdominal pain, intermittent for the past 24 hours. She denies bleeding or LOF. She reports fetal movement. She has a history of prior CDx3. She denies any bowel or bladder changes.     ROS: all other systems were reviewed and found to be negative    General: NAD, sitting up comfortably  Abdomen: gravid, non tender to palpation, no rebound or guarding, no peritoneal signs  Extremities: no edema    FHT: baseline 140's, moderate variability present, accelerations present, decelerations absent  Harper: quiescent    CL: 3.4cm and closed    UA: negative for nitrites or leukocytes    Assessment   IUP at 26w2d  Threatened  labor    Plan: No evidence of  labor. Discussed symptomatic management of physiologic discomforts of pregnancy in the second trimester. Stable for discharge home and follow up in clinic as previously scheduled.     Keri Chowdhury M.D.

## 2021-10-27 ENCOUNTER — NURSE ONLY (OUTPATIENT)
Dept: FAMILY MEDICINE CLINIC | Age: 71
End: 2021-10-27
Payer: MEDICARE

## 2021-10-27 DIAGNOSIS — D50.9 IRON DEFICIENCY ANEMIA, UNSPECIFIED IRON DEFICIENCY ANEMIA TYPE: Primary | ICD-10-CM

## 2021-10-27 PROCEDURE — 96372 THER/PROPH/DIAG INJ SC/IM: CPT | Performed by: FAMILY MEDICINE

## 2021-10-27 RX ORDER — CYANOCOBALAMIN 1000 UG/ML
1000 INJECTION INTRAMUSCULAR; SUBCUTANEOUS ONCE
Status: COMPLETED | OUTPATIENT
Start: 2021-10-27 | End: 2021-10-27

## 2021-10-27 RX ADMIN — CYANOCOBALAMIN 1000 MCG: 1000 INJECTION INTRAMUSCULAR; SUBCUTANEOUS at 10:11

## 2021-11-01 LAB
AVERAGE GLUCOSE: NORMAL
HBA1C MFR BLD: 7 %

## 2021-11-02 ENCOUNTER — OFFICE VISIT (OUTPATIENT)
Dept: UROLOGY | Age: 71
End: 2021-11-02
Payer: MEDICARE

## 2021-11-02 VITALS — WEIGHT: 215 LBS | BODY MASS INDEX: 30.1 KG/M2 | HEIGHT: 71 IN

## 2021-11-02 DIAGNOSIS — N40.1 BPH WITH OBSTRUCTION/LOWER URINARY TRACT SYMPTOMS: Primary | ICD-10-CM

## 2021-11-02 DIAGNOSIS — N13.8 BPH WITH OBSTRUCTION/LOWER URINARY TRACT SYMPTOMS: Primary | ICD-10-CM

## 2021-11-02 DIAGNOSIS — R97.20 ELEVATED PSA: ICD-10-CM

## 2021-11-02 PROCEDURE — 4040F PNEUMOC VAC/ADMIN/RCVD: CPT | Performed by: UROLOGY

## 2021-11-02 PROCEDURE — G8427 DOCREV CUR MEDS BY ELIG CLIN: HCPCS | Performed by: UROLOGY

## 2021-11-02 PROCEDURE — 1036F TOBACCO NON-USER: CPT | Performed by: UROLOGY

## 2021-11-02 PROCEDURE — G8484 FLU IMMUNIZE NO ADMIN: HCPCS | Performed by: UROLOGY

## 2021-11-02 PROCEDURE — 99214 OFFICE O/P EST MOD 30 MIN: CPT | Performed by: UROLOGY

## 2021-11-02 PROCEDURE — 3017F COLORECTAL CA SCREEN DOC REV: CPT | Performed by: UROLOGY

## 2021-11-02 PROCEDURE — G8417 CALC BMI ABV UP PARAM F/U: HCPCS | Performed by: UROLOGY

## 2021-11-02 PROCEDURE — 1123F ACP DISCUSS/DSCN MKR DOCD: CPT | Performed by: UROLOGY

## 2021-11-02 NOTE — PROGRESS NOTES
[facility] [address] [id number]  Dept: [phone]  Dept Fax: [phone]: [address] [address] Urology Office Note      Patient:  Tripp Wahl  YOB: 1950    The patient is a 66 y.o. male who presents today for evaluation of the following problems:   Chief Complaint   Patient presents with    Elevated PSA     pbx results         HISTORY OF PRESENT ILLNESS:     BPH  Followed by Camilla Putnam  On flomax  Overall stable    Elevated PSA   Here for biopsy results- focal hgpin  FINAL DIAGNOSIS:   A. Prostate, left base, biopsy:             Negative for malignancy. B.  Prostate, left mid, biopsy:             Negative for malignancy. C. Prostate, left apex, biopsy:    Negative for malignancy. D. Prostate, right base, biopsy:    Focal high-grade prostatic intraepithelial neoplasia (PIN). E.  Prostate, right mid, biopsy:             Negative for malignancy. F.  Prostate, right apex, biopsy:             Negative for malignancy. Summary of Previous Records:  Vandana Mcgraw f/u for BPH. He is doing well, LUTS are stable, he remains on Flomax, tolerating well.     +family history of prostate cancer  PSA jumped up to 5 in March, 4.5 most recently. We had long discussion about prostate cancer and biopsy. In the past he was caring for his wife while she recovered from back surgery,  he is sole caretaker  He is ready to proceed with biopsy and would like to do so.         Requested/reviewed records from Shannan Garcia MD office and/or outside physician/EMR    (Patient's old records have been requested, reviewed and pertinent findings summarized in today's note.)    Procedures Today: N/A    Last several PSA's:  Lab Results   Component Value Date    PSA 3.50 (H) 04/15/2021    PSA 3.26 (H) 01/24/2021    PSA 3.98 (H) 10/28/2020       Last total testosterone:  No results found for: TESTOSTERONE    Urinalysis today:  No results found for this visit on 11/02/21. Last BUN and creatinine:  Lab Results   Component Value Date    BUN 12 08/03/2021     Lab Results   Component Value Date    CREATININE 0.9 08/03/2021       Imaging Reviewed during this Office Visit:   Azael Bethea MD independently reviewed the images and verified the radiology reports from:    No results found. PAST MEDICAL, FAMILY AND SOCIAL HISTORY:  Past Medical History:   Diagnosis Date    Asthma     [employer] dual pacemaker 4/9/2014    Bronchitis, chronic (HCC)     Carotid artery stenosis     Dr Mellisa Rodriguez    Chickenpox     COPD with asthma ([address] 4/26/2021    CVA (cerebral infarction)     GERD (gastroesophageal reflux disease)     Hemorrhoids     Hyperlipidemia     Hypertension     Mild intermittent asthma without complication 27/1/3789    Nausea & vomiting     [facility]    Osteoarthritis     Peripheral neuropathy     Post traumatic stress disorder (PTSD)     [facility]    S/P cardiac catheterization: 11/20/2013: No obstructive lesions. Moderate LI's in the mid LAD and in the RCA. 11/20/2013 11/20/2013: No obstructive lesions. Moderate LI's in the mid LAD and in the RCA.  Dr. Heart Client Tremor of both hands     [facility]    Type II or unspecified type diabetes mellitus without mention of complication, not stated as uncontrolled     Dr Lin office    Unspecified sleep apnea     Dr Hernandez Knee     Past Surgical History:   Procedure Laterality Date    APPENDECTOMY  age 14    BLEPHAROPLASTY Bilateral 05/2017    CARDIAC CATHETERIZATION  11/2013    no stents    CARPAL TUNNEL RELEASE Right 01/2017    COLONOSCOPY  2005    Dr. Gwen Harvey      right eye clog oil duct    EYE SURGERY Bilateral 01/2017    HAND SURGERY Right 01/19/2017    OIO - carpal tunnel    MANDIBLE SURGERY N/A 05/2017    jaw surgery lower front of mouth    PACEMAKER INSERTION  2000/2010    PACEMAKER INSERTION  11/20/2019    TONSILLECTOMY AND ADENOIDECTOMY  as a child      Family History   Problem Relation Age of Onset    High Cholesterol Father     High Blood Pressure Father     Heart Disease Father     Stroke Father     Diabetes Mother     Heart Disease Mother     High Blood Pressure Mother     High Cholesterol Mother     Kidney Disease Mother     Cancer Paternal Grandfather      Outpatient Medications Marked as Taking for the 11/2/21 encounter (Office Visit) with Amanda Yates MD   Medication Sig Dispense Refill    budesonide-formoterol (SYMBICORT) 160-4.5 MCG/ACT AERO Inhale 2 puffs into the lungs 2 times daily      Insulin Pen Needle 31G X 8 MM MISC 1 each by Does not apply route daily 100 each 3    latanoprost (XALATAN) 0.005 % ophthalmic solution       Cannabinoids (THC FREE PO) Take by mouth Gummi bears, Dr in [facility]      albuterol sulfate HFA (VENTOLIN HFA) 108 (90 Base) MCG/ACT inhaler INHALE 2 PUFFS EVERY 6 HOURS AS NEEDED FOR WHEEZING. 18 g 11    tamsulosin (FLOMAX) 0.4 MG capsule Take 0.4 mg by mouth daily      topiramate (TOPAMAX SPRINKLE) 25 MG capsule Take 25 mg by mouth 2 times daily      insulin detemir (LEVEMIR FLEXTOUCH) 100 UNIT/ML injection pen Inject 33 Units into the skin nightly 5 pen 11    DULoxetine (CYMBALTA) 60 MG extended release capsule Take 60 mg by mouth daily      metFORMIN (GLUCOPHAGE XR) 500 MG extended release tablet Take 2 tablets by mouth 2 times daily (Patient taking differently: Take 500 mg by mouth 2 times daily ) 60 tablet 11    blood glucose test strips (ACCU-CHEK ERVIN) strip 1 each by In Vitro route 2 times daily As needed.  100 each 3    Lancets MISC Testing  each 3    vitamin D (CHOLECALCIFEROL) 1000 UNIT TABS tablet Take 1 tablet by mouth 2 times daily 60 tablet 11    Omega-3 Fatty Acids (FISH OIL) 500 MG CAPS Take by mouth 2 times daily      loratadine (CLARITIN) 10 MG tablet Take 10 mg by mouth daily      rivaroxaban (XARELTO) 20 MG TABS tablet Take 20 mg by mouth daily (with breakfast)       gabapentin (NEURONTIN) 300 MG capsule Take 300 mg by mouth 2 times daily .       amLODIPine (NORVASC) 5 MG tablet TAKE 1 TABLET BY MOUTH DAILY (Patient taking differently: Take 5 mg by mouth daily ) 90 tablet 3    atorvastatin (LIPITOR) 20 MG tablet take 1 tablet by mouth once daily (Patient taking differently: 40 mg take 1 tablet by mouth once daily) 90 tablet 3    omeprazole (PRILOSEC) 40 MG capsule Take 1 capsule by mouth daily (Patient taking differently: Take 20 mg by mouth 2 times daily ) 90 capsule 3    propranolol (INDERAL LA) 120 MG CR capsule Take 120 mg by mouth 2 times daily       montelukast (SINGULAIR) 10 MG tablet take 1 tablet by mouth once daily 90 tablet 3    b complex vitamins capsule Take 1 capsule by mouth 2 times daily      BUSPIRONE HCL PO Take 10 mg by mouth 3 times daily Take 2 tablet by mouth 3 times daily Disp 270 tablets  R-3      fluticasone (FLONASE) 50 MCG/ACT nasal spray USE 2 SPRAYS NASALLY DAILY 3 Bottle 3    aspirin 81 MG EC tablet Take 81 mg by mouth daily           Latex, Doxycycline calcium [doxycycline calcium], Lactose, Nasacort [triamcinolone], and Wellbutrin [bupropion]  Social History     Tobacco Use   Smoking Status Former Smoker    Packs/day: 2.00    Years: 6.00    Pack years: 12.00    Types: Cigarettes    Quit date: 1971    Years since quittin.0   Smokeless Tobacco Former User      (If patient a smoker, smoking cessation counseling offered)   Social History     Substance and Sexual Activity   Alcohol Use No    Alcohol/week: 0.0 standard drinks       REVIEW OF SYSTEMS:  Constitutional: negative  Eyes: negative  Respiratory: negative  Cardiovascular: negative  Gastrointestinal: negative  Genitourinary: see HPI  Musculoskeletal: negative  Skin: negative   Neurological: negative  Hematological/Lymphatic: negative  Psychological: negative        Physical Exam:    This a 66 y.o. male  There were no vitals filed for this visit. Body mass index is 29.99 kg/m². Constitutional: Patient in no acute distress;         Assessment and Plan        1. BPH with obstruction/lower urinary tract symptoms    2. Elevated PSA               Plan:      BPH- cont flomax and meds. Stable. Elevated psa- new finding of high grade PIN. Will follow with PSA every six months. Prescriptions Ordered:  No orders of the defined types were placed in this encounter.      Orders Placed:  Orders Placed This Encounter   Procedures    PSA Prostatic Specific Antigen     Standing Status:   Future     Standing Expiration Date:   11/2/2022            Josias Velasquez MD

## 2021-11-03 ENCOUNTER — OFFICE VISIT (OUTPATIENT)
Dept: FAMILY MEDICINE CLINIC | Age: 71
End: 2021-11-03
Payer: MEDICARE

## 2021-11-03 VITALS
SYSTOLIC BLOOD PRESSURE: 118 MMHG | HEIGHT: 71 IN | RESPIRATION RATE: 14 BRPM | HEART RATE: 70 BPM | TEMPERATURE: 97.3 F | BODY MASS INDEX: 29.57 KG/M2 | DIASTOLIC BLOOD PRESSURE: 58 MMHG | WEIGHT: 211.2 LBS | OXYGEN SATURATION: 97 %

## 2021-11-03 DIAGNOSIS — G25.0 ESSENTIAL TREMOR: ICD-10-CM

## 2021-11-03 DIAGNOSIS — E78.5 HYPERLIPIDEMIA WITH TARGET LDL LESS THAN 100: ICD-10-CM

## 2021-11-03 DIAGNOSIS — E53.8 VITAMIN B12 DEFICIENCY: ICD-10-CM

## 2021-11-03 DIAGNOSIS — I73.9 PVD (PERIPHERAL VASCULAR DISEASE) (HCC): ICD-10-CM

## 2021-11-03 DIAGNOSIS — C44.622 SQUAMOUS CELL CARCINOMA OF RIGHT UPPER EXTREMITY: ICD-10-CM

## 2021-11-03 DIAGNOSIS — I10 PRIMARY HYPERTENSION: ICD-10-CM

## 2021-11-03 DIAGNOSIS — G62.9 PERIPHERAL POLYNEUROPATHY: ICD-10-CM

## 2021-11-03 DIAGNOSIS — M19.90 ARTHRITIS: ICD-10-CM

## 2021-11-03 DIAGNOSIS — J44.9 COPD WITH ASTHMA (HCC): ICD-10-CM

## 2021-11-03 DIAGNOSIS — F43.10 POST TRAUMATIC STRESS DISORDER: ICD-10-CM

## 2021-11-03 DIAGNOSIS — Z99.89 OBSTRUCTIVE SLEEP APNEA ON CPAP: ICD-10-CM

## 2021-11-03 DIAGNOSIS — Z77.098 AGENT ORANGE EXPOSURE: ICD-10-CM

## 2021-11-03 DIAGNOSIS — H40.89 OTHER GLAUCOMA OF BOTH EYES: ICD-10-CM

## 2021-11-03 DIAGNOSIS — G47.33 OBSTRUCTIVE SLEEP APNEA ON CPAP: ICD-10-CM

## 2021-11-03 DIAGNOSIS — Z98.890 S/P CARDIAC CATHETERIZATION: ICD-10-CM

## 2021-11-03 DIAGNOSIS — E66.9 OBESITY (BMI 30-39.9): ICD-10-CM

## 2021-11-03 DIAGNOSIS — N13.8 BPH WITH OBSTRUCTION/LOWER URINARY TRACT SYMPTOMS: ICD-10-CM

## 2021-11-03 DIAGNOSIS — E11.65 UNCONTROLLED TYPE 2 DIABETES MELLITUS WITH HYPERGLYCEMIA (HCC): Primary | ICD-10-CM

## 2021-11-03 DIAGNOSIS — I48.0 PAROXYSMAL ATRIAL FIBRILLATION (HCC): ICD-10-CM

## 2021-11-03 DIAGNOSIS — E87.1 HYPONATREMIA: ICD-10-CM

## 2021-11-03 DIAGNOSIS — D50.9 IRON DEFICIENCY ANEMIA, UNSPECIFIED IRON DEFICIENCY ANEMIA TYPE: ICD-10-CM

## 2021-11-03 DIAGNOSIS — N40.1 BPH WITH OBSTRUCTION/LOWER URINARY TRACT SYMPTOMS: ICD-10-CM

## 2021-11-03 PROCEDURE — G8427 DOCREV CUR MEDS BY ELIG CLIN: HCPCS | Performed by: FAMILY MEDICINE

## 2021-11-03 PROCEDURE — 3044F HG A1C LEVEL LT 7.0%: CPT | Performed by: FAMILY MEDICINE

## 2021-11-03 PROCEDURE — 99214 OFFICE O/P EST MOD 30 MIN: CPT | Performed by: FAMILY MEDICINE

## 2021-11-03 PROCEDURE — 4040F PNEUMOC VAC/ADMIN/RCVD: CPT | Performed by: FAMILY MEDICINE

## 2021-11-03 PROCEDURE — G8417 CALC BMI ABV UP PARAM F/U: HCPCS | Performed by: FAMILY MEDICINE

## 2021-11-03 PROCEDURE — 1123F ACP DISCUSS/DSCN MKR DOCD: CPT | Performed by: FAMILY MEDICINE

## 2021-11-03 PROCEDURE — 3017F COLORECTAL CA SCREEN DOC REV: CPT | Performed by: FAMILY MEDICINE

## 2021-11-03 PROCEDURE — 3023F SPIROM DOC REV: CPT | Performed by: FAMILY MEDICINE

## 2021-11-03 PROCEDURE — 2022F DILAT RTA XM EVC RTNOPTHY: CPT | Performed by: FAMILY MEDICINE

## 2021-11-03 PROCEDURE — G8926 SPIRO NO PERF OR DOC: HCPCS | Performed by: FAMILY MEDICINE

## 2021-11-03 PROCEDURE — 1036F TOBACCO NON-USER: CPT | Performed by: FAMILY MEDICINE

## 2021-11-03 PROCEDURE — G8484 FLU IMMUNIZE NO ADMIN: HCPCS | Performed by: FAMILY MEDICINE

## 2021-11-03 NOTE — PROGRESS NOTES
[address] [address]  Dept: [phone]  Dept Fax: [phone]  Loc: [phone]    Yonny Palumbo is a 66 y.o. male who presents today for:  Chief Complaint   Patient presents with    3 Month Follow-Up           HPI:     HPI    He is seeing primarily the [place] due to medications and treatment for PTSD but comes here 3 times yearly to go over all the information as well as maintenance of diabetes. PTSD did have a flare during a pacemaker placement when they did not give him enough pain medication. He reports thoughts of hurting the surgeon but did not act on them when redirected by the nurse. He has since worked this through with the counselor at the [place]. He is also flaring a little bit with the current pandemic. Hypertension: Patient here for follow-up of elevated blood pressure. He is not exercising and is adherent to low salt diet. Blood pressure is well controlled at home. Cardiac symptoms none. Patient denies chest pain, dyspnea and palpitations. Cardiovascular risk factors: advanced age (older than 54 for men, 72 for women), dyslipidemia, hypertension and male gender. Use of agents associated with hypertension: none. History of target organ damage: stroke, paroxysmal afib, and CAD diagnosed by cath in 2013 but no stents. Hyperlipidemia: Patient presents with hyperlipidemia. He was tested because hypertension and CVA.   His last labs showed   Lab Results   Component Value Date    CHOL 125 04/15/2021    CHOL 119 03/10/2020    CHOL 117 01/28/2019     Lab Results   Component Value Date    TRIG 59 04/15/2021    TRIG 96 03/10/2020    TRIG 77 01/28/2019     Lab Results   Component Value Date    HDL 53 04/15/2021    HDL 46 03/10/2020    HDL 49 01/28/2019     Lab Results   Component Value Date    LDLCALC 60 04/15/2021    LDLCALC 54 03/10/2020    LDLCALC 53 01/28/2019     No results found for: LABVLDL, VLDL  No results found for: CHOLHDLRATIO  There is not a family history of hyperlipidemia. There is not a family history of early ischemia heart disease. GERD: Rene complains of heartburn. This has been associated with heartburn and hoarseness. He denies belching, difficulty swallowing and melena. Symptoms have been present for several years. He denies dysphagia. He has not lost weight. He denies melena, hematochezia, hematemesis, and coffee ground emesis. Medical therapy in the past has included proton pump inhibitors. Elevated glucose need follow up. He is no longer seeing an endocrinologist for diabetes but his medications are through the [place]. His endocrinologist left town and we are now managing the DM through here until he can find a new endocrinologist.  Fasting and nonfassting blood sugars are . Alexus's feet have  Along history of callus formation from bunion deformities, complicated by PVD. Lab Results   Component Value Date    LABA1C 6.6 (H) 08/03/2021     No results found for: EAG      Being followed currently by the [place] for hypertension, afib, and hyperlipidemia. Vitamin b12  was at the low end of normal but with symptoms of fatigue [facility]. He is feeling better and has also started vit d PO as well. Last b12 level is high and he is instructed to go to every 8 weeks instead of once a month. Lab Results   Component Value Date    [id number] 04/15/2021       Tremor controlled better with current medications. He has seen the neurologist at the [place] who does not think that he has parkinson's but occasionally the tremor does get bad. He recalls once when he spilled his cereal right out of the bowl that he was holding. This is more controlled now. A lot of these conditions are related to agent orange exposure in [facility] as well as a water contamination in [employer]. HARINDER controlled on CPAP.     BPH is under the management of urology. Biopsy was negative 10/19/21. Lab Results   Component Value Date    PSA 3.50 (H) 04/15/2021    PSA 3.26 (H) 01/24/2021    PSA 3.98 (H) 10/28/2020       Low sodium is under watch. Anemia is under regular watch. Reviewed chart forpast medical history , surgical history , allergies, social history , family history and medications. Health Maintenance   Topic Date Due    COVID-19 Vaccine (3 - Pfizer booster) 09/18/2021    DTaP/Tdap/Td vaccine (1 - Tdap) 04/26/2022 (Originally 1/26/2017)    Diabetic foot exam  12/22/2021    Annual Wellness Visit (AWV)  01/15/2022    Diabetic microalbuminuria test  04/15/2022    Lipid screen  04/15/2022    A1C test (Diabetic or Prediabetic)  08/03/2022    Diabetic retinal exam  09/21/2022    Colon cancer screen colonoscopy  10/04/2029    Flu vaccine  Completed    Shingles Vaccine  Completed    Pneumococcal 65+ years Vaccine  Completed    Hepatitis A vaccine  Aged Out    Hib vaccine  Aged Out    Meningococcal (ACWY) vaccine  Aged Out    AAA screen  Discontinued    Hepatitis C screen  Discontinued       Subjective:      Constitutional:Negative for fever, chills, diaphoresis, activity change, appetite change and fatigue. HENT: Negative for hearing loss, ear pain, congestion, sore throat, rhinorrhea, postnasal drip and ear discharge. Eyes: Negative for photophobia and visual disturbance. Respiratory: Negative for cough, chest tightness, shortness of breath and wheezing. Cardiovascular: Negative for chest pain and leg swelling. Gastrointestinal: Negative for nausea, vomiting, abdominal pain, diarrhea and constipation. Genitourinary: Negative for dysuria, urgency and frequency. Neurological: Negative for weakness, light-headedness and headaches.    Psychiatric/Behavioral: Negative for sleep disturbance.      :     Vitals:    11/03/21 1313   BP: (!) 118/58   Site: Left Upper Arm   Position: Sitting   Cuff Size: Medium Adult Pulse: 70   Resp: 14   Temp: 97.3 °F (36.3 °C)   TempSrc: Temporal   SpO2: 97%   Weight: 211 lb 3.2 oz (95.8 kg)   Height: 5' 11\" (1.803 m)     Wt Readings from Last 3 Encounters:   11/03/21 211 lb 3.2 oz (95.8 kg)   11/02/21 215 lb (97.5 kg)   10/19/21 211 lb (95.7 kg)       Physical Exam  Constitutional: Vital signs are normal. He appears well-developed and well-nourished. He is active. HENT:   Head: Normocephalic and atraumatic. Right Ear: Tympanic membrane, external ear and ear canal normal. No drainage or tenderness. Left Ear: Tympanic membrane, external ear and ear canal normal. No drainage or tenderness. Nose: Nose normal. No mucosal edema or rhinorrhea. Mouth/Throat: Uvula is midline, oropharynx is clear and moist and mucous membranes are normal. Mucous membranes are not pale. Normal dentition. No posterior oropharyngeal edema or posterior oropharyngeal erythema. Eyes: Lids are normal. Right eye exhibits no chemosis and no discharge. Left eye exhibits no chemosis and no drainage. Right conjunctiva has no hemorrhage. Left conjunctiva has no hemorrhage. Right eye exhibits normal extraocular motion. Left eye exhibits normal extraocular motion. Right pupil is round and reactive. Left pupil is round and reactive. Pupils are equal.   Cardiovascular: Normal rate, regular rhythm, S1 normal, S2 normal and normal heart sounds. Exam reveals no gallop. No murmur heard. Pulmonary/Chest: Effort normal and breath sounds normal. No respiratory distress. He has no wheezes. He has no rhonchi. He has no rales. Abdominal: Soft. Normal appearance and bowel sounds are normal. He exhibits no distension and no mass. There is no hepatosplenomegaly. No tenderness. He has no rigidity, no rebound and no guarding. No hernia. Musculoskeletal:        Right lower leg: He exhibits no edema. Left lower leg: He exhibits no edema. Neurological: He is alert.  Oriented and pleasent      Assessment/Plan   [facility] was seen today for 3 month follow-up. Diagnoses and all orders for this visit:    Uncontrolled type 2 diabetes mellitus with hyperglycemia ([facility]  -     Comprehensive Metabolic Panel, Fasting; Future  -     Hemoglobin A1C; Future  -     Microalbumin / Creatinine Urine Ratio; Future    Primary hypertension  -     TSH With Reflex Ft4; Future    Hyperlipidemia with target LDL less than 100  -     Comprehensive Metabolic Panel, Fasting; Future  -     Lipid Panel; Future    Paroxysmal atrial fibrillation (HCC)  -     TSH With Reflex Ft4; Future    COPD with asthma ([facility]    Post traumatic stress disorder    Arthritis    S/P cardiac catheterization: 11/20/2013: No obstructive lesions. Moderate LI's in the mid LAD and in the RCA. Obstructive sleep apnea on CPAP    Peripheral polyneuropathy    Essential tremor    BPH with obstruction/lower urinary tract symptoms  -     PSA Prostatic Specific Antigen; Future    Obesity (BMI 30-39. 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 All patient questions answered. Pt voiced understanding. Reviewed health maintenance. Instructed to continue current medications, diet and exercise. Patient agreed with treatment plan. Followup as directed.      Electronically signed by Nitin Gates MD

## 2021-11-09 ENCOUNTER — TELEPHONE (OUTPATIENT)
Dept: FAMILY MEDICINE CLINIC | Age: 71
End: 2021-11-09

## 2021-11-30 ENCOUNTER — NURSE ONLY (OUTPATIENT)
Dept: FAMILY MEDICINE CLINIC | Age: 71
End: 2021-11-30
Payer: MEDICARE

## 2021-11-30 DIAGNOSIS — E53.8 VITAMIN B12 DEFICIENCY: ICD-10-CM

## 2021-11-30 DIAGNOSIS — D50.9 IRON DEFICIENCY ANEMIA, UNSPECIFIED IRON DEFICIENCY ANEMIA TYPE: Primary | ICD-10-CM

## 2021-11-30 PROCEDURE — 96372 THER/PROPH/DIAG INJ SC/IM: CPT | Performed by: FAMILY MEDICINE

## 2021-11-30 RX ORDER — CYANOCOBALAMIN 1000 UG/ML
1000 INJECTION INTRAMUSCULAR; SUBCUTANEOUS ONCE
Status: COMPLETED | OUTPATIENT
Start: 2021-11-30 | End: 2021-11-30

## 2021-11-30 RX ADMIN — CYANOCOBALAMIN 1000 MCG: 1000 INJECTION INTRAMUSCULAR; SUBCUTANEOUS at 10:20

## 2021-11-30 NOTE — PROGRESS NOTES
Administrations This Visit     cyanocobalamin injection 1,000 mcg     Admin Date  11/30/2021  10:20 Action  Given Dose  1,000 mcg Route  IntraMUSCular Site  Dorsogluteal Right Administered By  Yanique Smith CMA ([address] [place] [address])    Ordering Provider: Shannan Garcia MD    NDC: 4442-5777-83    Lot#: [id number]    : [employer]    Patient Supplied?: No                Patient instructed to report any adverse reaction to me immediately.

## 2021-12-21 ENCOUNTER — PROCEDURE VISIT (OUTPATIENT)
Dept: FAMILY MEDICINE CLINIC | Age: 71
End: 2021-12-21
Payer: MEDICARE

## 2021-12-21 VITALS
BODY MASS INDEX: 30.24 KG/M2 | WEIGHT: 216 LBS | SYSTOLIC BLOOD PRESSURE: 177 MMHG | TEMPERATURE: 96.7 F | RESPIRATION RATE: 17 BRPM | OXYGEN SATURATION: 100 % | DIASTOLIC BLOOD PRESSURE: 74 MMHG | HEART RATE: 74 BPM | HEIGHT: 71 IN

## 2021-12-21 DIAGNOSIS — L98.9 SKIN LESION OF SCALP: Primary | ICD-10-CM

## 2021-12-21 PROCEDURE — 11104 PUNCH BX SKIN SINGLE LESION: CPT | Performed by: FAMILY MEDICINE

## 2021-12-23 NOTE — PROGRESS NOTES
SUBJECTIVE:   Sarwat Morfin is a 66 y.o. male who presents for lesion removal. We have already discussed this procedure, including option of not performing surgery, technique of surgery and potential for scarring at a recent visit. OBJECTIVE:   Patient appears well. Vitals are normal.  Skin: changing skin lesion on the top of the scalp    ASSESSMENT:    possible squamous cell carcinoma pigmented uneven, raised, border irregular, pearly edges and asymmetrical size 5 mm noted on the left scalp    PLAN:   After informed consent was obtained, using Betadine for cleansing and 1% Lidocaine with epinephrine for anesthetic, with sterile technique, elliptical excision in total was performed. 4-0 ethilon was used to close the wound with 3 interrupted sutures. Antibiotic dressing is applied, and wound care instructions provided. Be alert for any signs of cutaneous infection. The procedure was well tolerated without complications. Follow up: the specimen is labeled and sent to pathology for evaluation, return for suture removal in 10 days.

## 2021-12-27 ENCOUNTER — TELEPHONE (OUTPATIENT)
Dept: FAMILY MEDICINE CLINIC | Age: 71
End: 2021-12-27

## 2021-12-27 DIAGNOSIS — C44.42 SQUAMOUS CELL CARCINOMA OF SCALP: Primary | ICD-10-CM

## 2021-12-27 NOTE — TELEPHONE ENCOUNTER
----- Message from Ritesh Gaytan, [address] sent at 12/27/2021  9:05 AM EST -----  Patient aware and voiced understanding, no concerns voiced at this time. Pt stated he has no preference on where to go or who to see. Pt said if he could stay in [facility] he prefers that.

## 2021-12-28 ENCOUNTER — NURSE ONLY (OUTPATIENT)
Dept: FAMILY MEDICINE CLINIC | Age: 71
End: 2021-12-28
Payer: MEDICARE

## 2021-12-28 DIAGNOSIS — E53.8 VITAMIN B12 DEFICIENCY: Primary | ICD-10-CM

## 2021-12-28 PROCEDURE — 96372 THER/PROPH/DIAG INJ SC/IM: CPT | Performed by: FAMILY MEDICINE

## 2021-12-28 RX ORDER — CYANOCOBALAMIN 1000 UG/ML
1000 INJECTION INTRAMUSCULAR; SUBCUTANEOUS ONCE
Status: COMPLETED | OUTPATIENT
Start: 2021-12-28 | End: 2021-12-28

## 2021-12-28 RX ADMIN — CYANOCOBALAMIN 1000 MCG: 1000 INJECTION INTRAMUSCULAR; SUBCUTANEOUS at 10:32

## 2021-12-28 NOTE — PROGRESS NOTES
Administrations This Visit     cyanocobalamin injection 1,000 mcg     Admin Date  12/28/2021  10:32 Action  Given Dose  1,000 mcg Route  IntraMUSCular Site  Deltoid Left Administered By  Joe Goldberg, [address] [place] [address]    Ordering Provider: Laura Villegas MD    NDC: 2917-2091-59    Lot#: [id number]    : [employer]    Patient Supplied?: No                Patient instructed to remain in clinic for 20 minutes after injection and was advised to report any adverse reaction to me immediately. Pt tolerated well.

## 2022-01-03 ENCOUNTER — NURSE ONLY (OUTPATIENT)
Dept: FAMILY MEDICINE CLINIC | Age: 72
End: 2022-01-03

## 2022-01-03 PROCEDURE — 99024 POSTOP FOLLOW-UP VISIT: CPT | Performed by: FAMILY MEDICINE

## 2022-01-11 ENCOUNTER — NURSE ONLY (OUTPATIENT)
Dept: CARDIOLOGY CLINIC | Age: 72
End: 2022-01-11
Payer: MEDICARE

## 2022-01-11 DIAGNOSIS — Z95.0 PACEMAKER: Primary | ICD-10-CM

## 2022-01-11 PROCEDURE — 93280 PM DEVICE PROGR EVAL DUAL: CPT | Performed by: INTERNAL MEDICINE

## 2022-01-11 NOTE — PROGRESS NOTES
DR VICENTE PT / KNOWN AFIB/ XARELTO   AFIB BURDEN 3%  SEVERAL EPISODES OF AFIB/ LONGEST 2 SECONDS   10/24/21 17 BTS NS VT   [facility] DUAL PACEMAKER CHECK IN OFFICE     PRESENTS IN ASVP   PT TELLS ME HE IS DEPENDENT AND DOES NOT WANT ME TO CHECK HIS UNDERLYING CAUSE HE PASSES OUT     DDDR     P WAVES 3.8  RV WAVES DEPENDENT     ATRIAL IMPEDENCE 501  VENT IMPEDNECE 735    ATRIAL THRESHOLD 0.7 @ 0.4  VENT THRESHOLD AUTO 1.2 @ 0.4  ATRIAL AND VENT AMPLITUDES 2.5 @ 0.4

## 2022-01-25 ENCOUNTER — NURSE ONLY (OUTPATIENT)
Dept: FAMILY MEDICINE CLINIC | Age: 72
End: 2022-01-25
Payer: MEDICARE

## 2022-01-25 DIAGNOSIS — E53.8 VITAMIN B12 DEFICIENCY: Primary | ICD-10-CM

## 2022-01-25 PROCEDURE — 96372 THER/PROPH/DIAG INJ SC/IM: CPT | Performed by: FAMILY MEDICINE

## 2022-01-25 RX ORDER — CYANOCOBALAMIN 1000 UG/ML
1000 INJECTION INTRAMUSCULAR; SUBCUTANEOUS ONCE
Status: COMPLETED | OUTPATIENT
Start: 2022-01-25 | End: 2022-01-25

## 2022-01-25 RX ADMIN — CYANOCOBALAMIN 1000 MCG: 1000 INJECTION INTRAMUSCULAR; SUBCUTANEOUS at 10:51

## 2022-01-25 NOTE — PROGRESS NOTES
Administrations This Visit     cyanocobalamin injection 1,000 mcg     Admin Date  01/25/2022  10:51 Action  Given Dose  1,000 mcg Route  IntraMUSCular Site  Deltoid Right Administered By  Rashid Garcia MA    Ordering Provider: Jeffrey Hull MD    NDC: 9874-3080-67    Lot#: [id number]    : [employer]    Patient Supplied?: No            Patient tolerated well

## 2022-01-31 ENCOUNTER — NURSE TRIAGE (OUTPATIENT)
Dept: OTHER | Facility: CLINIC | Age: 72
End: 2022-01-31

## 2022-01-31 ENCOUNTER — HOSPITAL ENCOUNTER (OUTPATIENT)
Age: 72
Setting detail: SPECIMEN
Discharge: HOME | End: 2022-01-31
Payer: MEDICARE

## 2022-01-31 ENCOUNTER — TELEPHONE (OUTPATIENT)
Dept: FAMILY MEDICINE CLINIC | Age: 72
End: 2022-01-31

## 2022-01-31 DIAGNOSIS — R05.9 COUGH: Primary | ICD-10-CM

## 2022-01-31 DIAGNOSIS — R52 BODY ACHES: ICD-10-CM

## 2022-01-31 DIAGNOSIS — J02.9 SORE THROAT: ICD-10-CM

## 2022-01-31 DIAGNOSIS — R05.9 COUGH: ICD-10-CM

## 2022-01-31 DIAGNOSIS — R09.81 NASAL CONGESTION: ICD-10-CM

## 2022-01-31 LAB
FLU A ANTIGEN: NEGATIVE
FLU B ANTIGEN: NEGATIVE
GROUP A STREP CULTURE, REFLEX: NEGATIVE
REFLEX THROAT C + S: NORMAL

## 2022-01-31 PROCEDURE — 87880 STREP A ASSAY W/OPTIC: CPT

## 2022-01-31 PROCEDURE — 87804 INFLUENZA ASSAY W/OPTIC: CPT

## 2022-01-31 PROCEDURE — 87070 CULTURE OTHR SPECIMN AEROBIC: CPT

## 2022-01-31 PROCEDURE — U0003 INFECTIOUS AGENT DETECTION BY NUCLEIC ACID (DNA OR RNA); SEVERE ACUTE RESPIRATORY SYNDROME CORONAVIRUS 2 (SARS-COV-2) (CORONAVIRUS DISEASE [COVID-19]), AMPLIFIED PROBE TECHNIQUE, MAKING USE OF HIGH THROUGHPUT TECHNOLOGIES AS DESCRIBED BY CMS-2020-01-R: HCPCS

## 2022-01-31 PROCEDURE — U0005 INFEC AGEN DETEC AMPLI PROBE: HCPCS

## 2022-01-31 RX ORDER — AZITHROMYCIN 250 MG/1
TABLET, FILM COATED ORAL
Qty: 6 TABLET | Refills: 0 | Status: SHIPPED | OUTPATIENT
Start: 2022-01-31 | End: 2022-02-11 | Stop reason: ALTCHOICE

## 2022-01-31 RX ORDER — PREDNISONE 20 MG/1
20 TABLET ORAL DAILY
Qty: 5 TABLET | Refills: 0 | Status: SHIPPED | OUTPATIENT
Start: 2022-01-31 | End: 2022-02-05

## 2022-01-31 NOTE — TELEPHONE ENCOUNTER
COVID test is still pending. Rx for Azithromycin and Prednisone sent in. Prednisone may increase his BG so will need to reduce carbs and increase water intake. Rest, ambulate frequently, and use OTC symptomatic medication. Having a pulse ox monitor would be good.

## 2022-01-31 NOTE — TELEPHONE ENCOUNTER
Received call from [address] at [facility] with Red Flag Complaint. Subjective: Caller states \"I started to have a sore throat last week. Went into a cough/cold. Phlegm is clear. I'm kind of wheezing today and feel extremely weak. \"    Current Symptoms: cough, congestion, wheezing    Onset: last week    Associated Symptoms: history of asthma and sinus problems. Pain Severity: 0/10    Temperature: denies fever    What has been tried: allergy pill, tylenol, arthritis medication    Recommended disposition: See provider today. Care advice provided, patient verbalizes understanding; denies any other questions or concerns; instructed to call back for any new or worsening symptoms. Writer provided warm transfer to Tamanna Ochoa at [facility] for appointment scheduling     Attention Provider: Thank you for allowing me to participate in the care of your patient. The patient was connected to triage in response to information provided to the ECC/PSC. Please do not respond through this encounter as the response is not directed to a shared pool.     Reason for Disposition   MILD difficulty breathing (e.g., minimal/no SOB at rest, SOB with walking, pulse < 100) of new-onset or worse than normal    Protocols used: BREATHING DIFFICULTY-ADULT-OH

## 2022-01-31 NOTE — TELEPHONE ENCOUNTER
Pt called stating he started 1/24 with congested cough, HA, St, SOB with activity and body aches    Pt denies having fever or V/D    Pt is taking Loratidine and Tylenol     Covid, Flu and Strep orders placed  Pt going to [facility] and will call when in parking lot    Any other suggestions?

## 2022-02-01 LAB
SARS-COV-2: NOT DETECTED
SOURCE: NORMAL

## 2022-02-02 LAB — THROAT/NOSE CULTURE: NORMAL

## 2022-02-08 ENCOUNTER — HOSPITAL ENCOUNTER (OUTPATIENT)
Dept: GENERAL RADIOLOGY | Age: 72
Discharge: HOME | End: 2022-02-08
Payer: MEDICARE

## 2022-02-08 ENCOUNTER — HOSPITAL ENCOUNTER (OUTPATIENT)
Age: 72
Discharge: HOME | End: 2022-02-08
Payer: MEDICARE

## 2022-02-08 DIAGNOSIS — Z01.818 ENCOUNTER FOR OTHER PREPROCEDURAL EXAMINATION: ICD-10-CM

## 2022-02-08 DIAGNOSIS — E11.9 TYPE 2 DIABETES MELLITUS WITHOUT COMPLICATION, UNSPECIFIED WHETHER LONG TERM INSULIN USE (HCC): ICD-10-CM

## 2022-02-08 DIAGNOSIS — C44.42 SQUAMOUS CELL CARCINOMA OF SKIN OF SCALP AND NECK: ICD-10-CM

## 2022-02-08 DIAGNOSIS — Z87.891 PERSONAL HISTORY OF NICOTINE DEPENDENCE: ICD-10-CM

## 2022-02-08 DIAGNOSIS — Z95.0 PRESENCE OF CARDIAC PACEMAKER: ICD-10-CM

## 2022-02-08 DIAGNOSIS — I10 ESSENTIAL (PRIMARY) HYPERTENSION: ICD-10-CM

## 2022-02-08 LAB
EKG ATRIAL RATE: 70 BPM
EKG P AXIS: 73 DEGREES
EKG P-R INTERVAL: 208 MS
EKG Q-T INTERVAL: 432 MS
EKG QRS DURATION: 154 MS
EKG QTC CALCULATION (BAZETT): 466 MS
EKG R AXIS: -88 DEGREES
EKG T AXIS: 78 DEGREES
EKG VENTRICULAR RATE: 70 BPM

## 2022-02-08 PROCEDURE — 93010 ELECTROCARDIOGRAM REPORT: CPT | Performed by: NUCLEAR MEDICINE

## 2022-02-08 PROCEDURE — 71046 X-RAY EXAM CHEST 2 VIEWS: CPT

## 2022-02-08 PROCEDURE — 83036 HEMOGLOBIN GLYCOSYLATED A1C: CPT

## 2022-02-08 PROCEDURE — 80048 BASIC METABOLIC PNL TOTAL CA: CPT

## 2022-02-08 PROCEDURE — 36415 COLL VENOUS BLD VENIPUNCTURE: CPT

## 2022-02-08 PROCEDURE — 93005 ELECTROCARDIOGRAM TRACING: CPT | Performed by: SPECIALIST

## 2022-02-08 PROCEDURE — 85027 COMPLETE CBC AUTOMATED: CPT

## 2022-02-09 LAB
ANION GAP SERPL CALCULATED.3IONS-SCNC: 13 MEQ/L (ref 8–16)
AVERAGE GLUCOSE: 150 MG/DL (ref 70–126)
BUN BLDV-MCNC: 14 MG/DL (ref 7–22)
CALCIUM SERPL-MCNC: 9.5 MG/DL (ref 8.5–10.5)
CHLORIDE BLD-SCNC: 98 MEQ/L (ref 98–111)
CO2: 25 MEQ/L (ref 23–33)
CREAT SERPL-MCNC: 1 MG/DL (ref 0.4–1.2)
ERYTHROCYTE [DISTWIDTH] IN BLOOD BY AUTOMATED COUNT: 13.5 % (ref 11.5–14.5)
ERYTHROCYTE [DISTWIDTH] IN BLOOD BY AUTOMATED COUNT: 45.7 FL (ref 35–45)
GFR SERPL CREATININE-BSD FRML MDRD: 73 ML/MIN/1.73M2
GLUCOSE BLD-MCNC: 124 MG/DL (ref 70–108)
HBA1C MFR BLD: 7 % (ref 4.4–6.4)
HCT VFR BLD CALC: 43.3 % (ref 42–52)
HEMOGLOBIN: 14 GM/DL (ref 14–18)
MCH RBC QN AUTO: 30.1 PG (ref 26–33)
MCHC RBC AUTO-ENTMCNC: 32.3 GM/DL (ref 32.2–35.5)
MCV RBC AUTO: 93.1 FL (ref 80–94)
PLATELET # BLD: 344 THOU/MM3 (ref 130–400)
PMV BLD AUTO: 8.7 FL (ref 9.4–12.4)
POTASSIUM SERPL-SCNC: 4.7 MEQ/L (ref 3.5–5.2)
RBC # BLD: 4.65 MILL/MM3 (ref 4.7–6.1)
SODIUM BLD-SCNC: 136 MEQ/L (ref 135–145)
WBC # BLD: 10.4 THOU/MM3 (ref 4.8–10.8)

## 2022-02-11 RX ORDER — ATORVASTATIN CALCIUM 40 MG/1
TABLET, FILM COATED ORAL DAILY
COMMUNITY

## 2022-02-11 RX ORDER — DORZOLAMIDE HYDROCHLORIDE AND TIMOLOL MALEATE 20; 5 MG/ML; MG/ML
1 SOLUTION/ DROPS OPHTHALMIC 2 TIMES DAILY
COMMUNITY

## 2022-02-11 RX ORDER — INSULIN GLARGINE 100 [IU]/ML
23 INJECTION, SOLUTION SUBCUTANEOUS NIGHTLY
COMMUNITY

## 2022-02-11 RX ORDER — OMEPRAZOLE 40 MG/1
40 CAPSULE, DELAYED RELEASE ORAL DAILY
COMMUNITY

## 2022-02-11 RX ORDER — AMLODIPINE BESYLATE 5 MG/1
5 TABLET ORAL DAILY
COMMUNITY

## 2022-02-11 NOTE — PROGRESS NOTES
States he doesn't have a ride home after surgery. Instructed patient he must have a ride home or surgery will need canceled. Also let patient know procedure on Thurs cannot be done if he doesn't have a ride on Fri. He voiced understanding.  I called Dr. Fiona Duarte' office and let Nicolasa Benton know the situation

## 2022-02-11 NOTE — PROGRESS NOTES
Covid screening questionnaire complete and negative for symptoms or exposure see chart for documentation.   Please notify your doctor if you develop any signs of illness  Please wear a mask when you come to the hospital    NPO after midnight  Bring insurance info and drivers license  Wear comfortable clean clothing  Do not bring jewelry  Shower night before and morning of surgery with a liquid antibacterial soap  Bring list of medications with dosage and how often taken  Follow all instructions given by your physician   needed at discharge  No visitors allowed in with patient at this time  Please bring and wear mask  Call [phone] for any questions  Bring CPAP

## 2022-02-18 ENCOUNTER — HOSPITAL ENCOUNTER (OUTPATIENT)
Age: 72
Setting detail: OUTPATIENT SURGERY
Discharge: HOME | End: 2022-02-18
Attending: SPECIALIST | Admitting: SPECIALIST
Payer: MEDICARE

## 2022-02-18 ENCOUNTER — ANESTHESIA (OUTPATIENT)
Dept: OPERATING ROOM | Age: 72
End: 2022-02-18
Payer: MEDICARE

## 2022-02-18 ENCOUNTER — ANESTHESIA EVENT (OUTPATIENT)
Dept: OPERATING ROOM | Age: 72
End: 2022-02-18
Payer: MEDICARE

## 2022-02-18 VITALS
TEMPERATURE: 97 F | HEART RATE: 68 BPM | BODY MASS INDEX: 28.84 KG/M2 | SYSTOLIC BLOOD PRESSURE: 129 MMHG | RESPIRATION RATE: 16 BRPM | OXYGEN SATURATION: 97 % | DIASTOLIC BLOOD PRESSURE: 70 MMHG | HEIGHT: 71 IN | WEIGHT: 206 LBS

## 2022-02-18 VITALS
SYSTOLIC BLOOD PRESSURE: 127 MMHG | OXYGEN SATURATION: 99 % | RESPIRATION RATE: 18 BRPM | DIASTOLIC BLOOD PRESSURE: 72 MMHG

## 2022-02-18 LAB — GLUCOSE BLD-MCNC: 165 MG/DL (ref 70–108)

## 2022-02-18 PROCEDURE — 3600000002 HC SURGERY LEVEL 2 BASE: Performed by: SPECIALIST

## 2022-02-18 PROCEDURE — 6360000002 HC RX W HCPCS: Performed by: SPECIALIST

## 2022-02-18 PROCEDURE — 7100000011 HC PHASE II RECOVERY - ADDTL 15 MIN: Performed by: SPECIALIST

## 2022-02-18 PROCEDURE — 82948 REAGENT STRIP/BLOOD GLUCOSE: CPT

## 2022-02-18 PROCEDURE — 3700000000 HC ANESTHESIA ATTENDED CARE: Performed by: SPECIALIST

## 2022-02-18 PROCEDURE — 3600000012 HC SURGERY LEVEL 2 ADDTL 15MIN: Performed by: SPECIALIST

## 2022-02-18 PROCEDURE — 2709999900 HC NON-CHARGEABLE SUPPLY: Performed by: SPECIALIST

## 2022-02-18 PROCEDURE — 2580000003 HC RX 258: Performed by: NURSE ANESTHETIST, CERTIFIED REGISTERED

## 2022-02-18 PROCEDURE — 2500000003 HC RX 250 WO HCPCS: Performed by: NURSE ANESTHETIST, CERTIFIED REGISTERED

## 2022-02-18 PROCEDURE — 6370000000 HC RX 637 (ALT 250 FOR IP): Performed by: SPECIALIST

## 2022-02-18 PROCEDURE — 6360000002 HC RX W HCPCS: Performed by: NURSE ANESTHETIST, CERTIFIED REGISTERED

## 2022-02-18 PROCEDURE — 2500000003 HC RX 250 WO HCPCS: Performed by: SPECIALIST

## 2022-02-18 PROCEDURE — 3700000001 HC ADD 15 MINUTES (ANESTHESIA): Performed by: SPECIALIST

## 2022-02-18 PROCEDURE — 7100000010 HC PHASE II RECOVERY - FIRST 15 MIN: Performed by: SPECIALIST

## 2022-02-18 RX ORDER — PROPOFOL 10 MG/ML
INJECTION, EMULSION INTRAVENOUS PRN
Status: DISCONTINUED | OUTPATIENT
Start: 2022-02-18 | End: 2022-02-18 | Stop reason: SDUPTHER

## 2022-02-18 RX ORDER — GINSENG 100 MG
CAPSULE ORAL PRN
Status: DISCONTINUED | OUTPATIENT
Start: 2022-02-18 | End: 2022-02-18 | Stop reason: ALTCHOICE

## 2022-02-18 RX ORDER — CEFAZOLIN SODIUM 2 G/100ML
2000 INJECTION, SOLUTION INTRAVENOUS ONCE
Status: COMPLETED | OUTPATIENT
Start: 2022-02-18 | End: 2022-02-18

## 2022-02-18 RX ORDER — SODIUM CHLORIDE 9 MG/ML
INJECTION, SOLUTION INTRAVENOUS CONTINUOUS PRN
Status: DISCONTINUED | OUTPATIENT
Start: 2022-02-18 | End: 2022-02-18 | Stop reason: SDUPTHER

## 2022-02-18 RX ORDER — SODIUM CHLORIDE 9 MG/ML
INJECTION, SOLUTION INTRAVENOUS CONTINUOUS
Status: DISCONTINUED | OUTPATIENT
Start: 2022-02-18 | End: 2022-02-18 | Stop reason: HOSPADM

## 2022-02-18 RX ORDER — LIDOCAINE HYDROCHLORIDE AND EPINEPHRINE 10; 10 MG/ML; UG/ML
INJECTION, SOLUTION INFILTRATION; PERINEURAL PRN
Status: DISCONTINUED | OUTPATIENT
Start: 2022-02-18 | End: 2022-02-18 | Stop reason: ALTCHOICE

## 2022-02-18 RX ORDER — LIDOCAINE HYDROCHLORIDE 20 MG/ML
INJECTION, SOLUTION EPIDURAL; INFILTRATION; INTRACAUDAL; PERINEURAL PRN
Status: DISCONTINUED | OUTPATIENT
Start: 2022-02-18 | End: 2022-02-18 | Stop reason: SDUPTHER

## 2022-02-18 RX ADMIN — PROPOFOL 50 MG: 10 INJECTION, EMULSION INTRAVENOUS at 10:35

## 2022-02-18 RX ADMIN — CEFAZOLIN SODIUM 2000 MG: 2 INJECTION, SOLUTION INTRAVENOUS at 10:34

## 2022-02-18 RX ADMIN — PROPOFOL 50 MG: 10 INJECTION, EMULSION INTRAVENOUS at 10:40

## 2022-02-18 RX ADMIN — PROPOFOL 50 MG: 10 INJECTION, EMULSION INTRAVENOUS at 10:52

## 2022-02-18 RX ADMIN — LIDOCAINE HYDROCHLORIDE 100 MG: 20 INJECTION, SOLUTION EPIDURAL; INFILTRATION; INTRACAUDAL; PERINEURAL at 10:31

## 2022-02-18 RX ADMIN — SODIUM CHLORIDE: 9 INJECTION, SOLUTION INTRAVENOUS at 10:29

## 2022-02-18 RX ADMIN — PROPOFOL 50 MG: 10 INJECTION, EMULSION INTRAVENOUS at 10:45

## 2022-02-18 RX ADMIN — PROPOFOL 50 MG: 10 INJECTION, EMULSION INTRAVENOUS at 10:32

## 2022-02-18 RX ADMIN — PROPOFOL 50 MG: 10 INJECTION, EMULSION INTRAVENOUS at 10:57

## 2022-02-18 ASSESSMENT — PULMONARY FUNCTION TESTS
PIF_VALUE: 0

## 2022-02-18 ASSESSMENT — ENCOUNTER SYMPTOMS: SHORTNESS OF BREATH: 1

## 2022-02-18 ASSESSMENT — PAIN - FUNCTIONAL ASSESSMENT: PAIN_FUNCTIONAL_ASSESSMENT: 0-10

## 2022-02-18 ASSESSMENT — PAIN SCALES - GENERAL: PAINLEVEL_OUTOF10: 0

## 2022-02-18 NOTE — ANESTHESIA PRE PROCEDURE
Provider, MD   loratadine (CLARITIN) 10 MG tablet Take 10 mg by mouth daily   Yes Historical Provider, MD   rivaroxaban (XARELTO) 20 MG TABS tablet Take 20 mg by mouth daily (with breakfast)    Yes Historical Provider, MD   gabapentin (NEURONTIN) 300 MG capsule Take 300 mg by mouth 2 times daily . Yes Historical Provider, MD   propranolol (INDERAL LA) 120 MG CR capsule Take 120 mg by mouth daily    Yes Historical Provider, MD   montelukast (SINGULAIR) 10 MG tablet take 1 tablet by mouth once daily 6/2/15  Yes FERDINAND Jennings - CNP   b complex vitamins capsule Take 1 capsule by mouth 2 times daily   Yes Historical Provider, MD   BUSPIRONE HCL PO Take 10 mg by mouth 3 times daily    Yes Historical Provider, MD   fluticasone (FLONASE) 50 MCG/ACT nasal spray USE 2 SPRAYS NASALLY DAILY 8/20/14  Yes Marybel Whyte MD   aspirin 81 MG EC tablet Take 81 mg by mouth daily     Yes Historical Provider, MD       Current medications:    Current Facility-Administered Medications   Medication Dose Route Frequency Provider Last Rate Last Admin    0.9 % sodium chloride infusion   IntraVENous Continuous Mally Darby MD        lidocaine-EPINEPHrine 1 %-1:576125 injection    PRN Mally Darby MD   17 mL at 02/18/22 1034    bacitracin ointment    PRN Mally Darby MD   1 each at 02/18/22 1056       Allergies: Allergies   Allergen Reactions    Latex Itching and Rash    Doxycycline Calcium Diarrhea    Lactose Diarrhea    Nasacort [Triamcinolone] Other (See Comments)     Unable to remember    Wellbutrin [Bupropion] Other (See Comments)     Does not remember       Problem List:    Patient Active Problem List   Diagnosis Code    Diabetes mellitus type 2, uncontrolled E11.65    Post traumatic stress disorder F43.10    Hyperlipidemia with target LDL less than 100 E78.5    Hypertension I10    Arthritis M19.90    S/P cardiac catheterization: 11/20/2013: No obstructive lesions.  Moderate LI's in the mid LAD and in the RCA. Z98.890    Pacemaker at end of battery life Z45.010    Paroxysmal atrial fibrillation (HCC) I48.0    Essential tremor G25.0    Peripheral polyneuropathy G62.9    Obstructive sleep apnea on CPAP G47.33, Z99.89    Elevated PSA R97.20    BPH with obstruction/lower urinary tract symptoms N40.1, N13.8    SOB (shortness of breath) R06.02    Hyponatremia E87.1    Mild intermittent asthma without complication S00.51    Obesity (BMI 30-39. 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 C44.622    PVD (peripheral vascular disease) ([facility]) I73.9    Vitamin B12 deficiency E53.8       Past Medical History:        Diagnosis Date    Asthma     [employer] dual pacemaker 4/9/2014    Bronchitis, chronic (HCC)     Carotid artery stenosis     Dr Neymar Dowell    Chickenpox     COPD with asthma ([facility] 4/26/2021    CVA (cerebral infarction)     GERD (gastroesophageal reflux disease)     Glaucoma     Hemorrhoids     Hyperlipidemia     Hypertension     Mild intermittent asthma without complication 48/3/2525    [facility]    Osteoarthritis     Peripheral neuropathy     Post traumatic stress disorder (PTSD)     [facility]    S/P cardiac catheterization: 11/20/2013: No obstructive lesions. Moderate LI's in the mid LAD and in the RCA. 11/20/2013 11/20/2013: No obstructive lesions. Moderate LI's in the mid LAD and in the RCA.  Dr. Weeks Corporal Squamous cell carcinoma of right upper extremity 11/3/2021    Tremor of both hands     [facility]    Type II or unspecified type diabetes mellitus without mention of complication, not stated as uncontrolled     Dr Lin garcía    Unspecified sleep apnea     Dr Payal Contreras       Past Surgical History:        Procedure Laterality Date    APPENDECTOMY  age 15    BLEPHAROPLASTY Bilateral 05/2017    CARDIAC CATHETERIZATION  11/2013 no stents    CARPAL TUNNEL RELEASE Right 2017    COLONOSCOPY  2005    Dr. Monika Amador      right eye clog oil duct    EYE SURGERY Bilateral 2017    MANDIBLE SURGERY N/A 2017    jaw surgery lower front of mouth    PACEMAKER INSERTION      PACEMAKER INSERTION  2019    TONSILLECTOMY AND ADENOIDECTOMY  as a child        Social History:    Social History     Tobacco Use    Smoking status: Former Smoker     Packs/day: 2.00     Years: 6.00     Pack years: 12.00     Types: Cigarettes     Quit date: 1971     Years since quittin.2    Smokeless tobacco: Former User   Substance Use Topics    Alcohol use: No     Alcohol/week: 0.0 standard drinks                                Counseling given: Not Answered      Vital Signs (Current):   Vitals:    22 1231 22 1001 22 1121   BP:  (!) 141/75 129/70   Pulse:  70 68   Resp:  16 16   Temp:  96.4 °F (35.8 °C) 97 °F (36.1 °C)   TempSrc:  Temporal Temporal   SpO2:  98% 97%   Weight: 220 lb (99.8 kg) 206 lb (93.4 kg)    Height: 5' 11\" (1.803 m) 5' 11\" (1.803 m)                                               BP Readings from Last 3 Encounters:   22 129/70   22 127/72   21 (!) 177/74       NPO Status: Time of last liquid consumption: 0600 (sip with meds)                        Time of last solid consumption: 1800                        Date of last liquid consumption: 22                        Date of last solid food consumption: 22    BMI:   Wt Readings from Last 3 Encounters:   22 206 lb (93.4 kg)   21 216 lb (98 kg)   21 211 lb 3.2 oz (95.8 kg)     Body mass index is 28.73 kg/m².     CBC:   Lab Results   Component Value Date    WBC 10.4 2022    RBC 4.65 2022    RBC 4.77 2012    HGB 14.0 2022    HCT 43.3 2022    MCV 93.1 2022    RDW 13.7 2018     2022       CMP:   Lab Results   Component Value Date     02/08/2022    K 4.7 02/08/2022    K 4.9 11/20/2019    CL 98 02/08/2022    CO2 25 02/08/2022    BUN 14 02/08/2022    CREATININE 1.0 02/08/2022    LABGLOM 73 02/08/2022    GLUCOSE 124 02/08/2022    GLUCOSE 121 02/01/2012    PROT 7.1 04/15/2021    CALCIUM 9.5 02/08/2022    BILITOT 0.7 04/15/2021    ALKPHOS 120 04/15/2021    AST 28 04/15/2021    ALT 38 04/15/2021       POC Tests:   Recent Labs     02/18/22  1011   POCGLU 165*       Coags:   Lab Results   Component Value Date    INR 1.03 11/20/2019       HCG (If Applicable): No results found for: PREGTESTUR, PREGSERUM, HCG, HCGQUANT     ABGs: No results found for: PHART, PO2ART, PGP6RLR, EVM2JFQ, BEART, W4AEPECU     Type & Screen (If Applicable):  Lab Results   Component Value Date    LABRH POS 11/20/2013       Drug/Infectious Status (If Applicable):  No results found for: HIV, HEPCAB    COVID-19 Screening (If Applicable):   Lab Results   Component Value Date    COVID19 Not Detected 01/31/2022           Anesthesia Evaluation  Patient summary reviewed and Nursing notes reviewed no history of anesthetic complications:   Airway: Mallampati: III  TM distance: >3 FB   Neck ROM: full  Mouth opening: > = 3 FB Dental:          Pulmonary:normal exam  breath sounds clear to auscultation  (+) COPD:  shortness of breath:  sleep apnea: on CPAP,  asthma:                            Cardiovascular:  Exercise tolerance: good (>4 METS),   (+) hypertension:, pacemaker: pacemaker,                   Neuro/Psych:   (+) neuromuscular disease:, psychiatric history:            GI/Hepatic/Renal:   (+) GERD:,           Endo/Other:    (+) DiabetesType II DM, , .          Pt had no PAT visit       Abdominal:             Vascular: negative vascular ROS. Other Findings:             Anesthesia Plan      MAC     ASA 3       Induction: intravenous. Anesthetic plan and risks discussed with patient. Plan discussed with CRNA.                   [address], DO   2/18/2022

## 2022-02-18 NOTE — OP NOTE
Operative Note    Patient name: Steve Gibson             Medical Record Number: [id number]    Primary Care Physician: Minerva Coles MD     1950    Date of Procedure: 2022    Pre-operative Diagnosis: 4cm2 defect of top of head s/p MOHS for squamous cell carcinoma    Post-operative Diagnosis: Same    Procedure Performed: Repair of top of head 4cm2 defect with an adjacent tissue transfer (16 cm2) (CPT 23224)    Surgeons/Assistants: MD Dannielle Arrieta PA-C    Estimated Blood Loss: 5ml     Complications: none immediately appreciated    Procedure: With the patient lying in the supine position and under adequate anesthesia per the anesthesia team, the area was anesthetized with a total of 17 ml of 1% Lidocaine 1:100,000 with epinephrine solution. The area was then prepped and draped in the standard surgical fashion. There was a 4cm2 defect, which could not be closed primarily due to tension. Therefore, a 16cm2 sum of defect/adjacent tissue transfer (rotation flap) was then designed, back cut, elevated and inset with 4-0 Monocryl suture placed in interrupted buried fashion. The Burrow's triangles were resected with final closure being 2-0 Quill, bacitracin with bulky sterile dressings/head wrap. The patient tolerated the procedure quite well and remained hemodynamically stable throughout the procedure and was quite comfortable throughout the operative course.     Clinical staging for cancer cases:  Ct  Cn  Steve Aj MD  Electronically signed by me on 2022 at 11:10 AM  Operative Note      Patient: Steve Gibson  YOB: 1950  MRN: [id number]    Date of Procedure: 2022    Pre-Op Diagnosis: SCC TOP OF HEAD    Post-Op Diagnosis: Same       Procedure(s):  MOHS DEFECT REPAIR SCC TOP OF HEAD    Surgeon(s):  Jamaal Bird MD    Assistant:   Physician Assistant: Dannielle Patel PA-C    Anesthesia: Monitor Anesthesia Care    Estimated Blood Loss (mL): Minimal    Complications: None    Specimens:   * No specimens in log *    Implants:  * No implants in log *      Drains: * No LDAs found *    Findings: 4cm2 defect of top of head s/p MOHS for squamous cell carcinoma    Detailed Description of Procedure:   Repair of top of head 4cm2 defect with an adjacent tissue transfer (16 cm2) (CPT 76907)      Electronically signed by Yesenia Mcpherson MD on 2/18/2022 at 11:10 AM

## 2022-02-18 NOTE — ANESTHESIA POSTPROCEDURE EVALUATION
Department of Anesthesiology  Postprocedure Note    Patient: Elise Turpin  MRN: [id number]  [place]: 1950  Date of evaluation: 2/18/2022  Time:  12:07 PM     Procedure Summary     Date: 02/18/22 Room / Location: [address] [place] [address] 01 / [facility]    Anesthesia Start: 8502 Anesthesia Stop: 1118    Procedure: MOHS DEFECT REPAIR SCC TOP OF HEAD (N/A Face) Diagnosis: (SCC TOP OF HEAD)    Surgeons: Stephanie Hickman MD Responsible Provider: Haley Ram DO    Anesthesia Type: MAC ASA Status: 3          Anesthesia Type: MAC    Carrillo Phase I:      Carrillo Phase II: Carrillo Score: 10    Last vitals: Reviewed and per EMR flowsheets.        Anesthesia Post Evaluation    Patient location during evaluation: bedside  Patient participation: complete - patient participated  Level of consciousness: awake and alert  Pain score: 0  Airway patency: patent  Nausea & Vomiting: no nausea and no vomiting  Complications: no  Cardiovascular status: hemodynamically stable and blood pressure returned to baseline  Respiratory status: spontaneous ventilation, room air and acceptable  Hydration status: stable

## 2022-02-18 NOTE — H&P
[facility]  History and Physical Update    Pt Name: July Flanagan  MRN: [id number]  YOB: 1950  Date of evaluation: 2/18/2022    I have examined the patient and reviewed the H&P/Consult and there are no changes to the patient or plans.       Paul Kaba MD  Electronically signed 2/18/2022 at 6:54 AM

## 2022-02-18 NOTE — PROGRESS NOTES
1121-  Patient arrived to phase II via chair. Spontaneous respirations even and unlabored. Placed on monitor--VSS. Report received from Surgical RN.    2332-  Assessment completed. Patient is alert and oriented x4. IV capped off-- no complications. Patient denies pain--will monitor. ACE wrap to head-- clean and dry. 1125-  Snack and drink given to patient. Family brought to room. 1130-  All belongings in room. 1140-  Patient doing well. No needs at this time. 1150-  IV removed-- no complications. Bandage applied. 1200-  Patient dressed. 1210-  Discharge instructions given via Prasanth Nelson. 1215-  Patient discharged in stable condition with all belongings via wheelchair.

## 2022-02-28 ENCOUNTER — NURSE ONLY (OUTPATIENT)
Dept: FAMILY MEDICINE CLINIC | Age: 72
End: 2022-02-28
Payer: MEDICARE

## 2022-02-28 DIAGNOSIS — E53.8 VITAMIN B12 DEFICIENCY: Primary | ICD-10-CM

## 2022-02-28 PROCEDURE — 96372 THER/PROPH/DIAG INJ SC/IM: CPT | Performed by: FAMILY MEDICINE

## 2022-02-28 RX ORDER — CYANOCOBALAMIN 1000 UG/ML
1000 INJECTION INTRAMUSCULAR; SUBCUTANEOUS ONCE
Status: COMPLETED | OUTPATIENT
Start: 2022-02-28 | End: 2022-02-28

## 2022-02-28 RX ADMIN — CYANOCOBALAMIN 1000 MCG: 1000 INJECTION INTRAMUSCULAR; SUBCUTANEOUS at 13:19

## 2022-02-28 NOTE — PROGRESS NOTES
Administrations This Visit     cyanocobalamin injection 1,000 mcg     Admin Date  02/28/2022  13:19 Action  Given Dose  1,000 mcg Route  IntraMUSCular Site  Deltoid Right Administered By  Queen Agustin LPN    Ordering Provider: Man Porras MD    NDC: 6995-5308-27    Lot#: [id number]    : [employer]    Patient Supplied?: No                Patient instructed to remain in clinic for 20 minutes after injection and was advised to report any adverse reaction to me immediately.

## 2022-03-28 ENCOUNTER — NURSE ONLY (OUTPATIENT)
Dept: FAMILY MEDICINE CLINIC | Age: 72
End: 2022-03-28
Payer: MEDICARE

## 2022-03-28 DIAGNOSIS — E53.8 VITAMIN B12 DEFICIENCY: Primary | ICD-10-CM

## 2022-03-28 PROCEDURE — 96372 THER/PROPH/DIAG INJ SC/IM: CPT | Performed by: FAMILY MEDICINE

## 2022-03-28 RX ORDER — CYANOCOBALAMIN 1000 UG/ML
1000 INJECTION INTRAMUSCULAR; SUBCUTANEOUS ONCE
Status: COMPLETED | OUTPATIENT
Start: 2022-03-28 | End: 2022-03-28

## 2022-03-28 RX ADMIN — CYANOCOBALAMIN 1000 MCG: 1000 INJECTION INTRAMUSCULAR; SUBCUTANEOUS at 13:13

## 2022-03-28 NOTE — PROGRESS NOTES
Administrations This Visit     cyanocobalamin injection 1,000 mcg     Admin Date  03/28/2022  13:13 Action  Given Dose  1,000 mcg Route  IntraMUSCular Site  Deltoid Left Administered By  Lottie Benton, [facility]    Ordering Provider: Marybel Whyte MD    NDC: 6798-3306-87    Lot#: [id number]    : [employer]    Patient Supplied?: No                Patient instructed to remain in clinic for 20 minutes after injection and was advised to report any adverse reaction to me immediately. Pt tolerated well, no reactions noted.

## 2022-04-13 ENCOUNTER — PROCEDURE VISIT (OUTPATIENT)
Dept: CARDIOLOGY CLINIC | Age: 72
End: 2022-04-13
Payer: MEDICARE

## 2022-04-13 DIAGNOSIS — Z95.0 PACEMAKER: Primary | ICD-10-CM

## 2022-04-13 PROCEDURE — 93296 REM INTERROG EVL PM/IDS: CPT | Performed by: INTERNAL MEDICINE

## 2022-04-13 PROCEDURE — 93294 REM INTERROG EVL PM/LDLS PM: CPT | Performed by: INTERNAL MEDICINE

## 2022-04-13 NOTE — PROGRESS NOTES
DR VICENTE PT / KNOWN PAF / Lexy Walker   AFIB BURDEN 11%      NXT [employer] DUAL PACEMAKER REMOTE   BATTERY 5.5 YRS REMAINING      P WAVES 4  RV WAVES NOT MEASURED PER THE DEVICE    ATRIAL IMPEDENCE 513  VENT IMPEDENCE 744    NO THRESHOLDS OBTAINED PER THE DEVICE       A PACED 10%  V PACED 100%      DDDR

## 2022-04-21 ENCOUNTER — NURSE ONLY (OUTPATIENT)
Dept: LAB | Age: 72
End: 2022-04-21

## 2022-04-21 DIAGNOSIS — R97.20 ELEVATED PSA: ICD-10-CM

## 2022-04-21 DIAGNOSIS — N13.8 BPH WITH OBSTRUCTION/LOWER URINARY TRACT SYMPTOMS: ICD-10-CM

## 2022-04-21 DIAGNOSIS — D50.9 IRON DEFICIENCY ANEMIA, UNSPECIFIED IRON DEFICIENCY ANEMIA TYPE: ICD-10-CM

## 2022-04-21 DIAGNOSIS — E11.65 UNCONTROLLED TYPE 2 DIABETES MELLITUS WITH HYPERGLYCEMIA (HCC): ICD-10-CM

## 2022-04-21 DIAGNOSIS — E78.5 HYPERLIPIDEMIA WITH TARGET LDL LESS THAN 100: ICD-10-CM

## 2022-04-21 DIAGNOSIS — E53.8 VITAMIN B12 DEFICIENCY: ICD-10-CM

## 2022-04-21 DIAGNOSIS — I10 PRIMARY HYPERTENSION: ICD-10-CM

## 2022-04-21 DIAGNOSIS — N40.1 BPH WITH OBSTRUCTION/LOWER URINARY TRACT SYMPTOMS: ICD-10-CM

## 2022-04-21 DIAGNOSIS — I48.0 PAROXYSMAL ATRIAL FIBRILLATION (HCC): ICD-10-CM

## 2022-04-21 LAB
CREATININE, URINE: 196.1 MG/DL
ERYTHROCYTE [DISTWIDTH] IN BLOOD BY AUTOMATED COUNT: 13.6 % (ref 11.5–14.5)
ERYTHROCYTE [DISTWIDTH] IN BLOOD BY AUTOMATED COUNT: 45.8 FL (ref 35–45)
HCT VFR BLD CALC: 42.4 % (ref 42–52)
HEMOGLOBIN: 14.1 GM/DL (ref 14–18)
MCH RBC QN AUTO: 30.6 PG (ref 26–33)
MCHC RBC AUTO-ENTMCNC: 33.3 GM/DL (ref 32.2–35.5)
MCV RBC AUTO: 92 FL (ref 80–94)
MICROALBUMIN UR-MCNC: 5.3 MG/DL
MICROALBUMIN/CREAT UR-RTO: 27 MG/G (ref 0–30)
PLATELET # BLD: 260 THOU/MM3 (ref 130–400)
PMV BLD AUTO: 9.2 FL (ref 9.4–12.4)
RBC # BLD: 4.61 MILL/MM3 (ref 4.7–6.1)
WBC # BLD: 6.9 THOU/MM3 (ref 4.8–10.8)

## 2022-04-22 LAB
ALBUMIN SERPL-MCNC: 4.6 G/DL (ref 3.5–5.1)
ALP BLD-CCNC: 175 U/L (ref 38–126)
ALT SERPL-CCNC: 22 U/L (ref 11–66)
ANION GAP SERPL CALCULATED.3IONS-SCNC: 15 MEQ/L (ref 8–16)
AST SERPL-CCNC: 18 U/L (ref 5–40)
AVERAGE GLUCOSE: 138 MG/DL (ref 70–126)
BILIRUB SERPL-MCNC: 0.5 MG/DL (ref 0.3–1.2)
BUN BLDV-MCNC: 12 MG/DL (ref 7–22)
CALCIUM SERPL-MCNC: 9.5 MG/DL (ref 8.5–10.5)
CHLORIDE BLD-SCNC: 99 MEQ/L (ref 98–111)
CHOLESTEROL, TOTAL: 127 MG/DL (ref 100–199)
CO2: 24 MEQ/L (ref 23–33)
CREAT SERPL-MCNC: 0.9 MG/DL (ref 0.4–1.2)
FOLATE: > 20 NG/ML (ref 4.8–24.2)
GFR SERPL CREATININE-BSD FRML MDRD: 83 ML/MIN/1.73M2
GLUCOSE FASTING: 120 MG/DL (ref 70–108)
HBA1C MFR BLD: 6.6 % (ref 4.4–6.4)
HDLC SERPL-MCNC: 51 MG/DL
LDL CHOLESTEROL CALCULATED: 61 MG/DL
POTASSIUM SERPL-SCNC: 5 MEQ/L (ref 3.5–5.2)
PROSTATE SPECIFIC ANTIGEN: 4.55 NG/ML (ref 0–1)
SODIUM BLD-SCNC: 138 MEQ/L (ref 135–145)
T4 FREE: 1.38 NG/DL (ref 0.93–1.76)
TOTAL PROTEIN: 6.9 G/DL (ref 6.1–8)
TRIGL SERPL-MCNC: 77 MG/DL (ref 0–199)
TSH SERPL DL<=0.05 MIU/L-ACNC: 4.56 UIU/ML (ref 0.4–4.2)
VITAMIN B-12: 1022 PG/ML (ref 211–911)

## 2022-04-28 ENCOUNTER — NURSE ONLY (OUTPATIENT)
Dept: FAMILY MEDICINE CLINIC | Age: 72
End: 2022-04-28
Payer: MEDICARE

## 2022-04-28 DIAGNOSIS — E53.8 VITAMIN B12 DEFICIENCY: Primary | ICD-10-CM

## 2022-04-28 PROCEDURE — 96372 THER/PROPH/DIAG INJ SC/IM: CPT | Performed by: FAMILY MEDICINE

## 2022-04-28 RX ORDER — CYANOCOBALAMIN 1000 UG/ML
1000 INJECTION INTRAMUSCULAR; SUBCUTANEOUS ONCE
Status: COMPLETED | OUTPATIENT
Start: 2022-04-28 | End: 2022-04-28

## 2022-04-28 RX ADMIN — CYANOCOBALAMIN 1000 MCG: 1000 INJECTION INTRAMUSCULAR; SUBCUTANEOUS at 13:08

## 2022-04-28 NOTE — PROGRESS NOTES
Administrations This Visit     cyanocobalamin injection 1,000 mcg     Admin Date  04/28/2022  13:08 Action  Given Dose  1,000 mcg Route  IntraMUSCular Site  Deltoid Left Administered By  Bhavna Faustin LPN    Ordering Provider: Latosha Valverde MD    NDC: 2671-5777-98    Lot#: [id number]    : [employer]    Patient Supplied?: No                Patient instructed to remain in clinic for 20 minutes after injection and was advised to report any adverse reaction to me immediately.

## 2022-05-03 ENCOUNTER — OFFICE VISIT (OUTPATIENT)
Dept: UROLOGY | Age: 72
End: 2022-05-03
Payer: MEDICARE

## 2022-05-03 VITALS
SYSTOLIC BLOOD PRESSURE: 128 MMHG | WEIGHT: 205 LBS | HEIGHT: 71 IN | DIASTOLIC BLOOD PRESSURE: 78 MMHG | BODY MASS INDEX: 28.7 KG/M2

## 2022-05-03 DIAGNOSIS — N40.1 BPH WITH OBSTRUCTION/LOWER URINARY TRACT SYMPTOMS: Primary | ICD-10-CM

## 2022-05-03 DIAGNOSIS — R97.20 ELEVATED PSA: ICD-10-CM

## 2022-05-03 DIAGNOSIS — N13.8 BPH WITH OBSTRUCTION/LOWER URINARY TRACT SYMPTOMS: Primary | ICD-10-CM

## 2022-05-03 PROCEDURE — 4040F PNEUMOC VAC/ADMIN/RCVD: CPT | Performed by: UROLOGY

## 2022-05-03 PROCEDURE — 1036F TOBACCO NON-USER: CPT | Performed by: UROLOGY

## 2022-05-03 PROCEDURE — 99214 OFFICE O/P EST MOD 30 MIN: CPT | Performed by: UROLOGY

## 2022-05-03 PROCEDURE — 3017F COLORECTAL CA SCREEN DOC REV: CPT | Performed by: UROLOGY

## 2022-05-03 PROCEDURE — G8427 DOCREV CUR MEDS BY ELIG CLIN: HCPCS | Performed by: UROLOGY

## 2022-05-03 PROCEDURE — 1123F ACP DISCUSS/DSCN MKR DOCD: CPT | Performed by: UROLOGY

## 2022-05-03 PROCEDURE — G8417 CALC BMI ABV UP PARAM F/U: HCPCS | Performed by: UROLOGY

## 2022-05-03 NOTE — PROGRESS NOTES
JUNIOR Mayorga MD        [place] [address] [facility] [id number]  Dept: [phone]  Dept Fax: [phone]: [address] [address] Urology Office Note -     Patient:  Kevin Dinero  YOB: 1950    The patient is a 67 y.o. male who presents today for evaluation of the following problems:   Chief Complaint   Patient presents with    Follow-up     psa prior     Benign Prostatic Hypertrophy        HISTORY OF PRESENT ILLNESS:     BPH  Previously followed by Manda Vila  On flomax  Overall stable    Elevated PSA    focal hgpin on last biopsy  FINAL DIAGNOSIS:   A. Prostate, left base, biopsy:             Negative for malignancy. B.  Prostate, left mid, biopsy:             Negative for malignancy. C. Prostate, left apex, biopsy:    Negative for malignancy. D. Prostate, right base, biopsy:    Focal high-grade prostatic intraepithelial neoplasia (PIN). E.  Prostate, right mid, biopsy:             Negative for malignancy. F.  Prostate, right apex, biopsy:             Negative for malignancy. Summary of Previous Records:  Carlos Boucher f/u for BPH. He is doing well, LUTS are stable, he remains on Flomax, tolerating well.     +family history of prostate cancer  PSA jumped up to 5 in March, 4.5 most recently. We had long discussion about prostate cancer and biopsy. In the past he was caring for his wife while she recovered from back surgery,  he is sole caretaker  He is ready to proceed with biopsy and would like to do so.         Requested/reviewed records from Wicho Robertson MD office and/or outside physician/EMR    (Patient's old records have been requested, reviewed and pertinent findings summarized in today's note.)    Procedures Today: N/A    Last several PSA's:  Lab Results   Component Value Date    PSA 4.55 (H) 04/21/2022    PSA 3.50 (H) 04/15/2021    PSA 3.26 (H) 01/24/2021       Last total testosterone:  No results found for: TESTOSTERONE    Urinalysis today:  No results found for this visit on 05/03/22. Last BUN and creatinine:  Lab Results   Component Value Date    BUN 12 04/21/2022     Lab Results   Component Value Date    CREATININE 0.9 04/21/2022       Imaging Reviewed during this Office Visit:   Gayle Delacruz MD independently reviewed the images and verified the radiology reports from:    No results found. PAST MEDICAL, FAMILY AND SOCIAL HISTORY:  Past Medical History:   Diagnosis Date    Asthma     [employer] dual pacemaker 4/9/2014    Bronchitis, chronic (HCC)     Carotid artery stenosis     Dr Zbigniew Kelly    Chickenpox     COPD with asthma ([facility] [address] 4/26/2021    CVA (cerebral infarction)     GERD (gastroesophageal reflux disease)     Glaucoma     Hemorrhoids     Hyperlipidemia     Hypertension     Mild intermittent asthma without complication 93/8/8493    [facility]    Osteoarthritis     Peripheral neuropathy     [facility] stress disorder ([facility]SD)     [facility]    S/P cardiac catheterization: 11/20/2013: No obstructive lesions. Moderate LI's in the mid LAD and in the RCA. 11/20/2013 11/20/2013: No obstructive lesions. Moderate LI's in the mid LAD and in the RCA.  Dr. Otilia Felty Squamous cell carcinoma of right upper extremity 11/3/2021    Tremor of both hands     [facility]    Type II or unspecified type diabetes mellitus without mention of complication, not stated as uncontrolled     Dr Lin garcía    Unspecified sleep apnea     Dr Yg May     Past Surgical History:   Procedure Laterality Date    APPENDECTOMY  age 14    BLEPHAROPLASTY Bilateral 05/2017    CARDIAC CATHETERIZATION  11/2013    no stents    CARPAL TUNNEL RELEASE Right 01/2017    COLONOSCOPY  2005    Dr. Claudia Vieyra      right eye clog oil duct    EYE SURGERY Bilateral 01/2017    MANDIBLE SURGERY N/A 05/2017    jaw surgery lower front of mouth    MOHS SURGERY N/A 2/18/2022    MOHS DEFECT REPAIR SCC TOP OF HEAD performed by Sherif Clemons MD at [facility]  2000/2010   [address]  11/20/2019    TONSILLECTOMY AND ADENOIDECTOMY  as a child      Family History   Problem Relation Age of Onset    High Cholesterol Father     High Blood Pressure Father     Heart Disease Father     Stroke Father     Diabetes Mother     Heart Disease Mother     High Blood Pressure Mother     High Cholesterol Mother     Kidney Disease Mother     Cancer Paternal Grandfather      Outpatient Medications Marked as Taking for the 5/3/22 encounter (Office Visit) with Frida Golden MD   Medication Sig Dispense Refill    atorvastatin (LIPITOR) 40 MG tablet Take by mouth daily 1/2 tab      amLODIPine (NORVASC) 5 MG tablet Take 5 mg by mouth daily      metFORMIN (GLUCOPHAGE) 500 MG tablet Take by mouth 2 times daily (with meals) 2 tab      omeprazole (PRILOSEC) 40 MG delayed release capsule Take 40 mg by mouth daily      Multiple Vitamins-Minerals (PRESERVISION AREDS 2 PO) Take by mouth 2 times daily      dorzolamide-timolol (COSOPT) 22.3-6.8 MG/ML ophthalmic solution Place 1 drop into both eyes 2 times daily      insulin glargine (LANTUS SOLOSTAR) 100 UNIT/ML injection pen Inject 23 Units into the skin nightly      budesonide-formoterol (SYMBICORT) 160-4.5 MCG/ACT AERO Inhale 2 puffs into the lungs 2 times daily      latanoprost (XALATAN) 0.005 % ophthalmic solution Place into both eyes 2 times daily       albuterol sulfate HFA (VENTOLIN HFA) 108 (90 Base) MCG/ACT inhaler INHALE 2 PUFFS EVERY 6 HOURS AS NEEDED FOR WHEEZING.  18 g 11    tamsulosin (FLOMAX) 0.4 MG capsule Take 0.4 mg by mouth daily      topiramate (TOPAMAX SPRINKLE) 25 MG capsule Take 25 mg by mouth 2 times daily      DULoxetine (CYMBALTA) 60 MG extended release capsule Take 60 mg by mouth daily      loratadine (CLARITIN) 10 MG tablet Take 10 mg by mouth daily      rivaroxaban (XARELTO) 20 MG TABS tablet Take 20 mg by mouth daily (with breakfast)       gabapentin (NEURONTIN) 300 MG capsule Take 300 mg by mouth 2 times daily .  propranolol (INDERAL LA) 120 MG CR capsule Take 120 mg by mouth daily       montelukast (SINGULAIR) 10 MG tablet take 1 tablet by mouth once daily 90 tablet 3    b complex vitamins capsule Take 1 capsule by mouth 2 times daily      BUSPIRONE HCL PO Take 10 mg by mouth 3 times daily       fluticasone (FLONASE) 50 MCG/ACT nasal spray USE 2 SPRAYS NASALLY DAILY 3 Bottle 3    aspirin 81 MG EC tablet Take 81 mg by mouth daily           Latex, Doxycycline calcium, Lactose, Nasacort [triamcinolone], and Wellbutrin [bupropion]  Social History     Tobacco Use   Smoking Status Former Smoker    Packs/day: 2.00    Years: 6.00    Pack years: 12.00    Types: Cigarettes    Quit date: 1971    Years since quittin.5   Smokeless Tobacco Former User      (If patient a smoker, smoking cessation counseling offered)   Social History     Substance and Sexual Activity   Alcohol Use No    Alcohol/week: 0.0 standard drinks       REVIEW OF SYSTEMS:  Constitutional: negative  Eyes: negative  Respiratory: negative  Cardiovascular: negative  Gastrointestinal: negative  Genitourinary: see HPI  Musculoskeletal: negative  Skin: negative   Neurological: negative  Hematological/Lymphatic: negative  Psychological: negative        Physical Exam:    This a 67 y.o. male  Vitals:    22 1359   BP: 128/78     Body mass index is 28.59 kg/m². Constitutional: Patient in no acute distress;         Assessment and Plan        1. BPH with obstruction/lower urinary tract symptoms    2. Elevated PSA               Plan:      BPH- cont flomax and meds. Stable. Elevated psa- stable PSA. reviewed today. hx of recent biopsy. high grade PIN. Will follow with PSA every six months. Prescriptions Ordered:  No orders of the defined types were placed in this encounter.      Orders Placed:  No orders of the defined types were placed in this encounter.            Gayle Delacruz MD

## 2022-05-09 ENCOUNTER — OFFICE VISIT (OUTPATIENT)
Dept: FAMILY MEDICINE CLINIC | Age: 72
End: 2022-05-09
Payer: MEDICARE

## 2022-05-09 VITALS
BODY MASS INDEX: 28.42 KG/M2 | DIASTOLIC BLOOD PRESSURE: 76 MMHG | WEIGHT: 203 LBS | RESPIRATION RATE: 16 BRPM | TEMPERATURE: 97.6 F | SYSTOLIC BLOOD PRESSURE: 128 MMHG | HEIGHT: 71 IN | HEART RATE: 79 BPM | OXYGEN SATURATION: 98 %

## 2022-05-09 DIAGNOSIS — E11.42 DIABETIC PERIPHERAL NEUROPATHY ASSOCIATED WITH TYPE 2 DIABETES MELLITUS (HCC): ICD-10-CM

## 2022-05-09 DIAGNOSIS — Z77.098 AGENT ORANGE EXPOSURE: ICD-10-CM

## 2022-05-09 DIAGNOSIS — I73.9 PVD (PERIPHERAL VASCULAR DISEASE) (HCC): ICD-10-CM

## 2022-05-09 DIAGNOSIS — D50.9 IRON DEFICIENCY ANEMIA, UNSPECIFIED IRON DEFICIENCY ANEMIA TYPE: ICD-10-CM

## 2022-05-09 DIAGNOSIS — Z98.890 S/P CARDIAC CATHETERIZATION: ICD-10-CM

## 2022-05-09 DIAGNOSIS — F43.10 POST TRAUMATIC STRESS DISORDER: ICD-10-CM

## 2022-05-09 DIAGNOSIS — C44.42 SQUAMOUS CELL CARCINOMA OF SCALP: ICD-10-CM

## 2022-05-09 DIAGNOSIS — I10 PRIMARY HYPERTENSION: Primary | ICD-10-CM

## 2022-05-09 DIAGNOSIS — J44.9 COPD WITH ASTHMA (HCC): ICD-10-CM

## 2022-05-09 DIAGNOSIS — Z99.89 OBSTRUCTIVE SLEEP APNEA ON CPAP: ICD-10-CM

## 2022-05-09 DIAGNOSIS — N13.8 BPH WITH OBSTRUCTION/LOWER URINARY TRACT SYMPTOMS: ICD-10-CM

## 2022-05-09 DIAGNOSIS — E78.5 HYPERLIPIDEMIA WITH TARGET LDL LESS THAN 100: ICD-10-CM

## 2022-05-09 DIAGNOSIS — G25.0 ESSENTIAL TREMOR: ICD-10-CM

## 2022-05-09 DIAGNOSIS — N40.1 BPH WITH OBSTRUCTION/LOWER URINARY TRACT SYMPTOMS: ICD-10-CM

## 2022-05-09 DIAGNOSIS — E87.1 HYPONATREMIA: ICD-10-CM

## 2022-05-09 DIAGNOSIS — G62.9 PERIPHERAL POLYNEUROPATHY: ICD-10-CM

## 2022-05-09 DIAGNOSIS — E66.9 OBESITY (BMI 30-39.9): ICD-10-CM

## 2022-05-09 DIAGNOSIS — E53.8 VITAMIN B12 DEFICIENCY: ICD-10-CM

## 2022-05-09 DIAGNOSIS — G47.33 OBSTRUCTIVE SLEEP APNEA ON CPAP: ICD-10-CM

## 2022-05-09 DIAGNOSIS — I48.0 PAROXYSMAL ATRIAL FIBRILLATION (HCC): ICD-10-CM

## 2022-05-09 DIAGNOSIS — E11.65 UNCONTROLLED TYPE 2 DIABETES MELLITUS WITH HYPERGLYCEMIA (HCC): ICD-10-CM

## 2022-05-09 DIAGNOSIS — H40.89 OTHER GLAUCOMA OF BOTH EYES: ICD-10-CM

## 2022-05-09 PROCEDURE — 3023F SPIROM DOC REV: CPT | Performed by: FAMILY MEDICINE

## 2022-05-09 PROCEDURE — 2022F DILAT RTA XM EVC RTNOPTHY: CPT | Performed by: FAMILY MEDICINE

## 2022-05-09 PROCEDURE — G8417 CALC BMI ABV UP PARAM F/U: HCPCS | Performed by: FAMILY MEDICINE

## 2022-05-09 PROCEDURE — 4040F PNEUMOC VAC/ADMIN/RCVD: CPT | Performed by: FAMILY MEDICINE

## 2022-05-09 PROCEDURE — 1123F ACP DISCUSS/DSCN MKR DOCD: CPT | Performed by: FAMILY MEDICINE

## 2022-05-09 PROCEDURE — 3044F HG A1C LEVEL LT 7.0%: CPT | Performed by: FAMILY MEDICINE

## 2022-05-09 PROCEDURE — 3017F COLORECTAL CA SCREEN DOC REV: CPT | Performed by: FAMILY MEDICINE

## 2022-05-09 PROCEDURE — 99214 OFFICE O/P EST MOD 30 MIN: CPT | Performed by: FAMILY MEDICINE

## 2022-05-09 PROCEDURE — 1036F TOBACCO NON-USER: CPT | Performed by: FAMILY MEDICINE

## 2022-05-09 PROCEDURE — G8427 DOCREV CUR MEDS BY ELIG CLIN: HCPCS | Performed by: FAMILY MEDICINE

## 2022-05-09 ASSESSMENT — PATIENT HEALTH QUESTIONNAIRE - PHQ9
SUM OF ALL RESPONSES TO PHQ QUESTIONS 1-9: 0
SUM OF ALL RESPONSES TO PHQ QUESTIONS 1-9: 0
2. FEELING DOWN, DEPRESSED OR HOPELESS: 0
SUM OF ALL RESPONSES TO PHQ9 QUESTIONS 1 & 2: 0
1. LITTLE INTEREST OR PLEASURE IN DOING THINGS: 0
SUM OF ALL RESPONSES TO PHQ QUESTIONS 1-9: 0
SUM OF ALL RESPONSES TO PHQ QUESTIONS 1-9: 0

## 2022-05-09 NOTE — PROGRESS NOTES
[address] [place] [id number]  Dept: [phone]  Dept Fax: [phone]  Loc: [address] [place] [address] Vanita Sorensen is a 67 y.o. male who presents today for:  Chief Complaint   Patient presents with    6 Month Follow-Up           HPI:     HPI    He is seeing primarily the [facility] due to medications and treatment for PTSD but comes here 3 times yearly to go over all the information as well as maintenance of diabetes. PTSD did have a flare during a pacemaker placement when they did not give him enough pain medication. He reports thoughts of hurting the surgeon but did not act on them when redirected by the nurse. He has since worked this through with the counselor at the [facility]. He is also flaring a little bit with the current pandemic. Hypertension: Patient here for follow-up of elevated blood pressure. He is not exercising and is adherent to low salt diet. Blood pressure is well controlled at home. Cardiac symptoms none. Patient denies chest pain, dyspnea and palpitations. Cardiovascular risk factors: advanced age (older than 54 for men, 72 for women), dyslipidemia, hypertension and male gender. Use of agents associated with hypertension: none. History of target organ damage: stroke, paroxysmal afib, and CAD diagnosed by cath in 2013 but no stents. Hyperlipidemia: Patient presents with hyperlipidemia. He was tested because hypertension and CVA.   His last labs showed   Lab Results   Component Value Date    CHOL 127 04/21/2022    CHOL 125 04/15/2021    CHOL 119 03/10/2020     Lab Results   Component Value Date    TRIG 77 04/21/2022    TRIG 59 04/15/2021    TRIG 96 03/10/2020     Lab Results   Component Value Date    HDL 51 04/21/2022    HDL 53 04/15/2021    HDL 46 03/10/2020     Lab Results   Component Value Date    LDLCALC 61 04/21/2022    LDLCALC 60 04/15/2021    LDLCALC 54 03/10/2020     No results found for: LABVLDL, VLDL  No results found for: CHOLHDLRATIO    There is not a family history of hyperlipidemia. There is not a family history of early ischemia heart disease. GERD: Rene complains of heartburn. This has been associated with heartburn and hoarseness. He denies belching, difficulty swallowing and melena. Symptoms have been present for several years. He denies dysphagia. He has not lost weight. He denies melena, hematochezia, hematemesis, and coffee ground emesis. Medical therapy in the past has included proton pump inhibitors. Elevated glucose need follow up. He is no longer seeing an endocrinologist for diabetes but his medications are through the [place]. His endocrinologist left town and we are now managing the DM through here until he can find a new endocrinologist.  Fasting and nonfassting blood sugars are . Alexus's feet have  Along history of callus formation from bunion deformities, complicated by PVD. Lab Results   Component Value Date    LABA1C 6.6 (H) 04/21/2022     No results found for: EAG      Being followed currently by the [place] for hypertension, afib, and hyperlipidemia. Vitamin b12  was at the low end of normal but with symptoms of fatigue [facility]. He is feeling better and has also started vit d PO as well. Last b12 level is high and he is instructed to go to every 8 weeks instead of once a month. Lab Results   Component Value Date    [id number] (H) 04/21/2022       Tremor controlled better with current medications. He has seen the neurologist at the [place] who does not think that he has parkinson's but occasionally the tremor does get bad. He recalls once when he spilled his cereal right out of the bowl that he was holding. This is more controlled now. A lot of these conditions are related to agent orange exposure in [facility] as well as a water contamination in [employer]. HARINDER controlled on CPAP.     BPH is under the management of urology. Biopsy was negative 10/19/21. Lab Results   Component Value Date    PSA 4.55 (H) 04/21/2022    PSA 3.50 (H) 04/15/2021    PSA 3.26 (H) 01/24/2021       Low sodium is under watch. Anemia is under regular watch. Headaches and sinus pressure worse recently but better with allergy medications and tylenol ES. Reviewed chart forpast medical history , surgical history , allergies, social history , family history and medications. Health Maintenance   Topic Date Due    DTaP/Tdap/Td vaccine (1 - Tdap) 01/26/2017    Diabetic foot exam  12/22/2021    Depression Screen  01/14/2022    Annual Wellness Visit (AWV)  01/15/2022    Diabetic retinal exam  09/21/2022    A1C test (Diabetic or Prediabetic)  04/21/2023    Diabetic microalbuminuria test  04/21/2023    Lipids  04/21/2023    Prostate Specific Antigen (PSA) Screening or Monitoring  04/21/2023    Colorectal Cancer Screen  10/04/2029    Flu vaccine  Completed    Shingles vaccine  Completed    Pneumococcal 65+ years Vaccine  Completed    COVID-19 Vaccine  Completed    Hepatitis A vaccine  Aged Out    Hib vaccine  Aged Out    Meningococcal (ACWY) vaccine  Aged Out    AAA screen  Discontinued    Hepatitis C screen  Discontinued       Subjective:      Constitutional:Negative for fever, chills, diaphoresis, activity change, appetite change and fatigue. HENT: Negative for hearing loss, ear pain, congestion, sore throat, rhinorrhea, postnasal drip and ear discharge. Eyes: Negative for photophobia and visual disturbance. Respiratory: Negative for cough, chest tightness, shortness of breath and wheezing. Cardiovascular: Negative for chest pain and leg swelling. Gastrointestinal: Negative for nausea, vomiting, abdominal pain, diarrhea and constipation. Genitourinary: Negative for dysuria, urgency and frequency. Neurological: Negative for weakness, light-headedness and headaches.    Psychiatric/Behavioral: Negative for sleep disturbance.      :     Vitals:    05/09/22 1300   BP: 128/76   Site: Left Upper Arm   Position: Sitting   Cuff Size: Medium Adult   Pulse: 79   Resp: 16   Temp: 97.6 °F (36.4 °C)   TempSrc: Temporal   SpO2: 98%   Weight: 203 lb (92.1 kg)   Height: 5' 10.98\" (1.803 m)     Wt Readings from Last 3 Encounters:   05/09/22 203 lb (92.1 kg)   05/03/22 205 lb (93 kg)   02/18/22 206 lb (93.4 kg)       Physical Exam  Constitutional: Vital signs are normal. He appears well-developed and well-nourished. He is active. HENT:   Head: Normocephalic and atraumatic. Right Ear: Tympanic membrane, external ear and ear canal normal. No drainage or tenderness. Left Ear: Tympanic membrane, external ear and ear canal normal. No drainage or tenderness. Nose: Nose normal. No mucosal edema or rhinorrhea. Mouth/Throat: Uvula is midline, oropharynx is clear and moist and mucous membranes are normal. Mucous membranes are not pale. Normal dentition. No posterior oropharyngeal edema or posterior oropharyngeal erythema. Eyes: Lids are normal. Right eye exhibits no chemosis and no discharge. Left eye exhibits no chemosis and no drainage. Right conjunctiva has no hemorrhage. Left conjunctiva has no hemorrhage. Right eye exhibits normal extraocular motion. Left eye exhibits normal extraocular motion. Right pupil is round and reactive. Left pupil is round and reactive. Pupils are equal.   Cardiovascular: Normal rate, regular rhythm, S1 normal, S2 normal and normal heart sounds. Exam reveals no gallop. No murmur heard. Pulmonary/Chest: Effort normal and breath sounds normal. No respiratory distress. He has no wheezes. He has no rhonchi. He has no rales. Abdominal: Soft. Normal appearance and bowel sounds are normal. He exhibits no distension and no mass. There is no hepatosplenomegaly. No tenderness. He has no rigidity, no rebound and no guarding. No hernia. Musculoskeletal:        Right lower leg: He exhibits no edema. Left lower leg: He exhibits no edema. Neurological: He is alert. Oriented and pleasent        Assessment/Plan   Elizabeth Arevalo was seen today for 6 month follow-up. Diagnoses and all orders for this visit:    Primary hypertension    COPD with asthma ([facility] [address]    Uncontrolled type 2 diabetes mellitus with hyperglycemia ([facility] [address]  -     Basic Metabolic Panel; Future  -     Hemoglobin A1C; Future    PVD (peripheral vascular disease) (HCC)    Paroxysmal atrial fibrillation (HCC)    Squamous cell carcinoma of scalp    Vitamin B12 deficiency  -     Vitamin B12 & Folate; Future    Hyperlipidemia with target LDL less than 100    Iron deficiency anemia, unspecified iron deficiency anemia type    Post traumatic stress disorder    Peripheral polyneuropathy    Obesity (BMI 30-39. 9)    Obstructive sleep apnea on CPAP    BPH with obstruction/lower urinary tract symptoms    Agent orange exposure    Other glaucoma of both eyes    Diabetic peripheral neuropathy associated with type 2 diabetes mellitus ([facility] [address]    Hyponatremia    Essential tremor    S/P cardiac catheterization: 11/20/2013: No obstructive lesions. Moderate LI's in the mid LAD and in the RCA. No change to medication   Continue healthy diet and exercise  Yearly eye exam  Daily foot inspection  Yearly flu shot  Monitor glucose regularly  Daily aspirin  Regular labs : A1c quarterly, lipids every 6 months and BMP quaterly    Change b12 to every 6 weeks       Discussed use, benefit, and side effectsof prescribed medications. All patient questions answered. Pt voiced understanding. Reviewed health maintenance. Instructed to continue current medications, diet and exercise. Patient agreed with treatment plan. Followup as directed.      Electronically signed by Michela Degroot MD

## 2022-06-14 ENCOUNTER — NURSE ONLY (OUTPATIENT)
Dept: FAMILY MEDICINE CLINIC | Age: 72
End: 2022-06-14
Payer: MEDICARE

## 2022-06-14 DIAGNOSIS — E53.8 VITAMIN B12 DEFICIENCY: Primary | ICD-10-CM

## 2022-06-14 PROCEDURE — 96372 THER/PROPH/DIAG INJ SC/IM: CPT | Performed by: FAMILY MEDICINE

## 2022-06-14 RX ORDER — CYANOCOBALAMIN 1000 UG/ML
1000 INJECTION INTRAMUSCULAR; SUBCUTANEOUS ONCE
Status: COMPLETED | OUTPATIENT
Start: 2022-06-14 | End: 2022-06-14

## 2022-06-14 RX ADMIN — CYANOCOBALAMIN 1000 MCG: 1000 INJECTION INTRAMUSCULAR; SUBCUTANEOUS at 13:11

## 2022-06-21 ENCOUNTER — OFFICE VISIT (OUTPATIENT)
Dept: FAMILY MEDICINE CLINIC | Age: 72
End: 2022-06-21
Payer: MEDICARE

## 2022-06-21 VITALS
HEIGHT: 71 IN | SYSTOLIC BLOOD PRESSURE: 110 MMHG | WEIGHT: 206 LBS | HEART RATE: 70 BPM | RESPIRATION RATE: 18 BRPM | DIASTOLIC BLOOD PRESSURE: 58 MMHG | OXYGEN SATURATION: 97 % | TEMPERATURE: 97.4 F | BODY MASS INDEX: 28.84 KG/M2

## 2022-06-21 DIAGNOSIS — B96.89 ACUTE BACTERIAL SINUSITIS: Primary | ICD-10-CM

## 2022-06-21 DIAGNOSIS — Z20.822 EXPOSURE TO COVID-19 VIRUS: ICD-10-CM

## 2022-06-21 DIAGNOSIS — J01.90 ACUTE BACTERIAL SINUSITIS: Primary | ICD-10-CM

## 2022-06-21 PROCEDURE — G8417 CALC BMI ABV UP PARAM F/U: HCPCS | Performed by: FAMILY MEDICINE

## 2022-06-21 PROCEDURE — 1036F TOBACCO NON-USER: CPT | Performed by: FAMILY MEDICINE

## 2022-06-21 PROCEDURE — 1123F ACP DISCUSS/DSCN MKR DOCD: CPT | Performed by: FAMILY MEDICINE

## 2022-06-21 PROCEDURE — G8427 DOCREV CUR MEDS BY ELIG CLIN: HCPCS | Performed by: FAMILY MEDICINE

## 2022-06-21 PROCEDURE — 99213 OFFICE O/P EST LOW 20 MIN: CPT | Performed by: FAMILY MEDICINE

## 2022-06-21 PROCEDURE — 3017F COLORECTAL CA SCREEN DOC REV: CPT | Performed by: FAMILY MEDICINE

## 2022-06-21 RX ORDER — CEFDINIR 300 MG/1
300 CAPSULE ORAL 2 TIMES DAILY
Qty: 20 CAPSULE | Refills: 0 | Status: SHIPPED | OUTPATIENT
Start: 2022-06-21 | End: 2022-07-01

## 2022-06-21 RX ORDER — METHYLPREDNISOLONE 4 MG/1
TABLET ORAL
Qty: 1 KIT | Refills: 0 | Status: SHIPPED | OUTPATIENT
Start: 2022-06-21 | End: 2022-06-27

## 2022-06-21 NOTE — PROGRESS NOTES
[address] [place] [id number]  Dept: [phone]  Dept Fax: [phone]  Loc: [address] [place], [place] [address]ulevard Chu Acosta is a 67 y.o. male who presents today for:  Chief Complaint   Patient presents with    Cough     x2 weeks           HPI:     HPI  Here for cough for 2 weeks. Associated with sinus pressure, left ear ache, sore throat, productive of clear phlegm, PND, mild stomach ache, lower appetite, but no fever. Tried:  claritin daily and flonase. Reviewed chart forpast medical history , surgical history , allergies, social history , family history and medications. Health Maintenance   Topic Date Due    DTaP/Tdap/Td vaccine (1 - Tdap) 01/26/2017    Diabetic foot exam  12/22/2021    Annual Wellness Visit (AWV)  01/15/2022    Diabetic retinal exam  09/21/2022    A1C test (Diabetic or Prediabetic)  04/21/2023    Diabetic microalbuminuria test  04/21/2023    Lipids  04/21/2023    Prostate Specific Antigen (PSA) Screening or Monitoring  04/21/2023    Depression Screen  05/09/2023    Colorectal Cancer Screen  10/04/2029    Flu vaccine  Completed    Shingles vaccine  Completed    Pneumococcal 65+ years Vaccine  Completed    COVID-19 Vaccine  Completed    Hepatitis A vaccine  Aged Out    Hib vaccine  Aged Out    Meningococcal (ACWY) vaccine  Aged Out    AAA screen  Discontinued    Hepatitis C screen  Discontinued       Subjective:      Constitutional:Negative for fever, chills, diaphoresis, activity change, appetite change and fatigue. HENT: Negative for hearing loss, ear pain, congestion, sore throat, rhinorrhea, postnasal drip and ear discharge. Eyes: Negative for photophobia and visual disturbance. Respiratory: Negative for cough, chest tightness, shortness of breath and wheezing. Cardiovascular: Negative for chest pain and leg swelling.    Gastrointestinal: Negative for nausea, vomiting, abdominal pain, diarrhea and constipation. Genitourinary: Negative for dysuria, urgency and frequency. Neurological: Negative for weakness, light-headedness and headaches. Psychiatric/Behavioral: Negative for sleep disturbance.      :     Vitals:    06/21/22 1459   BP: (!) 110/58   Site: Left Upper Arm   Position: Sitting   Cuff Size: Medium Adult   Pulse: 70   Resp: 18   Temp: 97.4 °F (36.3 °C)   TempSrc: Temporal   SpO2: 97%   Weight: 206 lb (93.4 kg)   Height: 5' 10.98\" (1.803 m)     Wt Readings from Last 3 Encounters:   06/21/22 206 lb (93.4 kg)   05/09/22 203 lb (92.1 kg)   05/03/22 205 lb (93 kg)       Physical Exam  Constitutional: Vital signs are normal. He appears well-developed and well-nourished. He is active. HENT:   Head: Normocephalic and atraumatic. Right Ear: Tympanic membrane, external ear and ear canal normal. No drainage or tenderness. Left Ear: Tympanic membrane, external ear and ear canal normal. No drainage or tenderness. Nose: Nose normal. moderate mucosal edema or rhinorrhea. Also has swelling around the eyes  Mouth/Throat: Uvula is midline, oropharynx is clear and moist and mucous membranes are normal. Mucous membranes are not pale. Normal dentition. No posterior oropharyngeal edema or posterior oropharyngeal erythema. Eyes: Lids are normal. Right eye exhibits no chemosis and no discharge. Left eye exhibits no chemosis and no drainage. Right conjunctiva has no hemorrhage. Left conjunctiva has no hemorrhage. Right eye exhibits normal extraocular motion. Left eye exhibits normal extraocular motion. Right pupil is round and reactive. Left pupil is round and reactive. Pupils are equal.   Cardiovascular: Normal rate, regular rhythm, S1 normal, S2 normal and normal heart sounds. Exam reveals no gallop. No murmur heard. Pulmonary/Chest: Effort normal and breath sounds normal. No respiratory distress. He has no wheezes. He has no rhonchi. He has no rales. Abdominal: Soft. Normal appearance and bowel sounds are normal. He exhibits no distension and no mass. There is no hepatosplenomegaly. No tenderness. He has no rigidity, no rebound and no guarding. No hernia. Musculoskeletal:        Right lower leg: He exhibits no edema. Left lower leg: He exhibits no edema. Neurological: He is alert. Oriented and pleasent        Assessment/Plan   Jenni Correa was seen today for cough. Diagnoses and all orders for this visit:    Acute bacterial sinusitis  -     methylPREDNISolone (MEDROL DOSEPACK) 4 MG tablet; Take by mouth. -     cefdinir (OMNICEF) 300 MG capsule; Take 1 capsule by mouth 2 times daily for 10 days    Exposure to COVID-19 virus      Push fluids  Tylenol or ibuprofen prn fever  Cool mist Humidifier in the bedroom  Follow up if not better in 1 week or if symptoms get worse. Discussed use, benefit, and side effectsof prescribed medications. All patient questions answered. Pt voiced understanding. Reviewed health maintenance. Instructed to continue current medications, diet and exercise. Patient agreed with treatment plan. Followup as directed.      Electronically signed by Albert Caputo MD

## 2022-07-14 ENCOUNTER — NURSE ONLY (OUTPATIENT)
Dept: FAMILY MEDICINE CLINIC | Age: 72
End: 2022-07-14
Payer: MEDICARE

## 2022-07-14 DIAGNOSIS — E53.8 VITAMIN B12 DEFICIENCY: Primary | ICD-10-CM

## 2022-07-14 PROCEDURE — 96372 THER/PROPH/DIAG INJ SC/IM: CPT | Performed by: FAMILY MEDICINE

## 2022-07-14 RX ORDER — CYANOCOBALAMIN 1000 UG/ML
1000 INJECTION INTRAMUSCULAR; SUBCUTANEOUS ONCE
Status: COMPLETED | OUTPATIENT
Start: 2022-07-14 | End: 2022-07-14

## 2022-07-14 RX ADMIN — CYANOCOBALAMIN 1000 MCG: 1000 INJECTION INTRAMUSCULAR; SUBCUTANEOUS at 13:45

## 2022-07-15 ENCOUNTER — PROCEDURE VISIT (OUTPATIENT)
Dept: CARDIOLOGY CLINIC | Age: 72
End: 2022-07-15
Payer: MEDICARE

## 2022-07-15 DIAGNOSIS — Z95.0 PACEMAKER: Primary | ICD-10-CM

## 2022-07-15 PROCEDURE — 93294 REM INTERROG EVL PM/LDLS PM: CPT | Performed by: INTERNAL MEDICINE

## 2022-07-15 PROCEDURE — 93296 REM INTERROG EVL PM/IDS: CPT | Performed by: INTERNAL MEDICINE

## 2022-07-15 NOTE — PROGRESS NOTES
Dr savage pt  Nxt [employer] dual pacer remote   Battery 4.5 yrs remaiining      Known afib/ xarelto  Afib burden 8%      P waves 3.5  Rv waves not obtained per the device     Atrial impedence 475  Vent impedence 653  No thresholds obtained per the device     A paced 13%  V paced 100%   Dddr

## 2022-08-03 ENCOUNTER — TELEPHONE (OUTPATIENT)
Dept: PULMONOLOGY | Age: 72
End: 2022-08-03

## 2022-08-03 NOTE — TELEPHONE ENCOUNTER
Patient Lvm on our machine stating about an appt for Aug 10 @ 1015 Am, I Called patient back and I see pt has an appt for 8/8/2022 [place] and he may need go to [facility] to get his download for an appt. I advised to call our office back.

## 2022-08-08 ENCOUNTER — OFFICE VISIT (OUTPATIENT)
Dept: PULMONOLOGY | Age: 72
End: 2022-08-08
Payer: MEDICARE

## 2022-08-08 VITALS
WEIGHT: 208.8 LBS | TEMPERATURE: 98.6 F | OXYGEN SATURATION: 97 % | BODY MASS INDEX: 29.23 KG/M2 | HEIGHT: 71 IN | DIASTOLIC BLOOD PRESSURE: 66 MMHG | SYSTOLIC BLOOD PRESSURE: 126 MMHG | HEART RATE: 71 BPM

## 2022-08-08 DIAGNOSIS — Z99.89 OSA ON CPAP: Primary | ICD-10-CM

## 2022-08-08 DIAGNOSIS — G47.33 OSA ON CPAP: Primary | ICD-10-CM

## 2022-08-08 PROCEDURE — 3017F COLORECTAL CA SCREEN DOC REV: CPT | Performed by: NURSE PRACTITIONER

## 2022-08-08 PROCEDURE — 1123F ACP DISCUSS/DSCN MKR DOCD: CPT | Performed by: NURSE PRACTITIONER

## 2022-08-08 PROCEDURE — 99214 OFFICE O/P EST MOD 30 MIN: CPT | Performed by: NURSE PRACTITIONER

## 2022-08-08 PROCEDURE — 1036F TOBACCO NON-USER: CPT | Performed by: NURSE PRACTITIONER

## 2022-08-08 PROCEDURE — G8427 DOCREV CUR MEDS BY ELIG CLIN: HCPCS | Performed by: NURSE PRACTITIONER

## 2022-08-08 PROCEDURE — G8417 CALC BMI ABV UP PARAM F/U: HCPCS | Performed by: NURSE PRACTITIONER

## 2022-08-08 ASSESSMENT — ENCOUNTER SYMPTOMS
DIARRHEA: 0
CHEST TIGHTNESS: 0
EYES NEGATIVE: 1
ALLERGIC/IMMUNOLOGIC NEGATIVE: 1
STRIDOR: 0
NAUSEA: 0
WHEEZING: 0
GASTROINTESTINAL NEGATIVE: 1
RESPIRATORY NEGATIVE: 1
VOMITING: 0

## 2022-08-08 NOTE — PROGRESS NOTES
Patient reports PAP pressure is: pretty good    Patient reports no air leaks.     Patient is falling asleep with difficulty: no    Patient is having difficulty remaining asleep: no    Patient needs supplies: yes, he wants a new machine    Patient is having daytime sleepiness: no    Patient is snoring: Yes    Other: SOB

## 2022-08-08 NOTE — PROGRESS NOTES
[facility]      Riya Butts         [id number]  8/8/2022   Chief Complaint   Patient presents with    Follow-up     HARINDER 1 year follow up        Pt of Dr. Carl Galindo    PAP Download:   Original or initial AHI: 52.2     Date of initial study: 10/11/13      Compliant  100%     Noncompliant 0 %     PAP Type Remstar auto Level  14   Avg Hrs/Day 7 hrs 59 mins 31 secs  AHI: 2   Recorded compliance dates , 7/9/22-8/7/22  Machine/Mfg:   [x] ResMed    [] Respironics/Dreamstation   Interface:   [] Nasal    [] Nasal pillows   [x] FFM      Provider:      [x] [facility]     [][facility]     [] [facility]    [] Linsey Done    [] [facility]               [] [facility]      [] [facility]    [] [address]      [] Other    Neck Size: 16  Mallampati Mallampati 1  ESS:  6  SAQLI: 61     Here is a scan of the most recent download:        Presentation:   Lina Mc presents for sleep medicine follow up for obstructive sleep apnea  Patient reports PAP pressure is: pretty good     Patient reports no air leaks. Patient is falling asleep with difficulty: no     Patient is having difficulty remaining asleep: no     Patient needs supplies: yes, he wants a new machine     Patient is having daytime sleepiness: no     Patient is snoring: Yes  Requesting new machine today with same settings      Other: SOB     Equipment issues: The pressure is  acceptable, the mask is acceptable     Sleep issues:  Do you feel better? Yes  More rested? Yes   Better concentration? yes    Progress History:   Since last visit any new medical issues? No  New ER or hospital visits? Yes 2/18/22 MOHS repair top of head Dr. Jackson Aid  Any new or changes in medicines? No  Any new sleep medicines? No    Review of Systems -   Review of Systems   Constitutional: Negative. Negative for chills, fever and unexpected weight change. HENT: Negative. Eyes: Negative. Respiratory: Negative. Negative for chest tightness, wheezing and stridor. Cardiovascular:  Negative for chest pain and leg swelling. Gastrointestinal: Negative. Negative for diarrhea, nausea and vomiting. Endocrine: Negative. Genitourinary: Negative. Negative for dysuria. Musculoskeletal: Negative. Skin: Negative. Allergic/Immunologic: Negative. Neurological: Negative. Hematological: Negative. Psychiatric/Behavioral: Negative. Physical Exam:    BMI:  Body mass index is 29.12 kg/m². Wt Readings from Last 3 Encounters:   08/08/22 208 lb 12.8 oz (94.7 kg)   06/21/22 206 lb (93.4 kg)   05/09/22 203 lb (92.1 kg)     Weight stable / unchanged  Vitals: /66   Pulse 71   Temp 98.6 °F (37 °C)   Ht 5' 11\" (1.803 m)   Wt 208 lb 12.8 oz (94.7 kg)   SpO2 97%   BMI 29.12 kg/m²       Physical Exam  Vitals and nursing note reviewed. Constitutional:       General: He is not in acute distress. Appearance: He is well-developed. HENT:      Head: Normocephalic and atraumatic. Neck:      Trachea: No tracheal deviation. Cardiovascular:      Rate and Rhythm: Normal rate and regular rhythm. Heart sounds: Normal heart sounds. No murmur heard. Pulmonary:      Effort: Pulmonary effort is normal. No respiratory distress. Breath sounds: Normal breath sounds. No stridor. No wheezing or rales. Chest:      Chest wall: No tenderness. Abdominal:      General: Bowel sounds are normal. There is no distension. Palpations: Abdomen is soft. Skin:     General: Skin is warm and dry. Capillary Refill: Capillary refill takes less than 2 seconds. Neurological:      Mental Status: He is alert and oriented to person, place, and time. Psychiatric:         Behavior: Behavior normal.         Thought Content: Thought content normal.         Judgment: Judgment normal.     ASSESSMENT/DIAGNOSIS     Diagnosis Orders   1. HARINDER on CPAP  DME Order for CPAP as OP           Plan   Do you need any equipment today?  Yes - new machine RX today    - Download reviewed and discussed with patient  - He  was advised to continue current positive airway pressure therapy with above described pressure. - He  advised to keep good compliance with current recommended pressure to get optimal results and clinical improvement  - Recommend 7-9 hours of sleep with PAP  - He was advised to call DME company regarding supplies if needed.   -He call my office for earlier appointment if needed for worsening of sleep symptoms.   - He was instructed on weight loss  - Yina Perez was educated about my impression and plan. Patient verbalizesunderstanding.   We will see Jeanne Jimenez back in: 3 months with download  - DME order placed for new CPAP machine with same settings     Information added by my medical assistant/LPN was reviewed today   Electronically signed by FERDINAND Rayo CNP on 8/8/2022 at 1:28 PM

## 2022-08-25 ENCOUNTER — NURSE ONLY (OUTPATIENT)
Dept: FAMILY MEDICINE CLINIC | Age: 72
End: 2022-08-25
Payer: MEDICARE

## 2022-08-25 DIAGNOSIS — E53.8 VITAMIN B12 DEFICIENCY: Primary | ICD-10-CM

## 2022-08-25 PROCEDURE — 96372 THER/PROPH/DIAG INJ SC/IM: CPT | Performed by: FAMILY MEDICINE

## 2022-08-25 RX ORDER — CYANOCOBALAMIN 1000 UG/ML
1000 INJECTION INTRAMUSCULAR; SUBCUTANEOUS ONCE
Status: COMPLETED | OUTPATIENT
Start: 2022-08-25 | End: 2022-08-25

## 2022-08-25 RX ADMIN — CYANOCOBALAMIN 1000 MCG: 1000 INJECTION INTRAMUSCULAR; SUBCUTANEOUS at 13:09

## 2022-08-25 NOTE — PROGRESS NOTES
Administrations This Visit       cyanocobalamin injection 1,000 mcg       Admin Date  08/25/2022  13:09 Action  Given Dose  1,000 mcg Route  IntraMUSCular Site  Deltoid Left Administered By  Kevin Robison MA    Ordering Provider: Jenni Lew MD    NDC: 2148-0739-10    Lot#: [id number]    : [employer]    Patient Supplied?: No                  Patient tolerated well

## 2022-08-30 ENCOUNTER — OFFICE VISIT (OUTPATIENT)
Dept: PULMONOLOGY | Age: 72
End: 2022-08-30
Payer: MEDICARE

## 2022-08-30 VITALS
DIASTOLIC BLOOD PRESSURE: 68 MMHG | WEIGHT: 209 LBS | BODY MASS INDEX: 29.26 KG/M2 | HEIGHT: 71 IN | HEART RATE: 70 BPM | OXYGEN SATURATION: 98 % | SYSTOLIC BLOOD PRESSURE: 118 MMHG | TEMPERATURE: 97.9 F

## 2022-08-30 DIAGNOSIS — J44.9 COPD WITH ASTHMA (HCC): Primary | ICD-10-CM

## 2022-08-30 PROCEDURE — G8417 CALC BMI ABV UP PARAM F/U: HCPCS | Performed by: NURSE PRACTITIONER

## 2022-08-30 PROCEDURE — 1036F TOBACCO NON-USER: CPT | Performed by: NURSE PRACTITIONER

## 2022-08-30 PROCEDURE — 1123F ACP DISCUSS/DSCN MKR DOCD: CPT | Performed by: NURSE PRACTITIONER

## 2022-08-30 PROCEDURE — 3023F SPIROM DOC REV: CPT | Performed by: NURSE PRACTITIONER

## 2022-08-30 PROCEDURE — 3017F COLORECTAL CA SCREEN DOC REV: CPT | Performed by: NURSE PRACTITIONER

## 2022-08-30 PROCEDURE — G8427 DOCREV CUR MEDS BY ELIG CLIN: HCPCS | Performed by: NURSE PRACTITIONER

## 2022-08-30 PROCEDURE — 99213 OFFICE O/P EST LOW 20 MIN: CPT | Performed by: NURSE PRACTITIONER

## 2022-08-30 ASSESSMENT — ENCOUNTER SYMPTOMS
ABDOMINAL PAIN: 0
DIARRHEA: 0
NAUSEA: 0
SHORTNESS OF BREATH: 0
WHEEZING: 0
VOMITING: 0
COUGH: 0

## 2022-08-30 NOTE — PROGRESS NOTES
Patient is experiencing SOB: no    Patient is experiencing wheezing: No    Patient states they have had a Absent = 4 cough.     Phlegm is none    Patient is coughing up blood: no    Patient has been experiencing chest pains: non-existent    Patient is currently taking the following inhaler(s): Symbicort, Albuterol     Patient is using their rescue inhaler not at all

## 2022-08-30 NOTE — PROGRESS NOTES
[facility]     Patient: Khanh Gibbs, 67 y.o.   : 1950    Pt of Dr. Terry Floyd   Patient presents with    Follow-up     1 year COPD follow up, no testing         HPI  Margaux Lopez is here for 1 year follow up for COPD  Feels he is doing well. No current respiratory issues. Using inhaler with good compliance   Current symptoms:   Patient is experiencing SOB: no  Patient is experiencing wheezing: No  Patient states they have had a Absent cough.   Phlegm is none  Patient is coughing up blood: no  Patient has been experiencing chest pains: non-existent  Patient is currently taking the following inhaler(s): Symbicort, Albuterol   Patient is using their rescue inhaler not at all     Any recent exacerbations that have required oral atb or steroids No  Any new medical issues since last visit : Yes- skin cancer removed surgically by Dr Mario Booker MD  Planning to have cataract surgery     Last spirometry: - normal     Patient is not on home oxygen therapy:     UTD with flu vaccine Yes  UTD with pneumonia vaccine Yes  UTD COVID vaccine yes,       SOCIAL HISTORY:  Social History     Tobacco Use    Smoking status: Former     Packs/day: 2.00     Years: 6.00     Pack years: 12.00     Types: Cigarettes     Quit date: 1971     Years since quittin.8    Smokeless tobacco: Former   Vaping Use    Vaping Use: Never used   Substance Use Topics    Alcohol use: No     Alcohol/week: 0.0 standard drinks    Drug use: Not Currently     Types: Marijuana (Weed)     Comment: quit at 31 yoa         CURRENT MEDICATIONS:  Current Outpatient Medications   Medication Sig Dispense Refill    atorvastatin (LIPITOR) 40 MG tablet Take by mouth daily 1/2 tab      amLODIPine (NORVASC) 5 MG tablet Take 5 mg by mouth daily      metFORMIN (GLUCOPHAGE) 500 MG tablet Take by mouth 2 times daily (with meals) 2 tab      omeprazole (PRILOSEC) 40 MG delayed release capsule Take 40 mg by mouth daily      Multiple Vitamins-Minerals (PRESERVISION AREDS 2 PO) Take by mouth 2 times daily      dorzolamide-timolol (COSOPT) 22.3-6.8 MG/ML ophthalmic solution Place 1 drop into both eyes 2 times daily      insulin glargine (LANTUS SOLOSTAR) 100 UNIT/ML injection pen Inject 23 Units into the skin nightly      budesonide-formoterol (SYMBICORT) 160-4.5 MCG/ACT AERO Inhale 2 puffs into the lungs 2 times daily      latanoprost (XALATAN) 0.005 % ophthalmic solution Place into both eyes 2 times daily       albuterol sulfate HFA (VENTOLIN HFA) 108 (90 Base) MCG/ACT inhaler INHALE 2 PUFFS EVERY 6 HOURS AS NEEDED FOR WHEEZING. 18 g 11    tamsulosin (FLOMAX) 0.4 MG capsule Take 0.4 mg by mouth daily      topiramate (TOPAMAX SPRINKLE) 25 MG capsule Take 25 mg by mouth 2 times daily      DULoxetine (CYMBALTA) 60 MG extended release capsule Take 60 mg by mouth daily      loratadine (CLARITIN) 10 MG tablet Take 10 mg by mouth daily      rivaroxaban (XARELTO) 20 MG TABS tablet Take 20 mg by mouth daily (with breakfast)       gabapentin (NEURONTIN) 300 MG capsule Take 300 mg by mouth 2 times daily . propranolol (INDERAL LA) 120 MG CR capsule Take 120 mg by mouth daily       montelukast (SINGULAIR) 10 MG tablet take 1 tablet by mouth once daily 90 tablet 3    b complex vitamins capsule Take 1 capsule by mouth 2 times daily      BUSPIRONE HCL PO Take 10 mg by mouth 3 times daily       fluticasone (FLONASE) 50 MCG/ACT nasal spray USE 2 SPRAYS NASALLY DAILY 3 Bottle 3    aspirin 81 MG EC tablet Take 81 mg by mouth daily         No current facility-administered medications for this visit. Jigna BELTRAN   Review of Systems   Constitutional:  Negative for activity change, appetite change, chills, fatigue, fever and unexpected weight change. HENT: Negative. Eyes:  Positive for visual disturbance. Respiratory:  Negative for cough, shortness of breath and wheezing.     Cardiovascular:  Negative for chest pain, palpitations and leg swelling. Gastrointestinal:  Negative for abdominal pain, diarrhea, nausea and vomiting. Genitourinary: Negative. Musculoskeletal: Negative. Skin: Negative. Neurological: Negative. Hematological: Negative. Psychiatric/Behavioral: Negative. Physical exam   /68   Pulse 70   Temp 97.9 °F (36.6 °C)   Ht 5' 11\" (1.803 m)   Wt 209 lb (94.8 kg)   SpO2 98%   BMI 29.15 kg/m²       Wt Readings from Last 3 Encounters:   08/30/22 209 lb (94.8 kg)   08/08/22 208 lb 12.8 oz (94.7 kg)   06/21/22 206 lb (93.4 kg)     Physical Exam  Vitals and nursing note reviewed. Constitutional:       Appearance: Normal appearance. He is overweight. HENT:      Head: Normocephalic and atraumatic. Mouth/Throat:      Pharynx: Oropharynx is clear. Eyes:      Conjunctiva/sclera: Conjunctivae normal.   Pulmonary:      Effort: Pulmonary effort is normal. No tachypnea, bradypnea or respiratory distress. Skin:     Findings: No erythema or rash. Neurological:      Mental Status: He is alert and oriented to person, place, and time. Psychiatric:         Attention and Perception: Attention normal.         Mood and Affect: Mood normal. Affect is flat. Speech: Speech normal.         Behavior: Behavior is slowed. Thought Content: Thought content normal.         Cognition and Memory: Cognition normal.         Judgment: Judgment normal.        Test results   Lung Nodule Screening     [] Qualifies    [x]Does not qualify   [] Declined    [] Completed     Assessment       ICD-10-CM    1. [facility]  J44.9         Plan      -Symptoms currently optimally controlled , continue current therapies with Symbicort,  Albuterol and Singulair PO , recommend resuming current dosages of these medications.   - denies need for refills, [facility] hospital provider takes care of this.     -avoid ill contacts  -keep UTD on recommended vaccinations    Will see Davey Kong Juan Pablo Negro in: 1 year, sooner if any worsening symptoms   billing based on medical decision making   Electronically signed by FERDINAND Cortes CNP on 8/30/2022 at 2:10 PM

## 2022-08-31 ENCOUNTER — OFFICE VISIT (OUTPATIENT)
Dept: CARDIOLOGY CLINIC | Age: 72
End: 2022-08-31
Payer: MEDICARE

## 2022-08-31 ENCOUNTER — HOSPITAL ENCOUNTER (OUTPATIENT)
Age: 72
Discharge: HOME | End: 2022-08-31
Payer: MEDICARE

## 2022-08-31 VITALS
BODY MASS INDEX: 29.26 KG/M2 | WEIGHT: 209 LBS | DIASTOLIC BLOOD PRESSURE: 72 MMHG | HEART RATE: 84 BPM | SYSTOLIC BLOOD PRESSURE: 130 MMHG | HEIGHT: 71 IN

## 2022-08-31 DIAGNOSIS — R06.09 DYSPNEA ON EXERTION: Primary | ICD-10-CM

## 2022-08-31 DIAGNOSIS — R06.09 DYSPNEA ON EXERTION: ICD-10-CM

## 2022-08-31 PROCEDURE — 1123F ACP DISCUSS/DSCN MKR DOCD: CPT | Performed by: INTERNAL MEDICINE

## 2022-08-31 PROCEDURE — 1036F TOBACCO NON-USER: CPT | Performed by: INTERNAL MEDICINE

## 2022-08-31 PROCEDURE — 36415 COLL VENOUS BLD VENIPUNCTURE: CPT

## 2022-08-31 PROCEDURE — 3017F COLORECTAL CA SCREEN DOC REV: CPT | Performed by: INTERNAL MEDICINE

## 2022-08-31 PROCEDURE — 99214 OFFICE O/P EST MOD 30 MIN: CPT | Performed by: INTERNAL MEDICINE

## 2022-08-31 PROCEDURE — G8427 DOCREV CUR MEDS BY ELIG CLIN: HCPCS | Performed by: INTERNAL MEDICINE

## 2022-08-31 PROCEDURE — 83880 ASSAY OF NATRIURETIC PEPTIDE: CPT

## 2022-08-31 PROCEDURE — G8417 CALC BMI ABV UP PARAM F/U: HCPCS | Performed by: INTERNAL MEDICINE

## 2022-08-31 RX ORDER — FUROSEMIDE 20 MG/1
20 TABLET ORAL DAILY
Qty: 90 TABLET | Refills: 1 | Status: SHIPPED | OUTPATIENT
Start: 2022-08-31

## 2022-08-31 RX ORDER — FLUTICASONE PROPIONATE AND SALMETEROL 100; 50 UG/1; UG/1
1 POWDER RESPIRATORY (INHALATION) EVERY 12 HOURS
COMMUNITY

## 2022-08-31 NOTE — PROGRESS NOTES
[address] [facility] [address]  Dept: [phone]  Dept Fax: [phone]  Loc: [phone]    Visit Date: 8/31/2022    Mr. Joanna Bautista is a 67 y.o. male  who presented for:  Chief Complaint   Patient presents with    Atrial Fibrillation       HPI:   66 yo M C hx of Afib on Xarelto, PPM, s/p PPM, DM, HTN, HLD is here for a follow up. He reports good exercise tolerance. Had recent PPM interrogated. Denies any chest pain, palpitations, lightheadedness, dizziness, orthopnea, PND or pedal edema. No bleeding issues. Has been complaining of more shortness of breath lately. This is progressing more.           Current Outpatient Medications:     fluticasone-salmeterol (ADVIAR) 100-50 MCG/ACT AEPB diskus inhaler, Inhale 1 puff into the lungs every 12 hours, Disp: , Rfl:     vitamin D (CHOLECALCIFEROL) 25 MCG (1000 UT) TABS tablet, Take 1,000 Units by mouth in the morning and at bedtime, Disp: , Rfl:     furosemide (LASIX) 20 MG tablet, Take 1 tablet by mouth daily, Disp: 90 tablet, Rfl: 1    atorvastatin (LIPITOR) 40 MG tablet, Take by mouth daily 1/2 tab, Disp: , Rfl:     amLODIPine (NORVASC) 5 MG tablet, Take 5 mg by mouth daily, Disp: , Rfl:     metFORMIN (GLUCOPHAGE) 500 MG tablet, Take by mouth 2 times daily (with meals) 2 tab, Disp: , Rfl:     omeprazole (PRILOSEC) 40 MG delayed release capsule, Take 40 mg by mouth daily, Disp: , Rfl:     Multiple Vitamins-Minerals (PRESERVISION AREDS 2 PO), Take by mouth 2 times daily, Disp: , Rfl:     dorzolamide-timolol (COSOPT) 22.3-6.8 MG/ML ophthalmic solution, Place 1 drop into both eyes 2 times daily, Disp: , Rfl:     insulin glargine (LANTUS SOLOSTAR) 100 UNIT/ML injection pen, Inject 23 Units into the skin nightly, Disp: , Rfl:     latanoprost (XALATAN) 0.005 % ophthalmic solution, Place into both eyes 2 times daily , Disp: , Rfl:     albuterol sulfate HFA (VENTOLIN HFA) 108 (90 Base) MCG/ACT inhaler, INHALE 2 PUFFS EVERY 6 HOURS AS NEEDED FOR WHEEZING., Disp: 18 g, Rfl: 11    tamsulosin (FLOMAX) 0.4 MG capsule, Take 0.4 mg by mouth daily, Disp: , Rfl:     topiramate (TOPAMAX SPRINKLE) 25 MG capsule, Take 25 mg by mouth 2 times daily, Disp: , Rfl:     DULoxetine (CYMBALTA) 60 MG extended release capsule, Take 60 mg by mouth daily, Disp: , Rfl:     loratadine (CLARITIN) 10 MG tablet, Take 10 mg by mouth daily, Disp: , Rfl:     rivaroxaban (XARELTO) 20 MG TABS tablet, Take 20 mg by mouth daily (with breakfast) , Disp: , Rfl:     gabapentin (NEURONTIN) 300 MG capsule, Take 300 mg by mouth 2 times daily . , Disp: , Rfl:     propranolol (INDERAL LA) 120 MG CR capsule, Take 120 mg by mouth daily , Disp: , Rfl:     montelukast (SINGULAIR) 10 MG tablet, take 1 tablet by mouth once daily, Disp: 90 tablet, Rfl: 3    b complex vitamins capsule, Take 1 capsule by mouth 2 times daily, Disp: , Rfl:     BUSPIRONE HCL PO, Take 10 mg by mouth 3 times daily , Disp: , Rfl:     fluticasone (FLONASE) 50 MCG/ACT nasal spray, USE 2 SPRAYS NASALLY DAILY, Disp: 3 Bottle, Rfl: 3    aspirin 81 MG EC tablet, Take 81 mg by mouth daily  , Disp: , Rfl:     Past Medical History  Florinda Ko  has a past medical history of Asthma, [employer] dual pacemaker, Bronchitis, chronic ([address] Carotid artery stenosis, Chickenpox, COPD with asthma ([address] CVA (cerebral infarction), GERD (gastroesophageal reflux disease), Glaucoma, Hemorrhoids, Hyperlipidemia, Hypertension, Mild intermittent asthma without complication, Neuropathy, Osteoarthritis, Peripheral neuropathy, Post traumatic stress disorder (PTSD), S/P cardiac catheterization: 11/20/2013: No obstructive lesions. 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     Social History  Mulugeta Knight  reports that he quit smoking about 50 years ago. His smoking use included cigarettes. He has a 12.00 pack-year smoking history. He has quit using smokeless tobacco. He reports that he does not currently use drugs after having used the following drugs: Marijuana Adrianne Postin). He reports that he does not drink alcohol. Family History  Mulugeta Knight family history includes Cancer in his paternal grandfather; Diabetes in his mother; Heart Disease in his father and mother; High Blood Pressure in his father and mother; High Cholesterol in his father and mother; Kidney Disease in his mother; Stroke in his father. Past Surgical History   Past Surgical History:   Procedure Laterality Date    APPENDECTOMY  age 14    BLEPHAROPLASTY Bilateral 05/2017    CARDIAC CATHETERIZATION  11/2013    no stents    CARPAL TUNNEL RELEASE Right 01/2017    COLONOSCOPY  2005    Dr. Sam Bunn      right eye clog oil duct    EYE SURGERY Bilateral 01/2017    MANDIBLE SURGERY N/A 05/2017    jaw surgery lower front of mouth    MOHS SURGERY N/A 2/18/2022    MOHS DEFECT REPAIR SCC TOP OF HEAD performed by Kem Chung MD at [facility]  2000/2010    PACEMAKER INSERTION  11/20/2019    TONSILLECTOMY AND ADENOIDECTOMY  as a child        Subjective:     REVIEW OF SYSTEMS  Constitutional: denies sweats, chills and fever  HENT: denies  congestion, sinus pressure, sneezing and sore throat. Eyes: denies  pain, discharge, redness and itching. Respiratory: denies apnea, cough  Gastrointestinal: denies blood in stool, constipation, diarrhea   Endocrine: denies cold intolerance, heat intolerance, polydipsia. Genitourinary: denies dysuria, enuresis, flank pain and hematuria. Musculoskeletal: denies arthralgias, joint swelling and neck pain. Neurological: denies numbness and headaches.    Psychiatric/Behavioral: denies agitation, confusion, decreased concentration and dysphoric mood    All others reviewed and are negative. Objective:     /72   Pulse 84   Ht 5' 11\" (1.803 m)   Wt 209 lb (94.8 kg)   BMI 29.15 kg/m²     Wt Readings from Last 3 Encounters:   08/31/22 209 lb (94.8 kg)   08/30/22 209 lb (94.8 kg)   08/08/22 208 lb 12.8 oz (94.7 kg)     BP Readings from Last 3 Encounters:   08/31/22 130/72   08/30/22 118/68   08/08/22 126/66       PHYSICAL EXAM  Constitutional: Oriented to person, place, and time. Appears well-developed and well-nourished. HENT:   Head: Normocephalic and atraumatic. Eyes: EOM are normal. Pupils are equal, round, and reactive to light. Neck: Normal range of motion. Neck supple. No JVD present. Cardiovascular: Normal rate , normal heart sounds and intact distal pulses. Pulmonary/Chest: Effort normal and breath sounds normal. No respiratory distress. No wheezes. No rales. Abdominal: Soft. Bowel sounds are normal. No distension. There is no tenderness. Musculoskeletal: Normal range of motion. No edema. Neurological: Alert and oriented to person, place, and time. No cranial nerve deficit. Coordination normal.   Skin: Skin is warm and dry. Psychiatric: Normal mood and affect.        No results found for: CKTOTAL, CKMB, CKMBINDEX    Lab Results   Component Value Date/Time    WBC 6.9 04/21/2022 12:04 PM    RBC 4.61 04/21/2022 12:04 PM    RBC 4.77 02/01/2012 09:46 AM    HGB 14.1 04/21/2022 12:04 PM    HCT 42.4 04/21/2022 12:04 PM    MCV 92.0 04/21/2022 12:04 PM    MCH 30.6 04/21/2022 12:04 PM    MCHC 33.3 04/21/2022 12:04 PM    RDW 13.7 05/25/2018 10:26 AM     04/21/2022 12:04 PM    MPV 9.2 04/21/2022 12:04 PM       Lab Results   Component Value Date/Time     04/21/2022 12:03 PM    K 5.0 04/21/2022 12:03 PM    K 4.9 11/20/2019 11:21 AM    CL 99 04/21/2022 12:03 PM    CO2 24 04/21/2022 12:03 PM    BUN 12 04/21/2022 12:03 PM    LABALBU 4.6 04/21/2022 12:03 PM    CREATININE 0.9 04/21/2022 12:03 PM    CALCIUM 9.5 04/21/2022 12:03 PM LABGLOM 83 04/21/2022 12:03 PM    GLUCOSE 124 02/08/2022 05:10 PM    GLUCOSE 121 02/01/2012 09:46 AM       Lab Results   Component Value Date/Time    ALKPHOS 175 04/21/2022 12:03 PM    ALT 22 04/21/2022 12:03 PM    AST 18 04/21/2022 12:03 PM    PROT 6.9 04/21/2022 12:03 PM    BILITOT 0.5 04/21/2022 12:03 PM    BILIDIR 0.2 10/15/2014 10:41 AM    LABALBU 4.6 04/21/2022 12:03 PM       Lab Results   Component Value Date/Time    MG 1.9 01/30/2018 06:44 PM       Lab Results   Component Value Date    INR 1.03 11/20/2019    INR 0.85 11/19/2013         Lab Results   Component Value Date/Time    LABA1C 6.6 04/21/2022 12:03 PM       Lab Results   Component Value Date/Time    TRIG 77 04/21/2022 12:03 PM    HDL 51 04/21/2022 12:03 PM    LDLCALC 61 04/21/2022 12:03 PM       Lab Results   Component Value Date/Time    TSH 4.560 04/21/2022 12:03 PM         Testing Reviewed:      I haveindividually reviewed the below cardiac tests    EKG:    ECHO:   Results for orders placed during the hospital encounter of 03/30/17   ECHO Complete 2D W Doppler W Color    Narrative Transthoracic Echocardiography Report (TTE)     Demographics      Patient Name    Eduardo Petty  Gender               Male                   D      MR #            [id number]          Race                                                          Ethnicity      Account #       [de-identified]            Room Number      Accession       [id number]          Date of Study        03/30/2017   Number      Date of Birth   1950         Referring Physician  Frankie Bhandari MD      Age             78 year(s)         Sonographer          Tarik Guadarrama RDCS                                         Interpreting         Solomon Bowenite DO                                      Physician     Procedure    Type of Study      TTE procedure:ECHOCARDIOGRAM COMPLETE 2D W DOPPLER W COLOR.      Procedure Date  Date: 03/30/2017 Start: 11:00 AM    Study Location: Echo Lab  Technical Quality: Adequate visualization    Indications:Shortness of breath. Additional Medical History:Coronary artery disease, Hypertension,  Hyperlipidemia, GERD, Diabetes, Paroxysmal atrial fib, Arthritis, Asthma,  Remote stroke, Pacemaker, Family history of heart disease, Former smoker    Patient Status: Routine    Height: 71 inches Weight: 220 pounds BSA: 2.2 m^2 BMI: 30.68 kg/m^2    BP: 142/78 mmHg     Conclusions      Summary   Systolic function was normal.   Ejection fraction is visually estimated at 55%. Tricuspid valve is structurally normal.   Mild tricuspid regurgitation visualized. Signature      ----------------------------------------------------------------   Electronically signed by Renetta Martínez DO (Interpreting   physician) on 03/30/2017 at 10:10 PM   ----------------------------------------------------------------      Findings      Mitral Valve   Structurally normal mitral valve. Trace mitral regurgitation is present. Aortic Valve   The aortic valve appears to be trileaflet with good leaflet separation. Aortic valve leaflets are Mildly calcified. Tricuspid Valve   Tricuspid valve is structurally normal.   Mild tricuspid regurgitation visualized. Pulmonic Valve   Pulmonic valve is structurally normal.   Trivial pulmonic regurgitation visualized. Left Atrium   Normal size left atrium. Left Ventricle   Systolic function was normal.   Ejection fraction is visually estimated at 55%. Right Atrium   The right atrium is of normal size. Right Ventricle   Normal right ventricular size and function. Pericardial Effusion   There is a trivial pericardial effusion noted.      M-Mode/2D Measurements & Calculations      LV Diastolic    LV Systolic Dimension: 3.2  AV Cusp Separation: 2.2 cmLA   Dimension: 5.1  cm                          Dimension: 3.7 cmAO Root   cm              LV Volume Diastolic: 960 ml Dimension: 2.8 cm   LV FS:37.3 %    LV Volume Systolic: 41 ml   LV PW           LV EDV/LV EDV Index: 044   Diastolic: 1.2  QF/44 E^8QK ESV/LV ESV   cm              Index: 41 ml/19 m^2         RV Diastolic Dimension: 2.9 cm   Septum          EF Calculated: 25.6 %   Diastolic: 1.2                              LA/Aorta: 1.32   cm     Doppler Measurements & Calculations      MV Peak E-Wave: 85.4 cm/s  AV Peak Velocity: 130 LVOT Peak Velocity: 94.8   MV Peak A-Wave: 79.5 cm/s  cm/s                  cm/s   MV E/A Ratio: 1.07         AV Peak Gradient:     LVOT Peak Gradient: 4   MV Peak Gradient: 2.92     6.76 mmHg             mmHg   mmHg                                                    TV Peak E-Wave: 47.4 cm/s   MV Deceleration Time: 190                        TV Peak A-Wave: 36 cm/s   msec                                                    TV Peak Gradient: 0.9                                                    mmHg   MV E' Septal Velocity:                           TR Velocity:207 cm/s   4.97 cm/s                  AV DVI (Vmax):0.73    TR Gradient:17.14 mmHg   MV A' Septal Velocity:                           PV Peak Velocity: 54.8   5.07 cm/s                                        cm/s   MV E' Lateral Velocity:                          PV Peak Gradient: 1.2   6.34 cm/s                                        mmHg   MV A' Lateral Velocity:   6.63 cm/s   E/E' septal: 17.18   E/E' lateral: 13.47   MR Velocity: 259 cm/s     [email or web address]%3d%3d       STRESS:    CATH:    Assessment/Plan       Diagnosis Orders   1.  Dyspnea on exertion  Echo 2D w doppler w color complete    Brain Natriuretic Peptide          Afib on Xarelto  Shortness of breath  PPM  DM  HTN  HLD  HARINDER on CPAP  Asthma    More shortness of breath  Will get ProBNP, Echo  Will give Lasix 20mg daily to see any response  Reports good exercise tolerance  Consistent with CPAP  On Xarelto , no bleeding, no side effects, continue for now  ABT9OENSQR:3, high risk  Continue Aspirin, statin, amlodipine  EKG shows A-S, V-Paced rhythm  The patient is asked to make an attempt to improve diet and exercise patterns to aid in medical management of this problem. Advised more plant based nutrition/meditarrean diet   Advised patient to call office or seek immediate medical attention if there is any new onset of  any chest pain, sob, palpitations, lightheadedness, dizziness, orthopnea, PND or pedal edema. All medication side effects were discussed in details. Thank youfor allowing me to participate in the care of this patient. Please do not hesitate to contact me for any further questions. Return in about 3 months (around 11/30/2022), or if symptoms worsen or fail to improve, for Regular follow up, Review testing.        Electronically signed by Binta Joyce MD [facility]  8/31/2022 at 1:21 PM EST

## 2022-09-01 LAB — PRO-BNP: 166.8 PG/ML (ref 0–900)

## 2022-09-09 ENCOUNTER — HOSPITAL ENCOUNTER (OUTPATIENT)
Dept: NON INVASIVE DIAGNOSTICS | Age: 72
Discharge: HOME | End: 2022-09-09
Payer: MEDICARE

## 2022-09-09 DIAGNOSIS — R06.09 DYSPNEA ON EXERTION: ICD-10-CM

## 2022-09-09 LAB
LV EF: 63 %
LVEF MODALITY: NORMAL

## 2022-09-09 PROCEDURE — 93306 TTE W/DOPPLER COMPLETE: CPT

## 2022-09-20 ENCOUNTER — NURSE ONLY (OUTPATIENT)
Dept: FAMILY MEDICINE CLINIC | Age: 72
End: 2022-09-20
Payer: MEDICARE

## 2022-09-20 DIAGNOSIS — E53.8 VITAMIN B12 DEFICIENCY: Primary | ICD-10-CM

## 2022-09-20 PROCEDURE — 96372 THER/PROPH/DIAG INJ SC/IM: CPT | Performed by: FAMILY MEDICINE

## 2022-09-20 RX ORDER — CYANOCOBALAMIN 1000 UG/ML
1000 INJECTION INTRAMUSCULAR; SUBCUTANEOUS ONCE
Status: COMPLETED | OUTPATIENT
Start: 2022-09-20 | End: 2022-09-20

## 2022-09-20 RX ADMIN — CYANOCOBALAMIN 1000 MCG: 1000 INJECTION INTRAMUSCULAR; SUBCUTANEOUS at 13:29

## 2022-09-20 NOTE — PROGRESS NOTES
Administrations This Visit       cyanocobalamin injection 1,000 mcg       Admin Date  09/20/2022  13:29 Action  Given Dose  1,000 mcg Route  IntraMUSCular Site  Deltoid Right Administered By  Chance Marino CMA ([address] [place] [address])    Ordering Provider: Marcela Sesay MD    NDC: 0430-2155-24    Lot#: [id number]    : [employer]    Patient Supplied?: No                    Patient instructed to remain in clinic for 20 minutes after injection and was advised to report any adverse reaction to me immediately.

## 2022-10-19 ENCOUNTER — TELEPHONE (OUTPATIENT)
Dept: UROLOGY | Age: 72
End: 2022-10-19

## 2022-10-19 ENCOUNTER — PROCEDURE VISIT (OUTPATIENT)
Dept: CARDIOLOGY CLINIC | Age: 72
End: 2022-10-19
Payer: MEDICARE

## 2022-10-19 DIAGNOSIS — Z95.0 PACEMAKER: Primary | ICD-10-CM

## 2022-10-19 PROCEDURE — 93296 REM INTERROG EVL PM/IDS: CPT | Performed by: INTERNAL MEDICINE

## 2022-10-19 PROCEDURE — 93294 REM INTERROG EVL PM/LDLS PM: CPT | Performed by: INTERNAL MEDICINE

## 2022-10-19 NOTE — PROGRESS NOTES
Dr savage pt   Nxt [employer] dual pacer remote   Battery 4.5 yrs remaining      Known paf/ xarelto   Afib burden 7%  2 episodes of afib with rvr / longest 65 hours       P waves 4.5  Rv waves 9.5    Atrial impedence 490  Vent impedence 671    No thresholds obtained per the device     A paced 15%  V paced 100%  Dddr

## 2022-10-19 NOTE — TELEPHONE ENCOUNTER
Lm at the [facility]. to fax over recent PSA

## 2022-10-20 ENCOUNTER — NURSE ONLY (OUTPATIENT)
Dept: FAMILY MEDICINE CLINIC | Age: 72
End: 2022-10-20
Payer: MEDICARE

## 2022-10-20 DIAGNOSIS — E53.8 VITAMIN B12 DEFICIENCY: Primary | ICD-10-CM

## 2022-10-20 PROCEDURE — 96372 THER/PROPH/DIAG INJ SC/IM: CPT | Performed by: FAMILY MEDICINE

## 2022-10-20 RX ORDER — CYANOCOBALAMIN 1000 UG/ML
1000 INJECTION INTRAMUSCULAR; SUBCUTANEOUS ONCE
Status: COMPLETED | OUTPATIENT
Start: 2022-10-20 | End: 2022-10-20

## 2022-10-20 RX ADMIN — CYANOCOBALAMIN 1000 MCG: 1000 INJECTION INTRAMUSCULAR; SUBCUTANEOUS at 13:30

## 2022-10-20 NOTE — PROGRESS NOTES
Pt given B12 injection. Pt tolerated appropriately.        Administrations This Visit       cyanocobalamin injection 1,000 mcg       Admin Date  10/20/2022  13:30 Action  Given Dose  1,000 mcg Route  IntraMUSCular Site  Deltoid Left Administered By  Markie Prabhakar MA    Ordering Provider: Rodney Robles MD    NDC: 7314-8582-29    Lot#: [id number]    : [employer]    Patient Supplied?: No

## 2022-11-09 ENCOUNTER — OFFICE VISIT (OUTPATIENT)
Dept: FAMILY MEDICINE CLINIC | Age: 72
End: 2022-11-09
Payer: MEDICARE

## 2022-11-09 VITALS
HEIGHT: 71 IN | HEART RATE: 76 BPM | TEMPERATURE: 97.2 F | SYSTOLIC BLOOD PRESSURE: 128 MMHG | DIASTOLIC BLOOD PRESSURE: 62 MMHG | BODY MASS INDEX: 30.1 KG/M2 | RESPIRATION RATE: 16 BRPM | WEIGHT: 215 LBS

## 2022-11-09 DIAGNOSIS — F43.10 POST TRAUMATIC STRESS DISORDER: ICD-10-CM

## 2022-11-09 DIAGNOSIS — D50.9 IRON DEFICIENCY ANEMIA, UNSPECIFIED IRON DEFICIENCY ANEMIA TYPE: ICD-10-CM

## 2022-11-09 DIAGNOSIS — G25.0 ESSENTIAL TREMOR: ICD-10-CM

## 2022-11-09 DIAGNOSIS — C44.42 SQUAMOUS CELL CARCINOMA OF SCALP: ICD-10-CM

## 2022-11-09 DIAGNOSIS — Z98.890 S/P CARDIAC CATHETERIZATION: ICD-10-CM

## 2022-11-09 DIAGNOSIS — M19.90 ARTHRITIS: ICD-10-CM

## 2022-11-09 DIAGNOSIS — Z00.00 MEDICARE ANNUAL WELLNESS VISIT, SUBSEQUENT: Primary | ICD-10-CM

## 2022-11-09 DIAGNOSIS — I10 PRIMARY HYPERTENSION: ICD-10-CM

## 2022-11-09 DIAGNOSIS — Z99.89 OBSTRUCTIVE SLEEP APNEA ON CPAP: ICD-10-CM

## 2022-11-09 DIAGNOSIS — E11.65 UNCONTROLLED TYPE 2 DIABETES MELLITUS WITH HYPERGLYCEMIA (HCC): ICD-10-CM

## 2022-11-09 DIAGNOSIS — N40.1 BPH WITH OBSTRUCTION/LOWER URINARY TRACT SYMPTOMS: ICD-10-CM

## 2022-11-09 DIAGNOSIS — E78.5 HYPERLIPIDEMIA WITH TARGET LDL LESS THAN 100: ICD-10-CM

## 2022-11-09 DIAGNOSIS — E87.1 HYPONATREMIA: ICD-10-CM

## 2022-11-09 DIAGNOSIS — I48.0 PAROXYSMAL ATRIAL FIBRILLATION (HCC): ICD-10-CM

## 2022-11-09 DIAGNOSIS — Z77.098 AGENT ORANGE EXPOSURE: ICD-10-CM

## 2022-11-09 DIAGNOSIS — E11.42 DIABETIC PERIPHERAL NEUROPATHY ASSOCIATED WITH TYPE 2 DIABETES MELLITUS (HCC): ICD-10-CM

## 2022-11-09 DIAGNOSIS — E66.9 OBESITY (BMI 30-39.9): ICD-10-CM

## 2022-11-09 DIAGNOSIS — H40.89 OTHER GLAUCOMA OF BOTH EYES: ICD-10-CM

## 2022-11-09 DIAGNOSIS — J44.9 COPD WITH ASTHMA (HCC): ICD-10-CM

## 2022-11-09 DIAGNOSIS — N13.8 BPH WITH OBSTRUCTION/LOWER URINARY TRACT SYMPTOMS: ICD-10-CM

## 2022-11-09 DIAGNOSIS — G47.33 OBSTRUCTIVE SLEEP APNEA ON CPAP: ICD-10-CM

## 2022-11-09 DIAGNOSIS — I73.9 PVD (PERIPHERAL VASCULAR DISEASE) (HCC): ICD-10-CM

## 2022-11-09 DIAGNOSIS — G62.9 PERIPHERAL POLYNEUROPATHY: ICD-10-CM

## 2022-11-09 DIAGNOSIS — E53.8 VITAMIN B12 DEFICIENCY: ICD-10-CM

## 2022-11-09 PROCEDURE — 1123F ACP DISCUSS/DSCN MKR DOCD: CPT | Performed by: FAMILY MEDICINE

## 2022-11-09 PROCEDURE — 3017F COLORECTAL CA SCREEN DOC REV: CPT | Performed by: FAMILY MEDICINE

## 2022-11-09 PROCEDURE — 3074F SYST BP LT 130 MM HG: CPT | Performed by: FAMILY MEDICINE

## 2022-11-09 PROCEDURE — G0439 PPPS, SUBSEQ VISIT: HCPCS | Performed by: FAMILY MEDICINE

## 2022-11-09 PROCEDURE — 3044F HG A1C LEVEL LT 7.0%: CPT | Performed by: FAMILY MEDICINE

## 2022-11-09 PROCEDURE — 96372 THER/PROPH/DIAG INJ SC/IM: CPT | Performed by: FAMILY MEDICINE

## 2022-11-09 PROCEDURE — G8484 FLU IMMUNIZE NO ADMIN: HCPCS | Performed by: FAMILY MEDICINE

## 2022-11-09 PROCEDURE — 3078F DIAST BP <80 MM HG: CPT | Performed by: FAMILY MEDICINE

## 2022-11-09 RX ORDER — CYANOCOBALAMIN 1000 UG/ML
1000 INJECTION, SOLUTION INTRAMUSCULAR; SUBCUTANEOUS ONCE
Status: COMPLETED | OUTPATIENT
Start: 2022-11-09 | End: 2022-11-09

## 2022-11-09 RX ADMIN — CYANOCOBALAMIN 1000 MCG: 1000 INJECTION, SOLUTION INTRAMUSCULAR; SUBCUTANEOUS at 13:52

## 2022-11-09 SDOH — ECONOMIC STABILITY: FOOD INSECURITY: WITHIN THE PAST 12 MONTHS, THE FOOD YOU BOUGHT JUST DIDN'T LAST AND YOU DIDN'T HAVE MONEY TO GET MORE.: NEVER TRUE

## 2022-11-09 SDOH — ECONOMIC STABILITY: FOOD INSECURITY: WITHIN THE PAST 12 MONTHS, YOU WORRIED THAT YOUR FOOD WOULD RUN OUT BEFORE YOU GOT MONEY TO BUY MORE.: NEVER TRUE

## 2022-11-09 ASSESSMENT — PATIENT HEALTH QUESTIONNAIRE - PHQ9
SUM OF ALL RESPONSES TO PHQ QUESTIONS 1-9: 0
2. FEELING DOWN, DEPRESSED OR HOPELESS: 0
SUM OF ALL RESPONSES TO PHQ QUESTIONS 1-9: 0
SUM OF ALL RESPONSES TO PHQ QUESTIONS 1-9: 0
1. LITTLE INTEREST OR PLEASURE IN DOING THINGS: 0
SUM OF ALL RESPONSES TO PHQ9 QUESTIONS 1 & 2: 0
SUM OF ALL RESPONSES TO PHQ QUESTIONS 1-9: 0

## 2022-11-09 ASSESSMENT — SOCIAL DETERMINANTS OF HEALTH (SDOH): HOW HARD IS IT FOR YOU TO PAY FOR THE VERY BASICS LIKE FOOD, HOUSING, MEDICAL CARE, AND HEATING?: NOT HARD AT ALL

## 2022-11-09 ASSESSMENT — LIFESTYLE VARIABLES
HOW OFTEN DO YOU HAVE A DRINK CONTAINING ALCOHOL: NEVER
HOW MANY STANDARD DRINKS CONTAINING ALCOHOL DO YOU HAVE ON A TYPICAL DAY: PATIENT DOES NOT DRINK

## 2022-11-09 NOTE — PATIENT INSTRUCTIONS
Advance Directives: Care Instructions  Overview  An advance directive is a legal way to state your wishes at the end of your life. It tells your family and your doctor what to do if you can't say what you want. There are two main types of advance directives. You can change them any time your wishes change. Living will. This form tells your family and your doctor your wishes about life support and other treatment. The form is also called a declaration. Medical power of . This form lets you name a person to make treatment decisions for you when you can't speak for yourself. This person is called a health care agent (health care proxy, health care surrogate). The form is also called a durable power of  for health care. If you do not have an advance directive, decisions about your medical care may be made by a family member, or by a doctor or a  who doesn't know you. It may help to think of an advance directive as a gift to the people who care for you. If you have one, they won't have to make tough decisions by themselves. Follow-up care is a key part of your treatment and safety. Be sure to make and go to all appointments, and call your doctor if you are having problems. It's also a good idea to know your test results and keep a list of the medicines you take. What should you include in an advance directive? Many states have a unique advance directive form. (It may ask you to address specific issues.) Or you might use a universal form that's approved by many states. If your form doesn't tell you what to address, it may be hard to know what to include in your advance directive. Use the questions below to help you get started. Who do you want to make decisions about your medical care if you are not able to? What life-support measures do you want if you have a serious illness that gets worse over time or can't be cured? What are you most afraid of that might happen?  (Maybe you're meaningful for you. Understands your Hinduism and moral values. Will do what you want, not what he or she wants. Will be able to make difficult choices at a stressful time. Will be able to refuse or stop treatment, if that is what you would want, even if you could die. Will be firm and confident with health professionals if needed. Will ask questions to get needed information. Lives near you or agrees to travel to you if needed. Your family may help you make medical decisions while you can still be part of that process. But it's important to choose one person to be your health care agent in case you aren't able to make decisions for yourself. If you don't fill out the legal form and name a health care agent, the decisions your family can make may be limited. A health care agent may be called something else in your state. Who will make decisions for you if you don't have a health care agent? If you don't have a health care agent or a living will, you may not get the care you want. Decisions may be made by family members who disagree about your medical care. Or decisions may be made by a medical professional who doesn't know you well. In some cases, a  makes the decisions. When you name a health care agent, it is very clear who has the power to make health decisions for you. How do you name a health care agent? You name your health care agent on a legal form. This form is usually called a medical power of . Ask your hospital, state bar association, or office on aging where to find these forms. You must sign the form to make it legal. Some states require you to get the form notarized. This means that a person called a  watches you sign the form and then he or she signs the form. Some states also require that two or more witnesses sign the form. Be sure to tell your family members and doctors who your health care agent is. Where can you learn more?   Go to [email or web address]://[email or web address].[email or web address] org and sign in to your [employer] account. Enter [id number] in the [facility] box to learn more about \"Learning About [address]. \"     If you do not have an account, please click on the \"Sign Up Now\" link. Current as of: October 6, 2021               Content Version: 13.4  © 2006-2022 Healthwise, [employer]. Care instructions adapted under license by [facility] ([facility]). If you have questions about a medical condition or this instruction, always ask your healthcare professional. [address] any warranty or liability for your use of this information. Learning About Living Kayla Eboni  What is a living will? A living will, also called a declaration, is a legal form. It tells your family and your doctor your wishes when you can't speak for yourself. It's used by the health professionals who will treat you as you near the end of your life or if you get seriously hurt or ill. If you put your wishes in writing, your loved ones and others will know what kind of care you want. They won't need to guess. This can ease your mind and be helpful to others. And you can change or cancel your living will at any time. A living will is not the same as an estate or property will. An estate will explains what you want to happen with your money and property after you die. How do you use it? Keep these facts in mind about how a living will is used. Your living will is used only if you can't speak or make decisions for yourself. Most often, one or more doctors must certify that you can't speak or decide for yourself before your living will takes effect. If you get better and can speak for yourself again, you can accept or refuse any treatment. It doesn't matter what you said in your living will. Some states may limit your right to refuse treatment in certain cases.  For example, you may need to clearly state in your living will that you don't want artificial hydration and nutrition, such as being fed through a tube. Is a living will a legal document? A living will is a legal document. Each state has its own laws about living ocasio. And a living will may be called something else in your state. Here are some things to know about living ocasio. You don't need an  to complete a living will. But legal advice can be helpful if your state's laws are unclear. It can also help if your health history is complicated or your family can't agree on what should be in your living will. You can change your living will at any time. Some people find that their wishes about end-of-life care change as their health changes. If you make big changes to your living will, complete a new form. If you move to another state, make sure that your living will is legal in the state where you now live. In most cases, doctors will respect your wishes even if you have a form from a different state. You might use a universal form that has been approved by many states. This kind of form can sometimes be filled out and stored online. Your digital copy will then be available wherever you have a connection to the internet. The doctors and nurses who need to treat you can find it right away. Your state may offer an online registry. This is another place where you can store your living will online. It's a good idea to get your living will notarized. This means using a person called a  to watch two people sign, or witness, your living will. What should you know when you create a living will? Here are some questions to ask yourself as you make your living will. Do you know enough about life support methods that might be used? If not, talk to your doctor so you know what might be done if you can't breathe on your own, your heart stops, or you can't swallow. What things would you still want to be able to do after you receive life-support methods?  Would you want to be able to walk? To speak? To eat on your own? To live without the help of machines? Do you want certain Muslim practices performed if you become very ill? If you have a choice, where do you want to be cared for? In your home? At a hospital or nursing home? If you have a choice at the end of your life, where would you prefer to die? At home? In a hospital or nursing home? Somewhere else? Would you prefer to be buried or cremated? Do you want your organs to be donated after you die? What should you do with your living will? Make sure that your family members and your health care agent have copies of your living will (also called a declaration). Give your doctor a copy of your living will. Ask to have it kept as part of your medical record. If you have more than one doctor, make sure that each one has a copy. Put a copy of your living will where it can be easily found. For example, some people may put a copy on their refrigerator door. If you are using a digital copy, be sure your doctor, family members, and health care agent know how to find and access it. Where can you learn more? Go to https://[email or web address].[email or web address] org and sign in to your [employer] account. Enter W666 in the [employer] box to learn more about \"Learning About Living [place]. \"     If you do not have an account, please click on the \"Sign Up Now\" link. Current as of: June 16, 2022               Content Version: 13.4  © 2006-2022 Healthwise, Incorporated. Care instructions adapted under license by [facility] ([facility]). If you have questions about a medical condition or this instruction, always ask your healthcare professional. [place] [address] any warranty or liability for your use of this information. Personalized Preventive Plan for Maria C Salazar - 11/9/2022  Medicare offers a range of preventive health benefits.  Some of the tests and screenings are paid in full while other may be subject to a deductible, co-insurance, and/or copay. Some of these benefits include a comprehensive review of your medical history including lifestyle, illnesses that may run in your family, and various assessments and screenings as appropriate. After reviewing your medical record and screening and assessments performed today your provider may have ordered immunizations, labs, imaging, and/or referrals for you. A list of these orders (if applicable) as well as your Preventive Care list are included within your After Visit Summary for your review. Other Preventive Recommendations:    A preventive eye exam performed by an eye specialist is recommended every 1-2 years to screen for glaucoma; cataracts, macular degeneration, and other eye disorders. A preventive dental visit is recommended every 6 months. Try to get at least 150 minutes of exercise per week or 10,000 steps per day on a pedometer . Order or download the FREE \"Exercise & Physical Activity: Your Everyday Guide\" from [employer]. Call 4-704.852.1598 or search [employer] online. You need 8759-9839 mg of calcium and 2159-9274 IU of vitamin D per day. It is possible to meet your calcium requirement with diet alone, but a vitamin D supplement is usually necessary to meet this goal.  When exposed to the sun, use a sunscreen that protects against both UVA and UVB radiation with an SPF of 30 or greater. Reapply every 2 to 3 hours or after sweating, drying off with a towel, or swimming. Always wear a seat belt when traveling in a car. Always wear a helmet when riding a bicycle or motorcycle.

## 2022-11-09 NOTE — PROGRESS NOTES
[address] [place] [id number]  Dept: [phone]  Dept Fax: [phone]  Loc: [address] [place] [address] Jesús Reyes is a 67 y.o. male who presents today for:  No chief complaint on file. HPI:     HPI    He is seeing primarily the [facility] due to medications and treatment for PTSD but comes here 3 times yearly to go over all the information as well as maintenance of diabetes. PTSD did have a flare during a pacemaker placement when they did not give him enough pain medication. He reports thoughts of hurting the surgeon but did not act on them when redirected by the nurse. He has since worked this through with the counselor at the [facility]. He is also flaring a little bit with the current pandemic. ***    Hypertension: Patient here for follow-up of elevated blood pressure. He is not exercising and is adherent to low salt diet. Blood pressure is well controlled at home. Cardiac symptoms none. Patient denies chest pain, dyspnea and palpitations. Cardiovascular risk factors: advanced age (older than 54 for men, 72 for women), dyslipidemia, hypertension and male gender. Use of agents associated with hypertension: none. History of target organ damage: stroke, paroxysmal afib, and CAD diagnosed by cath in 2013 but no stents. Hyperlipidemia: Patient presents with hyperlipidemia. He was tested because hypertension and CVA.   His last labs showed   Lab Results   Component Value Date    CHOL 127 04/21/2022    CHOL 125 04/15/2021    CHOL 119 03/10/2020     Lab Results   Component Value Date    TRIG 77 04/21/2022    TRIG 59 04/15/2021    TRIG 96 03/10/2020     Lab Results   Component Value Date    HDL 51 04/21/2022    HDL 53 04/15/2021    HDL 46 03/10/2020     Lab Results   Component Value Date    LDLCALC 61 04/21/2022    LDLCALC 60 04/15/2021    LDLCALC 54 03/10/2020     No results found for: LABVLDL, VLDL  No results found for: CHOLHDLRATIO  There is not a family history of hyperlipidemia. There is not a family history of early ischemia heart disease. GERD: Rene complains of heartburn. This has been associated with heartburn and hoarseness. He denies belching, difficulty swallowing and melena. Symptoms have been present for several years. He denies dysphagia. He has not lost weight. He denies melena, hematochezia, hematemesis, and coffee ground emesis. Medical therapy in the past has included proton pump inhibitors. ***    Elevated glucose need follow up. He is no longer seeing an endocrinologist for diabetes but his medications are through the [place]. His endocrinologist left town and we are now managing the DM through here until he can find a new endocrinologist.  Fasting and nonfassting blood sugars are . Alexus's feet have  Along history of callus formation from bunion deformities, complicated by PVD. Hemoglobin A1C   Date Value Ref Range Status   04/21/2022 6.6 (H) 4.4 - 6.4 % Final         Being followed currently by the [place] for hypertension, afib, and hyperlipidemia. ***    Vitamin b12  was at the low end of normal but with symptoms of fatigue [facility]. He is feeling better and has also started vit d PO as well. Last b12 level is high and he is instructed to go to every 8 weeks instead of once a month. Lab Results   Component Value Date    [id number] 8570 (H) 04/21/2022       Tremor controlled better with current medications. He has seen the neurologist at the [place] who does not think that he has parkinson's but occasionally the tremor does get bad. He recalls once when he spilled his cereal right out of the bowl that he was holding. This is more controlled now. ***    A lot of these conditions are related to agent orange exposure in [facility] as well as a water contamination in [employer]. ***    HARINDER controlled on CPAP.  ***    BPH is under the management of urology. Biopsy was negative 10/19/21. Lab Results   Component Value Date    PSA 4.55 (H) 04/21/2022    PSA 3.50 (H) 04/15/2021    PSA 3.26 (H) 01/24/2021       Low sodium is under watch.  ***    Anemia is under regular watch. ***    Headaches and sinus pressure worse recently but better with allergy medications and tylenol ES. ***        Reviewed chart forpast medical history , surgical history , allergies, social history , family history and medications. Health Maintenance   Topic Date Due    DTaP/Tdap/Td vaccine (1 - Tdap) 01/26/2017    Diabetic foot exam  12/22/2021    COVID-19 Vaccine (4 - Booster for VBI Vaccines series) 01/05/2022    Annual Wellness Visit (AWV)  01/15/2022    A1C test (Diabetic or Prediabetic)  04/21/2023    Diabetic microalbuminuria test  04/21/2023    Lipids  04/21/2023    Prostate Specific Antigen (PSA) Screening or Monitoring  04/21/2023    Depression Screen  05/09/2023    Diabetic retinal exam  07/21/2023    Colorectal Cancer Screen  10/04/2029    Flu vaccine  Completed    Shingles vaccine  Completed    Pneumococcal 65+ years Vaccine  Completed    Hepatitis A vaccine  Aged Out    Hib vaccine  Aged Out    Meningococcal (ACWY) vaccine  Aged Out    AAA screen  Discontinued    Hepatitis C screen  Discontinued       Subjective:      Constitutional:Negative for fever, chills, diaphoresis, activity change, appetite change and fatigue. HENT: Negative for hearing loss, ear pain, congestion, sore throat, rhinorrhea, postnasal drip and ear discharge. Eyes: Negative for photophobia and visual disturbance. Respiratory: Negative for cough, chest tightness, shortness of breath and wheezing. Cardiovascular: Negative for chest pain and leg swelling. Gastrointestinal: Negative for nausea, vomiting, abdominal pain, diarrhea and constipation. Genitourinary: Negative for dysuria, urgency and frequency. Neurological: Negative for weakness, light-headedness and headaches. Psychiatric/Behavioral: Negative for sleep disturbance.      :     There were no vitals filed for this visit. Wt Readings from Last 3 Encounters:   08/31/22 209 lb (94.8 kg)   08/30/22 209 lb (94.8 kg)   08/08/22 208 lb 12.8 oz (94.7 kg)       Physical Exam        Assessment/Plan   Diagnoses and all orders for this visit:    Uncontrolled type 2 diabetes mellitus with hyperglycemia ([facility] [address]    Primary hypertension    Paroxysmal atrial fibrillation (HCC)    COPD with asthma ([facility] [address]    Vitamin B12 deficiency    PVD (peripheral vascular disease) ([facility] [address]    Hyperlipidemia with target LDL less than 100    Arthritis    S/P cardiac catheterization: 11/20/2013: No obstructive lesions. Moderate LI's in the mid LAD and in the RCA. Diabetic peripheral neuropathy associated with type 2 diabetes mellitus (HCC)    BPH with obstruction/lower urinary tract symptoms    Peripheral polyneuropathy    Hyponatremia    Agent orange exposure    Obesity (BMI 30-39. 9)    Iron deficiency anemia, unspecified iron deficiency anemia type    Essential tremor    Other glaucoma of both eyes    Obstructive sleep apnea on CPAP    Post traumatic stress disorder    Squamous cell carcinoma of scalp          Reccommended tobacco cessation options including pharmacologicmethods, counseled great than 3 minutes during this visit:  Yes  []  No  []    Patient given educational materials - see patient instructions. Discussed use, benefit, and side effectsof prescribed medications. All patient questions answered. Pt voiced understanding. Reviewed health maintenance. Instructed to continue current medications, diet and exercise. Patient agreed with treatment plan. Followup as directed.      Electronically signed by Rodney Robles MD

## 2022-11-09 NOTE — PROGRESS NOTES
Administrations This Visit       cyanocobalamin injection 1,000 mcg       Admin Date  11/09/2022  13:52 Action  Given Dose  1,000 mcg Route  IntraMUSCular Site  Deltoid Right Administered By  Yadi Martínez LPN    Ordering Provider: Lindsay Chaudhari MD    NDC: 2421-3322-23    Lot#: [id number]    : [employer]    Patient Supplied?: No                    Patient instructed to remain in clinic for 20 minutes after injection and was advised to report any adverse reaction to me immediately.

## 2022-11-09 NOTE — PROGRESS NOTES
Medicare Annual Wellness Visit    Martina Lloyd is here for Medicare AWV    Assessment & Plan   Medicare annual wellness visit, subsequent  Uncontrolled type 2 diabetes mellitus with hyperglycemia ([facility]  -     Comprehensive Metabolic Panel, Fasting; Future  -     Hemoglobin A1C; Future  -     Microalbumin / Creatinine Urine Ratio; Future  Primary hypertension  Paroxysmal atrial fibrillation (HCC)  -     TSH with Reflex; Future  COPD with asthma ([facility]  Vitamin B12 deficiency  -     Vitamin B12 & Folate; Future  -     cyanocobalamin injection 1,000 mcg; 1,000 mcg, IntraMUSCular, ONCE, 1 dose, On Wed 11/9/22 at 1415  PVD (peripheral vascular disease) ([facility]  Hyperlipidemia with target LDL less than 100  -     Comprehensive Metabolic Panel, Fasting; Future  -     Lipid Panel; Future  Arthritis  S/P cardiac catheterization: 11/20/2013: No obstructive lesions. Moderate LI's in the mid LAD and in the RCA. Diabetic peripheral neuropathy associated with type 2 diabetes mellitus (HCC)  BPH with obstruction/lower urinary tract symptoms  -     PSA, Prostatic Specific Antigen; Future  Peripheral polyneuropathy  Hyponatremia  Agent orange exposure  Obesity (BMI 30-39. 9)  Iron deficiency anemia, unspecified iron deficiency anemia type  -     CBC; Future  Essential tremor  Other glaucoma of both eyes  Obstructive sleep apnea on CPAP  Post traumatic stress disorder  Squamous cell carcinoma of scalp    Recommendations for Preventive Services Due: see orders and patient instructions/AVS.  Recommended screening schedule for the next 5-10 years is provided to the patient in written form: see Patient Instructions/AVS.     Return in 6 months (on 5/9/2023) for Medicare Annual Wellness Visit in 1 year. Subjective       He is seeing primarily the [place] due to medications and treatment for PTSD but comes here 3 times yearly to go over all the information as well as maintenance of diabetes.   PTSD did have a flare during a pacemaker placement when they did not give him enough pain medication. He reports thoughts of hurting the surgeon but did not act on them when redirected by the nurse. He has since worked this through with the counselor at the [facility]. He is also flaring a little bit with the current pandemic. Hypertension: Patient here for follow-up of elevated blood pressure. He is not exercising and is adherent to low salt diet. Blood pressure is well controlled at home. Cardiac symptoms none. Patient denies chest pain, dyspnea and palpitations. Cardiovascular risk factors: advanced age (older than 54 for men, 72 for women), dyslipidemia, hypertension and male gender. Use of agents associated with hypertension: none. History of target organ damage: stroke, paroxysmal afib, and CAD diagnosed by cath in 2013 but no stents. Hyperlipidemia: Patient presents with hyperlipidemia. He was tested because hypertension and CVA. His last labs showed   Lab Results   Component Value Date    CHOL 127 04/21/2022    CHOL 125 04/15/2021    CHOL 119 03/10/2020     Lab Results   Component Value Date    TRIG 77 04/21/2022    TRIG 59 04/15/2021    TRIG 96 03/10/2020     Lab Results   Component Value Date    HDL 51 04/21/2022    HDL 53 04/15/2021    HDL 46 03/10/2020     Lab Results   Component Value Date    LDLCALC 61 04/21/2022    LDLCALC 60 04/15/2021    LDLCALC 54 03/10/2020     No results found for: LABVLDL, VLDL  No results found for: CHOLHDLRATIO  There is not a family history of hyperlipidemia. There is not a family history of early ischemia heart disease. GERD: Rene complains of heartburn. This has been associated with heartburn and hoarseness. He denies belching, difficulty swallowing and melena. Symptoms have been present for several years. He denies dysphagia. He has not lost weight. He denies melena, hematochezia, hematemesis, and coffee ground emesis. Medical therapy in the past has included proton pump inhibitors.      Elevated glucose need follow up. He is no longer seeing an endocrinologist for diabetes but his medications are through the [place]. His endocrinologist left town and we are now managing the DM through here until he can find a new endocrinologist.  Fasting and nonfasting blood sugars are . Alexus's feet have  Along history of callus formation from bunion deformities, complicated by PVD. Hemoglobin A1C   Date Value Ref Range Status   04/21/2022 6.6 (H) 4.4 - 6.4 % Final         Being followed currently by the [place] for hypertension, afib, and hyperlipidemia. Vitamin b12  was at the low end of normal but with symptoms of fatigue [facility]. He is feeling better and has also started vit d PO as well. Last b12 level is high and he is instructed to go to every 8 weeks instead of once a month. Lab Results   Component Value Date    [id number] 1907 (H) 04/21/2022       Tremor controlled better with current medications. He has seen the neurologist at the [place] who does not think that he has parkinson's but occasionally the tremor does get bad. He recalls once when he spilled his cereal right out of the bowl that he was holding. This is more controlled now. A lot of these conditions are related to agent orange exposure in [facility] as well as a water contamination in [employer]. HARINDER controlled on CPAP. BPH is under the management of urology. Biopsy was negative 10/19/21. Lab Results   Component Value Date    PSA 4.55 (H) 04/21/2022    PSA 3.50 (H) 04/15/2021    PSA 3.26 (H) 01/24/2021       Low sodium is under watch. Anemia is under regular watch. Headaches and sinus pressure worse recently but better with allergy medications and tylenol ES. The following acute and/or chronic problems were also addressed today:  See above    Patient's complete Health Risk Assessment and screening values have been reviewed and are found in Flowsheets.  The following problems were reviewed today and where indicated follow up appointments were made and/or referrals ordered. Positive Risk Factor Screenings with Interventions:              Health Habits/Nutrition:  Physical Activity: Insufficiently Active    Days of Exercise per Week: 3 days    Minutes of Exercise per Session: 20 min     Have you lost any weight without trying in the past 3 months?: No  Body mass index: (!) 30  Have you seen the dentist within the past year?: Yes  Health Habits/Nutrition Interventions:  Inadequate physical activity:  patient agrees to increase physical activity as follows: walk daily  Dental exam overdue:  patient encouraged to make appointment with his/her dentist             Objective   Vitals:    11/09/22 1259   BP: 128/62   Site: Left Upper Arm   Position: Sitting   Cuff Size: Large Adult   Pulse: 76   Resp: 16   Temp: 97.2 °F (36.2 °C)   TempSrc: Temporal   Weight: 215 lb (97.5 kg)   Height: 5' 10.98\" (1.803 m)      Body mass index is 30 kg/m². Wt Readings from Last 3 Encounters:   11/09/22 215 lb (97.5 kg)   08/31/22 209 lb (94.8 kg)   08/30/22 209 lb (94.8 kg)       Physical Exam   Constitutional: Vital signs are normal. He appears well-developed and well-nourished. He is active. HENT:   Head: Normocephalic and atraumatic. Right Ear: Tympanic membrane, external ear and ear canal normal. No drainage or tenderness. Left Ear: Tympanic membrane, external ear and ear canal normal. No drainage or tenderness. Nose: Nose normal. No mucosal edema or rhinorrhea. Mouth/Throat: Uvula is midline, oropharynx is clear and moist and mucous membranes are normal. Mucous membranes are not pale. Normal dentition. No posterior oropharyngeal edema or posterior oropharyngeal erythema. Eyes: Lids are normal. Right eye exhibits no chemosis and no discharge. Left eye exhibits no chemosis and no drainage. Right conjunctiva has no hemorrhage. Left conjunctiva has no hemorrhage.  Right eye exhibits normal extraocular motion. Left eye exhibits normal extraocular motion. Right pupil is round and reactive. Left pupil is round and reactive. Pupils are equal.   Cardiovascular: Normal rate, regular rhythm, S1 normal, S2 normal and normal heart sounds. Exam reveals no gallop. No murmur heard. Pulmonary/Chest: Effort normal and breath sounds normal. No respiratory distress. He has no wheezes. He has no rhonchi. He has no rales. Abdominal: Soft. Normal appearance and bowel sounds are normal. He exhibits no distension and no mass. There is no hepatosplenomegaly. No tenderness. He has no rigidity, no rebound and no guarding. No hernia. Musculoskeletal:        Right lower leg: He exhibits no edema. Left lower leg: He exhibits no edema. Neurological: He is alert. Oriented and pleasent       MMSE 24/30      Need labs from [facility]   Allergen Reactions    Latex Itching and Rash    Doxycycline Calcium Diarrhea    Lactose Diarrhea    Nasacort [Triamcinolone] Other (See Comments)     Unable to remember    Wellbutrin [Bupropion] Other (See Comments)     Does not remember     Prior to Visit Medications    Medication Sig Taking?  Authorizing Provider   fluticasone-salmeterol (ADVAIR) 100-50 MCG/ACT AEPB diskus inhaler Inhale 1 puff into the lungs every 12 hours Yes Historical Provider, MD   vitamin D (CHOLECALCIFEROL) 25 MCG (1000 UT) TABS tablet Take 1,000 Units by mouth in the morning and at bedtime Yes Historical Provider, MD   furosemide (LASIX) 20 MG tablet Take 1 tablet by mouth daily Yes Nasra Anderson MD   atorvastatin (LIPITOR) 40 MG tablet Take by mouth daily 1/2 tab Yes Historical Provider, MD   amLODIPine (NORVASC) 5 MG tablet Take 5 mg by mouth daily Yes Historical Provider, MD   metFORMIN (GLUCOPHAGE) 500 MG tablet Take by mouth 2 times daily (with meals) 2 tab Yes Historical Provider, MD   omeprazole (PRILOSEC) 40 MG delayed release capsule Take 40 mg by mouth daily Yes Historical Provider, MD   Multiple Vitamins-Minerals (PRESERVISION AREDS 2 PO) Take by mouth 2 times daily Yes Historical Provider, MD   insulin glargine (LANTUS SOLOSTAR) 100 UNIT/ML injection pen Inject 23 Units into the skin nightly Yes Historical Provider, MD   tamsulosin (FLOMAX) 0.4 MG capsule Take 0.4 mg by mouth daily Yes Historical Provider, MD   topiramate (TOPAMAX SPRINKLE) 25 MG capsule Take 25 mg by mouth 2 times daily Yes Historical Provider, MD   DULoxetine (CYMBALTA) 60 MG extended release capsule Take 60 mg by mouth daily Yes Historical Provider, MD   loratadine (CLARITIN) 10 MG tablet Take 10 mg by mouth daily Yes Historical Provider, MD   rivaroxaban (XARELTO) 20 MG TABS tablet Take 20 mg by mouth daily (with breakfast)  Yes Historical Provider, MD   gabapentin (NEURONTIN) 300 MG capsule Take 300 mg by mouth 2 times daily .  Yes Historical Provider, MD   propranolol (INDERAL LA) 120 MG CR capsule Take 120 mg by mouth daily  Yes Historical Provider, MD   montelukast (SINGULAIR) 10 MG tablet take 1 tablet by mouth once daily Yes FERDINAND Jennings CNP   b complex vitamins capsule Take 1 capsule by mouth 2 times daily Yes Historical Provider, MD   BUSPIRONE HCL PO Take 10 mg by mouth 3 times daily  Yes Historical Provider, MD   fluticasone (FLONASE) 50 MCG/ACT nasal spray USE 2 SPRAYS NASALLY DAILY Yes Sonia Garg MD   aspirin 81 MG EC tablet Take 81 mg by mouth daily   Yes Historical Provider, MD Hurt (Including outside providers/suppliers regularly involved in providing care):   Patient Care Team:  Sonia Garg MD as PCP - Inna Schofield MD as PCP - [facility]  FERDINAND Bardales CNP as Nurse Practitioner (Nurse Practitioner)  FERDINAND Walter CNP as Advanced Practice Nurse (Nurse Practitioner Family)  Pastor Becker MD as Cardiologist (Cardiology)     Reviewed and updated this visit:  Tobacco  Allergies  Meds  Med Hx  Surg Hx  Soc Hx Fam Hx

## 2022-11-15 ENCOUNTER — OFFICE VISIT (OUTPATIENT)
Dept: UROLOGY | Age: 72
End: 2022-11-15
Payer: MEDICARE

## 2022-11-15 VITALS — HEIGHT: 70 IN | WEIGHT: 213 LBS | BODY MASS INDEX: 30.49 KG/M2

## 2022-11-15 DIAGNOSIS — R97.20 ELEVATED PSA: ICD-10-CM

## 2022-11-15 DIAGNOSIS — N40.1 BPH WITH OBSTRUCTION/LOWER URINARY TRACT SYMPTOMS: Primary | ICD-10-CM

## 2022-11-15 DIAGNOSIS — N13.8 BPH WITH OBSTRUCTION/LOWER URINARY TRACT SYMPTOMS: Primary | ICD-10-CM

## 2022-11-15 PROCEDURE — G8417 CALC BMI ABV UP PARAM F/U: HCPCS | Performed by: UROLOGY

## 2022-11-15 PROCEDURE — G8427 DOCREV CUR MEDS BY ELIG CLIN: HCPCS | Performed by: UROLOGY

## 2022-11-15 PROCEDURE — G8484 FLU IMMUNIZE NO ADMIN: HCPCS | Performed by: UROLOGY

## 2022-11-15 PROCEDURE — 3017F COLORECTAL CA SCREEN DOC REV: CPT | Performed by: UROLOGY

## 2022-11-15 PROCEDURE — 99214 OFFICE O/P EST MOD 30 MIN: CPT | Performed by: UROLOGY

## 2022-11-15 PROCEDURE — 1123F ACP DISCUSS/DSCN MKR DOCD: CPT | Performed by: UROLOGY

## 2022-11-15 PROCEDURE — 1036F TOBACCO NON-USER: CPT | Performed by: UROLOGY

## 2022-11-17 LAB
ALBUMIN SERPL-MCNC: NORMAL G/DL
ALP BLD-CCNC: NORMAL U/L
ALT SERPL-CCNC: NORMAL U/L
ANION GAP SERPL CALCULATED.3IONS-SCNC: 9.4 MMOL/L
AST SERPL-CCNC: NORMAL U/L
BASOPHILS ABSOLUTE: 0.05 /ΜL
BASOPHILS RELATIVE PERCENT: 0.8 %
BILIRUB SERPL-MCNC: NORMAL MG/DL
BUN BLDV-MCNC: 16 MG/DL
CALCIUM SERPL-MCNC: NORMAL MG/DL
CHLORIDE BLD-SCNC: 96 MMOL/L
CHOLESTEROL, TOTAL: 139 MG/DL
CHOLESTEROL/HDL RATIO: NORMAL
CO2: 26.6 MMOL/L
CREAT SERPL-MCNC: 1.07 MG/DL
EOSINOPHILS ABSOLUTE: 0.17 /ΜL
EOSINOPHILS RELATIVE PERCENT: 2.6 %
GFR CALCULATED: 73
GLUCOSE BLD-MCNC: 102 MG/DL
HCT VFR BLD CALC: 42.8 % (ref 41–53)
HDLC SERPL-MCNC: 51 MG/DL (ref 35–70)
HEMOGLOBIN: 14 G/DL (ref 13.5–17.5)
LDL CHOLESTEROL CALCULATED: 73 MG/DL (ref 0–160)
LYMPHOCYTES ABSOLUTE: 0.66 /ΜL
LYMPHOCYTES RELATIVE PERCENT: 34.1 %
MCH RBC QN AUTO: 29.9 PG
MCHC RBC AUTO-ENTMCNC: 32.7 G/DL
MCV RBC AUTO: 91.5 FL
MICROSCOPIC URINE: NORMAL
MONOCYTES ABSOLUTE: 2.24 /ΜL
MONOCYTES RELATIVE PERCENT: 10.1 %
NEUTROPHILS ABSOLUTE: 3.4 /ΜL
NEUTROPHILS RELATIVE PERCENT: 51.8 %
NONHDLC SERPL-MCNC: NORMAL MG/DL
PDW BLD-RTO: 13.3 %
PLATELET # BLD: 226 K/ΜL
PMV BLD AUTO: 9 FL
POTASSIUM SERPL-SCNC: 4.5 MMOL/L
RBC # BLD: 4.68 10^6/ΜL
SODIUM BLD-SCNC: 132 MMOL/L
TOTAL PROTEIN: NORMAL
TRIGL SERPL-MCNC: 72 MG/DL
VLDLC SERPL CALC-MCNC: NORMAL MG/DL
WBC # BLD: 6.6 10^3/ML

## 2022-11-28 ENCOUNTER — OFFICE VISIT (OUTPATIENT)
Dept: PULMONOLOGY | Age: 72
End: 2022-11-28
Payer: MEDICARE

## 2022-11-28 VITALS
BODY MASS INDEX: 30.92 KG/M2 | HEIGHT: 70 IN | SYSTOLIC BLOOD PRESSURE: 118 MMHG | DIASTOLIC BLOOD PRESSURE: 60 MMHG | TEMPERATURE: 98.4 F | HEART RATE: 67 BPM | WEIGHT: 216 LBS | OXYGEN SATURATION: 97 %

## 2022-11-28 DIAGNOSIS — G47.33 OSA ON CPAP: Primary | ICD-10-CM

## 2022-11-28 DIAGNOSIS — Z99.89 OSA ON CPAP: Primary | ICD-10-CM

## 2022-11-28 DIAGNOSIS — E66.9 OBESITY (BMI 30-39.9): ICD-10-CM

## 2022-11-28 PROCEDURE — G8427 DOCREV CUR MEDS BY ELIG CLIN: HCPCS | Performed by: NURSE PRACTITIONER

## 2022-11-28 PROCEDURE — 3074F SYST BP LT 130 MM HG: CPT | Performed by: NURSE PRACTITIONER

## 2022-11-28 PROCEDURE — G8484 FLU IMMUNIZE NO ADMIN: HCPCS | Performed by: NURSE PRACTITIONER

## 2022-11-28 PROCEDURE — 1123F ACP DISCUSS/DSCN MKR DOCD: CPT | Performed by: NURSE PRACTITIONER

## 2022-11-28 PROCEDURE — 1036F TOBACCO NON-USER: CPT | Performed by: NURSE PRACTITIONER

## 2022-11-28 PROCEDURE — 3078F DIAST BP <80 MM HG: CPT | Performed by: NURSE PRACTITIONER

## 2022-11-28 PROCEDURE — 99213 OFFICE O/P EST LOW 20 MIN: CPT | Performed by: NURSE PRACTITIONER

## 2022-11-28 PROCEDURE — G8417 CALC BMI ABV UP PARAM F/U: HCPCS | Performed by: NURSE PRACTITIONER

## 2022-11-28 PROCEDURE — 3017F COLORECTAL CA SCREEN DOC REV: CPT | Performed by: NURSE PRACTITIONER

## 2022-11-28 ASSESSMENT — ENCOUNTER SYMPTOMS
RESPIRATORY NEGATIVE: 1
ALLERGIC/IMMUNOLOGIC NEGATIVE: 1
CHEST TIGHTNESS: 0
GASTROINTESTINAL NEGATIVE: 1
DIARRHEA: 0
VOMITING: 0
NAUSEA: 0
WHEEZING: 0
STRIDOR: 0
EYES NEGATIVE: 1

## 2022-11-28 NOTE — PROGRESS NOTES
[facility]      Martina Lloyd         [id number]  11/28/2022   Chief Complaint   Patient presents with    Follow-up     HARINDER 3 month f/u with DL. New  on 10/19. Pt of Dr. Aravind CHANEY Download:   Original or initial AHI: 52.2     Date of initial study: 10/11/13      Compliant  100%     Noncompliant 0 %     PAP Type CPAP Level  14   Avg Hrs/Day 7hrs 42mins  AHI: 4.9   Recorded compliance dates , 10/24/22  to 11/22/22   Machine/Mfg:   [x] ResMed    [] Respironics/Dreamstation   Interface:   [] Nasal    [] Nasal pillows   [x] FFM      Provider:      [x] [facility]     [][facility]     [] [employer]    [] [address]    [] [place]               [] [facility]      [] [employer]    [] [address]      [] Other    Neck Size: 16  Mallampati 1  ESS:  5  SAQLI: 83    Here is a scan of the most recent download:                Presentation:   Travis Silva presents for sleep medicine follow up for obstructive sleep apnea  Since the last visit, Travis Silva has been set up on new CPAP machine and is doing well. Awake and alert today   No sleep concerns    Weight stable / unchanged    Equipment issues: The pressure is  acceptable, the mask is acceptable     Sleep issues:  Do you feel better? Yes  More rested? Yes   Better concentration? NA  Difficulty falling sleep? No  Difficulty staying asleep? No  Snoring? No    Progress History:   Since last visit any new medical issues? No  New ER or hospital visits? No  Any new or changes in medicines? No  Any new sleep medicines? No    Review of Systems -   Review of Systems   Constitutional: Negative. Negative for chills, fever and unexpected weight change. HENT: Negative. Eyes: Negative. Respiratory: Negative. Negative for chest tightness, wheezing and stridor. Cardiovascular:  Negative for chest pain and leg swelling. Gastrointestinal: Negative. Negative for diarrhea, nausea and vomiting. Endocrine: Negative. Genitourinary: Negative. Negative for dysuria. Musculoskeletal: Negative. Skin: Negative. Allergic/Immunologic: Negative. Neurological: Negative. Hematological: Negative. Psychiatric/Behavioral: Negative. Physical Exam:    BMI:  Body mass index is 30.99 kg/m². Wt Readings from Last 3 Encounters:   11/28/22 216 lb (98 kg)   11/15/22 213 lb (96.6 kg)   11/09/22 215 lb (97.5 kg)     Vitals: /60   Pulse 67   Temp 98.4 °F (36.9 °C)   Ht 5' 10\" (1.778 m)   Wt 216 lb (98 kg)   SpO2 97%   BMI 30.99 kg/m²       Physical Exam  Vitals and nursing note reviewed. Constitutional:       General: He is not in acute distress. Appearance: He is well-developed. He is obese. HENT:      Head: Normocephalic and atraumatic. Neck:      Trachea: No tracheal deviation. Cardiovascular:      Rate and Rhythm: Normal rate and regular rhythm. Heart sounds: Normal heart sounds. No murmur heard. Pulmonary:      Effort: Pulmonary effort is normal. No respiratory distress. Breath sounds: Normal breath sounds. No stridor. No wheezing or rales. Chest:      Chest wall: No tenderness. Abdominal:      General: Bowel sounds are normal. There is no distension. Palpations: Abdomen is soft. Skin:     General: Skin is warm and dry. Capillary Refill: Capillary refill takes less than 2 seconds. Neurological:      Mental Status: He is alert and oriented to person, place, and time. Psychiatric:         Behavior: Behavior normal.         Thought Content: Thought content normal.         Judgment: Judgment normal.     ASSESSMENT/DIAGNOSIS     Diagnosis Orders   1. HARINDER on CPAP        2. Obesity (BMI 30-39. 9)             Plan   Do you need any equipment today? No  -Patient's symptoms and AHI are controlled with current settings of 14 cmH2O.   -Download reviewed with patient and myself today in the office   -Advised to continue current positive airway pressure therapy with above described pressure. -Advised to keep good compliance with current recommended pressure to get optimal results and clinical improvement  -Recommend 7-9 hours of sleep with PAP  -Instructed to call DME company regarding supplies if needed.   -Patient to call office for earlier appointment if needed for worsening of sleep symptoms. -Advised patient not to drive if feeling sleepy  -Discussed weight loss  -Educated about my impression and plan. Patient verbalizes understanding. Will see Ravinder Govea back in: 1 year with download. Information added by my medical assistant/LPN was reviewed today.     Electronically signed by FERDINAND Cummnis CNP on 11/28/2022 at 12:05 PM

## 2022-12-14 ENCOUNTER — OFFICE VISIT (OUTPATIENT)
Dept: CARDIOLOGY CLINIC | Age: 72
End: 2022-12-14
Payer: MEDICARE

## 2022-12-14 VITALS
WEIGHT: 211 LBS | SYSTOLIC BLOOD PRESSURE: 128 MMHG | HEART RATE: 64 BPM | HEIGHT: 70 IN | DIASTOLIC BLOOD PRESSURE: 68 MMHG | BODY MASS INDEX: 30.21 KG/M2

## 2022-12-14 DIAGNOSIS — I48.91 ATRIAL FIBRILLATION, UNSPECIFIED TYPE (HCC): Primary | ICD-10-CM

## 2022-12-14 PROBLEM — E11.9 TYPE 2 DIABETES MELLITUS (HCC): Status: ACTIVE | Noted: 2022-12-14

## 2022-12-14 PROCEDURE — 3078F DIAST BP <80 MM HG: CPT | Performed by: INTERNAL MEDICINE

## 2022-12-14 PROCEDURE — 1036F TOBACCO NON-USER: CPT | Performed by: INTERNAL MEDICINE

## 2022-12-14 PROCEDURE — 3074F SYST BP LT 130 MM HG: CPT | Performed by: INTERNAL MEDICINE

## 2022-12-14 PROCEDURE — G8417 CALC BMI ABV UP PARAM F/U: HCPCS | Performed by: INTERNAL MEDICINE

## 2022-12-14 PROCEDURE — 1123F ACP DISCUSS/DSCN MKR DOCD: CPT | Performed by: INTERNAL MEDICINE

## 2022-12-14 PROCEDURE — G8484 FLU IMMUNIZE NO ADMIN: HCPCS | Performed by: INTERNAL MEDICINE

## 2022-12-14 PROCEDURE — 3017F COLORECTAL CA SCREEN DOC REV: CPT | Performed by: INTERNAL MEDICINE

## 2022-12-14 PROCEDURE — G8427 DOCREV CUR MEDS BY ELIG CLIN: HCPCS | Performed by: INTERNAL MEDICINE

## 2022-12-14 PROCEDURE — 99213 OFFICE O/P EST LOW 20 MIN: CPT | Performed by: INTERNAL MEDICINE

## 2022-12-14 NOTE — PROGRESS NOTES
[address] [facility] [address]  Dept: [phone]  Dept Fax: [phone]  Loc: [phone]    Visit Date: 12/14/2022    Mr. Leslie Fitzpatrick is a 67 y.o. male  who presented for:  Chief Complaint   Patient presents with    Check-Up    Atrial Fibrillation       HPI:   66 yo M C hx of Afib on Xarelto, PPM, s/p PPM, DM, HTN, HLD is here for a follow up. He reports good exercise tolerance. Had recent PPM interrogated. Denies any chest pain, palpitations, lightheadedness, dizziness, orthopnea, PND or pedal edema. No bleeding issues. Has been complaining of more shortness of breath lately. This is progressing more.           Current Outpatient Medications:     Albuterol Sulfate, sensor, (PROAIR DIGIHALER IN), Inhale into the lungs, Disp: , Rfl:     fluticasone-salmeterol (ADVAIR) 100-50 MCG/ACT AEPB diskus inhaler, Inhale 1 puff into the lungs every 12 hours, Disp: , Rfl:     vitamin D (CHOLECALCIFEROL) 25 MCG (1000 UT) TABS tablet, Take 1,000 Units by mouth in the morning and at bedtime, Disp: , Rfl:     furosemide (LASIX) 20 MG tablet, Take 1 tablet by mouth daily, Disp: 90 tablet, Rfl: 1    atorvastatin (LIPITOR) 40 MG tablet, Take by mouth daily 1/2 tab, Disp: , Rfl:     amLODIPine (NORVASC) 5 MG tablet, Take 5 mg by mouth daily, Disp: , Rfl:     metFORMIN (GLUCOPHAGE) 500 MG tablet, Take by mouth 2 times daily (with meals) 2 tab, Disp: , Rfl:     omeprazole (PRILOSEC) 40 MG delayed release capsule, Take 40 mg by mouth daily, Disp: , Rfl:     Multiple Vitamins-Minerals (PRESERVISION AREDS 2 PO), Take by mouth 2 times daily, Disp: , Rfl:     insulin glargine (LANTUS SOLOSTAR) 100 UNIT/ML injection pen, Inject 23 Units into the skin nightly, Disp: , Rfl:     tamsulosin (FLOMAX) 0.4 MG capsule, Take 0.4 mg by mouth daily, Disp: , Rfl:     topiramate (TOPAMAX SPRINKLE) 25 MG capsule, Take 25 mg by mouth 2 times daily, Disp: , Rfl: DULoxetine (CYMBALTA) 60 MG extended release capsule, Take 60 mg by mouth daily, Disp: , Rfl:     loratadine (CLARITIN) 10 MG tablet, Take 10 mg by mouth daily, Disp: , Rfl:     rivaroxaban (XARELTO) 20 MG TABS tablet, Take 20 mg by mouth daily (with breakfast) , Disp: , Rfl:     gabapentin (NEURONTIN) 300 MG capsule, Take 300 mg by mouth 2 times daily . , Disp: , Rfl:     propranolol (INDERAL LA) 120 MG CR capsule, Take 120 mg by mouth daily , Disp: , Rfl:     montelukast (SINGULAIR) 10 MG tablet, take 1 tablet by mouth once daily, Disp: 90 tablet, Rfl: 3    b complex vitamins capsule, Take 1 capsule by mouth 2 times daily, Disp: , Rfl:     BUSPIRONE HCL PO, Take 10 mg by mouth 3 times daily , Disp: , Rfl:     fluticasone (FLONASE) 50 MCG/ACT nasal spray, USE 2 SPRAYS NASALLY DAILY, Disp: 3 Bottle, Rfl: 3    aspirin 81 MG EC tablet, Take 81 mg by mouth daily  , Disp: , Rfl:     Past Medical History  Anni Tai  has a past medical history of Asthma, [employer] dual pacemaker, Bronchitis, chronic ([address] Carotid artery stenosis, Chickenpox, COPD with asthma ([address] CVA (cerebral infarction), GERD (gastroesophageal reflux disease), Glaucoma, Hemorrhoids, Hyperlipidemia, Hypertension, Mild intermittent asthma without complication, Neuropathy, Osteoarthritis, Peripheral neuropathy, Post traumatic stress disorder (PTSD), S/P cardiac catheterization: 11/20/2013: No obstructive lesions. 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 Social History  Anni Tai  reports that he quit smoking about 51 years ago. His smoking use included cigarettes. He has a 12.00 pack-year smoking history.  He has quit using smokeless tobacco. He reports that he does not currently use drugs after having used the following drugs: Marijuana Laurita Bess). He reports that he does not drink alcohol. Family History  Barney Rodriguez family history includes Cancer in his paternal grandfather; Diabetes in his mother; Heart Disease in his father and mother; High Blood Pressure in his father and mother; High Cholesterol in his father and mother; Kidney Disease in his mother; Stroke in his father. Past Surgical History   Past Surgical History:   Procedure Laterality Date    APPENDECTOMY  age 14    BLEPHAROPLASTY Bilateral 05/2017    CARDIAC CATHETERIZATION  11/2013    no stents    CARPAL TUNNEL RELEASE Right 01/2017    CATARACT REMOVAL Bilateral 10/05/2022    COLONOSCOPY  2005    Dr. Rupert Plaza      right eye clog oil duct    EYE SURGERY Bilateral 01/2017    MANDIBLE SURGERY N/A 05/2017    jaw surgery lower front of mouth    MOHS SURGERY N/A 02/18/2022    MOHS DEFECT REPAIR SCC TOP OF HEAD performed by Yolette Han MD at [facility]  2000/2010    PACEMAKER INSERTION  11/20/2019    TONSILLECTOMY AND ADENOIDECTOMY  as a child        Subjective:     REVIEW OF SYSTEMS  Constitutional: denies sweats, chills and fever  HENT: denies  congestion, sinus pressure, sneezing and sore throat. Eyes: denies  pain, discharge, redness and itching. Respiratory: denies apnea, cough  Gastrointestinal: denies blood in stool, constipation, diarrhea   Endocrine: denies cold intolerance, heat intolerance, polydipsia. Genitourinary: denies dysuria, enuresis, flank pain and hematuria. Musculoskeletal: denies arthralgias, joint swelling and neck pain. Neurological: denies numbness and headaches. Psychiatric/Behavioral: denies agitation, confusion, decreased concentration and dysphoric mood    All others reviewed and are negative.    Objective:     /68   Pulse 64   Ht 5' 10\" (1.778 m)   Wt 211 lb (95.7 kg)   BMI 30.28 kg/m²     Wt Readings from Last 3 Encounters:   12/14/22 211 lb (95.7 kg)   11/28/22 216 lb (98 kg) 11/15/22 213 lb (96.6 kg)     BP Readings from Last 3 Encounters:   12/14/22 128/68   11/28/22 118/60   11/09/22 128/62       PHYSICAL EXAM  Constitutional: Oriented to person, place, and time. Appears well-developed and well-nourished. HENT:   Head: Normocephalic and atraumatic. Eyes: EOM are normal. Pupils are equal, round, and reactive to light. Neck: Normal range of motion. Neck supple. No JVD present. Cardiovascular: Normal rate , normal heart sounds and intact distal pulses. Pulmonary/Chest: Effort normal and breath sounds normal. No respiratory distress. No wheezes. No rales. Abdominal: Soft. Bowel sounds are normal. No distension. There is no tenderness. Musculoskeletal: Normal range of motion. No edema. Neurological: Alert and oriented to person, place, and time. No cranial nerve deficit. Coordination normal.   Skin: Skin is warm and dry. Psychiatric: Normal mood and affect.        No results found for: CKTOTAL, CKMB, CKMBINDEX    Lab Results   Component Value Date/Time    WBC 6.6 11/17/2022 12:00 AM    RBC 4.68 11/17/2022 12:00 AM    RBC 4.77 02/01/2012 09:46 AM    HGB 14.0 11/17/2022 12:00 AM    HCT 42.8 11/17/2022 12:00 AM    MCV 91.5 11/17/2022 12:00 AM    MCH 29.9 11/17/2022 12:00 AM    MCHC 32.7 11/17/2022 12:00 AM    RDW 13.3 11/17/2022 12:00 AM     11/17/2022 12:00 AM    MPV 9.0 11/17/2022 12:00 AM       Lab Results   Component Value Date/Time     11/17/2022 12:00 AM    K 4.5 11/17/2022 12:00 AM    K 4.9 11/20/2019 11:21 AM    CL 96 11/17/2022 12:00 AM    CO2 26.6 11/17/2022 12:00 AM    BUN 16 11/17/2022 12:00 AM    LABALBU 4.6 04/21/2022 12:03 PM    CREATININE 1.07 11/17/2022 12:00 AM    CALCIUM 9.5 04/21/2022 12:03 PM    LABGLOM 73 11/17/2022 12:00 AM    LABGLOM 83 04/21/2022 12:03 PM    GLUCOSE 102 11/17/2022 12:00 AM    GLUCOSE 121 02/01/2012 09:46 AM       Lab Results   Component Value Date/Time    ALKPHOS 175 04/21/2022 12:03 PM    ALT 22 04/21/2022 12:03 PM AST 18 04/21/2022 12:03 PM    PROT 6.9 04/21/2022 12:03 PM    BILITOT 0.5 04/21/2022 12:03 PM    BILIDIR 0.2 10/15/2014 10:41 AM    LABALBU 4.6 04/21/2022 12:03 PM       Lab Results   Component Value Date/Time    MG 1.9 01/30/2018 06:44 PM       Lab Results   Component Value Date    INR 1.03 11/20/2019    INR 0.85 11/19/2013         Lab Results   Component Value Date/Time    LABA1C 6.6 04/21/2022 12:03 PM       Lab Results   Component Value Date/Time    TRIG 72 11/17/2022 12:00 AM    HDL 51 11/17/2022 12:00 AM    LDLCALC 73 11/17/2022 12:00 AM       Lab Results   Component Value Date/Time    TSH 4.560 04/21/2022 12:03 PM         Testing Reviewed:      I haveindividually reviewed the below cardiac tests    EKG:    ECHO:   Results for orders placed during the hospital encounter of 03/30/17   ECHO Complete 2D W Doppler W Color    Narrative Transthoracic Echocardiography Report (TTE)     Demographics      Patient Name    Hartman Dear  Gender               Male                   D      MR #            [id number]          Race                                                          Ethnicity      Account #       [de-identified]            Room Number      Accession       [id number]          Date of Study        03/30/2017   Number      Date of Birth   1950         Referring Physician  Carlos Valdez MD      Age             78 year(s)         Sonographer          Emily Styles RDCS                                         Interpreting         Yamel Crowder DO                                      Physician     Procedure    Type of Study      TTE procedure:ECHOCARDIOGRAM COMPLETE 2D W DOPPLER W COLOR. Procedure Date  Date: 03/30/2017 Start: 11:00 AM    Study Location: Echo Lab  Technical Quality: Adequate visualization    Indications:Shortness of breath.     Additional Medical History:Coronary artery disease, Hypertension,  Hyperlipidemia, GERD, Diabetes, Paroxysmal atrial fib, Arthritis, Asthma,  Remote stroke, Pacemaker, Family history of heart disease, Former smoker    Patient Status: Routine    Height: 71 inches Weight: 220 pounds BSA: 2.2 m^2 BMI: 30.68 kg/m^2    BP: 142/78 mmHg     Conclusions      Summary   Systolic function was normal.   Ejection fraction is visually estimated at 55%. Tricuspid valve is structurally normal.   Mild tricuspid regurgitation visualized. Signature      ----------------------------------------------------------------   Electronically signed by Davon Mascorro DO (Interpreting   physician) on 03/30/2017 at 10:10 PM   ----------------------------------------------------------------      Findings      Mitral Valve   Structurally normal mitral valve. Trace mitral regurgitation is present. Aortic Valve   The aortic valve appears to be trileaflet with good leaflet separation. Aortic valve leaflets are Mildly calcified. Tricuspid Valve   Tricuspid valve is structurally normal.   Mild tricuspid regurgitation visualized. Pulmonic Valve   Pulmonic valve is structurally normal.   Trivial pulmonic regurgitation visualized. Left Atrium   Normal size left atrium. Left Ventricle   Systolic function was normal.   Ejection fraction is visually estimated at 55%. Right Atrium   The right atrium is of normal size. Right Ventricle   Normal right ventricular size and function. Pericardial Effusion   There is a trivial pericardial effusion noted.      M-Mode/2D Measurements & Calculations      LV Diastolic    LV Systolic Dimension: 3.2  AV Cusp Separation: 2.2 cmLA   Dimension: 5.1  cm                          Dimension: 3.7 cmAO Root   cm              LV Volume Diastolic: 601 ml Dimension: 2.8 cm   LV FS:37.3 %    LV Volume Systolic: 41 ml   LV PW           LV EDV/LV EDV Index: 612   Diastolic: 1.2  BR/24 G^4NL ESV/LV ESV   cm              Index: 41 ml/19 m^2         RV Diastolic Dimension: 2.9 cm   Septum          EF Calculated: 69.9 %   Diastolic: 1.2                              LA/Aorta: 1.32   cm     Doppler Measurements & Calculations      MV Peak E-Wave: 85.4 cm/s  AV Peak Velocity: 130 LVOT Peak Velocity: 94.8   MV Peak A-Wave: 79.5 cm/s  cm/s                  cm/s   MV E/A Ratio: 1.07         AV Peak Gradient:     LVOT Peak Gradient: 4   MV Peak Gradient: 2.92     6.76 mmHg             mmHg   mmHg                                                    TV Peak E-Wave: 47.4 cm/s   MV Deceleration Time: 190                        TV Peak A-Wave: 36 cm/s   msec                                                    TV Peak Gradient: 0.9                                                    mmHg   MV E' Septal Velocity:                           TR Velocity:207 cm/s   4.97 cm/s                  AV DVI (Vmax):0.73    TR Gradient:17.14 mmHg   MV A' Septal Velocity:                           PV Peak Velocity: 54.8   5.07 cm/s                                        cm/s   MV E' Lateral Velocity:                          PV Peak Gradient: 1.2   6.34 cm/s                                        mmHg   MV A' Lateral Velocity:   6.63 cm/s   E/E' septal: 17.18   E/E' lateral: 13.47   MR Velocity: 259 cm/s     [email or web address]       STRESS:    CATH:    Assessment/Plan       Diagnosis Orders   1.  Atrial fibrillation, unspecified type (HCC)              Afib on Xarelto  Shortness of breath, improved, likely due to Asthma  PPM  DM  HTN  HLD  HARINDER on CPAP  Asthma    More shortness of breath  ProBNP is wnl, Echo normal  Reports good exercise tolerance  Consistent with CPAP  On Xarelto , no bleeding, no side effects, continue for now  ONQ4SCMSUB:3, high risk  Continue Aspirin, statin, amlodipine  EKG shows A-S, V-Paced rhythm  The patient is asked to make an attempt to improve diet and exercise patterns to aid in medical management of this problem. Advised more plant based nutrition/meditarrean diet   Advised patient to call office or seek immediate medical attention if there is any new onset of  any chest pain, sob, palpitations, lightheadedness, dizziness, orthopnea, PND or pedal edema. All medication side effects were discussed in details. Thank youfor allowing me to participate in the care of this patient. Please do not hesitate to contact me for any further questions. Return in about 9 months (around 9/14/2023), or if symptoms worsen or fail to improve, for Review testing, Regular follow up.        Electronically signed by Imelda Dhillon MD [facility]  12/14/2022 at 1:21 PM EST

## 2023-01-12 ENCOUNTER — NURSE ONLY (OUTPATIENT)
Dept: CARDIOLOGY CLINIC | Age: 73
End: 2023-01-12
Payer: MEDICARE

## 2023-01-12 DIAGNOSIS — Z95.0 PACEMAKER: ICD-10-CM

## 2023-01-12 PROCEDURE — 93280 PM DEVICE PROGR EVAL DUAL: CPT | Performed by: INTERNAL MEDICINE

## 2023-01-12 NOTE — PROGRESS NOTES
Too sci dual pacer in office   1/6/23  12 beats VT     . Battery longevity:  4.5 years   Presenting rhythm  AS     Atrial impedance 485  RV impedance 666    P wave sensing 3.6  R wave sensing none, he's dependent     18 % atrial paced  100 % RV paced     Atrial threshold 0.7 V  at 0.4ms  RV threshold 1.3 V at 0.4ms  Mode switches   xarelto

## 2023-01-13 RX ORDER — FUROSEMIDE 20 MG/1
20 TABLET ORAL DAILY
Qty: 90 TABLET | Refills: 3 | Status: SHIPPED | OUTPATIENT
Start: 2023-01-13

## 2023-01-19 ENCOUNTER — NURSE ONLY (OUTPATIENT)
Dept: FAMILY MEDICINE CLINIC | Age: 73
End: 2023-01-19

## 2023-01-19 DIAGNOSIS — E53.8 VITAMIN B12 DEFICIENCY: Primary | ICD-10-CM

## 2023-01-19 RX ORDER — CYANOCOBALAMIN 1000 UG/ML
1000 INJECTION, SOLUTION INTRAMUSCULAR; SUBCUTANEOUS ONCE
Status: COMPLETED | OUTPATIENT
Start: 2023-01-19 | End: 2023-01-19

## 2023-01-19 RX ADMIN — CYANOCOBALAMIN 1000 MCG: 1000 INJECTION, SOLUTION INTRAMUSCULAR; SUBCUTANEOUS at 15:34

## 2023-01-19 NOTE — PROGRESS NOTES
Administrations This Visit       cyanocobalamin injection 1,000 mcg       Admin Date  01/19/2023  15:34 Action  Given Dose  1,000 mcg Route  IntraMUSCular Site  Deltoid Right Administered By  Latricia Sosa CMA ([facility])    Ordering Provider: FERDINAND Michelle CNP    NDC: 4564-2754-81    Lot#: [id number]    : [employer]    Patient Supplied?: No                    Patient instructed to remain in clinic for 20 minutes after injection and was advised to report any adverse reaction to me immediately.

## 2023-02-16 ENCOUNTER — NURSE ONLY (OUTPATIENT)
Dept: FAMILY MEDICINE CLINIC | Age: 73
End: 2023-02-16

## 2023-02-16 DIAGNOSIS — E53.8 VITAMIN B12 DEFICIENCY: Primary | ICD-10-CM

## 2023-02-16 RX ORDER — CYANOCOBALAMIN 1000 UG/ML
1000 INJECTION, SOLUTION INTRAMUSCULAR; SUBCUTANEOUS ONCE
Status: COMPLETED | OUTPATIENT
Start: 2023-02-16 | End: 2023-02-16

## 2023-02-16 RX ADMIN — CYANOCOBALAMIN 1000 MCG: 1000 INJECTION, SOLUTION INTRAMUSCULAR; SUBCUTANEOUS at 13:08

## 2023-02-16 NOTE — PROGRESS NOTES
Administrations This Visit       cyanocobalamin injection 1,000 mcg       Admin Date  02/16/2023  13:08 Action  Given Dose  1,000 mcg Route  IntraMUSCular Site  Deltoid Right Administered By  Renu Xiong LPN    Ordering Provider: Larissa Abreu MD    NDC: 6559-5789-73    Lot#: [id number]    : [employer]    Patient Supplied?: No                    Patient instructed to remain in clinic for 20 minutes after injection and was advised to report any adverse reaction to me immediately.

## 2023-04-21 ENCOUNTER — HOSPITAL ENCOUNTER (OUTPATIENT)
Age: 73
Discharge: HOME | End: 2023-04-21
Payer: MEDICARE

## 2023-04-21 DIAGNOSIS — I48.0 PAROXYSMAL ATRIAL FIBRILLATION (HCC): ICD-10-CM

## 2023-04-21 DIAGNOSIS — E11.65 UNCONTROLLED TYPE 2 DIABETES MELLITUS WITH HYPERGLYCEMIA (HCC): ICD-10-CM

## 2023-04-21 DIAGNOSIS — N13.8 BPH WITH OBSTRUCTION/LOWER URINARY TRACT SYMPTOMS: ICD-10-CM

## 2023-04-21 DIAGNOSIS — E53.8 VITAMIN B12 DEFICIENCY: ICD-10-CM

## 2023-04-21 DIAGNOSIS — E78.5 HYPERLIPIDEMIA WITH TARGET LDL LESS THAN 100: ICD-10-CM

## 2023-04-21 DIAGNOSIS — D50.9 IRON DEFICIENCY ANEMIA, UNSPECIFIED IRON DEFICIENCY ANEMIA TYPE: ICD-10-CM

## 2023-04-21 DIAGNOSIS — N40.1 BPH WITH OBSTRUCTION/LOWER URINARY TRACT SYMPTOMS: ICD-10-CM

## 2023-04-21 LAB
ALBUMIN SERPL BCG-MCNC: 4.6 G/DL (ref 3.5–5.1)
ALP SERPL-CCNC: 160 U/L (ref 38–126)
ALT SERPL W/O P-5'-P-CCNC: 23 U/L (ref 11–66)
ANION GAP SERPL CALC-SCNC: 10 MEQ/L (ref 8–16)
AST SERPL-CCNC: 17 U/L (ref 5–40)
BILIRUB SERPL-MCNC: 0.6 MG/DL (ref 0.3–1.2)
BUN SERPL-MCNC: 13 MG/DL (ref 7–22)
CALCIUM SERPL-MCNC: 9.4 MG/DL (ref 8.5–10.5)
CHLORIDE SERPL-SCNC: 94 MEQ/L (ref 98–111)
CHOLEST SERPL-MCNC: 126 MG/DL (ref 100–199)
CO2 SERPL-SCNC: 27 MEQ/L (ref 23–33)
CREAT SERPL-MCNC: 1 MG/DL (ref 0.4–1.2)
DEPRECATED RDW RBC AUTO: 45 FL (ref 35–45)
ERYTHROCYTE [DISTWIDTH] IN BLOOD BY AUTOMATED COUNT: 13.3 % (ref 11.5–14.5)
GFR SERPL CREATININE-BSD FRML MDRD: > 60 ML/MIN/1.73M2
GLUCOSE FASTING: 113 MG/DL (ref 70–108)
HCT VFR BLD AUTO: 41.8 % (ref 42–52)
HDLC SERPL-MCNC: 50 MG/DL
HGB BLD-MCNC: 13.5 GM/DL (ref 14–18)
LDLC SERPL CALC-MCNC: 59 MG/DL
MCH RBC QN AUTO: 29.9 PG (ref 26–33)
MCHC RBC AUTO-ENTMCNC: 32.3 GM/DL (ref 32.2–35.5)
MCV RBC AUTO: 92.5 FL (ref 80–94)
PLATELET # BLD AUTO: 226 THOU/MM3 (ref 130–400)
PMV BLD AUTO: 9.4 FL (ref 9.4–12.4)
POTASSIUM SERPL-SCNC: 4.5 MEQ/L (ref 3.5–5.2)
PROT SERPL-MCNC: 7.2 G/DL (ref 6.1–8)
RBC # BLD AUTO: 4.52 MILL/MM3 (ref 4.7–6.1)
SODIUM SERPL-SCNC: 131 MEQ/L (ref 135–145)
TRIGL SERPL-MCNC: 83 MG/DL (ref 0–199)
TSH SERPL DL<=0.005 MIU/L-ACNC: 2.89 UIU/ML (ref 0.4–4.2)
WBC # BLD AUTO: 5.8 THOU/MM3 (ref 4.8–10.8)

## 2023-04-21 PROCEDURE — 80053 COMPREHEN METABOLIC PANEL: CPT

## 2023-04-21 PROCEDURE — 82746 ASSAY OF FOLIC ACID SERUM: CPT

## 2023-04-21 PROCEDURE — 82043 UR ALBUMIN QUANTITATIVE: CPT

## 2023-04-21 PROCEDURE — 80061 LIPID PANEL: CPT

## 2023-04-21 PROCEDURE — 84153 ASSAY OF PSA TOTAL: CPT

## 2023-04-21 PROCEDURE — 84443 ASSAY THYROID STIM HORMONE: CPT

## 2023-04-21 PROCEDURE — 82607 VITAMIN B-12: CPT

## 2023-04-21 PROCEDURE — 83036 HEMOGLOBIN GLYCOSYLATED A1C: CPT

## 2023-04-21 PROCEDURE — 85027 COMPLETE CBC AUTOMATED: CPT

## 2023-04-21 PROCEDURE — 36415 COLL VENOUS BLD VENIPUNCTURE: CPT

## 2023-04-22 LAB
CREAT UR-MCNC: 137.8 MG/DL
DEPRECATED MEAN GLUCOSE BLD GHB EST-ACNC: 129 MG/DL (ref 70–126)
FOLATE SERPL-MCNC: > 20 NG/ML (ref 4.8–24.2)
HBA1C MFR BLD HPLC: 6.3 % (ref 4.4–6.4)
MICROALBUMIN UR-MCNC: < 1.2 MG/DL
MICROALBUMIN/CREAT RATIO PNL UR: 9 MG/G (ref 0–30)
PSA SERPL-MCNC: 5.12 NG/ML (ref 0–1)
VIT B12 SERPL-MCNC: 1134 PG/ML (ref 211–911)

## 2023-04-24 DIAGNOSIS — E87.1 HYPONATREMIA: Primary | ICD-10-CM

## 2023-04-25 ENCOUNTER — PROCEDURE VISIT (OUTPATIENT)
Dept: CARDIOLOGY CLINIC | Age: 73
End: 2023-04-25
Payer: MEDICARE

## 2023-04-25 DIAGNOSIS — Z95.0 PACEMAKER: Primary | ICD-10-CM

## 2023-04-25 PROCEDURE — 93294 REM INTERROG EVL PM/LDLS PM: CPT | Performed by: INTERNAL MEDICINE

## 2023-04-25 PROCEDURE — 93296 REM INTERROG EVL PM/IDS: CPT | Performed by: INTERNAL MEDICINE

## 2023-04-25 NOTE — PROGRESS NOTES
Talib davis sci dual pacer     . [employer] Kar[employer] Battery longevity:  4.5 years   Presenting rhythm  AP     Atrial impedance 492  RV impedance 680    P wave sensing 4.2  R wave sensing not measured     22 % atrial paced  100 % RV paced     Mode switches  PAF, xarelto

## 2023-05-05 ENCOUNTER — HOSPITAL ENCOUNTER (OUTPATIENT)
Age: 73
Discharge: HOME | End: 2023-05-05
Payer: MEDICARE

## 2023-05-05 DIAGNOSIS — R97.20 ELEVATED PSA: ICD-10-CM

## 2023-05-05 PROCEDURE — 36415 COLL VENOUS BLD VENIPUNCTURE: CPT

## 2023-05-05 PROCEDURE — 84153 ASSAY OF PSA TOTAL: CPT

## 2023-05-06 LAB — PSA SERPL-MCNC: 6.33 NG/ML (ref 0–1)

## 2023-05-08 PROBLEM — E11.42 DIABETIC PERIPHERAL NEUROPATHY ASSOCIATED WITH TYPE 2 DIABETES MELLITUS (HCC): Status: ACTIVE | Noted: 2023-05-08

## 2023-05-09 ENCOUNTER — OFFICE VISIT (OUTPATIENT)
Dept: FAMILY MEDICINE CLINIC | Age: 73
End: 2023-05-09
Payer: MEDICARE

## 2023-05-09 VITALS
HEART RATE: 64 BPM | DIASTOLIC BLOOD PRESSURE: 70 MMHG | WEIGHT: 215 LBS | BODY MASS INDEX: 30.85 KG/M2 | TEMPERATURE: 97.7 F | RESPIRATION RATE: 14 BRPM | SYSTOLIC BLOOD PRESSURE: 122 MMHG

## 2023-05-09 DIAGNOSIS — G62.9 PERIPHERAL POLYNEUROPATHY: ICD-10-CM

## 2023-05-09 DIAGNOSIS — I48.91 ATRIAL FIBRILLATION, UNSPECIFIED TYPE (HCC): ICD-10-CM

## 2023-05-09 DIAGNOSIS — L03.032 PARONYCHIA OF GREAT TOE, LEFT: ICD-10-CM

## 2023-05-09 DIAGNOSIS — I73.9 PVD (PERIPHERAL VASCULAR DISEASE) (HCC): ICD-10-CM

## 2023-05-09 DIAGNOSIS — E66.9 OBESITY (BMI 30-39.9): ICD-10-CM

## 2023-05-09 DIAGNOSIS — S51.812A SKIN TEAR OF FOREARM WITHOUT COMPLICATION, LEFT, INITIAL ENCOUNTER: ICD-10-CM

## 2023-05-09 DIAGNOSIS — I48.0 PAROXYSMAL ATRIAL FIBRILLATION (HCC): ICD-10-CM

## 2023-05-09 DIAGNOSIS — Z98.890 S/P CARDIAC CATHETERIZATION: ICD-10-CM

## 2023-05-09 DIAGNOSIS — E53.8 VITAMIN B12 DEFICIENCY: ICD-10-CM

## 2023-05-09 DIAGNOSIS — H40.89 OTHER GLAUCOMA OF BOTH EYES: ICD-10-CM

## 2023-05-09 DIAGNOSIS — E11.42 DIABETIC PERIPHERAL NEUROPATHY ASSOCIATED WITH TYPE 2 DIABETES MELLITUS (HCC): ICD-10-CM

## 2023-05-09 DIAGNOSIS — E78.5 HYPERLIPIDEMIA WITH TARGET LDL LESS THAN 100: ICD-10-CM

## 2023-05-09 DIAGNOSIS — N13.8 BPH WITH OBSTRUCTION/LOWER URINARY TRACT SYMPTOMS: ICD-10-CM

## 2023-05-09 DIAGNOSIS — E87.1 HYPONATREMIA: ICD-10-CM

## 2023-05-09 DIAGNOSIS — G47.33 OBSTRUCTIVE SLEEP APNEA ON CPAP: ICD-10-CM

## 2023-05-09 DIAGNOSIS — E11.65 UNCONTROLLED TYPE 2 DIABETES MELLITUS WITH HYPERGLYCEMIA (HCC): ICD-10-CM

## 2023-05-09 DIAGNOSIS — D50.9 IRON DEFICIENCY ANEMIA, UNSPECIFIED IRON DEFICIENCY ANEMIA TYPE: ICD-10-CM

## 2023-05-09 DIAGNOSIS — Z99.89 OBSTRUCTIVE SLEEP APNEA ON CPAP: ICD-10-CM

## 2023-05-09 DIAGNOSIS — N40.1 BPH WITH OBSTRUCTION/LOWER URINARY TRACT SYMPTOMS: ICD-10-CM

## 2023-05-09 DIAGNOSIS — M19.90 ARTHRITIS: ICD-10-CM

## 2023-05-09 DIAGNOSIS — I10 PRIMARY HYPERTENSION: Primary | ICD-10-CM

## 2023-05-09 DIAGNOSIS — G25.0 ESSENTIAL TREMOR: ICD-10-CM

## 2023-05-09 DIAGNOSIS — J44.9 COPD WITH ASTHMA (HCC): ICD-10-CM

## 2023-05-09 DIAGNOSIS — Z77.098 AGENT ORANGE EXPOSURE: ICD-10-CM

## 2023-05-09 PROCEDURE — 3044F HG A1C LEVEL LT 7.0%: CPT | Performed by: FAMILY MEDICINE

## 2023-05-09 PROCEDURE — 3017F COLORECTAL CA SCREEN DOC REV: CPT | Performed by: FAMILY MEDICINE

## 2023-05-09 PROCEDURE — 96372 THER/PROPH/DIAG INJ SC/IM: CPT | Performed by: FAMILY MEDICINE

## 2023-05-09 PROCEDURE — 2022F DILAT RTA XM EVC RTNOPTHY: CPT | Performed by: FAMILY MEDICINE

## 2023-05-09 PROCEDURE — 1123F ACP DISCUSS/DSCN MKR DOCD: CPT | Performed by: FAMILY MEDICINE

## 2023-05-09 PROCEDURE — 3023F SPIROM DOC REV: CPT | Performed by: FAMILY MEDICINE

## 2023-05-09 PROCEDURE — 3074F SYST BP LT 130 MM HG: CPT | Performed by: FAMILY MEDICINE

## 2023-05-09 PROCEDURE — 99215 OFFICE O/P EST HI 40 MIN: CPT | Performed by: FAMILY MEDICINE

## 2023-05-09 PROCEDURE — G8417 CALC BMI ABV UP PARAM F/U: HCPCS | Performed by: FAMILY MEDICINE

## 2023-05-09 PROCEDURE — G8427 DOCREV CUR MEDS BY ELIG CLIN: HCPCS | Performed by: FAMILY MEDICINE

## 2023-05-09 PROCEDURE — 3078F DIAST BP <80 MM HG: CPT | Performed by: FAMILY MEDICINE

## 2023-05-09 PROCEDURE — 1036F TOBACCO NON-USER: CPT | Performed by: FAMILY MEDICINE

## 2023-05-09 RX ORDER — CYANOCOBALAMIN 1000 UG/ML
1000 INJECTION, SOLUTION INTRAMUSCULAR; SUBCUTANEOUS ONCE
Status: COMPLETED | OUTPATIENT
Start: 2023-05-09 | End: 2023-05-09

## 2023-05-09 RX ORDER — VITAMIN B COMPLEX
1 CAPSULE ORAL DAILY
Qty: 90 CAPSULE | Refills: 3 | Status: SHIPPED
Start: 2023-05-09

## 2023-05-09 RX ORDER — CEPHALEXIN 500 MG/1
500 CAPSULE ORAL 3 TIMES DAILY
Qty: 30 CAPSULE | Refills: 0 | Status: SHIPPED | OUTPATIENT
Start: 2023-05-09 | End: 2023-05-19

## 2023-05-09 RX ADMIN — CYANOCOBALAMIN 1000 MCG: 1000 INJECTION, SOLUTION INTRAMUSCULAR; SUBCUTANEOUS at 14:39

## 2023-05-09 SDOH — ECONOMIC STABILITY: FOOD INSECURITY: WITHIN THE PAST 12 MONTHS, YOU WORRIED THAT YOUR FOOD WOULD RUN OUT BEFORE YOU GOT MONEY TO BUY MORE.: NEVER TRUE

## 2023-05-09 SDOH — ECONOMIC STABILITY: INCOME INSECURITY: HOW HARD IS IT FOR YOU TO PAY FOR THE VERY BASICS LIKE FOOD, HOUSING, MEDICAL CARE, AND HEATING?: NOT HARD AT ALL

## 2023-05-09 SDOH — ECONOMIC STABILITY: HOUSING INSECURITY
IN THE LAST 12 MONTHS, WAS THERE A TIME WHEN YOU DID NOT HAVE A STEADY PLACE TO SLEEP OR SLEPT IN A SHELTER (INCLUDING NOW)?: NO

## 2023-05-09 SDOH — ECONOMIC STABILITY: FOOD INSECURITY: WITHIN THE PAST 12 MONTHS, THE FOOD YOU BOUGHT JUST DIDN'T LAST AND YOU DIDN'T HAVE MONEY TO GET MORE.: NEVER TRUE

## 2023-05-09 ASSESSMENT — PATIENT HEALTH QUESTIONNAIRE - PHQ9
SUM OF ALL RESPONSES TO PHQ QUESTIONS 1-9: 0
2. FEELING DOWN, DEPRESSED OR HOPELESS: 0
SUM OF ALL RESPONSES TO PHQ QUESTIONS 1-9: 0
SUM OF ALL RESPONSES TO PHQ9 QUESTIONS 1 & 2: 0
SUM OF ALL RESPONSES TO PHQ QUESTIONS 1-9: 0
SUM OF ALL RESPONSES TO PHQ QUESTIONS 1-9: 0
1. LITTLE INTEREST OR PLEASURE IN DOING THINGS: 0

## 2023-05-09 NOTE — PROGRESS NOTES
Administrations This Visit       cyanocobalamin injection 1,000 mcg       Admin Date  05/09/2023  14:39 Action  Given Dose  1,000 mcg Route  IntraMUSCular Site  Deltoid Right Administered By  Suzy Laird CMA ([facility])    Ordering Provider: Tu Johnson MD    NDC: 92862-675-81    Lot#: [id number]    : [address] [place] [address]    Patient Supplied?: No                    Patient instructed to remain in clinic for 20 minutes after injection and was advised to report any adverse reaction to me immediately.
Vitamin B12 & Folate; Future    Hyponatremia    Skin tear of forearm without complication, left, initial encounter  -     cephALEXin (KEFLEX) 500 MG capsule; Take 1 capsule by mouth 3 times daily for 10 days    Paronychia of great toe, left  -     cephALEXin (KEFLEX) 500 MG capsule; Take 1 capsule by mouth 3 times daily for 10 days      Soak toe 2-3 times daily and return if not getting better   watch the forearms: Keep area clean and use antibiotic ointment BID   Watch for redness swelling or drainage  Open to air until exposed to dirt then cover       Discussed use, benefit, and side effectsof prescribed medications. All patient questions answered. Pt voiced understanding. Reviewed health maintenance. Instructed to continue current medications, diet and exercise. Patient agreed with treatment plan. Followup as directed.      Over 50 minutes spent with patient with >50% spent in counseling and coordination of care    Electronically signed by Julio Porras MD

## 2023-05-16 ENCOUNTER — OFFICE VISIT (OUTPATIENT)
Dept: UROLOGY | Age: 73
End: 2023-05-16
Payer: MEDICARE

## 2023-05-16 VITALS
WEIGHT: 215 LBS | HEIGHT: 70 IN | BODY MASS INDEX: 30.78 KG/M2 | DIASTOLIC BLOOD PRESSURE: 78 MMHG | SYSTOLIC BLOOD PRESSURE: 120 MMHG

## 2023-05-16 DIAGNOSIS — R97.20 ELEVATED PSA: ICD-10-CM

## 2023-05-16 DIAGNOSIS — N13.8 BPH WITH OBSTRUCTION/LOWER URINARY TRACT SYMPTOMS: Primary | ICD-10-CM

## 2023-05-16 DIAGNOSIS — N40.1 BPH WITH OBSTRUCTION/LOWER URINARY TRACT SYMPTOMS: Primary | ICD-10-CM

## 2023-05-16 LAB
BILIRUBIN URINE: NEGATIVE
BLOOD URINE, POC: NEGATIVE
CHARACTER, URINE: CLEAR
COLOR, URINE: YELLOW
GLUCOSE URINE: NEGATIVE MG/DL
KETONES, URINE: NEGATIVE
LEUKOCYTE CLUMPS, URINE: NEGATIVE
NITRITE, URINE: NEGATIVE
PH, URINE: 5.5 (ref 5–9)
POST VOID RESIDUAL (PVR): 50 ML
PROTEIN, URINE: NEGATIVE MG/DL
SPECIFIC GRAVITY, URINE: 1.02 (ref 1–1.03)
UROBILINOGEN, URINE: 0.2 EU/DL (ref 0–1)

## 2023-05-16 PROCEDURE — 3074F SYST BP LT 130 MM HG: CPT | Performed by: NURSE PRACTITIONER

## 2023-05-16 PROCEDURE — 81003 URINALYSIS AUTO W/O SCOPE: CPT | Performed by: NURSE PRACTITIONER

## 2023-05-16 PROCEDURE — G8427 DOCREV CUR MEDS BY ELIG CLIN: HCPCS | Performed by: NURSE PRACTITIONER

## 2023-05-16 PROCEDURE — 3017F COLORECTAL CA SCREEN DOC REV: CPT | Performed by: NURSE PRACTITIONER

## 2023-05-16 PROCEDURE — 1123F ACP DISCUSS/DSCN MKR DOCD: CPT | Performed by: NURSE PRACTITIONER

## 2023-05-16 PROCEDURE — 1036F TOBACCO NON-USER: CPT | Performed by: NURSE PRACTITIONER

## 2023-05-16 PROCEDURE — G8417 CALC BMI ABV UP PARAM F/U: HCPCS | Performed by: NURSE PRACTITIONER

## 2023-05-16 PROCEDURE — 51798 US URINE CAPACITY MEASURE: CPT | Performed by: NURSE PRACTITIONER

## 2023-05-16 PROCEDURE — 3078F DIAST BP <80 MM HG: CPT | Performed by: NURSE PRACTITIONER

## 2023-05-16 PROCEDURE — 99214 OFFICE O/P EST MOD 30 MIN: CPT | Performed by: NURSE PRACTITIONER

## 2023-05-16 RX ORDER — TAMSULOSIN HYDROCHLORIDE 0.4 MG/1
0.4 CAPSULE ORAL DAILY
Qty: 90 CAPSULE | Refills: 3 | Status: SHIPPED | OUTPATIENT
Start: 2023-05-16

## 2023-05-16 RX ORDER — OXYBUTYNIN CHLORIDE 10 MG/1
10 TABLET, EXTENDED RELEASE ORAL DAILY
Qty: 30 TABLET | Refills: 3 | Status: SHIPPED | OUTPATIENT
Start: 2023-05-16

## 2023-05-16 ASSESSMENT — ENCOUNTER SYMPTOMS
ABDOMINAL PAIN: 0
NAUSEA: 0
BACK PAIN: 0
VOMITING: 0

## 2023-05-16 NOTE — PROGRESS NOTES
[address]  [address]  [facility] [address]  Dept: [phone]  Loc: [phone]    Visit Date: 5/16/2023        HPI:     Bruno Caputo is a 75 y.o. male who presents today for:  Chief Complaint   Patient presents with    Benign Prostatic Hypertrophy     W/ LUTS     Results     Review PSA results     Urinary Urgency    Incontinence     W/ urge        HPI  Pt seen in follow up for BPH and elevated psa. Pt has a hx of elevated PSA with prior prostate biopsy 10/2021 by Dr. Ioana Pa for psa of 4.5. Prostate volume 75 cc. 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 Most recent PSA 5/5/23 6.33 up from 5.12 4/21/23 and 4.55 4/21/22. Pt has a hx of BPH on tamsulosin 0.4 mg daily. Reports increased frequency, urgency, and urge dribbling. Has trouble making it very long without needing to urinate again. Current Outpatient Medications   Medication Sig Dispense Refill    b complex vitamins capsule Take 1 capsule by mouth daily 90 capsule 3    cephALEXin (KEFLEX) 500 MG capsule Take 1 capsule by mouth 3 times daily for 10 days 30 capsule 0    furosemide (LASIX) 20 MG tablet Take 1 tablet by mouth daily 90 tablet 3    Albuterol Sulfate, sensor, (PROAIR DIGIHALER IN) Inhale into the lungs      fluticasone-salmeterol (ADVAIR) 100-50 MCG/ACT AEPB diskus inhaler Inhale 1 puff into the lungs in the morning and 1 puff in the evening.       vitamin D (CHOLECALCIFEROL) 25 MCG (1000 UT) TABS tablet Take 1 tablet by mouth in the morning and at bedtime      atorvastatin (LIPITOR) 40 MG tablet Take by mouth daily 1/2 tab      amLODIPine (NORVASC) 5 MG tablet Take 1 tablet by mouth daily      metFORMIN (GLUCOPHAGE) 500 MG tablet Take by mouth 2 times daily (with meals) 2 tab      omeprazole (PRILOSEC) 40 MG delayed release capsule Take 1 capsule by mouth daily      Multiple Vitamins-Minerals (PRESERVISION AREDS

## 2023-06-06 ENCOUNTER — NURSE ONLY (OUTPATIENT)
Dept: FAMILY MEDICINE CLINIC | Age: 73
End: 2023-06-06
Payer: MEDICARE

## 2023-06-06 DIAGNOSIS — E53.8 VITAMIN B12 DEFICIENCY: Primary | ICD-10-CM

## 2023-06-06 PROCEDURE — 96372 THER/PROPH/DIAG INJ SC/IM: CPT | Performed by: FAMILY MEDICINE

## 2023-06-06 PROCEDURE — 99999 PR OFFICE/OUTPT VISIT,PROCEDURE ONLY: CPT | Performed by: FAMILY MEDICINE

## 2023-06-06 RX ORDER — CYANOCOBALAMIN 1000 UG/ML
1000 INJECTION, SOLUTION INTRAMUSCULAR; SUBCUTANEOUS ONCE
Status: COMPLETED | OUTPATIENT
Start: 2023-06-06 | End: 2023-06-06

## 2023-06-06 RX ADMIN — CYANOCOBALAMIN 1000 MCG: 1000 INJECTION, SOLUTION INTRAMUSCULAR; SUBCUTANEOUS at 15:02

## 2023-06-06 NOTE — PROGRESS NOTES
Administrations This Visit       cyanocobalamin injection 1,000 mcg       Admin Date  06/06/2023  15:02 Action  Given Dose  1,000 mcg Route  IntraMUSCular Site  Deltoid Left Administered By  Robb Gibbs CMA ([address] [place] [address])    Ordering Provider: Trinh Piña MD    NDC: 24518-316-26    Lot#: [id number]    : [address] [place] [address]    Patient Supplied?: No                    Patient instructed to  report any adverse reaction to me immediately.

## 2023-07-06 ENCOUNTER — NURSE ONLY (OUTPATIENT)
Dept: FAMILY MEDICINE CLINIC | Age: 73
End: 2023-07-06
Payer: MEDICARE

## 2023-07-06 DIAGNOSIS — E53.8 VITAMIN B12 DEFICIENCY: Primary | ICD-10-CM

## 2023-07-06 PROCEDURE — 99999 PR OFFICE/OUTPT VISIT,PROCEDURE ONLY: CPT | Performed by: FAMILY MEDICINE

## 2023-07-06 PROCEDURE — 96372 THER/PROPH/DIAG INJ SC/IM: CPT | Performed by: FAMILY MEDICINE

## 2023-07-06 RX ORDER — CYANOCOBALAMIN 1000 UG/ML
1000 INJECTION, SOLUTION INTRAMUSCULAR; SUBCUTANEOUS ONCE
Status: COMPLETED | OUTPATIENT
Start: 2023-07-06 | End: 2023-07-06

## 2023-07-06 RX ADMIN — CYANOCOBALAMIN 1000 MCG: 1000 INJECTION, SOLUTION INTRAMUSCULAR; SUBCUTANEOUS at 11:45

## 2023-07-06 NOTE — PROGRESS NOTES
Administrations This Visit       cyanocobalamin injection 1,000 mcg       Admin Date  07/06/2023  11:45 Action  Given Dose  1,000 mcg Route  IntraMUSCular Site  Deltoid Right Administered By  Herbie Blair LPN    Ordering Provider: Murtaza Garner MD    NDC: 33723-349-96    Lot#: [id number]    : [address]    Patient Supplied?: No                    Patient instructed to remain in clinic for 20 minutes after injection and was advised to report any adverse reaction to me immediately.

## 2023-07-14 ENCOUNTER — HOSPITAL ENCOUNTER (EMERGENCY)
Age: 73
Discharge: HOME | End: 2023-07-14
Attending: EMERGENCY MEDICINE
Payer: MEDICARE

## 2023-07-14 VITALS
HEIGHT: 71 IN | TEMPERATURE: 98.1 F | OXYGEN SATURATION: 96 % | SYSTOLIC BLOOD PRESSURE: 99 MMHG | WEIGHT: 219 LBS | HEART RATE: 70 BPM | RESPIRATION RATE: 18 BRPM | BODY MASS INDEX: 30.66 KG/M2 | DIASTOLIC BLOOD PRESSURE: 47 MMHG

## 2023-07-14 DIAGNOSIS — B96.89 ACUTE BACTERIAL SINUSITIS: Primary | ICD-10-CM

## 2023-07-14 DIAGNOSIS — J01.90 ACUTE BACTERIAL SINUSITIS: Primary | ICD-10-CM

## 2023-07-14 DIAGNOSIS — J02.9 ACUTE PHARYNGITIS, UNSPECIFIED ETIOLOGY: ICD-10-CM

## 2023-07-14 LAB
FLUAV AG SPEC QL: NEGATIVE
FLUBV AG SPEC QL: NEGATIVE
S PYO AG THROAT QL: NEGATIVE
SARS-COV-2 RDRP RESP QL NAA+PROBE: NOT  DETECTED

## 2023-07-14 PROCEDURE — 87804 INFLUENZA ASSAY W/OPTIC: CPT

## 2023-07-14 PROCEDURE — 87635 SARS-COV-2 COVID-19 AMP PRB: CPT

## 2023-07-14 PROCEDURE — 87651 STREP A DNA AMP PROBE: CPT

## 2023-07-14 PROCEDURE — 99283 EMERGENCY DEPT VISIT LOW MDM: CPT

## 2023-07-14 RX ORDER — CETIRIZINE HYDROCHLORIDE 10 MG/1
10 TABLET ORAL DAILY
Qty: 30 TABLET | Refills: 0 | Status: SHIPPED | OUTPATIENT
Start: 2023-07-14 | End: 2023-08-13

## 2023-07-14 RX ORDER — AZITHROMYCIN 250 MG/1
250 TABLET, FILM COATED ORAL SEE ADMIN INSTRUCTIONS
Qty: 6 TABLET | Refills: 0 | Status: SHIPPED | OUTPATIENT
Start: 2023-07-14 | End: 2023-07-19

## 2023-07-14 ASSESSMENT — ENCOUNTER SYMPTOMS
DIARRHEA: 0
SINUS PRESSURE: 1
CONSTIPATION: 0
VOMITING: 0
PHOTOPHOBIA: 0
COUGH: 0
FACIAL SWELLING: 1
ABDOMINAL DISTENTION: 0
BLOOD IN STOOL: 0
RHINORRHEA: 1
SHORTNESS OF BREATH: 0
EYE ITCHING: 0
BACK PAIN: 0
SINUS PAIN: 1
WHEEZING: 0
EYE PAIN: 0
CHEST TIGHTNESS: 0
EYE DISCHARGE: 0
EYE REDNESS: 0
SORE THROAT: 1
TROUBLE SWALLOWING: 0
NAUSEA: 0
ABDOMINAL PAIN: 0
CHOKING: 0
VOICE CHANGE: 0

## 2023-07-14 ASSESSMENT — PAIN - FUNCTIONAL ASSESSMENT: PAIN_FUNCTIONAL_ASSESSMENT: 0-10

## 2023-07-14 ASSESSMENT — PAIN DESCRIPTION - DESCRIPTORS: DESCRIPTORS: ACHING

## 2023-07-14 ASSESSMENT — PAIN DESCRIPTION - LOCATION: LOCATION: THROAT

## 2023-07-14 NOTE — DISCHARGE INSTRUCTIONS
Patient has what appears to be an acute bacterial sinusitis. He also has a sore throat most likely from sinus drainage. Patient has been given medications he is instructed to take this as prescribed. He is instructed to use Tylenol Motrin for any pain. He is instructed to follow-up with the family doctor and do so within the next 1 to 2 days. He is instructed return to the nearest emergency room immediately for any new or worsening complaints.

## 2023-07-14 NOTE — ED PROVIDER NOTES
[facility]  eMERGENCY dEPARTMENT eNCOUnter          CHIEF COMPLAINT       Chief Complaint   Patient presents with    Pharyngitis    Facial Pain       Nurses Notes reviewed and I agree except as noted in the HPI. HISTORY OF PRESENT ILLNESS    Lukas Howard is a 75 y.o. male who presents for sore throat. Apparently the patient has been having sinus problems has had a lot of sinus drainage. He has been having sore throat daily for 2 weeks. Patient states that his sinuses are full. He is not currently on any antibiotics. Patient states that they have tried conservative treatment without success. He has not seen his primary care physician he decided to come in today for evaluation and treatment. Patient states that they know that his left maxillary sinus is full. He is wondering why no one started him on antibiotics. Patient is otherwise resting comfortably on cot no apparent distress no other physical complaints at this time. REVIEW OF SYSTEMS     Review of Systems   Constitutional:  Negative for activity change, appetite change, diaphoresis, fatigue and unexpected weight change. HENT:  Positive for facial swelling, postnasal drip, rhinorrhea, sinus pressure, sinus pain and sore throat. Negative for congestion, ear discharge, ear pain, hearing loss, mouth sores, nosebleeds, trouble swallowing and voice change. Eyes:  Negative for photophobia, pain, discharge, redness and itching. Respiratory:  Negative for cough, choking, chest tightness, shortness of breath and wheezing. Cardiovascular:  Negative for chest pain, palpitations and leg swelling. Gastrointestinal:  Negative for abdominal distention, abdominal pain, blood in stool, constipation, diarrhea, nausea and vomiting. Endocrine: Negative for polydipsia, polyphagia and polyuria.    Genitourinary:  Negative for decreased urine volume, difficulty urinating, dysuria, enuresis, frequency, hematuria, penile swelling and

## 2023-07-14 NOTE — ED TRIAGE NOTES
Patient states he has had a sore throat with sinus congestion for the last 2 weeks. Patient states it hurts to swallow. Throat is red.

## 2023-07-14 NOTE — ED NOTES
Patient verbalized understanding of discharge instructions and medications prescribed. Denies questions or concerns at this time.       Herson Ding RN  07/14/23 4551

## 2023-07-26 ENCOUNTER — PROCEDURE VISIT (OUTPATIENT)
Dept: CARDIOLOGY CLINIC | Age: 73
End: 2023-07-26

## 2023-07-26 DIAGNOSIS — Z95.0 PACEMAKER: Primary | ICD-10-CM

## 2023-07-26 NOTE — PROGRESS NOTES
Dr savage pt   Nxt [employer] dual pacer remote   Battery 2.5 yrs remaining    Dddr     A paced 8%  V paced 100%      P waves 1.8  Rv waves 9.7    Atrial impedence 494  Vent impedence 659    No thresholds obtained per the device       Known afib/ xarelto     Afib burden 40%      5/30/23 high vent rate episode

## 2023-07-27 ASSESSMENT — ENCOUNTER SYMPTOMS
TROUBLE SWALLOWING: 0
VOICE CHANGE: 0
SORE THROAT: 0

## 2023-07-27 NOTE — PROGRESS NOTES
[address] [facility] [address] [address] [facility], [place]  Phone:  [phone]          Name: Kali Andre  : 1950    Chief Complaint   Patient presents with    Sore Throat     Was given antibiotic in ER     Other     Bright red blood after BM    Fatigue       HPI:     Kali Andre is a 75 y.o. male who presents today for follow up of sinusitis. He was seen at [facility] on 23 and was prescribed Azithromycin for sinusitis which has improved. He has been having some rectal bleeding since Saturday. If he passes gas or has a BM he passes bright red blood. No BM yet today. No dizziness. He has no h/o hemorrhoids. He is on Xarelto. No bloody noses or blood in his urine. He's b een fatigued. His last colonoscopy was with Dr. Coleen Franco in 2019 and he was supposed to f/u in 3 years but hasn't because of his wife's health.         Current Outpatient Medications:     hydrocortisone (ANUSOL-HC) 25 MG suppository, Place 1 suppository rectally in the morning and 1 suppository in the evening., Disp: 10 suppository, Rfl: 0    cetirizine (ZYRTEC) 10 MG tablet, Take 1 tablet by mouth daily, Disp: 30 tablet, Rfl: 0    oxybutynin (DITROPAN XL) 10 MG extended release tablet, Take 1 tablet by mouth daily, Disp: 30 tablet, Rfl: 3    tamsulosin (FLOMAX) 0.4 MG capsule, Take 1 capsule by mouth daily, Disp: 90 capsule, Rfl: 3    b complex vitamins capsule, Take 1 capsule by mouth daily, Disp: 90 capsule, Rfl: 3    furosemide (LASIX) 20 MG tablet, Take 1 tablet by mouth daily, Disp: 90 tablet, Rfl: 3    fluticasone-salmeterol (ADVAIR) 100-50 MCG/ACT AEPB diskus inhaler, Inhale 1 puff into the lungs in the morning and 1 puff in the evening., Disp: , Rfl:     vitamin D (CHOLECALCIFEROL) 25 MCG (1000 UT) TABS tablet, Take 1 tablet by mouth in the morning and at bedtime, Disp: , Rfl:     atorvastatin (LIPITOR) 40 MG tablet, Take by mouth daily 1/2 tab, Disp: , Rfl:     amLODIPine (NORVASC) 5 MG

## 2023-07-31 ENCOUNTER — NURSE ONLY (OUTPATIENT)
Dept: LAB | Age: 73
End: 2023-07-31

## 2023-07-31 ENCOUNTER — OFFICE VISIT (OUTPATIENT)
Dept: FAMILY MEDICINE CLINIC | Age: 73
End: 2023-07-31
Payer: MEDICARE

## 2023-07-31 VITALS
BODY MASS INDEX: 29.68 KG/M2 | HEART RATE: 70 BPM | DIASTOLIC BLOOD PRESSURE: 70 MMHG | HEIGHT: 71 IN | RESPIRATION RATE: 16 BRPM | OXYGEN SATURATION: 98 % | TEMPERATURE: 97.5 F | SYSTOLIC BLOOD PRESSURE: 114 MMHG | WEIGHT: 212 LBS

## 2023-07-31 DIAGNOSIS — K62.5 BRBPR (BRIGHT RED BLOOD PER RECTUM): Primary | ICD-10-CM

## 2023-07-31 DIAGNOSIS — K62.5 BRBPR (BRIGHT RED BLOOD PER RECTUM): ICD-10-CM

## 2023-07-31 DIAGNOSIS — K64.4 EXTERNAL HEMORRHOID: ICD-10-CM

## 2023-07-31 LAB
BASOPHILS ABSOLUTE: 0 THOU/MM3 (ref 0–0.1)
BASOPHILS NFR BLD AUTO: 0.6 %
DEPRECATED RDW RBC AUTO: 43.8 FL (ref 35–45)
EOSINOPHIL NFR BLD AUTO: 2.8 %
EOSINOPHILS ABSOLUTE: 0.1 THOU/MM3 (ref 0–0.4)
ERYTHROCYTE [DISTWIDTH] IN BLOOD BY AUTOMATED COUNT: 13 % (ref 11.5–14.5)
HCT VFR BLD AUTO: 41.6 % (ref 42–52)
HGB BLD-MCNC: 13.4 GM/DL (ref 14–18)
IMM GRANULOCYTES # BLD AUTO: 0.03 THOU/MM3 (ref 0–0.07)
IMM GRANULOCYTES NFR BLD AUTO: 0.6 %
LYMPHOCYTES ABSOLUTE: 1.7 THOU/MM3 (ref 1–4.8)
LYMPHOCYTES NFR BLD AUTO: 33.4 %
MCH RBC QN AUTO: 29.6 PG (ref 26–33)
MCHC RBC AUTO-ENTMCNC: 32.2 GM/DL (ref 32.2–35.5)
MCV RBC AUTO: 91.8 FL (ref 80–94)
MONOCYTES ABSOLUTE: 0.6 THOU/MM3 (ref 0.4–1.3)
MONOCYTES NFR BLD AUTO: 11 %
NEUTROPHILS NFR BLD AUTO: 51.6 %
NRBC BLD AUTO-RTO: 0 /100 WBC
PLATELET # BLD AUTO: 231 THOU/MM3 (ref 130–400)
PMV BLD AUTO: 9.7 FL (ref 9.4–12.4)
RBC # BLD AUTO: 4.53 MILL/MM3 (ref 4.7–6.1)
SEGMENTED NEUTROPHILS ABSOLUTE COUNT: 2.6 THOU/MM3 (ref 1.8–7.7)
WBC # BLD AUTO: 5 THOU/MM3 (ref 4.8–10.8)

## 2023-07-31 PROCEDURE — 3074F SYST BP LT 130 MM HG: CPT | Performed by: FAMILY MEDICINE

## 2023-07-31 PROCEDURE — 3078F DIAST BP <80 MM HG: CPT | Performed by: FAMILY MEDICINE

## 2023-07-31 PROCEDURE — 3017F COLORECTAL CA SCREEN DOC REV: CPT | Performed by: FAMILY MEDICINE

## 2023-07-31 PROCEDURE — 1123F ACP DISCUSS/DSCN MKR DOCD: CPT | Performed by: FAMILY MEDICINE

## 2023-07-31 PROCEDURE — G8427 DOCREV CUR MEDS BY ELIG CLIN: HCPCS | Performed by: FAMILY MEDICINE

## 2023-07-31 PROCEDURE — G8417 CALC BMI ABV UP PARAM F/U: HCPCS | Performed by: FAMILY MEDICINE

## 2023-07-31 PROCEDURE — 1036F TOBACCO NON-USER: CPT | Performed by: FAMILY MEDICINE

## 2023-07-31 PROCEDURE — 99213 OFFICE O/P EST LOW 20 MIN: CPT | Performed by: FAMILY MEDICINE

## 2023-07-31 RX ORDER — HYDROCORTISONE ACETATE 25 MG/1
25 SUPPOSITORY RECTAL EVERY 12 HOURS
Qty: 10 SUPPOSITORY | Refills: 0 | Status: SHIPPED | OUTPATIENT
Start: 2023-07-31

## 2023-07-31 ASSESSMENT — ENCOUNTER SYMPTOMS
DIARRHEA: 0
NAUSEA: 0
CONSTIPATION: 0
VOMITING: 0
ANAL BLEEDING: 1
ABDOMINAL PAIN: 0

## 2023-08-02 ENCOUNTER — NURSE ONLY (OUTPATIENT)
Dept: FAMILY MEDICINE CLINIC | Age: 73
End: 2023-08-02
Payer: MEDICARE

## 2023-08-02 DIAGNOSIS — E53.8 VITAMIN B12 DEFICIENCY: Primary | ICD-10-CM

## 2023-08-02 PROCEDURE — 96372 THER/PROPH/DIAG INJ SC/IM: CPT | Performed by: FAMILY MEDICINE

## 2023-08-02 RX ORDER — CYANOCOBALAMIN 1000 UG/ML
1000 INJECTION, SOLUTION INTRAMUSCULAR; SUBCUTANEOUS ONCE
Status: COMPLETED | OUTPATIENT
Start: 2023-08-02 | End: 2023-08-02

## 2023-08-02 RX ADMIN — CYANOCOBALAMIN 1000 MCG: 1000 INJECTION, SOLUTION INTRAMUSCULAR; SUBCUTANEOUS at 14:20

## 2023-08-15 ENCOUNTER — TELEPHONE (OUTPATIENT)
Dept: CARDIOLOGY CLINIC | Age: 73
End: 2023-08-15

## 2023-08-15 ENCOUNTER — NURSE ONLY (OUTPATIENT)
Dept: LAB | Age: 73
End: 2023-08-15

## 2023-08-15 DIAGNOSIS — R97.20 ELEVATED PSA: ICD-10-CM

## 2023-08-15 DIAGNOSIS — N40.1 BPH WITH OBSTRUCTION/LOWER URINARY TRACT SYMPTOMS: ICD-10-CM

## 2023-08-15 DIAGNOSIS — N13.8 BPH WITH OBSTRUCTION/LOWER URINARY TRACT SYMPTOMS: ICD-10-CM

## 2023-08-15 NOTE — TELEPHONE ENCOUNTER
LM for patient to call office back. Patient needs appointment moved up for cardiac clearance. Colonoscopy and EGD. Hold Xarelto 1 day. Form under media tab.

## 2023-08-16 LAB — PSA SERPL-MCNC: 5.12 NG/ML (ref 0–1)

## 2023-08-22 ENCOUNTER — OFFICE VISIT (OUTPATIENT)
Dept: UROLOGY | Age: 73
End: 2023-08-22
Payer: MEDICARE

## 2023-08-22 VITALS
HEIGHT: 71 IN | WEIGHT: 212 LBS | DIASTOLIC BLOOD PRESSURE: 70 MMHG | BODY MASS INDEX: 29.68 KG/M2 | SYSTOLIC BLOOD PRESSURE: 138 MMHG

## 2023-08-22 DIAGNOSIS — N13.8 BPH WITH OBSTRUCTION/LOWER URINARY TRACT SYMPTOMS: Primary | ICD-10-CM

## 2023-08-22 DIAGNOSIS — N40.1 BPH WITH OBSTRUCTION/LOWER URINARY TRACT SYMPTOMS: Primary | ICD-10-CM

## 2023-08-22 LAB
BILIRUBIN URINE: NEGATIVE
BLOOD URINE, POC: NEGATIVE
CHARACTER, URINE: CLEAR
COLOR, URINE: YELLOW
GLUCOSE URINE: 100 MG/DL
KETONES, URINE: NEGATIVE
LEUKOCYTE CLUMPS, URINE: NEGATIVE
NITRITE, URINE: NEGATIVE
PH, URINE: 5.5 (ref 5–9)
POST VOID RESIDUAL (PVR): 32 ML
PROTEIN, URINE: NEGATIVE MG/DL
SPECIFIC GRAVITY, URINE: 1.02 (ref 1–1.03)
UROBILINOGEN, URINE: 0.2 EU/DL (ref 0–1)

## 2023-08-22 PROCEDURE — 81003 URINALYSIS AUTO W/O SCOPE: CPT | Performed by: NURSE PRACTITIONER

## 2023-08-22 PROCEDURE — 1123F ACP DISCUSS/DSCN MKR DOCD: CPT | Performed by: NURSE PRACTITIONER

## 2023-08-22 PROCEDURE — G8417 CALC BMI ABV UP PARAM F/U: HCPCS | Performed by: NURSE PRACTITIONER

## 2023-08-22 PROCEDURE — 51798 US URINE CAPACITY MEASURE: CPT | Performed by: NURSE PRACTITIONER

## 2023-08-22 PROCEDURE — 3078F DIAST BP <80 MM HG: CPT | Performed by: NURSE PRACTITIONER

## 2023-08-22 PROCEDURE — 1036F TOBACCO NON-USER: CPT | Performed by: NURSE PRACTITIONER

## 2023-08-22 PROCEDURE — G8427 DOCREV CUR MEDS BY ELIG CLIN: HCPCS | Performed by: NURSE PRACTITIONER

## 2023-08-22 PROCEDURE — 3017F COLORECTAL CA SCREEN DOC REV: CPT | Performed by: NURSE PRACTITIONER

## 2023-08-22 PROCEDURE — 3075F SYST BP GE 130 - 139MM HG: CPT | Performed by: NURSE PRACTITIONER

## 2023-08-22 PROCEDURE — 99214 OFFICE O/P EST MOD 30 MIN: CPT | Performed by: NURSE PRACTITIONER

## 2023-08-22 RX ORDER — OXYBUTYNIN CHLORIDE 10 MG/1
10 TABLET, EXTENDED RELEASE ORAL DAILY
Qty: 90 TABLET | Refills: 3 | Status: SHIPPED | OUTPATIENT
Start: 2023-08-22

## 2023-08-22 ASSESSMENT — ENCOUNTER SYMPTOMS
ABDOMINAL PAIN: 0
VOMITING: 0
BACK PAIN: 0
NAUSEA: 0

## 2023-08-22 NOTE — PROGRESS NOTES
LUTs    Plan:     PSA trending down to 5. 12.  Repeat in 6 months. LUTS improved. Continue tamsulosin 0.4 mg daily and oxybutynin 10 mg XL daily. F/u in 6 months with PSA a few days prior.

## 2023-08-24 ENCOUNTER — OFFICE VISIT (OUTPATIENT)
Dept: CARDIOLOGY CLINIC | Age: 73
End: 2023-08-24
Payer: MEDICARE

## 2023-08-24 VITALS
DIASTOLIC BLOOD PRESSURE: 72 MMHG | HEIGHT: 71 IN | HEART RATE: 71 BPM | SYSTOLIC BLOOD PRESSURE: 122 MMHG | BODY MASS INDEX: 30.21 KG/M2 | WEIGHT: 215.8 LBS

## 2023-08-24 DIAGNOSIS — Z95.0 PACEMAKER: Primary | ICD-10-CM

## 2023-08-24 DIAGNOSIS — Z01.818 PRE-OP TESTING: ICD-10-CM

## 2023-08-24 DIAGNOSIS — I48.91 ATRIAL FIBRILLATION, UNSPECIFIED TYPE (HCC): ICD-10-CM

## 2023-08-24 PROCEDURE — G8427 DOCREV CUR MEDS BY ELIG CLIN: HCPCS | Performed by: INTERNAL MEDICINE

## 2023-08-24 PROCEDURE — 3017F COLORECTAL CA SCREEN DOC REV: CPT | Performed by: INTERNAL MEDICINE

## 2023-08-24 PROCEDURE — 1036F TOBACCO NON-USER: CPT | Performed by: INTERNAL MEDICINE

## 2023-08-24 PROCEDURE — 3078F DIAST BP <80 MM HG: CPT | Performed by: INTERNAL MEDICINE

## 2023-08-24 PROCEDURE — 1123F ACP DISCUSS/DSCN MKR DOCD: CPT | Performed by: INTERNAL MEDICINE

## 2023-08-24 PROCEDURE — 99214 OFFICE O/P EST MOD 30 MIN: CPT | Performed by: INTERNAL MEDICINE

## 2023-08-24 PROCEDURE — G8417 CALC BMI ABV UP PARAM F/U: HCPCS | Performed by: INTERNAL MEDICINE

## 2023-08-24 PROCEDURE — 93000 ELECTROCARDIOGRAM COMPLETE: CPT | Performed by: INTERNAL MEDICINE

## 2023-08-24 PROCEDURE — 3074F SYST BP LT 130 MM HG: CPT | Performed by: INTERNAL MEDICINE

## 2023-08-28 ENCOUNTER — OFFICE VISIT (OUTPATIENT)
Dept: PULMONOLOGY | Age: 73
End: 2023-08-28
Payer: MEDICARE

## 2023-08-28 VITALS
WEIGHT: 216.6 LBS | DIASTOLIC BLOOD PRESSURE: 60 MMHG | OXYGEN SATURATION: 98 % | BODY MASS INDEX: 30.32 KG/M2 | HEART RATE: 70 BPM | TEMPERATURE: 98.1 F | HEIGHT: 71 IN | SYSTOLIC BLOOD PRESSURE: 122 MMHG

## 2023-08-28 DIAGNOSIS — E66.9 OBESITY (BMI 30-39.9): ICD-10-CM

## 2023-08-28 DIAGNOSIS — J44.9 COPD WITH ASTHMA (HCC): Primary | ICD-10-CM

## 2023-08-28 PROBLEM — L57.0 ACTINIC KERATOSIS: Status: ACTIVE | Noted: 2022-06-06

## 2023-08-28 PROCEDURE — G8427 DOCREV CUR MEDS BY ELIG CLIN: HCPCS | Performed by: NURSE PRACTITIONER

## 2023-08-28 PROCEDURE — 3023F SPIROM DOC REV: CPT | Performed by: NURSE PRACTITIONER

## 2023-08-28 PROCEDURE — 3074F SYST BP LT 130 MM HG: CPT | Performed by: NURSE PRACTITIONER

## 2023-08-28 PROCEDURE — 99213 OFFICE O/P EST LOW 20 MIN: CPT | Performed by: NURSE PRACTITIONER

## 2023-08-28 PROCEDURE — 3017F COLORECTAL CA SCREEN DOC REV: CPT | Performed by: NURSE PRACTITIONER

## 2023-08-28 PROCEDURE — 1123F ACP DISCUSS/DSCN MKR DOCD: CPT | Performed by: NURSE PRACTITIONER

## 2023-08-28 PROCEDURE — G8417 CALC BMI ABV UP PARAM F/U: HCPCS | Performed by: NURSE PRACTITIONER

## 2023-08-28 PROCEDURE — 3078F DIAST BP <80 MM HG: CPT | Performed by: NURSE PRACTITIONER

## 2023-08-28 PROCEDURE — 1036F TOBACCO NON-USER: CPT | Performed by: NURSE PRACTITIONER

## 2023-08-28 ASSESSMENT — ENCOUNTER SYMPTOMS
ABDOMINAL PAIN: 0
WHEEZING: 0
NAUSEA: 0
DIARRHEA: 0
VOMITING: 0
COUGH: 1
EYE ITCHING: 1
SHORTNESS OF BREATH: 1

## 2023-09-05 ENCOUNTER — NURSE ONLY (OUTPATIENT)
Dept: FAMILY MEDICINE CLINIC | Age: 73
End: 2023-09-05
Payer: MEDICARE

## 2023-09-05 DIAGNOSIS — E53.8 VITAMIN B12 DEFICIENCY: Primary | ICD-10-CM

## 2023-09-05 PROCEDURE — 96372 THER/PROPH/DIAG INJ SC/IM: CPT | Performed by: FAMILY MEDICINE

## 2023-09-05 RX ORDER — CYANOCOBALAMIN 1000 UG/ML
1000 INJECTION, SOLUTION INTRAMUSCULAR; SUBCUTANEOUS ONCE
Status: COMPLETED | OUTPATIENT
Start: 2023-09-05 | End: 2023-09-05

## 2023-09-05 RX ADMIN — CYANOCOBALAMIN 1000 MCG: 1000 INJECTION, SOLUTION INTRAMUSCULAR; SUBCUTANEOUS at 10:07

## 2023-09-05 NOTE — PROGRESS NOTES
Administrations This Visit       cyanocobalamin injection 1,000 mcg       Admin Date  09/05/2023  10:07 Action  Given Dose  1,000 mcg Route  IntraMUSCular Site  Deltoid Left Administered By  Mary Gonsalves CMA ([facility])    Ordering Provider: Deya Car MD    NDC: 06935-865-84    Lot#: [id number]    : [address]    Patient Supplied?: No                    Patient instructed to remain in clinic for 20 minutes after injection and was advised to report any adverse reaction to me immediately.

## 2023-10-05 ENCOUNTER — NURSE ONLY (OUTPATIENT)
Dept: FAMILY MEDICINE CLINIC | Age: 73
End: 2023-10-05

## 2023-10-05 DIAGNOSIS — E53.8 VITAMIN B12 DEFICIENCY: Primary | ICD-10-CM

## 2023-10-05 RX ORDER — CYANOCOBALAMIN 1000 UG/ML
1000 INJECTION, SOLUTION INTRAMUSCULAR; SUBCUTANEOUS ONCE
Status: COMPLETED | OUTPATIENT
Start: 2023-10-05 | End: 2023-10-05

## 2023-10-05 RX ADMIN — CYANOCOBALAMIN 1000 MCG: 1000 INJECTION, SOLUTION INTRAMUSCULAR; SUBCUTANEOUS at 10:21

## 2023-10-05 NOTE — PROGRESS NOTES
Administrations This Visit       cyanocobalamin injection 1,000 mcg       Admin Date  10/05/2023  10:21 Action  Given Dose  1,000 mcg Route  IntraMUSCular Site  Deltoid Right Administered By  Jolynn Maguire LPN    Ordering Provider: Jeni Ram MD    NDC: 66655-764-45    Lot#: [id number]    : [address]    Patient Supplied?: No                    Patient instructed to remain in clinic for 20 minutes after injection and was advised to report any adverse reaction to me immediately.

## 2023-11-01 ENCOUNTER — PROCEDURE VISIT (OUTPATIENT)
Dept: CARDIOLOGY CLINIC | Age: 73
End: 2023-11-01

## 2023-11-01 DIAGNOSIS — Z95.0 PACEMAKER: Primary | ICD-10-CM

## 2023-11-01 NOTE — PROGRESS NOTES
[employer] susan sci dual pacer     . Battery longevity:  3 years on device   Presenting rhythm  Af   24 hour AF burden since 6/1/23    Atrial impedance 505  RV impedance 733    P wave sensing 1.5  R wave sensing 8.9    4 % atrial paced  100 % RV paced     Mode switches   60

## 2023-11-06 ENCOUNTER — NURSE ONLY (OUTPATIENT)
Dept: FAMILY MEDICINE CLINIC | Age: 73
End: 2023-11-06
Payer: MEDICARE

## 2023-11-06 DIAGNOSIS — E53.8 VITAMIN B12 DEFICIENCY: Primary | ICD-10-CM

## 2023-11-06 PROCEDURE — 99999 PR OFFICE/OUTPT VISIT,PROCEDURE ONLY: CPT | Performed by: FAMILY MEDICINE

## 2023-11-06 PROCEDURE — 96372 THER/PROPH/DIAG INJ SC/IM: CPT | Performed by: FAMILY MEDICINE

## 2023-11-06 RX ORDER — CYANOCOBALAMIN 1000 UG/ML
1000 INJECTION, SOLUTION INTRAMUSCULAR; SUBCUTANEOUS ONCE
Status: COMPLETED | OUTPATIENT
Start: 2023-11-06 | End: 2023-11-06

## 2023-11-06 RX ADMIN — CYANOCOBALAMIN 1000 MCG: 1000 INJECTION, SOLUTION INTRAMUSCULAR; SUBCUTANEOUS at 13:14

## 2023-11-06 NOTE — PROGRESS NOTES
Administrations This Visit       cyanocobalamin injection 1,000 mcg       Admin Date  11/06/2023  13:14 Action  Given Dose  1,000 mcg Route  IntraMUSCular Site  Deltoid Left Administered By  Cristian Gonzalez LPN    Ordering Provider: Claudia Mckenzie MD    NDC: 34045-830-45    Lot#: [id number]    : [address]    Patient Supplied?: No                    Patient instructed to remain in clinic for 20 minutes after injection and was advised to report any adverse reaction to me immediately.

## 2023-11-09 ENCOUNTER — OFFICE VISIT (OUTPATIENT)
Dept: PULMONOLOGY | Age: 73
End: 2023-11-09
Payer: MEDICARE

## 2023-11-09 VITALS
HEIGHT: 71 IN | SYSTOLIC BLOOD PRESSURE: 128 MMHG | WEIGHT: 211.6 LBS | BODY MASS INDEX: 29.62 KG/M2 | HEART RATE: 71 BPM | DIASTOLIC BLOOD PRESSURE: 72 MMHG | OXYGEN SATURATION: 98 %

## 2023-11-09 DIAGNOSIS — G47.33 OSA ON CPAP: Primary | ICD-10-CM

## 2023-11-09 DIAGNOSIS — E66.9 OBESITY (BMI 30-39.9): ICD-10-CM

## 2023-11-09 PROCEDURE — 1123F ACP DISCUSS/DSCN MKR DOCD: CPT | Performed by: NURSE PRACTITIONER

## 2023-11-09 PROCEDURE — 3074F SYST BP LT 130 MM HG: CPT | Performed by: NURSE PRACTITIONER

## 2023-11-09 PROCEDURE — 99213 OFFICE O/P EST LOW 20 MIN: CPT | Performed by: NURSE PRACTITIONER

## 2023-11-09 PROCEDURE — G8484 FLU IMMUNIZE NO ADMIN: HCPCS | Performed by: NURSE PRACTITIONER

## 2023-11-09 PROCEDURE — G8417 CALC BMI ABV UP PARAM F/U: HCPCS | Performed by: NURSE PRACTITIONER

## 2023-11-09 PROCEDURE — 1036F TOBACCO NON-USER: CPT | Performed by: NURSE PRACTITIONER

## 2023-11-09 PROCEDURE — 3017F COLORECTAL CA SCREEN DOC REV: CPT | Performed by: NURSE PRACTITIONER

## 2023-11-09 PROCEDURE — G8427 DOCREV CUR MEDS BY ELIG CLIN: HCPCS | Performed by: NURSE PRACTITIONER

## 2023-11-09 PROCEDURE — 3078F DIAST BP <80 MM HG: CPT | Performed by: NURSE PRACTITIONER

## 2023-11-09 ASSESSMENT — ENCOUNTER SYMPTOMS
GASTROINTESTINAL NEGATIVE: 1
CHEST TIGHTNESS: 0
STRIDOR: 0
NAUSEA: 0
ALLERGIC/IMMUNOLOGIC NEGATIVE: 1
EYES NEGATIVE: 1
RESPIRATORY NEGATIVE: 1
WHEEZING: 0
VOMITING: 0
DIARRHEA: 0

## 2023-11-11 ENCOUNTER — HOSPITAL ENCOUNTER (OUTPATIENT)
Age: 73
Discharge: HOME | End: 2023-11-11
Payer: MEDICARE

## 2023-11-11 DIAGNOSIS — N40.1 BPH WITH OBSTRUCTION/LOWER URINARY TRACT SYMPTOMS: ICD-10-CM

## 2023-11-11 DIAGNOSIS — D50.9 IRON DEFICIENCY ANEMIA, UNSPECIFIED IRON DEFICIENCY ANEMIA TYPE: ICD-10-CM

## 2023-11-11 DIAGNOSIS — E53.8 VITAMIN B12 DEFICIENCY: ICD-10-CM

## 2023-11-11 DIAGNOSIS — N13.8 BPH WITH OBSTRUCTION/LOWER URINARY TRACT SYMPTOMS: ICD-10-CM

## 2023-11-11 DIAGNOSIS — E11.65 UNCONTROLLED TYPE 2 DIABETES MELLITUS WITH HYPERGLYCEMIA (HCC): ICD-10-CM

## 2023-11-11 LAB
ANION GAP SERPL CALC-SCNC: 12 MEQ/L (ref 8–16)
BUN SERPL-MCNC: 14 MG/DL (ref 7–22)
CALCIUM SERPL-MCNC: 9 MG/DL (ref 8.5–10.5)
CHLORIDE SERPL-SCNC: 100 MEQ/L (ref 98–111)
CO2 SERPL-SCNC: 24 MEQ/L (ref 23–33)
CREAT SERPL-MCNC: 1 MG/DL (ref 0.4–1.2)
DEPRECATED MEAN GLUCOSE BLD GHB EST-ACNC: 135 MG/DL (ref 70–126)
DEPRECATED RDW RBC AUTO: 47.8 FL (ref 35–45)
ERYTHROCYTE [DISTWIDTH] IN BLOOD BY AUTOMATED COUNT: 14.2 % (ref 11.5–14.5)
FERRITIN SERPL IA-MCNC: 39 NG/ML (ref 22–322)
FOLATE SERPL-MCNC: > 20 NG/ML (ref 4.8–24.2)
GFR SERPL CREATININE-BSD FRML MDRD: > 60 ML/MIN/1.73M2
GLUCOSE SERPL-MCNC: 117 MG/DL (ref 70–108)
HBA1C MFR BLD HPLC: 6.5 % (ref 4.4–6.4)
HCT VFR BLD AUTO: 43.6 % (ref 42–52)
HGB BLD-MCNC: 14.1 GM/DL (ref 14–18)
IRON SERPL-MCNC: 73 UG/DL (ref 65–195)
MCH RBC QN AUTO: 30.1 PG (ref 26–33)
MCHC RBC AUTO-ENTMCNC: 32.3 GM/DL (ref 32.2–35.5)
MCV RBC AUTO: 93.2 FL (ref 80–94)
PLATELET # BLD AUTO: 232 THOU/MM3 (ref 130–400)
PMV BLD AUTO: 9.6 FL (ref 9.4–12.4)
POTASSIUM SERPL-SCNC: 4.3 MEQ/L (ref 3.5–5.2)
PSA SERPL-MCNC: 5.27 NG/ML (ref 0–1)
RBC # BLD AUTO: 4.68 MILL/MM3 (ref 4.7–6.1)
SODIUM SERPL-SCNC: 136 MEQ/L (ref 135–145)
TIBC SERPL-MCNC: 333 UG/DL (ref 171–450)
VIT B12 SERPL-MCNC: 1641 PG/ML (ref 211–911)
WBC # BLD AUTO: 5.9 THOU/MM3 (ref 4.8–10.8)

## 2023-11-11 PROCEDURE — 83540 ASSAY OF IRON: CPT

## 2023-11-11 PROCEDURE — 82746 ASSAY OF FOLIC ACID SERUM: CPT

## 2023-11-11 PROCEDURE — 82728 ASSAY OF FERRITIN: CPT

## 2023-11-11 PROCEDURE — 80048 BASIC METABOLIC PNL TOTAL CA: CPT

## 2023-11-11 PROCEDURE — 82607 VITAMIN B-12: CPT

## 2023-11-11 PROCEDURE — 83036 HEMOGLOBIN GLYCOSYLATED A1C: CPT

## 2023-11-11 PROCEDURE — 83550 IRON BINDING TEST: CPT

## 2023-11-11 PROCEDURE — 85027 COMPLETE CBC AUTOMATED: CPT

## 2023-11-11 PROCEDURE — 84153 ASSAY OF PSA TOTAL: CPT

## 2023-11-11 PROCEDURE — 36415 COLL VENOUS BLD VENIPUNCTURE: CPT

## 2023-11-14 ENCOUNTER — OFFICE VISIT (OUTPATIENT)
Dept: FAMILY MEDICINE CLINIC | Age: 73
End: 2023-11-14

## 2023-11-14 VITALS
WEIGHT: 213.41 LBS | RESPIRATION RATE: 16 BRPM | OXYGEN SATURATION: 98 % | SYSTOLIC BLOOD PRESSURE: 122 MMHG | BODY MASS INDEX: 29.88 KG/M2 | HEART RATE: 71 BPM | TEMPERATURE: 97.9 F | DIASTOLIC BLOOD PRESSURE: 70 MMHG | HEIGHT: 71 IN

## 2023-11-14 DIAGNOSIS — Z00.00 MEDICARE ANNUAL WELLNESS VISIT, SUBSEQUENT: Primary | ICD-10-CM

## 2023-11-14 DIAGNOSIS — G47.33 OBSTRUCTIVE SLEEP APNEA ON CPAP: ICD-10-CM

## 2023-11-14 DIAGNOSIS — Z98.890 S/P CARDIAC CATHETERIZATION: ICD-10-CM

## 2023-11-14 DIAGNOSIS — E11.42 DIABETIC PERIPHERAL NEUROPATHY ASSOCIATED WITH TYPE 2 DIABETES MELLITUS (HCC): ICD-10-CM

## 2023-11-14 DIAGNOSIS — E78.5 HYPERLIPIDEMIA WITH TARGET LDL LESS THAN 100: ICD-10-CM

## 2023-11-14 DIAGNOSIS — C44.42 SQUAMOUS CELL CARCINOMA OF SCALP: ICD-10-CM

## 2023-11-14 DIAGNOSIS — J44.89 COPD WITH ASTHMA: ICD-10-CM

## 2023-11-14 DIAGNOSIS — G25.0 ESSENTIAL TREMOR: ICD-10-CM

## 2023-11-14 DIAGNOSIS — I48.0 PAROXYSMAL ATRIAL FIBRILLATION (HCC): ICD-10-CM

## 2023-11-14 DIAGNOSIS — E66.9 OBESITY (BMI 30-39.9): ICD-10-CM

## 2023-11-14 DIAGNOSIS — K64.4 EXTERNAL HEMORRHOID: ICD-10-CM

## 2023-11-14 DIAGNOSIS — Z77.098 AGENT ORANGE EXPOSURE: ICD-10-CM

## 2023-11-14 DIAGNOSIS — Z77.098 EXPOSURE TO AGENT ORANGE: ICD-10-CM

## 2023-11-14 DIAGNOSIS — N13.8 BPH WITH OBSTRUCTION/LOWER URINARY TRACT SYMPTOMS: ICD-10-CM

## 2023-11-14 DIAGNOSIS — M19.90 ARTHRITIS: ICD-10-CM

## 2023-11-14 DIAGNOSIS — G62.9 PERIPHERAL POLYNEUROPATHY: ICD-10-CM

## 2023-11-14 DIAGNOSIS — S51.812A SKIN TEAR OF FOREARM WITHOUT COMPLICATION, LEFT, INITIAL ENCOUNTER: ICD-10-CM

## 2023-11-14 DIAGNOSIS — Z77.098 EXPOSURE TO TOXIC CHEMICAL: ICD-10-CM

## 2023-11-14 DIAGNOSIS — D50.9 IRON DEFICIENCY ANEMIA, UNSPECIFIED IRON DEFICIENCY ANEMIA TYPE: ICD-10-CM

## 2023-11-14 DIAGNOSIS — N40.1 BPH WITH OBSTRUCTION/LOWER URINARY TRACT SYMPTOMS: ICD-10-CM

## 2023-11-14 DIAGNOSIS — I73.9 PVD (PERIPHERAL VASCULAR DISEASE) (HCC): ICD-10-CM

## 2023-11-14 DIAGNOSIS — E11.65 UNCONTROLLED TYPE 2 DIABETES MELLITUS WITH HYPERGLYCEMIA (HCC): ICD-10-CM

## 2023-11-14 DIAGNOSIS — I10 PRIMARY HYPERTENSION: ICD-10-CM

## 2023-11-14 DIAGNOSIS — H40.89 OTHER GLAUCOMA OF BOTH EYES: ICD-10-CM

## 2023-11-14 DIAGNOSIS — L03.032 PARONYCHIA OF GREAT TOE, LEFT: ICD-10-CM

## 2023-11-14 DIAGNOSIS — F43.10 POST TRAUMATIC STRESS DISORDER: ICD-10-CM

## 2023-11-14 DIAGNOSIS — E53.8 VITAMIN B12 DEFICIENCY: ICD-10-CM

## 2023-11-14 RX ORDER — FLUTICASONE PROPIONATE AND SALMETEROL 250; 50 UG/1; UG/1
1 POWDER RESPIRATORY (INHALATION) EVERY 12 HOURS
Qty: 60 EACH | Refills: 3 | Status: SHIPPED | OUTPATIENT
Start: 2023-11-14

## 2023-11-14 RX ORDER — PREDNISONE 10 MG/1
TABLET ORAL
COMMUNITY
Start: 2023-11-09

## 2023-11-14 ASSESSMENT — PATIENT HEALTH QUESTIONNAIRE - PHQ9
1. LITTLE INTEREST OR PLEASURE IN DOING THINGS: 0
SUM OF ALL RESPONSES TO PHQ QUESTIONS 1-9: 0
SUM OF ALL RESPONSES TO PHQ9 QUESTIONS 1 & 2: 0
SUM OF ALL RESPONSES TO PHQ QUESTIONS 1-9: 0
2. FEELING DOWN, DEPRESSED OR HOPELESS: 0

## 2023-11-14 ASSESSMENT — LIFESTYLE VARIABLES
HOW MANY STANDARD DRINKS CONTAINING ALCOHOL DO YOU HAVE ON A TYPICAL DAY: PATIENT DOES NOT DRINK
HOW OFTEN DO YOU HAVE A DRINK CONTAINING ALCOHOL: NEVER

## 2023-11-14 NOTE — PROGRESS NOTES
Medicare Annual Wellness Visit    Aquilino Rankin is here for Medicare AWV    Assessment & Plan   Medicare annual wellness visit, subsequent  Uncontrolled type 2 diabetes mellitus with hyperglycemia ([address])  -     Basic Metabolic Panel; Future  -     Hemoglobin A1C; Future  Primary hypertension  Hyperlipidemia with target LDL less than 100  COPD with asthma  PVD (peripheral vascular disease) (HCC)  Paroxysmal atrial fibrillation (HCC)  Diabetic peripheral neuropathy associated with type 2 diabetes mellitus (HCC)  External hemorrhoid  Vitamin B12 deficiency  -     Vitamin B12 & Folate; Future  Exposure to Agent [facility]  BPH with obstruction/lower urinary tract symptoms  Arthritis  Peripheral polyneuropathy  Other glaucoma of both eyes  Obesity (BMI 30-39. 9)  S/P cardiac catheterization: 11/20/2013: No obstructive lesions. Moderate LI's in the mid LAD and in the RCA. Essential tremor  Agent orange exposure  Obstructive sleep apnea on CPAP  Skin tear of forearm without complication, left, initial encounter  Iron deficiency anemia, unspecified iron deficiency anemia type  Paronychia of great toe, left  Post traumatic stress disorder  Squamous cell carcinoma of scalp  Exposure to toxic chemical    Needs to bring in medical POA papers when completed    Recommendations for Preventive Services Due: see orders and patient instructions/AVS.  Recommended screening schedule for the next 5-10 years is provided to the patient in written form: see Patient Instructions/AVS.     Return in 4 months (on 3/14/2024). Subjective     He is seeing primarily the [place] due to medications and treatment for PTSD but comes here 3 times yearly to go over all the information as well as maintenance of diabetes. PTSD did have a flare during a pacemaker placement when they did not give him enough pain medication. He reports thoughts of hurting the surgeon but did not act on them when redirected by the nurse.   He has since worked this through
Oxygen testing to qualify for home Oxygen therapy. Oxygen saturation at REST on ROOM AIR = 98%(< or = 88%). Oxygen saturation on AMBULATION for 6 minutes on ROOM AIR =95%. Report provided to Dr. Kalia Marquez.
Date    PSA 5.27 (H) 11/11/2023    PSA 5.12 (H) 08/15/2023    PSA 6.33 (H) 05/05/2023       Low sodium is under watch.  ***    Anemia is under regular watch. ***    Headaches and sinus pressure worse recently but better with allergy medications and tylenol ES.  ***    Dog caused skin tears the past few weeks that are healing slowly. ***    Left great toe with pain at the lateral edge with some swelling. ***      Reviewed chart forpast medical history , surgical history , allergies, social history , family history and medications. Health Maintenance   Topic Date Due    DTaP/Tdap/Td vaccine (1 - Tdap) 01/26/2017    Diabetic foot exam  12/22/2021    Annual Wellness Visit (AWV)  11/10/2023    COVID-19 Vaccine (5 - 2023-24 season) 11/29/2023    Diabetic Alb to Cr ratio (uACR) test  04/21/2024    Lipids  04/21/2024    Depression Screen  05/09/2024    Diabetic retinal exam  06/06/2024    A1C test (Diabetic or Prediabetic)  11/11/2024    GFR test (Diabetes, CKD 3-4, OR last GFR 15-59)  11/11/2024    Prostate Specific Antigen (PSA) Screening or Monitoring  11/11/2024    Colorectal Cancer Screen  10/04/2029    Hepatitis B vaccine  Completed    Flu vaccine  Completed    Shingles vaccine  Completed    Pneumococcal 65+ years Vaccine  Completed    Hepatitis A vaccine  Aged Out    Hib vaccine  Aged Out    Meningococcal (ACWY) vaccine  Aged Out    AAA screen  Discontinued    Hepatitis C screen  Discontinued       Subjective:      Constitutional:Negative for fever, chills, diaphoresis, activity change, appetite change and fatigue. HENT: Negative for hearing loss, ear pain, congestion, sore throat, rhinorrhea, postnasal drip and ear discharge. Eyes: Negative for photophobia and visual disturbance. Respiratory: Negative for cough, chest tightness, shortness of breath and wheezing. Cardiovascular: Negative for chest pain and leg swelling.    Gastrointestinal: Negative for nausea, vomiting, abdominal pain, diarrhea and

## 2023-12-04 ENCOUNTER — NURSE ONLY (OUTPATIENT)
Dept: FAMILY MEDICINE CLINIC | Age: 73
End: 2023-12-04
Payer: MEDICARE

## 2023-12-04 DIAGNOSIS — D50.9 IRON DEFICIENCY ANEMIA, UNSPECIFIED IRON DEFICIENCY ANEMIA TYPE: Primary | ICD-10-CM

## 2023-12-04 PROCEDURE — 96372 THER/PROPH/DIAG INJ SC/IM: CPT | Performed by: FAMILY MEDICINE

## 2023-12-04 RX ORDER — CYANOCOBALAMIN 1000 UG/ML
1000 INJECTION, SOLUTION INTRAMUSCULAR; SUBCUTANEOUS ONCE
Status: COMPLETED | OUTPATIENT
Start: 2023-12-04 | End: 2023-12-04

## 2023-12-04 RX ADMIN — CYANOCOBALAMIN 1000 MCG: 1000 INJECTION, SOLUTION INTRAMUSCULAR; SUBCUTANEOUS at 13:20

## 2024-01-03 ENCOUNTER — NURSE ONLY (OUTPATIENT)
Dept: FAMILY MEDICINE CLINIC | Age: 74
End: 2024-01-03
Payer: MEDICARE

## 2024-01-03 DIAGNOSIS — E53.8 VITAMIN B12 DEFICIENCY: Primary | ICD-10-CM

## 2024-01-03 PROCEDURE — 99999 PR OFFICE/OUTPT VISIT,PROCEDURE ONLY: CPT | Performed by: FAMILY MEDICINE

## 2024-01-03 PROCEDURE — 96372 THER/PROPH/DIAG INJ SC/IM: CPT | Performed by: FAMILY MEDICINE

## 2024-01-03 RX ORDER — CYANOCOBALAMIN 1000 UG/ML
1000 INJECTION, SOLUTION INTRAMUSCULAR; SUBCUTANEOUS ONCE
Status: COMPLETED | OUTPATIENT
Start: 2024-01-03 | End: 2024-01-03

## 2024-01-03 RX ADMIN — CYANOCOBALAMIN 1000 MCG: 1000 INJECTION, SOLUTION INTRAMUSCULAR; SUBCUTANEOUS at 13:24

## 2024-01-03 NOTE — PROGRESS NOTES
Administrations This Visit       cyanocobalamin injection 1,000 mcg       Admin Date  01/03/2024  13:24 Action  Given Dose  1,000 mcg Route  IntraMUSCular Site  Deltoid Left Administered By  Bing Zaldivar CMA ([facility])    Ordering Provider: Rosy Castillo MD    NDC: 2750-6887-63    Lot#: [id number]    : [employer]    Patient Supplied?: No                    Patient instructed to remain in clinic for 20 minutes after injection and was advised to report any adverse reaction to me immediately.

## 2024-01-18 ENCOUNTER — NURSE ONLY (OUTPATIENT)
Dept: CARDIOLOGY CLINIC | Age: 74
End: 2024-01-18

## 2024-01-18 DIAGNOSIS — Z95.0 PACEMAKER: Primary | ICD-10-CM

## 2024-01-18 NOTE — PROGRESS NOTES
DR VICENTE PT   [employer] DUAL PACER CHECK IN OFFICE/ LATITUDE     DDDR   BATTERY 2.5 YRS REMAINING    PRESENTS IN AFIB  70  KNOWN AFIB/ XARELTO       PT IS DEPENDENT IN VENTRICLES       P WAVES 3.6  RV WAVES PACED     ATRIAL IMPEDENCE 502  VENT IMPEDENCE 718    ATRIAL THRESHOLD AFIB  VENT THRESHOLD 1.2 @ 0.4    ATRIAL AMPLITUDE 2 @ 0.4  VENT AMPLITUDE 2.5 @ 0.4

## 2024-02-05 ENCOUNTER — NURSE ONLY (OUTPATIENT)
Dept: FAMILY MEDICINE CLINIC | Age: 74
End: 2024-02-05
Payer: MEDICARE

## 2024-02-05 DIAGNOSIS — E53.8 VITAMIN B12 DEFICIENCY: Primary | ICD-10-CM

## 2024-02-05 PROCEDURE — 96372 THER/PROPH/DIAG INJ SC/IM: CPT | Performed by: FAMILY MEDICINE

## 2024-02-05 PROCEDURE — 99999 PR OFFICE/OUTPT VISIT,PROCEDURE ONLY: CPT | Performed by: FAMILY MEDICINE

## 2024-02-05 RX ORDER — CYANOCOBALAMIN 1000 UG/ML
1000 INJECTION, SOLUTION INTRAMUSCULAR; SUBCUTANEOUS ONCE
Status: COMPLETED | OUTPATIENT
Start: 2024-02-05 | End: 2024-02-05

## 2024-02-05 RX ADMIN — CYANOCOBALAMIN 1000 MCG: 1000 INJECTION, SOLUTION INTRAMUSCULAR; SUBCUTANEOUS at 15:57

## 2024-02-05 NOTE — PROGRESS NOTES
Administrations This Visit       cyanocobalamin injection 1,000 mcg       Admin Date  02/05/2024  15:57 Action  Given Dose  1,000 mcg Route  IntraMUSCular Site  Deltoid Left Administered By  Pearl Guo MA    Ordering Provider: Rosy Castillo MD    NDC: 5924-1841-50    Lot#: [id number]    : [employer]    Patient Supplied?: No                 Pt given b12 injection. Pt tolerated appropriately.

## 2024-02-22 RX ORDER — FUROSEMIDE 20 MG/1
20 TABLET ORAL DAILY
Qty: 90 TABLET | Refills: 3 | Status: SHIPPED | OUTPATIENT
Start: 2024-02-22

## 2024-02-27 ENCOUNTER — OFFICE VISIT (OUTPATIENT)
Dept: UROLOGY | Age: 74
End: 2024-02-27
Payer: MEDICARE

## 2024-02-27 ENCOUNTER — NURSE ONLY (OUTPATIENT)
Dept: LAB | Age: 74
End: 2024-02-27

## 2024-02-27 VITALS
DIASTOLIC BLOOD PRESSURE: 58 MMHG | BODY MASS INDEX: 29.82 KG/M2 | SYSTOLIC BLOOD PRESSURE: 106 MMHG | HEIGHT: 71 IN | WEIGHT: 213 LBS

## 2024-02-27 DIAGNOSIS — N40.1 BPH WITH OBSTRUCTION/LOWER URINARY TRACT SYMPTOMS: Primary | ICD-10-CM

## 2024-02-27 DIAGNOSIS — N13.8 BPH WITH OBSTRUCTION/LOWER URINARY TRACT SYMPTOMS: Primary | ICD-10-CM

## 2024-02-27 DIAGNOSIS — R97.20 ELEVATED PSA: ICD-10-CM

## 2024-02-27 LAB
BILIRUBIN URINE: NEGATIVE
BLOOD URINE, POC: NEGATIVE
CHARACTER, URINE: CLEAR
COLOR, URINE: YELLOW
GLUCOSE URINE: NEGATIVE MG/DL
KETONES, URINE: NEGATIVE
LEUKOCYTE CLUMPS, URINE: NEGATIVE
NITRITE, URINE: NEGATIVE
PH, URINE: 5.5 (ref 5–9)
PROTEIN, URINE: NEGATIVE MG/DL
PSA SERPL-MCNC: 5.82 NG/ML (ref 0–1)
SPECIFIC GRAVITY, URINE: 1.01 (ref 1–1.03)
UROBILINOGEN, URINE: 0.2 EU/DL (ref 0–1)

## 2024-02-27 PROCEDURE — 3074F SYST BP LT 130 MM HG: CPT | Performed by: NURSE PRACTITIONER

## 2024-02-27 PROCEDURE — 99214 OFFICE O/P EST MOD 30 MIN: CPT | Performed by: NURSE PRACTITIONER

## 2024-02-27 PROCEDURE — 1123F ACP DISCUSS/DSCN MKR DOCD: CPT | Performed by: NURSE PRACTITIONER

## 2024-02-27 PROCEDURE — 1036F TOBACCO NON-USER: CPT | Performed by: NURSE PRACTITIONER

## 2024-02-27 PROCEDURE — 3078F DIAST BP <80 MM HG: CPT | Performed by: NURSE PRACTITIONER

## 2024-02-27 PROCEDURE — G8417 CALC BMI ABV UP PARAM F/U: HCPCS | Performed by: NURSE PRACTITIONER

## 2024-02-27 PROCEDURE — G8484 FLU IMMUNIZE NO ADMIN: HCPCS | Performed by: NURSE PRACTITIONER

## 2024-02-27 PROCEDURE — 3017F COLORECTAL CA SCREEN DOC REV: CPT | Performed by: NURSE PRACTITIONER

## 2024-02-27 PROCEDURE — G8427 DOCREV CUR MEDS BY ELIG CLIN: HCPCS | Performed by: NURSE PRACTITIONER

## 2024-02-27 PROCEDURE — 81003 URINALYSIS AUTO W/O SCOPE: CPT | Performed by: NURSE PRACTITIONER

## 2024-02-27 ASSESSMENT — ENCOUNTER SYMPTOMS
NAUSEA: 0
VOMITING: 0
BACK PAIN: 0
ABDOMINAL PAIN: 0

## 2024-02-27 NOTE — PROGRESS NOTES
[facility] [place] [facility]  [facility]  [address]  [address]  [facility] [address]  Dept: [phone]  Loc: [phone]    Visit Date: 2/27/2024        HPI:     Edwin Stock is a 73 y.o. male who presents today for:  Chief Complaint   Patient presents with    Benign Prostatic Hypertrophy     PSA in November 2023       HPI    Pt seen in follow up for BPH and elevated psa.      Pt has a hx of elevated PSA with prior prostate biopsy 10/2021 by Dr. Ramos for psa of 4.5.  Prostate volume 75 cc.  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  PSA 5/5/23 6.33 up from 5.12 4/21/23 and 4.55 4/21/22.  Repeat PSA completed prior to today's appt.      Pt has a hx of BPH on tamsulosin 0.4 mg daily.  Oxybutynin 10 mg XL daily added 5/2023 for increased frequency, urgency and urge dribbling     Reports his urgency and frequency are stable on current medication therapy.  Nocturia down to 1 x per night.  Notes urge dribbling also reduced.    IPSS 11/35 today in office.       Current Outpatient Medications   Medication Sig Dispense Refill    furosemide (LASIX) 20 MG tablet TAKE ONE TABLET DAILY, BY MOUTH. 90 tablet 3    predniSONE (DELTASONE) 10 MG tablet 6 tabs on day 1, 5 on day 2, 4 on day 3, 3 on  Day 4, 2 on day 5, 1 on day 6      fluticasone-salmeterol (ADVAIR DISKUS) 250-50 MCG/ACT AEPB diskus inhaler Inhale 1 puff into the lungs in the morning and 1 puff in the evening. 60 each 3    oxybutynin (DITROPAN XL) 10 MG extended release tablet Take 1 tablet by mouth daily 90 tablet 3    hydrocortisone (ANUSOL-HC) 25 MG suppository Place 1 suppository rectally in the morning and 1 suppository in the evening. 10 suppository 0    tamsulosin (FLOMAX) 0.4 MG capsule Take 1 capsule by mouth daily 90 capsule 3    b complex vitamins capsule Take 1 capsule by mouth daily 90 capsule 3    vitamin D (CHOLECALCIFEROL) 25 MCG (1000 UT) TABS tablet Take 1 tablet by

## 2024-02-28 ENCOUNTER — TELEPHONE (OUTPATIENT)
Dept: UROLOGY | Age: 74
End: 2024-02-28

## 2024-02-28 DIAGNOSIS — R97.20 ELEVATED PSA: Primary | ICD-10-CM

## 2024-02-28 NOTE — TELEPHONE ENCOUNTER
Patient advised of the PSA results. He would like the MRI scheduled in [place].    Please schedule MRI and follow up appointment

## 2024-02-28 NOTE — TELEPHONE ENCOUNTER
Pt's PSA trending upward to 5.82.  Recommend scheduling MRI of the prostate with appt to review results.

## 2024-02-29 NOTE — TELEPHONE ENCOUNTER
Patient has a pacemaker so I called IR and they will get the information they need and then get the patient scheduled. I will schedule a follow up after

## 2024-03-11 ENCOUNTER — NURSE ONLY (OUTPATIENT)
Dept: FAMILY MEDICINE CLINIC | Age: 74
End: 2024-03-11
Payer: MEDICARE

## 2024-03-11 DIAGNOSIS — E53.8 VITAMIN B12 DEFICIENCY: Primary | ICD-10-CM

## 2024-03-11 DIAGNOSIS — D50.9 IRON DEFICIENCY ANEMIA, UNSPECIFIED IRON DEFICIENCY ANEMIA TYPE: ICD-10-CM

## 2024-03-11 PROCEDURE — 99999 PR OFFICE/OUTPT VISIT,PROCEDURE ONLY: CPT | Performed by: FAMILY MEDICINE

## 2024-03-11 PROCEDURE — 96372 THER/PROPH/DIAG INJ SC/IM: CPT | Performed by: FAMILY MEDICINE

## 2024-03-11 RX ORDER — CYANOCOBALAMIN 1000 UG/ML
1000 INJECTION, SOLUTION INTRAMUSCULAR; SUBCUTANEOUS ONCE
Status: COMPLETED | OUTPATIENT
Start: 2024-03-11 | End: 2024-03-11

## 2024-03-11 RX ADMIN — CYANOCOBALAMIN 1000 MCG: 1000 INJECTION, SOLUTION INTRAMUSCULAR; SUBCUTANEOUS at 15:11

## 2024-03-11 NOTE — PROGRESS NOTES
Administrations This Visit       cyanocobalamin injection 1,000 mcg       Admin Date  03/11/2024  15:11 Action  Given Dose  1,000 mcg Route  IntraMUSCular Site  Deltoid Left Administered By  Marisela Greene CMA ([facility])    Ordering Provider: Rosy Castillo MD    NDC: 3357-2910-72    Lot#: [id number]    : [employer]    Patient Supplied?: No                    Patient instructed to remain in clinic for 20 minutes after injection and was advised to report any adverse reaction to me immediately.      Pt also questioned about a skin tear to left arm.  Happened while walking his dog.  Non-adherent bandage with coban applied. Pt is aware to watch and call office If anything further is needed.

## 2024-03-17 PROBLEM — J43.1 PANLOBULAR EMPHYSEMA (HCC): Status: ACTIVE | Noted: 2024-03-17

## 2024-03-17 NOTE — PROGRESS NOTES
[facility]  [facility]  [address]  [address]  [facility] [address]  Dept: [phone]  Dept Fax: [phone]  Loc: [phone]    Edwin Stock is a 73 y.o. male who presents today for:  Chief Complaint   Patient presents with    Follow-up     4 month follow up       HPI:     HPI    [facility] but comes here 3 times yearly to go over all the information as well as maintenance of diabetes.  PTSD did have a flare during a pacemaker placement when they did not give him enough pain medication.  He reports thoughts of hurting the surgeon but did not act on them when redirected by the nurse.  [facility] He is no longer seeing the counselor but is seeing the psychiatrist.    Hypertension: Patient here for follow-up of elevated blood pressure. He is not exercising and is adherent to low salt diet.  Blood pressure is well controlled at home. Cardiac symptoms none. Patient denies chest pain, dyspnea and palpitations.  Cardiovascular risk factors: advanced age (older than 55 for men, 65 for women), dyslipidemia, hypertension and male gender. Use of agents associated with hypertension: none. History of target organ damage: stroke, paroxysmal afib, and CAD diagnosed by cath in 2013 but no stents.    Hyperlipidemia: Patient presents with hyperlipidemia.  He was tested because hypertension and CVA.  His last labs showed   Lab Results   Component Value Date    CHOL 126 04/21/2023    CHOL 139 11/17/2022    CHOL 127 04/21/2022     Lab Results   Component Value Date    TRIG 83 04/21/2023    TRIG 72 11/17/2022    TRIG 77 04/21/2022     Lab Results   Component Value Date    HDL 50 04/21/2023    HDL 51 11/17/2022    HDL 51 04/21/2022     Lab Results   Component Value Date    LDLCALC 59 04/21/2023    LDLCALC 73 11/17/2022    LDLCALC 61 04/21/2022     No results

## 2024-03-18 ENCOUNTER — OFFICE VISIT (OUTPATIENT)
Dept: FAMILY MEDICINE CLINIC | Age: 74
End: 2024-03-18
Payer: MEDICARE

## 2024-03-18 ENCOUNTER — TELEPHONE (OUTPATIENT)
Dept: FAMILY MEDICINE CLINIC | Age: 74
End: 2024-03-18

## 2024-03-18 VITALS
BODY MASS INDEX: 29.32 KG/M2 | OXYGEN SATURATION: 98 % | HEIGHT: 71 IN | SYSTOLIC BLOOD PRESSURE: 128 MMHG | HEART RATE: 70 BPM | TEMPERATURE: 98 F | RESPIRATION RATE: 16 BRPM | WEIGHT: 209.44 LBS | DIASTOLIC BLOOD PRESSURE: 66 MMHG

## 2024-03-18 DIAGNOSIS — Z77.098 EXPOSURE TO TOXIC CHEMICAL: ICD-10-CM

## 2024-03-18 DIAGNOSIS — I48.0 PAROXYSMAL ATRIAL FIBRILLATION (HCC): ICD-10-CM

## 2024-03-18 DIAGNOSIS — N13.8 BPH WITH OBSTRUCTION/LOWER URINARY TRACT SYMPTOMS: ICD-10-CM

## 2024-03-18 DIAGNOSIS — Z98.890 S/P CARDIAC CATHETERIZATION: ICD-10-CM

## 2024-03-18 DIAGNOSIS — S51.812A SKIN TEAR OF FOREARM WITHOUT COMPLICATION, LEFT, INITIAL ENCOUNTER: ICD-10-CM

## 2024-03-18 DIAGNOSIS — M19.90 ARTHRITIS: ICD-10-CM

## 2024-03-18 DIAGNOSIS — Z77.098 EXPOSURE TO AGENT ORANGE: ICD-10-CM

## 2024-03-18 DIAGNOSIS — C44.42 SQUAMOUS CELL CARCINOMA OF SCALP: ICD-10-CM

## 2024-03-18 DIAGNOSIS — E11.65 UNCONTROLLED TYPE 2 DIABETES MELLITUS WITH HYPERGLYCEMIA (HCC): Primary | ICD-10-CM

## 2024-03-18 DIAGNOSIS — E78.5 HYPERLIPIDEMIA WITH TARGET LDL LESS THAN 100: ICD-10-CM

## 2024-03-18 DIAGNOSIS — H40.89 OTHER GLAUCOMA OF BOTH EYES: ICD-10-CM

## 2024-03-18 DIAGNOSIS — R20.2 PARESTHESIA: ICD-10-CM

## 2024-03-18 DIAGNOSIS — G25.0 ESSENTIAL TREMOR: ICD-10-CM

## 2024-03-18 DIAGNOSIS — E66.9 OBESITY (BMI 30-39.9): ICD-10-CM

## 2024-03-18 DIAGNOSIS — J44.89 COPD WITH ASTHMA: ICD-10-CM

## 2024-03-18 DIAGNOSIS — B02.29 PHN (POSTHERPETIC NEURALGIA): ICD-10-CM

## 2024-03-18 DIAGNOSIS — M79.2 NEUROPATHIC PAIN, LEG, RIGHT: ICD-10-CM

## 2024-03-18 DIAGNOSIS — E11.42 DIABETIC PERIPHERAL NEUROPATHY ASSOCIATED WITH TYPE 2 DIABETES MELLITUS (HCC): ICD-10-CM

## 2024-03-18 DIAGNOSIS — K64.4 EXTERNAL HEMORRHOID: ICD-10-CM

## 2024-03-18 DIAGNOSIS — I73.9 PVD (PERIPHERAL VASCULAR DISEASE) (HCC): ICD-10-CM

## 2024-03-18 DIAGNOSIS — L03.032 PARONYCHIA OF GREAT TOE, LEFT: ICD-10-CM

## 2024-03-18 DIAGNOSIS — D50.9 IRON DEFICIENCY ANEMIA, UNSPECIFIED IRON DEFICIENCY ANEMIA TYPE: ICD-10-CM

## 2024-03-18 DIAGNOSIS — J43.1 PANLOBULAR EMPHYSEMA (HCC): ICD-10-CM

## 2024-03-18 DIAGNOSIS — F43.10 POST TRAUMATIC STRESS DISORDER: ICD-10-CM

## 2024-03-18 DIAGNOSIS — E53.8 VITAMIN B12 DEFICIENCY: ICD-10-CM

## 2024-03-18 DIAGNOSIS — I10 PRIMARY HYPERTENSION: ICD-10-CM

## 2024-03-18 DIAGNOSIS — R20.2 PARESTHESIA OF BOTH HANDS: ICD-10-CM

## 2024-03-18 DIAGNOSIS — G62.9 PERIPHERAL POLYNEUROPATHY: ICD-10-CM

## 2024-03-18 DIAGNOSIS — K62.5 BRBPR (BRIGHT RED BLOOD PER RECTUM): ICD-10-CM

## 2024-03-18 DIAGNOSIS — N40.1 BPH WITH OBSTRUCTION/LOWER URINARY TRACT SYMPTOMS: ICD-10-CM

## 2024-03-18 DIAGNOSIS — I48.91 ATRIAL FIBRILLATION, UNSPECIFIED TYPE (HCC): ICD-10-CM

## 2024-03-18 DIAGNOSIS — G47.33 OBSTRUCTIVE SLEEP APNEA ON CPAP: ICD-10-CM

## 2024-03-18 PROCEDURE — 1123F ACP DISCUSS/DSCN MKR DOCD: CPT | Performed by: FAMILY MEDICINE

## 2024-03-18 PROCEDURE — 99215 OFFICE O/P EST HI 40 MIN: CPT | Performed by: FAMILY MEDICINE

## 2024-03-18 PROCEDURE — 3074F SYST BP LT 130 MM HG: CPT | Performed by: FAMILY MEDICINE

## 2024-03-18 PROCEDURE — 3023F SPIROM DOC REV: CPT | Performed by: FAMILY MEDICINE

## 2024-03-18 PROCEDURE — 1036F TOBACCO NON-USER: CPT | Performed by: FAMILY MEDICINE

## 2024-03-18 PROCEDURE — G8484 FLU IMMUNIZE NO ADMIN: HCPCS | Performed by: FAMILY MEDICINE

## 2024-03-18 PROCEDURE — 3078F DIAST BP <80 MM HG: CPT | Performed by: FAMILY MEDICINE

## 2024-03-18 PROCEDURE — G8427 DOCREV CUR MEDS BY ELIG CLIN: HCPCS | Performed by: FAMILY MEDICINE

## 2024-03-18 PROCEDURE — 3017F COLORECTAL CA SCREEN DOC REV: CPT | Performed by: FAMILY MEDICINE

## 2024-03-18 PROCEDURE — 3046F HEMOGLOBIN A1C LEVEL >9.0%: CPT | Performed by: FAMILY MEDICINE

## 2024-03-18 PROCEDURE — G8417 CALC BMI ABV UP PARAM F/U: HCPCS | Performed by: FAMILY MEDICINE

## 2024-03-18 PROCEDURE — 2022F DILAT RTA XM EVC RTNOPTHY: CPT | Performed by: FAMILY MEDICINE

## 2024-03-18 RX ORDER — LIDOCAINE 50 MG/G
1 PATCH TOPICAL DAILY
Qty: 30 PATCH | Refills: 0 | Status: SHIPPED | OUTPATIENT
Start: 2024-03-18 | End: 2024-04-17

## 2024-03-18 ASSESSMENT — PATIENT HEALTH QUESTIONNAIRE - PHQ9
1. LITTLE INTEREST OR PLEASURE IN DOING THINGS: NOT AT ALL
2. FEELING DOWN, DEPRESSED OR HOPELESS: NOT AT ALL
SUM OF ALL RESPONSES TO PHQ QUESTIONS 1-9: 0
SUM OF ALL RESPONSES TO PHQ QUESTIONS 1-9: 0
SUM OF ALL RESPONSES TO PHQ9 QUESTIONS 1 & 2: 0
SUM OF ALL RESPONSES TO PHQ QUESTIONS 1-9: 0
SUM OF ALL RESPONSES TO PHQ QUESTIONS 1-9: 0

## 2024-03-18 NOTE — TELEPHONE ENCOUNTER
Lidocaine 5% patch    Prior authorization approved Case ID: [id number]      Payer: Leah ([employer])    [phone]    [phone]   Your request has been approved   Approval Details    Authorized from January 1, 2024 to March 18, 2025

## 2024-03-19 ENCOUNTER — NURSE ONLY (OUTPATIENT)
Dept: LAB | Age: 74
End: 2024-03-19

## 2024-03-19 DIAGNOSIS — E53.8 VITAMIN B12 DEFICIENCY: ICD-10-CM

## 2024-03-19 DIAGNOSIS — E11.65 UNCONTROLLED TYPE 2 DIABETES MELLITUS WITH HYPERGLYCEMIA (HCC): ICD-10-CM

## 2024-03-19 LAB
ANION GAP SERPL CALC-SCNC: 12 MEQ/L (ref 8–16)
BUN SERPL-MCNC: 13 MG/DL (ref 7–22)
CALCIUM SERPL-MCNC: 9.1 MG/DL (ref 8.5–10.5)
CHLORIDE SERPL-SCNC: 95 MEQ/L (ref 98–111)
CO2 SERPL-SCNC: 26 MEQ/L (ref 23–33)
CREAT SERPL-MCNC: 1 MG/DL (ref 0.4–1.2)
GFR SERPL CREATININE-BSD FRML MDRD: > 60 ML/MIN/1.73M2
GLUCOSE SERPL-MCNC: 89 MG/DL (ref 70–108)
POTASSIUM SERPL-SCNC: 4.5 MEQ/L (ref 3.5–5.2)
SODIUM SERPL-SCNC: 133 MEQ/L (ref 135–145)

## 2024-03-20 LAB
FOLATE SERPL-MCNC: > 20 NG/ML (ref 4.8–24.2)
VIT B12 SERPL-MCNC: 1190 PG/ML (ref 211–911)

## 2024-03-21 LAB
DEPRECATED MEAN GLUCOSE BLD GHB EST-ACNC: 135 MG/DL (ref 70–126)
HBA1C MFR BLD HPLC: 6.5 % (ref 4.4–6.4)

## 2024-03-27 ENCOUNTER — TELEPHONE (OUTPATIENT)
Dept: FAMILY MEDICINE CLINIC | Age: 74
End: 2024-03-27

## 2024-03-27 ENCOUNTER — NURSE ONLY (OUTPATIENT)
Dept: LAB | Age: 74
End: 2024-03-27

## 2024-03-27 DIAGNOSIS — E87.1 HYPONATREMIA: ICD-10-CM

## 2024-03-27 DIAGNOSIS — E87.1 HYPONATREMIA: Primary | ICD-10-CM

## 2024-03-27 LAB — SODIUM SERPL-SCNC: 129 MEQ/L (ref 135–145)

## 2024-03-28 ENCOUNTER — TELEPHONE (OUTPATIENT)
Dept: UROLOGY | Age: 74
End: 2024-03-28

## 2024-03-28 ENCOUNTER — OFFICE VISIT (OUTPATIENT)
Dept: UROLOGY | Age: 74
End: 2024-03-28
Payer: MEDICARE

## 2024-03-28 VITALS
HEIGHT: 71 IN | WEIGHT: 207 LBS | BODY MASS INDEX: 28.98 KG/M2 | SYSTOLIC BLOOD PRESSURE: 94 MMHG | DIASTOLIC BLOOD PRESSURE: 62 MMHG

## 2024-03-28 DIAGNOSIS — R97.20 ELEVATED PSA: Primary | ICD-10-CM

## 2024-03-28 DIAGNOSIS — N40.1 BPH WITH OBSTRUCTION/LOWER URINARY TRACT SYMPTOMS: ICD-10-CM

## 2024-03-28 DIAGNOSIS — N13.8 BPH WITH OBSTRUCTION/LOWER URINARY TRACT SYMPTOMS: ICD-10-CM

## 2024-03-28 DIAGNOSIS — Z01.818 PRE-OP TESTING: ICD-10-CM

## 2024-03-28 PROCEDURE — G8417 CALC BMI ABV UP PARAM F/U: HCPCS | Performed by: NURSE PRACTITIONER

## 2024-03-28 PROCEDURE — 99214 OFFICE O/P EST MOD 30 MIN: CPT | Performed by: NURSE PRACTITIONER

## 2024-03-28 PROCEDURE — 1036F TOBACCO NON-USER: CPT | Performed by: NURSE PRACTITIONER

## 2024-03-28 PROCEDURE — 3078F DIAST BP <80 MM HG: CPT | Performed by: NURSE PRACTITIONER

## 2024-03-28 PROCEDURE — 1123F ACP DISCUSS/DSCN MKR DOCD: CPT | Performed by: NURSE PRACTITIONER

## 2024-03-28 PROCEDURE — 3017F COLORECTAL CA SCREEN DOC REV: CPT | Performed by: NURSE PRACTITIONER

## 2024-03-28 PROCEDURE — G8484 FLU IMMUNIZE NO ADMIN: HCPCS | Performed by: NURSE PRACTITIONER

## 2024-03-28 PROCEDURE — G8427 DOCREV CUR MEDS BY ELIG CLIN: HCPCS | Performed by: NURSE PRACTITIONER

## 2024-03-28 PROCEDURE — 3074F SYST BP LT 130 MM HG: CPT | Performed by: NURSE PRACTITIONER

## 2024-03-28 RX ORDER — CIPROFLOXACIN 500 MG/1
500 TABLET, FILM COATED ORAL 2 TIMES DAILY
Qty: 6 TABLET | Refills: 0 | Status: SHIPPED | OUTPATIENT
Start: 2024-03-28 | End: 2024-03-31

## 2024-03-28 ASSESSMENT — ENCOUNTER SYMPTOMS
ABDOMINAL PAIN: 0
BACK PAIN: 0
NAUSEA: 0
VOMITING: 0

## 2024-03-28 NOTE — TELEPHONE ENCOUNTER
Patient is scheduled for surgery with  on 4/12/24. Surgery consent to be done on arrival. Dr. Boone to clear.Patient to do pre op urine culture, CBC and chest xray on 4/2/24.. Surgery instructions gone over with patient verbally in the office or mailed to the patient.     IR notified    Patient informed an adult over the age of 18 must be with them at the time of surgery and upon discharge

## 2024-03-28 NOTE — TELEPHONE ENCOUNTER
SURGERY SCHEDULING FORM   [address] [address]      Phone *[phone] *[phone]   Surgical Scheduling Direct Line Phone *[phone] Fax *[phone]      Edwin MIKE Stock 1950 male    [address]  [facility] [address]  Marital Status:          Home Phone: [phone]      Cell Phone:    Telephone Information:   Mobile [phone]          Surgeon: Dr. Ramos       Surgery Date: 4/12/24       Time: 9:30 am    Procedure: Transrectal Ultrasound with Prostate Biopsy    Diagnosis: Elevated PSA     Important Medical History:  In Epic    Special Inst/Equip: Regular                               IR notified for Ultrasound    CPT Codes:    53754  Latex Allergy: Yes     Cardiac Device:  No    Anesthesia:  MAC          Admission Type:  Same Day                        Admit Prior to Day of Surgery: No    Case Location:  Main OR            Preadmission Testing:  Phone Call          PAT Date and Time:______________________________________________________    PAT Confirmation #: ______________________________________________________    Post Op Visit: ___________________________________________________________    Need Preop Cardiac Clearance: Yes    Does Patient have Cardiologist/physician?     Dr Boone    Surgery Confirmation #: __________________________________________________    : ________________________   Date: __________________________     Insurance Company Name: Medicare

## 2024-03-28 NOTE — TELEPHONE ENCOUNTER
DO NOT TAKE  FISH OIL, MOBIC, IBUPROFEN, MOTRIN-LIKE DRUGS AND ANY MULTIVITAMINS OR OVER THE COUNTER SUPPLEMENTS 14 DAYS PRIOR TO SURGERY.    HOLD ASPIRIN 5 DAYS PRIOR TO SURGERY    HOLD THE XARELTO 3 DAYS PRIOR    HOLD THE LASIX THE MORNING OF SURGERY    HOLD 1/2 DOSE OF INSULIN THE NIGHT BEFORE SURGERY SURGERY    IF YOU TAKE GLUCOPHAGE, METFORMIN OR JANUMET, HOLD 2 DAYS PRIOR TO SURGERY    MUST HAVE AN ADULT OVER THE AGE OF 18 WITH YOU AT THE TIME OF THE DISCHARGE         Edwin Stock 1950     Surgical Physician: Dr. Ramos      You have been scheduled for the procedure marked below:      Surgery: Transrectal Rectal Ultrasound with Prostate Biopsy         Date: 4/12/24     Anesthesia:  MAC     Place of Service: [facility] Second Floor Same Day Surgery         Arrive to same day surgery at:  7:30 am  (Surgery time is subject to change)      INSTRUCTIONS AS MARKED BELOW:    1.  DO NOT eat or drink anything after midnight before surgery.  2.  We prefer you shower or bathe with an antibacterial soap (Dial) the morning of surgery.  3   Do a fleets enema 2 hours prior to the surgery arrival time  4  Please bring a current medication list, photo ID and insurance card(s) with you  5 Okay to take Tylenol  6. Take blood pressure or heart medication as directed, if taken in the morning take with a small sip of water  7.The office will call you in 1-2 days after your procedure to schedule a follow up.    DATE SENSITIVE PRE OP TESTING:    TO AVOID YOUR SURGERY BEING CANCELLED DO ON THE DATE LISTED *WALK IN *NO APPOINTMENT.      DO THE PRE OP URINE CULTURE, CBC AND CHEST XRAY ON 4/2/24. ORDERS INCLUDED        Date: 3/28/2024

## 2024-03-28 NOTE — TELEPHONE ENCOUNTER
Pre op Risk Assessment    Procedure transrectal Ultrasound with Prostate Biopsy   Physician Dr. Ramos   Date of surgery/procedure 04/1/2024    Last OV 08/24/2023  Last Stress 03/2017  Last Echo 03/2017  Last Cath 11/2019  Last Stent none in epic   Is patient on blood thinners Xarelto ASA  Hold Meds/how many days ??

## 2024-03-28 NOTE — TELEPHONE ENCOUNTER
Patient scheduled for Transrectal Ultrasound with Prostate Biopsy with Dr Ramos on 4/12/24. We are asking for clearance

## 2024-03-28 NOTE — PROGRESS NOTES
capsule by mouth daily 90 capsule 3    vitamin D (CHOLECALCIFEROL) 25 MCG (1000 UT) TABS tablet Take 1 tablet by mouth in the morning and at bedtime      atorvastatin (LIPITOR) 40 MG tablet Take by mouth daily 1/2 tab      amLODIPine (NORVASC) 5 MG tablet Take 1 tablet by mouth daily      metFORMIN (GLUCOPHAGE) 500 MG tablet Take by mouth 2 times daily (with meals) 2 tab      omeprazole (PRILOSEC) 40 MG delayed release capsule Take 1 capsule by mouth daily      Multiple Vitamins-Minerals (PRESERVISION AREDS 2 PO) Take by mouth 2 times daily      insulin glargine (LANTUS SOLOSTAR) 100 UNIT/ML injection pen Inject 26 Units into the skin nightly      topiramate (TOPAMAX SPRINKLE) 25 MG capsule Take 1 capsule by mouth 2 times daily      DULoxetine (CYMBALTA) 60 MG extended release capsule Take 1 capsule by mouth daily      loratadine (CLARITIN) 10 MG tablet Take 1 tablet by mouth daily      rivaroxaban (XARELTO) 20 MG TABS tablet Take 1 tablet by mouth daily (with breakfast)      gabapentin (NEURONTIN) 300 MG capsule Take 1 capsule by mouth 2 times daily.      propranolol (INDERAL LA) 120 MG CR capsule Take 1 capsule by mouth daily      montelukast (SINGULAIR) 10 MG tablet take 1 tablet by mouth once daily 90 tablet 3    BUSPIRONE HCL PO Take 10 mg by mouth 3 times daily       fluticasone (FLONASE) 50 MCG/ACT nasal spray USE 2 SPRAYS NASALLY DAILY 3 Bottle 3    aspirin 81 MG EC tablet Take 1 tablet by mouth daily       No current facility-administered medications for this visit.       Past Medical History  Edwin  has a past medical history of Asthma, [employer] dual pacemaker, [employer] dual pacemaker , Bronchitis, chronic (HCC), Carotid artery stenosis, Chickenpox, COPD with asthma, CVA (cerebral infarction), GERD (gastroesophageal reflux disease), Glaucoma, Hemorrhoids, Hyperlipidemia, Hypertension, Mild intermittent asthma without complication, Neuropathy, Osteoarthritis, Peripheral neuropathy, Post

## 2024-03-29 ENCOUNTER — PREP FOR PROCEDURE (OUTPATIENT)
Dept: UROLOGY | Age: 74
End: 2024-03-29

## 2024-03-29 DIAGNOSIS — E87.1 HYPONATREMIA: Primary | ICD-10-CM

## 2024-03-30 ENCOUNTER — HOSPITAL ENCOUNTER (OUTPATIENT)
Age: 74
Discharge: HOME | End: 2024-03-30
Payer: MEDICARE

## 2024-03-30 ENCOUNTER — HOSPITAL ENCOUNTER (OUTPATIENT)
Dept: GENERAL RADIOLOGY | Age: 74
Discharge: HOME | End: 2024-03-30
Payer: MEDICARE

## 2024-03-30 DIAGNOSIS — N13.8 BPH WITH OBSTRUCTION/LOWER URINARY TRACT SYMPTOMS: ICD-10-CM

## 2024-03-30 DIAGNOSIS — N40.1 BPH WITH OBSTRUCTION/LOWER URINARY TRACT SYMPTOMS: ICD-10-CM

## 2024-03-30 DIAGNOSIS — Z01.818 PRE-OP TESTING: ICD-10-CM

## 2024-03-30 DIAGNOSIS — E87.1 HYPONATREMIA: ICD-10-CM

## 2024-03-30 DIAGNOSIS — R97.20 ELEVATED PSA: ICD-10-CM

## 2024-03-30 LAB
BASOPHILS ABSOLUTE: 0 THOU/MM3 (ref 0–0.1)
BASOPHILS NFR BLD AUTO: 0.4 %
DEPRECATED RDW RBC AUTO: 46.1 FL (ref 35–45)
EOSINOPHIL NFR BLD AUTO: 5.1 %
EOSINOPHILS ABSOLUTE: 0.3 THOU/MM3 (ref 0–0.4)
ERYTHROCYTE [DISTWIDTH] IN BLOOD BY AUTOMATED COUNT: 13.5 % (ref 11.5–14.5)
HCT VFR BLD AUTO: 43.7 % (ref 42–52)
HGB BLD-MCNC: 14.3 GM/DL (ref 14–18)
IMM GRANULOCYTES # BLD AUTO: 0.05 THOU/MM3 (ref 0–0.07)
IMM GRANULOCYTES NFR BLD AUTO: 0.7 %
LYMPHOCYTES ABSOLUTE: 2.7 THOU/MM3 (ref 1–4.8)
LYMPHOCYTES NFR BLD AUTO: 39.8 %
MCH RBC QN AUTO: 30.8 PG (ref 26–33)
MCHC RBC AUTO-ENTMCNC: 32.7 GM/DL (ref 32.2–35.5)
MCV RBC AUTO: 94 FL (ref 80–94)
MONOCYTES ABSOLUTE: 0.7 THOU/MM3 (ref 0.4–1.3)
MONOCYTES NFR BLD AUTO: 11 %
NEUTROPHILS NFR BLD AUTO: 43 %
NRBC BLD AUTO-RTO: 0 /100 WBC
PLATELET # BLD AUTO: 220 THOU/MM3 (ref 130–400)
PMV BLD AUTO: 10 FL (ref 9.4–12.4)
RBC # BLD AUTO: 4.65 MILL/MM3 (ref 4.7–6.1)
SEGMENTED NEUTROPHILS ABSOLUTE COUNT: 2.9 THOU/MM3 (ref 1.8–7.7)
SODIUM SERPL-SCNC: 130 MEQ/L (ref 135–145)
WBC # BLD AUTO: 6.8 THOU/MM3 (ref 4.8–10.8)

## 2024-03-30 PROCEDURE — 71046 X-RAY EXAM CHEST 2 VIEWS: CPT

## 2024-03-30 PROCEDURE — 87086 URINE CULTURE/COLONY COUNT: CPT

## 2024-03-30 PROCEDURE — 36415 COLL VENOUS BLD VENIPUNCTURE: CPT

## 2024-03-30 PROCEDURE — 85025 COMPLETE CBC W/AUTO DIFF WBC: CPT

## 2024-03-30 PROCEDURE — 84295 ASSAY OF SERUM SODIUM: CPT

## 2024-03-30 RX ORDER — SODIUM CHLORIDE 9 MG/ML
INJECTION, SOLUTION INTRAVENOUS PRN
Status: CANCELLED | OUTPATIENT
Start: 2024-03-30

## 2024-03-30 RX ORDER — SODIUM CHLORIDE 0.9 % (FLUSH) 0.9 %
5-40 SYRINGE (ML) INJECTION PRN
Status: CANCELLED | OUTPATIENT
Start: 2024-03-30

## 2024-03-30 RX ORDER — SODIUM CHLORIDE 0.9 % (FLUSH) 0.9 %
5-40 SYRINGE (ML) INJECTION EVERY 12 HOURS SCHEDULED
Status: CANCELLED | OUTPATIENT
Start: 2024-03-30

## 2024-03-31 LAB — BACTERIA UR CULT: NORMAL

## 2024-04-01 LAB
BACTERIA UR CULT: ABNORMAL
ORGANISM: ABNORMAL

## 2024-04-01 RX ORDER — SODIUM CHLORIDE 1 G/1
1 TABLET ORAL 2 TIMES DAILY
Qty: 180 TABLET | Refills: 3 | Status: SHIPPED | OUTPATIENT
Start: 2024-04-01

## 2024-04-05 RX ORDER — CIPROFLOXACIN 500 MG/1
500 TABLET, FILM COATED ORAL 2 TIMES DAILY
COMMUNITY

## 2024-04-05 NOTE — PROGRESS NOTES
PAT Call Date: 4/5   Surgery Date: 4/12    Surgeon: Richard   Surgery: TRUPB    Any Isolation Precautions? No      Type of Isolation Precaution: Not Applicable   Is patient from a nursing home? No  Name of Nursing Home:   Any equipment assist needed for moving patient? No   Type of Equipment: Not Applicable  Patient last weight: 207 LB     Hard Copy on Chart  In EPIC Pending/Notes   Consent -   Within 30 days; signed, dated & timed by patient and physician     [x] On Arrival     [] Blood      [] DNR   H&P -   Within 30 days  3/28  [] Physician To Do     Clearance -        3/29  []Medical     [x]Cardiac NALLU-CLEARED XARELTO 2D, ASA 5D     [] Pulmonary    Orders -   Signed and Dated    3/30  [] Physician To Do    Labs -   Within 3 months   3/30  3/19        3/30        3/30  [x] CBC -ok   [x] BMP -Na+ low   [x] GFR-ok   [] INR    [] PTT    [x] Urine -ok   [] Liver Enzymes    [] MRSA Nasal      Others:SODIUM RECHECKED 130 TO TAKE SODIUM TAB BID     Radiology Studies -   Within 1 year  3/30  [x] Chest X-Ray-ok   [] MRI    [] CT    [] Vascular   [] US     Pulmonary -     [] HARINDER   [] CPAP     Cardiac Workup -   Stress Test, Echo, Cath within 18 months  8/24 9/9/22  [x] EKG      [] Cath                 [] Stress Test                      [x] Echo/BONNIE 63%   [] CABG   [] Holter Monitor      [x] Pacemaker/ICD        Brand:[employer]! DUAL PACER         Where does patient have checked: HERE        Last check: 1/18        Rep Notified: N/A

## 2024-04-10 ENCOUNTER — PROCEDURE VISIT (OUTPATIENT)
Dept: CARDIOLOGY CLINIC | Age: 74
End: 2024-04-10

## 2024-04-10 DIAGNOSIS — Z95.0 PACEMAKER: Primary | ICD-10-CM

## 2024-04-10 NOTE — PROGRESS NOTES
Notified Dr. Ramos's office ([facility]) of patient's last sodium level on 3/19/24 was abnormal at 130.  Await response.

## 2024-04-10 NOTE — PROGRESS NOTES
Dr savage pt   Nxt [employer] dual pacer remote   Battery 2.5 yrs remaining      Dddr     A paced 0%  V paced 100%    P waves 2.7  Rv waves not obtained per the device     Atrial impedence 493  Vent impedence 670    No thresholds obtained per the device       Known afib/ xarelto   Afib burden 100% since 1/18/24

## 2024-04-12 ENCOUNTER — ANESTHESIA (OUTPATIENT)
Dept: OPERATING ROOM | Age: 74
End: 2024-04-12
Payer: MEDICARE

## 2024-04-12 ENCOUNTER — ANESTHESIA EVENT (OUTPATIENT)
Dept: OPERATING ROOM | Age: 74
End: 2024-04-12
Payer: MEDICARE

## 2024-04-12 ENCOUNTER — HOSPITAL ENCOUNTER (OUTPATIENT)
Age: 74
Setting detail: OUTPATIENT SURGERY
Discharge: HOME | End: 2024-04-12
Attending: UROLOGY | Admitting: UROLOGY
Payer: MEDICARE

## 2024-04-12 ENCOUNTER — APPOINTMENT (OUTPATIENT)
Dept: ULTRASOUND IMAGING | Age: 74
End: 2024-04-12
Attending: UROLOGY
Payer: MEDICARE

## 2024-04-12 VITALS
BODY MASS INDEX: 28.59 KG/M2 | TEMPERATURE: 97.6 F | OXYGEN SATURATION: 99 % | SYSTOLIC BLOOD PRESSURE: 121 MMHG | HEIGHT: 71 IN | HEART RATE: 70 BPM | DIASTOLIC BLOOD PRESSURE: 67 MMHG | RESPIRATION RATE: 16 BRPM | WEIGHT: 204.2 LBS

## 2024-04-12 DIAGNOSIS — R97.20 ELEVATED PSA: ICD-10-CM

## 2024-04-12 LAB — GLUCOSE BLD STRIP.AUTO-MCNC: 121 MG/DL (ref 70–108)

## 2024-04-12 PROCEDURE — 88305 TISSUE EXAM BY PATHOLOGIST: CPT

## 2024-04-12 PROCEDURE — 2580000003 HC RX 258: Performed by: UROLOGY

## 2024-04-12 PROCEDURE — 3600000012 HC SURGERY LEVEL 2 ADDTL 15MIN: Performed by: UROLOGY

## 2024-04-12 PROCEDURE — 88342 IMHCHEM/IMCYTCHM 1ST ANTB: CPT

## 2024-04-12 PROCEDURE — 6360000002 HC RX W HCPCS: Performed by: NURSE ANESTHETIST, CERTIFIED REGISTERED

## 2024-04-12 PROCEDURE — 82948 REAGENT STRIP/BLOOD GLUCOSE: CPT

## 2024-04-12 PROCEDURE — 3600000002 HC SURGERY LEVEL 2 BASE: Performed by: UROLOGY

## 2024-04-12 PROCEDURE — 76998 US GUIDE INTRAOP: CPT

## 2024-04-12 PROCEDURE — 7100000010 HC PHASE II RECOVERY - FIRST 15 MIN: Performed by: UROLOGY

## 2024-04-12 PROCEDURE — 3700000000 HC ANESTHESIA ATTENDED CARE: Performed by: UROLOGY

## 2024-04-12 PROCEDURE — 2709999900 HC NON-CHARGEABLE SUPPLY: Performed by: UROLOGY

## 2024-04-12 PROCEDURE — 3700000001 HC ADD 15 MINUTES (ANESTHESIA): Performed by: UROLOGY

## 2024-04-12 PROCEDURE — 7100000011 HC PHASE II RECOVERY - ADDTL 15 MIN: Performed by: UROLOGY

## 2024-04-12 PROCEDURE — 6360000002 HC RX W HCPCS: Performed by: UROLOGY

## 2024-04-12 PROCEDURE — 2500000003 HC RX 250 WO HCPCS: Performed by: NURSE ANESTHETIST, CERTIFIED REGISTERED

## 2024-04-12 RX ORDER — MIDAZOLAM HYDROCHLORIDE 1 MG/ML
INJECTION INTRAMUSCULAR; INTRAVENOUS PRN
Status: DISCONTINUED | OUTPATIENT
Start: 2024-04-12 | End: 2024-04-12 | Stop reason: SDUPTHER

## 2024-04-12 RX ORDER — PROPOFOL 10 MG/ML
INJECTION, EMULSION INTRAVENOUS PRN
Status: DISCONTINUED | OUTPATIENT
Start: 2024-04-12 | End: 2024-04-12 | Stop reason: SDUPTHER

## 2024-04-12 RX ORDER — LIDOCAINE HYDROCHLORIDE 10 MG/ML
INJECTION, SOLUTION EPIDURAL; INFILTRATION; INTRACAUDAL; PERINEURAL PRN
Status: DISCONTINUED | OUTPATIENT
Start: 2024-04-12 | End: 2024-04-12 | Stop reason: SDUPTHER

## 2024-04-12 RX ORDER — SODIUM CHLORIDE 0.9 % (FLUSH) 0.9 %
5-40 SYRINGE (ML) INJECTION EVERY 12 HOURS SCHEDULED
Status: DISCONTINUED | OUTPATIENT
Start: 2024-04-12 | End: 2024-04-12 | Stop reason: HOSPADM

## 2024-04-12 RX ORDER — FENTANYL CITRATE 50 UG/ML
INJECTION, SOLUTION INTRAMUSCULAR; INTRAVENOUS PRN
Status: DISCONTINUED | OUTPATIENT
Start: 2024-04-12 | End: 2024-04-12 | Stop reason: SDUPTHER

## 2024-04-12 RX ORDER — SODIUM CHLORIDE 9 MG/ML
INJECTION, SOLUTION INTRAVENOUS PRN
Status: DISCONTINUED | OUTPATIENT
Start: 2024-04-12 | End: 2024-04-12 | Stop reason: HOSPADM

## 2024-04-12 RX ORDER — SODIUM CHLORIDE 0.9 % (FLUSH) 0.9 %
5-40 SYRINGE (ML) INJECTION PRN
Status: DISCONTINUED | OUTPATIENT
Start: 2024-04-12 | End: 2024-04-12 | Stop reason: HOSPADM

## 2024-04-12 RX ADMIN — LIDOCAINE HYDROCHLORIDE 100 MG: 10 INJECTION, SOLUTION EPIDURAL; INFILTRATION; INTRACAUDAL; PERINEURAL at 09:06

## 2024-04-12 RX ADMIN — MIDAZOLAM 1 MG: 1 INJECTION INTRAMUSCULAR; INTRAVENOUS at 09:06

## 2024-04-12 RX ADMIN — SODIUM CHLORIDE: 9 INJECTION, SOLUTION INTRAVENOUS at 08:22

## 2024-04-12 RX ADMIN — PHENYLEPHRINE HYDROCHLORIDE 200 MCG: 10 INJECTION INTRAVENOUS at 09:16

## 2024-04-12 RX ADMIN — MIDAZOLAM 1 MG: 1 INJECTION INTRAMUSCULAR; INTRAVENOUS at 09:03

## 2024-04-12 RX ADMIN — PROPOFOL 50 MG: 10 INJECTION, EMULSION INTRAVENOUS at 09:06

## 2024-04-12 RX ADMIN — FENTANYL CITRATE 50 MCG: 50 INJECTION, SOLUTION INTRAMUSCULAR; INTRAVENOUS at 09:03

## 2024-04-12 RX ADMIN — FENTANYL CITRATE 50 MCG: 50 INJECTION, SOLUTION INTRAMUSCULAR; INTRAVENOUS at 09:11

## 2024-04-12 RX ADMIN — WATER 2000 MG: 1 INJECTION INTRAMUSCULAR; INTRAVENOUS; SUBCUTANEOUS at 09:06

## 2024-04-12 ASSESSMENT — PAIN - FUNCTIONAL ASSESSMENT: PAIN_FUNCTIONAL_ASSESSMENT: 0-10

## 2024-04-12 ASSESSMENT — ENCOUNTER SYMPTOMS: SHORTNESS OF BREATH: 1

## 2024-04-12 NOTE — H&P
JUNIOR GLYNN MD  History and Physical    Patient:  Edwin Stock  MRN: [id number]  YOB: 1950    HISTORY OF PRESENT ILLNESS:     The patient is a 74 y.o. male who presents with elevated psa. Here for procedure.    Patient's old records, notes and chart reviewed and summarized above.     JUNIOR GLYNN MD independently reviewed the images and verified the radiology reports from:    No results found.      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 Moderate LI's in the mid LAD and in the RCA. 11/20/2013 11/20/2013: No obstructive lesions. Moderate LI's in the mid LAD and in the RCA. Dr. 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 WERNER Valdez     EYE SURGERY      right eye clog oil duct    EYE SURGERY Bilateral 01/2017    MANDIBLE SURGERY N/A 05/2017    jaw  negative  Hematological/Lymphatic: negative  Psychological: negative    Physical Exam:      No data found.  Constitutional: Patient in no acute distress;   Neuro: alert and oriented to person place and time.    Psych: Mood and affect normal.  Skin: Normal  Lungs: Respiratory effort normal, CTA  Cardiovascular:  Normal peripheral pulses; no murmur. Normal rhythm  Abdomen: Soft, non-tender, non-distended with no CVA, flank pain, hepatosplenomegaly or hernia.  Kidneys normal.  Bladder non-tender and not distended.      LABS:   No results for input(s): \"WBC\", \"HGB\", \"HCT\", \"MCV\", \"PLT\" in the last 72 hours.  No results for input(s): \"NA\", \"K\", \"CL\", \"CO2\", \"PHOS\", \"BUN\", \"CREATININE\", \"CA\" in the last 72 hours.  Lab Results   Component Value Date    PSA 5.82 (H) 02/27/2024    PSA 5.27 (H) 11/11/2023    PSA 5.12 (H) 08/15/2023         Urinalysis: No results for input(s): \"COLORU\", \"PHUR\", \"LABCAST\", \"WBCUA\", \"RBCUA\", \"MUCUS\", \"TRICHOMONAS\", \"YEAST\", \"BACTERIA\", \"CLARITYU\", \"SPECGRAV\", \"LEUKOCYTESUR\", \"UROBILINOGEN\", \"BILIRUBINUR\", \"BLOODU\" in the last 72 hours.    Invalid input(s): \"NITRATE\", \"GLUCOSEUKETONESUAMORPHOUS\"     -----------------------------------------------------------------      Assessment and Plan     Impression:    Patient Active Problem List   Diagnosis    Diabetes mellitus type 2, uncontrolled    Post traumatic stress disorder    Hyperlipidemia with target LDL less than 100    Hypertension    Arthritis    S/P cardiac catheterization: 11/20/2013: No obstructive lesions. Moderate LI's in the mid LAD and in the RCA.    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

## 2024-04-12 NOTE — PROGRESS NOTES
Pt has met discharge criteria and states he is ready for discharge to home. IV removed, gauze and tape applied. Dressed in own clothes and personal belongings gathered. Discharge instructions (with opioid medication education information) given to pt and family; pt and family verbalized understanding of discharge instructions, prescriptions and follow up appointments. Pt transported to discharge lobby by [facility] staff.

## 2024-04-12 NOTE — PROGRESS NOTES
Patient oriented to Same Day department and admitted to Same Day Surgery room 20.   Patient verbalized approval for first name, last initial with physician name on unit whiteboard.     Plan of care reviewed with patient.   Patient room whiteboard filled out and discussed with patient and responsible adult.   Patient and responsible adult offered Same Day Welcome Packet to review.    Call light in reach.   Bed in lowest position, locked, with one bed rail up.   SCDs and warming blanket in place.  Appropriate arm bands on patient.   Bathroom offered.   All questions and concerns of patient addressed.        Meds to Beds:   Patient informed of [facility] program during admission. Patient is agreeable to program.   Contact information for the pharmacy and the Meds to Beds program:   Name: merline   Relationship to patient:friend/acquaintance   Phone number: [phone]

## 2024-04-12 NOTE — DISCHARGE INSTRUCTIONS
Postop Prostate Biopsy Instructions:  Patient told to drink plenty of fluids and to take it easy the day of the prostate biopsy.  He was encouraged to use sitz baths as needed for relief of rectal discomfort after the biopsy. He was told he may notice blood or a rust color in his semen for several months after the biopsy.  He was told to avoid resuming blood thinners or aspirin until the rectal bleeding or visible blood in urine has stopped for at least 48 hours.  He should take his antibiotics to completion as prescribed.  He was instructed to call our office immediately or to go to the Emergency Room if he experiences a fever over 101, shaking chills or an inability to urinate.

## 2024-04-12 NOTE — ANESTHESIA PRE PROCEDURE
Department of Anesthesiology  Preprocedure Note       Name:  Edwin Stock   Age:  74 y.o.  :  1950                                          MRN:  [id number]         Date:  2024      Surgeon: Surgeon(s):  Srinivasan Ramos MD    Procedure: Procedure(s):  Transrectal Ultrasound with Prostate Biopsy    Medications prior to admission:   Prior to Admission medications    Medication Sig Start Date End Date Taking? Authorizing Provider   ciprofloxacin (CIPRO) 500 MG tablet Take 1 tablet by mouth 2 times daily Day prior to surgery   Yes Jason Huggins MD   sodium chloride 1 g tablet Take 1 tablet by mouth in the morning and at bedtime 24   Rosy Castillo MD   lidocaine (LIDODERM) 5 % Place 1 patch onto the skin daily 12 hours on, 12 hours off.  Dx:  postherpetic neuralgia 3/18/24 4/17/24  Rosy Castillo MD   furosemide (LASIX) 20 MG tablet TAKE ONE TABLET DAILY, BY MOUTH. 24   Dann Boone MD   fluticasone-salmeterol (ADVAIR DISKUS) 250-50 MCG/ACT AEPB diskus inhaler Inhale 1 puff into the lungs in the morning and 1 puff in the evening. 23   Rosy Castillo MD   oxybutynin (DITROPAN XL) 10 MG extended release tablet Take 1 tablet by mouth daily 23   Carol Burton APRN - CNP   hydrocortisone (ANUSOL-HC) 25 MG suppository Place 1 suppository rectally in the morning and 1 suppository in the evening. 23   Augustina Cuevas MD   tamsulosin (FLOMAX) 0.4 MG capsule Take 1 capsule by mouth daily 23   Carol Burton APRN - CNP   b complex vitamins capsule Take 1 capsule by mouth daily 23   Rosy Castillo MD   vitamin D (CHOLECALCIFEROL) 25 MCG (1000 UT) TABS tablet Take 1 tablet by mouth in the morning and at bedtime    Jason Huggins MD   atorvastatin (LIPITOR) 40 MG tablet Take by mouth daily 1/2 tab    Jason Huggins MD   amLODIPine (NORVASC) 5 MG tablet Take 1 tablet by mouth daily    Provider, MD Jason   metFORMIN

## 2024-04-12 NOTE — BRIEF OP NOTE
Brief Postoperative Note      Patient: Edwin Stock  YOB: 1950  MRN: [id number]    Date of Procedure: 4/12/2024    Pre-Op Diagnosis Codes:     * Elevated PSA [R97.20]    Post-Op Diagnosis: Same       Procedure(s):  Transrectal Ultrasound with Prostate Biopsy    Surgeon(s):  Junior Ramos MD    Assistant:  * No surgical staff found *    Anesthesia: Monitor Anesthesia Care    Estimated Blood Loss (mL): Minimal    Complications: None    Specimens:   ID Type Source Tests Collected by Time Destination   A : RIGHT LATERAL BASE- RLB Tissue Prostate SURGICAL PATHOLOGY Junior Ramos MD 4/12/2024 0912    B : RIGHT BASE- RB Tissue Prostate SURGICAL PATHOLOGY Junior Ramos MD 4/12/2024 0912    C : RIGHT LATERAL MID- RLM Tissue Prostate SURGICAL PATHOLOGY Junior Ramos MD 4/12/2024 0912    D : RIGHT MID - RM Tissue Prostate SURGICAL PATHOLOGY Junior Ramos MD 4/12/2024 0912    E : RIGHT LATERAL APE - RLA Tissue Prostate SURGICAL PATHOLOGY Junior Ramos MD 4/12/2024 0912    F : RIGHT APEX - RA Tissue Prostate SURGICAL PATHOLOGY Junior Ramos MD 4/12/2024 0912    G : LEFT LATERAL BASE - LLB Tissue Prostate SURGICAL PATHOLOGY Junior Ramos MD 4/12/2024 0912    H : LEFT BASE - LB Tissue Prostate SURGICAL PATHOLOGY Junior Ramos MD 4/12/2024 0912    I : LEFT LATERAL MID - LLM Tissue Prostate SURGICAL PATHOLOGY Junior Ramos MD 4/12/2024 0912    J : LEFT MID - LM Tissue Prostate SURGICAL PATHOLOGY Junior Ramos MD 4/12/2024 0912    K : LEFT LATERAL APEX - LLA Tissue Prostate SURGICAL PATHOLOGY Junior Ramos MD 4/12/2024 0912    L : LEFT APEX - LA Tissue Prostate SURGICAL PATHOLOGY Junior Ramos MD 4/12/2024 0912        Implants:  * No implants in log *      Drains: * No LDAs found *    Findings:  saturation biopsy 60 g prostate. F/u tana end of day or beginning w path results (ask her her preference)    Electronically signed by JUNIOR RAMOS MD on 4/12/2024 at 2:55 PM

## 2024-04-12 NOTE — ANESTHESIA POSTPROCEDURE EVALUATION
Department of Anesthesiology  Postprocedure Note    Patient: Edwin Stock  MRN: [id number]  YOB: 1950  Date of evaluation: 4/12/2024    Procedure Summary       Date: 04/12/24 Room / Location: [facility] OR  / [facility] OR    Anesthesia Start: 0903 Anesthesia Stop: 0928    Procedure: Transrectal Ultrasound with Prostate Biopsy (Bilateral) Diagnosis:       Elevated PSA      (Elevated PSA [R97.20])    Surgeons: Srinivasan Ramos MD Responsible Provider: David William DO    Anesthesia Type: MAC ASA Status: 3            Anesthesia Type: No value filed.    Carrillo Phase I: Carrillo Score: 10    Carrillo Phase II: Carrillo Score: 8    Anesthesia Post Evaluation    Patient location during evaluation: PACU  Patient participation: complete - patient participated  Level of consciousness: awake and alert  Airway patency: patent  Nausea & Vomiting: no vomiting and no nausea  Cardiovascular status: hemodynamically stable  Respiratory status: acceptable and room air  Hydration status: stable  Pain management: adequate    No notable events documented.

## 2024-04-12 NOTE — PROGRESS NOTES
Pt returned to [facility] room 20. Vitals and assessment as charted. 0.9 infusing,  to count from PACU. Pt has crackers and water. Family at the bedside. Pt and family verbalized understanding of discharge criteria and call light use. Call light in reach.

## 2024-04-15 ENCOUNTER — NURSE ONLY (OUTPATIENT)
Dept: FAMILY MEDICINE CLINIC | Age: 74
End: 2024-04-15
Payer: MEDICARE

## 2024-04-15 DIAGNOSIS — E53.8 VITAMIN B12 DEFICIENCY: Primary | ICD-10-CM

## 2024-04-15 PROCEDURE — 96372 THER/PROPH/DIAG INJ SC/IM: CPT | Performed by: FAMILY MEDICINE

## 2024-04-15 RX ORDER — CYANOCOBALAMIN 1000 UG/ML
1000 INJECTION, SOLUTION INTRAMUSCULAR; SUBCUTANEOUS ONCE
Status: COMPLETED | OUTPATIENT
Start: 2024-04-15 | End: 2024-04-15

## 2024-04-15 RX ADMIN — CYANOCOBALAMIN 1000 MCG: 1000 INJECTION, SOLUTION INTRAMUSCULAR; SUBCUTANEOUS at 13:55

## 2024-04-15 NOTE — PROGRESS NOTES
Administrations This Visit       cyanocobalamin injection 1,000 mcg       Admin Date  04/15/2024  13:55 Action  Given Dose  1,000 mcg Route  IntraMUSCular Site  Deltoid Right Administered By  Bing Zaldivar CMA ([facility])    Ordering Provider: Rosy Castillo MD    NDC: 4493-3766-62    Lot#: [id number]    : [employer]    Patient Supplied?: No                    Patient instructed to remain in clinic for 20 minutes after injection and was advised to report any adverse reaction to me immediately.

## 2024-04-23 ENCOUNTER — OFFICE VISIT (OUTPATIENT)
Dept: UROLOGY | Age: 74
End: 2024-04-23
Payer: MEDICARE

## 2024-04-23 VITALS — WEIGHT: 204 LBS | HEIGHT: 71 IN | BODY MASS INDEX: 28.56 KG/M2

## 2024-04-23 DIAGNOSIS — N40.1 BPH WITH OBSTRUCTION/LOWER URINARY TRACT SYMPTOMS: ICD-10-CM

## 2024-04-23 DIAGNOSIS — R97.20 ELEVATED PSA: Primary | ICD-10-CM

## 2024-04-23 DIAGNOSIS — N13.8 BPH WITH OBSTRUCTION/LOWER URINARY TRACT SYMPTOMS: ICD-10-CM

## 2024-04-23 LAB
BILIRUBIN URINE: NEGATIVE
BLOOD URINE, POC: ABNORMAL
CHARACTER, URINE: ABNORMAL
COLOR, URINE: ABNORMAL
GLUCOSE URINE: NEGATIVE MG/DL
KETONES, URINE: NEGATIVE
LEUKOCYTE CLUMPS, URINE: ABNORMAL
NITRITE, URINE: NEGATIVE
PH, URINE: 5.5 (ref 5–9)
POST VOID RESIDUAL (PVR): 27 ML
PROTEIN, URINE: 100 MG/DL
SPECIFIC GRAVITY, URINE: 1.01 (ref 1–1.03)
UROBILINOGEN, URINE: 0.2 EU/DL (ref 0–1)

## 2024-04-23 PROCEDURE — 81003 URINALYSIS AUTO W/O SCOPE: CPT | Performed by: NURSE PRACTITIONER

## 2024-04-23 PROCEDURE — 51798 US URINE CAPACITY MEASURE: CPT | Performed by: NURSE PRACTITIONER

## 2024-04-23 PROCEDURE — 99214 OFFICE O/P EST MOD 30 MIN: CPT | Performed by: NURSE PRACTITIONER

## 2024-04-23 PROCEDURE — G8427 DOCREV CUR MEDS BY ELIG CLIN: HCPCS | Performed by: NURSE PRACTITIONER

## 2024-04-23 PROCEDURE — G8417 CALC BMI ABV UP PARAM F/U: HCPCS | Performed by: NURSE PRACTITIONER

## 2024-04-23 PROCEDURE — 1036F TOBACCO NON-USER: CPT | Performed by: NURSE PRACTITIONER

## 2024-04-23 PROCEDURE — 3017F COLORECTAL CA SCREEN DOC REV: CPT | Performed by: NURSE PRACTITIONER

## 2024-04-23 PROCEDURE — 1123F ACP DISCUSS/DSCN MKR DOCD: CPT | Performed by: NURSE PRACTITIONER

## 2024-04-23 RX ORDER — TAMSULOSIN HYDROCHLORIDE 0.4 MG/1
0.4 CAPSULE ORAL DAILY
Qty: 90 CAPSULE | Refills: 3 | Status: SHIPPED | OUTPATIENT
Start: 2024-04-23

## 2024-04-23 RX ORDER — OXYBUTYNIN CHLORIDE 10 MG/1
10 TABLET, EXTENDED RELEASE ORAL DAILY
Qty: 90 TABLET | Refills: 3 | Status: SHIPPED | OUTPATIENT
Start: 2024-04-23

## 2024-04-23 ASSESSMENT — ENCOUNTER SYMPTOMS
ABDOMINAL PAIN: 0
BACK PAIN: 0
VOMITING: 0
NAUSEA: 0

## 2024-04-23 NOTE — PROGRESS NOTES
[facility] [place] [facility]  [facility]  [address]  [address]  [facility] [address]  Dept: [phone]  Loc: [phone]    Visit Date: 4/23/2024        HPI:     Edwin Stock is a 74 y.o. male who presents today for:  Chief Complaint   Patient presents with    Elevated PSA    Results     Pt had a bx and here for results.       HPI  Pt seen in follow up after recent prostate biopsy.      Pt has a hx of elevated PSA   -prostate biopsy 10/2021 by Dr. 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    -saturation TRUS prostate biopsy 4/12/24 by Dr. Ramos for PSA of 5.82:  negative for malignancy with HGPIN.      Pt has a hx of BPH on tamsulosin 0.4 mg daily and Oxybutynin 10 mg XL daily.  Urinary symptoms stable. Still with some brown coloration to urine.       Current Outpatient Medications   Medication Sig Dispense Refill    tamsulosin (FLOMAX) 0.4 MG capsule Take 1 capsule by mouth daily 90 capsule 3    oxyBUTYnin (DITROPAN XL) 10 MG extended release tablet Take 1 tablet by mouth daily 90 tablet 3    ciprofloxacin (CIPRO) 500 MG tablet Take 1 tablet by mouth 2 times daily Day prior to surgery      sodium chloride 1 g tablet Take 1 tablet by mouth in the morning and at bedtime 180 tablet 3    furosemide (LASIX) 20 MG tablet TAKE ONE TABLET DAILY, BY MOUTH. 90 tablet 3    fluticasone-salmeterol (ADVAIR DISKUS) 250-50 MCG/ACT AEPB diskus inhaler Inhale 1 puff into the lungs in the morning and 1 puff in the evening. 60 each 3    hydrocortisone (ANUSOL-HC) 25 MG suppository Place 1 suppository rectally in the morning and 1 suppository in the evening. 10 suppository 0    b complex vitamins capsule Take 1 capsule by mouth daily 90 capsule 3    vitamin D (CHOLECALCIFEROL) 25 MCG (1000 UT) TABS tablet Take 1 tablet by mouth in the morning and at bedtime      atorvastatin (LIPITOR) 40 MG tablet Take by mouth daily 1/2 tab      amLODIPine

## 2024-04-25 LAB — BACTERIA UR CULT: NORMAL

## 2024-04-28 NOTE — OP NOTE
Srinivasan Ramos MD.  Urologic Surgery      [facility]    DATE: 4/12/2024  Patient:  Edwin Stock  MRN: [id number]  YOB: 1950    SURGEON: Srinivasan Ramos MD.    ASSISTANT: none    PREOPERATIVE DIAGNOSIS: elevated PSA    POSTOPERATIVE DIAGNOSIS: elevated PSA    PROCEDURE PERFORMED: Transrectal Ultrasound Guided Prostate Biopsy with Saturation     ANESTHESIA: Monitor Anesthesia Care    COMPLICATIONS: none    OR BLOOD LOSS:  Minimal    FLUIDS: Cystalloids per Anesthesia    SPECIMENS:  ID Type Source Tests Collected by Time Destination   A : RIGHT LATERAL BASE- RLB Tissue Prostate SURGICAL PATHOLOGY Srinivasan Ramos MD 4/12/2024 0912    B : RIGHT BASE- RB Tissue Prostate SURGICAL PATHOLOGY Srinivasan Ramos MD 4/12/2024 0912    C : RIGHT LATERAL MID- RLM Tissue Prostate SURGICAL PATHOLOGY Srinivasan Ramos MD 4/12/2024 0912    D : RIGHT MID - RM Tissue Prostate SURGICAL PATHOLOGY Srinivasan Ramos MD 4/12/2024 0912    E : RIGHT LATERAL APE - RLA Tissue Prostate SURGICAL PATHOLOGY Srinivasan Ramos MD 4/12/2024 0912    F : RIGHT APEX - RA Tissue Prostate SURGICAL PATHOLOGY Srinivasan Ramos MD 4/12/2024 0912    G : LEFT LATERAL BASE - LLB Tissue Prostate SURGICAL PATHOLOGY Srinivasan Ramos MD 4/12/2024 0912    H : LEFT BASE - LB Tissue Prostate SURGICAL PATHOLOGY Srinivasan Ramos MD 4/12/2024 0912    I : LEFT LATERAL MID - LLM Tissue Prostate SURGICAL PATHOLOGY Srinivasan Ramos MD 4/12/2024 0912    J : LEFT MID - LM Tissue Prostate SURGICAL PATHOLOGY Srinivasan Ramos MD 4/12/2024 0912    K : LEFT LATERAL APEX - LLA Tissue Prostate SURGICAL PATHOLOGY Srinivasan Ramos MD 4/12/2024 0912    L : LEFT APEX - LA Tissue Prostate SURGICAL PATHOLOGY Srinivasan Ramos MD 4/12/2024 0912          DRAINS: none    DETAILS OF PROCEDURE:  After informed consent was obtain, the patient was taken to the operative suite. He remained on the [facility].  .     He was rolled onto the side. The legs were brought toward the chest. Anesthesia was induced. Antibiotics were

## 2024-05-14 ENCOUNTER — NURSE ONLY (OUTPATIENT)
Dept: FAMILY MEDICINE CLINIC | Age: 74
End: 2024-05-14
Payer: MEDICARE

## 2024-05-14 DIAGNOSIS — E53.8 VITAMIN B12 DEFICIENCY: Primary | ICD-10-CM

## 2024-05-14 PROCEDURE — 96372 THER/PROPH/DIAG INJ SC/IM: CPT | Performed by: FAMILY MEDICINE

## 2024-05-14 RX ORDER — CYANOCOBALAMIN 1000 UG/ML
1000 INJECTION, SOLUTION INTRAMUSCULAR; SUBCUTANEOUS ONCE
Status: COMPLETED | OUTPATIENT
Start: 2024-05-14 | End: 2024-05-14

## 2024-05-14 RX ADMIN — CYANOCOBALAMIN 1000 MCG: 1000 INJECTION, SOLUTION INTRAMUSCULAR; SUBCUTANEOUS at 13:09

## 2024-05-14 NOTE — PROGRESS NOTES
Edwin Stock  1950    Are you sick today? no  Do you have allergies to medications, food, a vaccine component, or latex? no  Have you ever had a serious reaction after receiving a vaccination? no  Do you have a long-term health problem with heart, lung, kidney, or metabolic disease (e.g. diabetes), asthma, a blood disorder, no spleen, complement component deficiency, a cochlear implant, or a spinal fluid leak? Are you on long-term aspirin therapy? no  Do you have cancer, leukemia, HIV/AIDS, or any other immune system problem? no  Do you have a parent, brother, or sister with an immune system problem? no  In the past 3 months, have you taken medications that affect your immune system, such as prednisone, other steroids, or anticancer drugs; drugs for the treatment of rheumatoid arthritis, Crohn's disease, or psoriasis; or have you had radiation treatment? no  Have you had a seizure or a brain or other nervous system problem? no  During the past year, have you received a transfusion of blood or blood products, or been given immune (gamma) globulin or an antiviral drug? no  For women: Are you pregnant or is there a chance you could become pregnant during the next month? no  Have you received any vaccinations in the past 4 weeks? no    Form Completed by: Ivonne Schneider CMA on 5/14/2024 at 1:07 PM EDT  Form Reviewed by: Ivonne Schneider MA on 5/14/2024 at 1:07 PM EDT    Did you bring your immunization card with you? No

## 2024-05-29 ENCOUNTER — TELEPHONE (OUTPATIENT)
Dept: FAMILY MEDICINE CLINIC | Age: 74
End: 2024-05-29

## 2024-05-29 DIAGNOSIS — G57.11 MERALGIA PARESTHETICA OF RIGHT SIDE: Primary | ICD-10-CM

## 2024-05-29 DIAGNOSIS — R94.131 ABNORMAL EMG: ICD-10-CM

## 2024-05-29 DIAGNOSIS — R20.2 PARESTHESIA OF RIGHT LEG: ICD-10-CM

## 2024-05-31 ENCOUNTER — TELEPHONE (OUTPATIENT)
Dept: FAMILY MEDICINE CLINIC | Age: 74
End: 2024-05-31

## 2024-05-31 NOTE — TELEPHONE ENCOUNTER
Patient states [facility] tried to call and schedule his MRI. He has a pacemaker. [facility] told him he needed a different order for a different MRI due to his pacemaker. Please advise.

## 2024-06-03 NOTE — TELEPHONE ENCOUNTER
Hold MRI.  Pt did see Dr Stoddard.  Did Dr Stoddard want him to have further testing done? It doesn't say so in his chart

## 2024-06-10 ENCOUNTER — NURSE ONLY (OUTPATIENT)
Dept: FAMILY MEDICINE CLINIC | Age: 74
End: 2024-06-10
Payer: MEDICARE

## 2024-06-10 DIAGNOSIS — E53.8 VITAMIN B12 DEFICIENCY: Primary | ICD-10-CM

## 2024-06-10 PROCEDURE — 96372 THER/PROPH/DIAG INJ SC/IM: CPT | Performed by: FAMILY MEDICINE

## 2024-06-10 RX ORDER — CYANOCOBALAMIN 1000 UG/ML
1000 INJECTION, SOLUTION INTRAMUSCULAR; SUBCUTANEOUS ONCE
Status: COMPLETED | OUTPATIENT
Start: 2024-06-10 | End: 2024-06-10

## 2024-06-10 RX ADMIN — CYANOCOBALAMIN 1000 MCG: 1000 INJECTION, SOLUTION INTRAMUSCULAR; SUBCUTANEOUS at 13:31

## 2024-06-11 RX ORDER — OXYBUTYNIN CHLORIDE 10 MG/1
10 TABLET, EXTENDED RELEASE ORAL DAILY
Qty: 90 TABLET | Refills: 3 | OUTPATIENT
Start: 2024-06-11

## 2024-07-08 ENCOUNTER — NURSE ONLY (OUTPATIENT)
Dept: FAMILY MEDICINE CLINIC | Age: 74
End: 2024-07-08
Payer: MEDICARE

## 2024-07-08 DIAGNOSIS — E53.8 VITAMIN B12 DEFICIENCY: Primary | ICD-10-CM

## 2024-07-08 PROCEDURE — 96372 THER/PROPH/DIAG INJ SC/IM: CPT | Performed by: FAMILY MEDICINE

## 2024-07-08 RX ORDER — CYANOCOBALAMIN 1000 UG/ML
1000 INJECTION, SOLUTION INTRAMUSCULAR; SUBCUTANEOUS ONCE
Status: COMPLETED | OUTPATIENT
Start: 2024-07-08 | End: 2024-07-08

## 2024-07-08 RX ADMIN — CYANOCOBALAMIN 1000 MCG: 1000 INJECTION, SOLUTION INTRAMUSCULAR; SUBCUTANEOUS at 15:29

## 2024-07-08 NOTE — PROGRESS NOTES
Administrations This Visit       cyanocobalamin injection 1,000 mcg       Admin Date  07/08/2024  15:29 Action  Given Dose  1,000 mcg Route  IntraMUSCular Site  Deltoid Right Administered By  tSephanie Oro LPN    Ordering Provider: Rosy Castillo MD    Patient Supplied?: No                    Patient instructed to remain in clinic for 20 minutes after injection and was advised to report any adverse reaction to me immediately.

## 2024-07-15 ENCOUNTER — PROCEDURE VISIT (OUTPATIENT)
Dept: CARDIOLOGY CLINIC | Age: 74
End: 2024-07-15
Payer: MEDICARE

## 2024-07-15 DIAGNOSIS — Z95.0 PACEMAKER: Primary | ICD-10-CM

## 2024-07-15 PROCEDURE — 93296 REM INTERROG EVL PM/IDS: CPT | Performed by: INTERNAL MEDICINE

## 2024-07-15 PROCEDURE — 93294 REM INTERROG EVL PM/LDLS PM: CPT | Performed by: INTERNAL MEDICINE

## 2024-07-15 NOTE — PROGRESS NOTES
[facility] [employer] Dual Pacemaker   Patient of Nallu    Battery 2 years    Presenting rhythm AF     A Impedance 501  RV Impedance 660    P wave sensing 0.8  R wave sensing 9.8    A Threshold - @ -  A Amplitude 2 @ 0.4  RV Thresholds - @ -  RV Amplitude 2.5 @ 0.4      A Paced 0%  V Paced 100%    Programmed Mode DDDR       Afib [place] 100% - known AF on Xarelto

## 2024-08-08 ENCOUNTER — NURSE ONLY (OUTPATIENT)
Dept: FAMILY MEDICINE CLINIC | Age: 74
End: 2024-08-08
Payer: MEDICARE

## 2024-08-08 DIAGNOSIS — E53.8 VITAMIN B12 DEFICIENCY: Primary | ICD-10-CM

## 2024-08-08 PROCEDURE — 96372 THER/PROPH/DIAG INJ SC/IM: CPT | Performed by: FAMILY MEDICINE

## 2024-08-08 RX ORDER — CYANOCOBALAMIN 1000 UG/ML
1000 INJECTION, SOLUTION INTRAMUSCULAR; SUBCUTANEOUS ONCE
Status: COMPLETED | OUTPATIENT
Start: 2024-08-08 | End: 2024-08-08

## 2024-08-08 RX ADMIN — CYANOCOBALAMIN 1000 MCG: 1000 INJECTION, SOLUTION INTRAMUSCULAR; SUBCUTANEOUS at 13:05

## 2024-08-08 NOTE — PROGRESS NOTES
Administrations This Visit       cyanocobalamin injection 1,000 mcg       Admin Date  08/08/2024  13:25 Action  Given Dose  1,000 mcg Route  IntraMUSCular Site  Deltoid Right Administered By  Corina Orozco MA    Ordering Provider: Rosy Castillo MD    Patient Supplied?: No                Patient tolerated well

## 2024-08-28 NOTE — PATIENT INSTRUCTIONS
Your Provider for Today: Dr. Boone  Your nurses for today: Xavi    You may receive a survey regarding the care you received during your visit.  Your input is valuable to us.  We encourage you to complete and return your survey.  We hope you will choose us in the future for your healthcare needs.

## 2024-08-29 ENCOUNTER — OFFICE VISIT (OUTPATIENT)
Dept: CARDIOLOGY CLINIC | Age: 74
End: 2024-08-29
Payer: MEDICARE

## 2024-08-29 VITALS
SYSTOLIC BLOOD PRESSURE: 116 MMHG | BODY MASS INDEX: 29.06 KG/M2 | HEIGHT: 70 IN | DIASTOLIC BLOOD PRESSURE: 78 MMHG | WEIGHT: 203 LBS | HEART RATE: 71 BPM

## 2024-08-29 DIAGNOSIS — I48.91 ATRIAL FIBRILLATION, UNSPECIFIED TYPE (HCC): Primary | ICD-10-CM

## 2024-08-29 PROCEDURE — 3074F SYST BP LT 130 MM HG: CPT | Performed by: INTERNAL MEDICINE

## 2024-08-29 PROCEDURE — G8417 CALC BMI ABV UP PARAM F/U: HCPCS | Performed by: INTERNAL MEDICINE

## 2024-08-29 PROCEDURE — 99214 OFFICE O/P EST MOD 30 MIN: CPT | Performed by: INTERNAL MEDICINE

## 2024-08-29 PROCEDURE — 3078F DIAST BP <80 MM HG: CPT | Performed by: INTERNAL MEDICINE

## 2024-08-29 PROCEDURE — 1123F ACP DISCUSS/DSCN MKR DOCD: CPT | Performed by: INTERNAL MEDICINE

## 2024-08-29 PROCEDURE — G8427 DOCREV CUR MEDS BY ELIG CLIN: HCPCS | Performed by: INTERNAL MEDICINE

## 2024-08-29 PROCEDURE — 1036F TOBACCO NON-USER: CPT | Performed by: INTERNAL MEDICINE

## 2024-08-29 PROCEDURE — 93000 ELECTROCARDIOGRAM COMPLETE: CPT | Performed by: INTERNAL MEDICINE

## 2024-08-29 PROCEDURE — 3017F COLORECTAL CA SCREEN DOC REV: CPT | Performed by: INTERNAL MEDICINE

## 2024-08-29 RX ORDER — ALBUTEROL SULFATE 90 UG/1
2 AEROSOL, METERED RESPIRATORY (INHALATION) EVERY 6 HOURS PRN
COMMUNITY

## 2024-08-29 RX ORDER — CETIRIZINE HYDROCHLORIDE 10 MG/1
10 TABLET ORAL DAILY
COMMUNITY

## 2024-08-29 NOTE — PROGRESS NOTES
discharge, redness and itching.   Respiratory: denies apnea, cough  Gastrointestinal: denies blood in stool, constipation, diarrhea   Endocrine: denies cold intolerance, heat intolerance, polydipsia.  Genitourinary: denies dysuria, enuresis, flank pain and hematuria.   Musculoskeletal: denies arthralgias, joint swelling and neck pain.   Neurological: denies numbness and headaches.   Psychiatric/Behavioral: denies agitation, confusion, decreased concentration and dysphoric mood    All others reviewed and are negative.   Objective:     /78   Pulse 71   Ht 1.778 m (5' 10\")   Wt 92.1 kg (203 lb)   BMI 29.13 kg/m²     Wt Readings from Last 3 Encounters:   08/29/24 92.1 kg (203 lb)   04/23/24 92.5 kg (204 lb)   04/12/24 92.6 kg (204 lb 3.2 oz)     BP Readings from Last 3 Encounters:   08/29/24 116/78   04/12/24 121/67   03/28/24 94/62       PHYSICAL EXAM  Constitutional: Oriented to person, place, and time. Appears well-developed and well-nourished.   HENT:   Head: Normocephalic and atraumatic.   Eyes: EOM are normal. Pupils are equal, round, and reactive to light.   Neck: Normal range of motion. Neck supple. No JVD present.   Cardiovascular: Normal rate , normal heart sounds and intact distal pulses.    Pulmonary/Chest: Effort normal and breath sounds normal. No respiratory distress. No wheezes. No rales.   Abdominal: Soft. Bowel sounds are normal. No distension. There is no tenderness.   Musculoskeletal: Normal range of motion. No edema.   Neurological: Alert and oriented to person, place, and time. No cranial nerve deficit. Coordination normal.   Skin: Skin is warm and dry.   Psychiatric: Normal mood and affect.       No results found for: \"CKTOTAL\", \"CKMB\", \"CKMBINDEX\"    Lab Results   Component Value Date/Time    WBC 6.8 03/30/2024 04:15 PM    RBC 4.65 03/30/2024 04:15 PM    RBC 4.77 02/01/2012 09:46 AM    HGB 14.3 03/30/2024 04:15 PM    HGB 14.9 02/01/2012 09:46 AM    HCT 43.7 03/30/2024 04:15 PM    MCV    [email or web address]       STRESS:    CATH:    Assessment/Plan       Diagnosis Orders   1. Atrial fibrillation, unspecified type (HCC)  EKG 12 Lead            Afib on Xarelto  PPM, V paced 100%  DM  HTN  HLD  HARINDER on CPAP  Asthma    EKG is V-paced rhyth,  PPM interrogation showed V paced 100%  On Xarelto , no bleeding, no side effects, continue for now  [id number]:3, high risk  Continue Aspirin, statin, amlodipine  The patient is asked to make an attempt to improve diet and exercise patterns to aid in medical management of this problem.  Advised more plant based nutrition/meditarrean diet   Advised patient to call office or seek immediate medical attention if there is any new onset of  any chest pain, sob, palpitations, lightheadedness, dizziness, orthopnea, PND or pedal edema.   All medication side effects were discussed in details.    Thank youfor allowing me to participate in the care of this patient.   Please do not hesitate to contact me for any further questions.     Return in about 1 year (around 8/29/2025), or if symptoms worsen or fail to improve, for Review testing, Regular follow up.       Electronically signed by Dann Boone MD [facility]  8/29/2024 at 1:21 PM EST

## 2024-09-09 ENCOUNTER — OFFICE VISIT (OUTPATIENT)
Dept: PULMONOLOGY | Age: 74
End: 2024-09-09
Payer: MEDICARE

## 2024-09-09 VITALS
SYSTOLIC BLOOD PRESSURE: 126 MMHG | WEIGHT: 201.6 LBS | DIASTOLIC BLOOD PRESSURE: 62 MMHG | HEART RATE: 71 BPM | TEMPERATURE: 97.9 F | BODY MASS INDEX: 28.22 KG/M2 | HEIGHT: 71 IN | OXYGEN SATURATION: 97 %

## 2024-09-09 DIAGNOSIS — J44.89 COPD WITH ASTHMA (HCC): Primary | ICD-10-CM

## 2024-09-09 PROCEDURE — G8427 DOCREV CUR MEDS BY ELIG CLIN: HCPCS | Performed by: NURSE PRACTITIONER

## 2024-09-09 PROCEDURE — 99214 OFFICE O/P EST MOD 30 MIN: CPT | Performed by: NURSE PRACTITIONER

## 2024-09-09 PROCEDURE — 3074F SYST BP LT 130 MM HG: CPT | Performed by: NURSE PRACTITIONER

## 2024-09-09 PROCEDURE — 3017F COLORECTAL CA SCREEN DOC REV: CPT | Performed by: NURSE PRACTITIONER

## 2024-09-09 PROCEDURE — G8417 CALC BMI ABV UP PARAM F/U: HCPCS | Performed by: NURSE PRACTITIONER

## 2024-09-09 PROCEDURE — 1036F TOBACCO NON-USER: CPT | Performed by: NURSE PRACTITIONER

## 2024-09-09 PROCEDURE — 3023F SPIROM DOC REV: CPT | Performed by: NURSE PRACTITIONER

## 2024-09-09 PROCEDURE — 1123F ACP DISCUSS/DSCN MKR DOCD: CPT | Performed by: NURSE PRACTITIONER

## 2024-09-09 PROCEDURE — 3078F DIAST BP <80 MM HG: CPT | Performed by: NURSE PRACTITIONER

## 2024-09-09 ASSESSMENT — ENCOUNTER SYMPTOMS: SHORTNESS OF BREATH: 1

## 2024-09-10 ENCOUNTER — NURSE ONLY (OUTPATIENT)
Dept: FAMILY MEDICINE CLINIC | Age: 74
End: 2024-09-10

## 2024-09-10 DIAGNOSIS — E53.8 VITAMIN B12 DEFICIENCY: Primary | ICD-10-CM

## 2024-09-10 DIAGNOSIS — D50.9 IRON DEFICIENCY ANEMIA, UNSPECIFIED IRON DEFICIENCY ANEMIA TYPE: ICD-10-CM

## 2024-09-10 RX ORDER — CYANOCOBALAMIN 1000 UG/ML
1000 INJECTION, SOLUTION INTRAMUSCULAR; SUBCUTANEOUS ONCE
Status: COMPLETED | OUTPATIENT
Start: 2024-09-10 | End: 2024-09-10

## 2024-09-10 RX ADMIN — CYANOCOBALAMIN 1000 MCG: 1000 INJECTION, SOLUTION INTRAMUSCULAR; SUBCUTANEOUS at 13:18

## 2024-10-10 ENCOUNTER — NURSE ONLY (OUTPATIENT)
Dept: FAMILY MEDICINE CLINIC | Age: 74
End: 2024-10-10
Payer: MEDICARE

## 2024-10-10 DIAGNOSIS — E53.8 VITAMIN B12 DEFICIENCY: ICD-10-CM

## 2024-10-10 DIAGNOSIS — D50.9 IRON DEFICIENCY ANEMIA, UNSPECIFIED IRON DEFICIENCY ANEMIA TYPE: Primary | ICD-10-CM

## 2024-10-10 PROCEDURE — 96372 THER/PROPH/DIAG INJ SC/IM: CPT | Performed by: FAMILY MEDICINE

## 2024-10-10 RX ORDER — CYANOCOBALAMIN 1000 UG/ML
1000 INJECTION, SOLUTION INTRAMUSCULAR; SUBCUTANEOUS ONCE
Status: COMPLETED | OUTPATIENT
Start: 2024-10-10 | End: 2024-10-10

## 2024-10-10 RX ADMIN — CYANOCOBALAMIN 1000 MCG: 1000 INJECTION, SOLUTION INTRAMUSCULAR; SUBCUTANEOUS at 12:53

## 2024-10-10 NOTE — PROGRESS NOTES
Administrations This Visit       cyanocobalamin injection 1,000 mcg       Admin Date  10/10/2024  12:53 Action  Given Dose  1,000 mcg Route  IntraMUSCular Site  Deltoid Right Documented By  Evelyn Peres LPN    NDC: 7371-8699-94    Lot#: [id number]    : [employer]    Patient Supplied?: No                    Patient instructed to remain in clinic for 20 minutes after injection and was advised to report any adverse reaction to me immediately.

## 2024-10-21 ENCOUNTER — HOSPITAL ENCOUNTER (OUTPATIENT)
Age: 74
Discharge: HOME | End: 2024-10-21
Payer: MEDICARE

## 2024-10-21 DIAGNOSIS — R97.20 ELEVATED PSA: ICD-10-CM

## 2024-10-21 PROCEDURE — 84153 ASSAY OF PSA TOTAL: CPT

## 2024-10-21 PROCEDURE — 36415 COLL VENOUS BLD VENIPUNCTURE: CPT

## 2024-10-22 LAB — PSA SERPL-MCNC: 5.58 NG/ML (ref 0–1)

## 2024-10-22 PROCEDURE — 93296 REM INTERROG EVL PM/IDS: CPT | Performed by: INTERNAL MEDICINE

## 2024-10-22 PROCEDURE — 93294 REM INTERROG EVL PM/LDLS PM: CPT | Performed by: INTERNAL MEDICINE

## 2024-10-29 ENCOUNTER — OFFICE VISIT (OUTPATIENT)
Dept: UROLOGY | Age: 74
End: 2024-10-29
Payer: MEDICARE

## 2024-10-29 VITALS — HEIGHT: 71 IN | BODY MASS INDEX: 28.14 KG/M2 | RESPIRATION RATE: 22 BRPM | WEIGHT: 201 LBS

## 2024-10-29 DIAGNOSIS — R97.20 ELEVATED PSA: Primary | ICD-10-CM

## 2024-10-29 PROCEDURE — G8417 CALC BMI ABV UP PARAM F/U: HCPCS | Performed by: NURSE PRACTITIONER

## 2024-10-29 PROCEDURE — G8484 FLU IMMUNIZE NO ADMIN: HCPCS | Performed by: NURSE PRACTITIONER

## 2024-10-29 PROCEDURE — 1159F MED LIST DOCD IN RCRD: CPT | Performed by: NURSE PRACTITIONER

## 2024-10-29 PROCEDURE — 1036F TOBACCO NON-USER: CPT | Performed by: NURSE PRACTITIONER

## 2024-10-29 PROCEDURE — 99214 OFFICE O/P EST MOD 30 MIN: CPT | Performed by: NURSE PRACTITIONER

## 2024-10-29 PROCEDURE — G8427 DOCREV CUR MEDS BY ELIG CLIN: HCPCS | Performed by: NURSE PRACTITIONER

## 2024-10-29 PROCEDURE — 1123F ACP DISCUSS/DSCN MKR DOCD: CPT | Performed by: NURSE PRACTITIONER

## 2024-10-29 PROCEDURE — 3017F COLORECTAL CA SCREEN DOC REV: CPT | Performed by: NURSE PRACTITIONER

## 2024-10-29 ASSESSMENT — ENCOUNTER SYMPTOMS
BACK PAIN: 0
NAUSEA: 0
VOMITING: 0
ABDOMINAL PAIN: 0

## 2024-10-29 NOTE — PROGRESS NOTES
Tremor of both hands, Type II or unspecified type diabetes mellitus without mention of complication, not stated as uncontrolled, and Unspecified sleep apnea.    Past Surgical History  The patient  has a past surgical history that includes Appendectomy (age 12); Tonsillectomy and adenoidectomy (as a child ); Pacemaker insertion (2000/2010); Colonoscopy (2005); Cardiac catheterization (11/2013); Carpal tunnel release (Right, 01/2017); eye surgery; eye surgery (Bilateral, 01/2017); Mandible surgery (N/A, 05/2017); Blepharoplasty (Bilateral, 05/2017); Pacemaker insertion (11/20/2019); Mohs surgery (N/A, 02/18/2022); Cataract removal (Bilateral, 10/05/2022); and Prostate surgery (Bilateral, 4/12/2024).    Family History  This patient's family history includes Cancer in his paternal grandfather; Diabetes in his mother; Heart Disease in his father and mother; High Blood Pressure in his father and mother; High Cholesterol in his father and mother; Kidney Disease in his mother; Stroke in his father.    Social History  Edwin  reports that he quit smoking about 52 years ago. His smoking use included cigarettes. He started smoking about 58 years ago. He has a 12 pack-year smoking history. He has quit using smokeless tobacco. He reports that he does not currently use drugs after having used the following drugs: Marijuana (Weed). He reports that he does not drink alcohol.      Subjective:      Review of Systems   Constitutional:  Negative for activity change, appetite change, chills, diaphoresis, fatigue, fever and unexpected weight change.   Gastrointestinal:  Negative for abdominal pain, nausea and vomiting.   Genitourinary:  Positive for frequency. Negative for decreased urine volume, difficulty urinating, dysuria, flank pain, hematuria and urgency.   Musculoskeletal:  Negative for back pain.       Objective:   Resp 22   Ht 1.803 m (5' 11\")   Wt 91.2 kg (201 lb)   BMI 28.03 kg/m²     Physical Exam  Vitals reviewed.

## 2024-11-18 ENCOUNTER — OFFICE VISIT (OUTPATIENT)
Dept: FAMILY MEDICINE CLINIC | Age: 74
End: 2024-11-18

## 2024-11-18 VITALS
WEIGHT: 200.62 LBS | RESPIRATION RATE: 16 BRPM | OXYGEN SATURATION: 99 % | SYSTOLIC BLOOD PRESSURE: 110 MMHG | TEMPERATURE: 97.6 F | DIASTOLIC BLOOD PRESSURE: 82 MMHG | BODY MASS INDEX: 28.09 KG/M2 | HEIGHT: 71 IN | HEART RATE: 71 BPM

## 2024-11-18 DIAGNOSIS — G47.33 OBSTRUCTIVE SLEEP APNEA ON CPAP: ICD-10-CM

## 2024-11-18 DIAGNOSIS — G62.9 PERIPHERAL POLYNEUROPATHY: ICD-10-CM

## 2024-11-18 DIAGNOSIS — I73.9 PVD (PERIPHERAL VASCULAR DISEASE) (HCC): ICD-10-CM

## 2024-11-18 DIAGNOSIS — E11.65 UNCONTROLLED TYPE 2 DIABETES MELLITUS WITH HYPERGLYCEMIA (HCC): ICD-10-CM

## 2024-11-18 DIAGNOSIS — E87.1 HYPONATREMIA: ICD-10-CM

## 2024-11-18 DIAGNOSIS — J43.1 PANLOBULAR EMPHYSEMA (HCC): ICD-10-CM

## 2024-11-18 DIAGNOSIS — Z77.098 EXPOSURE TO AGENT ORANGE: ICD-10-CM

## 2024-11-18 DIAGNOSIS — Z00.00 MEDICARE ANNUAL WELLNESS VISIT, SUBSEQUENT: Primary | ICD-10-CM

## 2024-11-18 DIAGNOSIS — J44.89 COPD WITH ASTHMA (HCC): ICD-10-CM

## 2024-11-18 DIAGNOSIS — F43.10 POST TRAUMATIC STRESS DISORDER: ICD-10-CM

## 2024-11-18 DIAGNOSIS — R20.2 PARESTHESIA OF RIGHT LEG: ICD-10-CM

## 2024-11-18 DIAGNOSIS — G57.11 MERALGIA PARESTHETICA OF RIGHT SIDE: ICD-10-CM

## 2024-11-18 DIAGNOSIS — D64.9 ANEMIA, UNSPECIFIED TYPE: ICD-10-CM

## 2024-11-18 DIAGNOSIS — C44.42 SQUAMOUS CELL CARCINOMA OF SCALP: ICD-10-CM

## 2024-11-18 DIAGNOSIS — H40.89 OTHER GLAUCOMA OF BOTH EYES: ICD-10-CM

## 2024-11-18 DIAGNOSIS — M19.90 ARTHRITIS: ICD-10-CM

## 2024-11-18 DIAGNOSIS — D50.9 IRON DEFICIENCY ANEMIA, UNSPECIFIED IRON DEFICIENCY ANEMIA TYPE: ICD-10-CM

## 2024-11-18 DIAGNOSIS — G25.0 ESSENTIAL TREMOR: ICD-10-CM

## 2024-11-18 DIAGNOSIS — I10 PRIMARY HYPERTENSION: ICD-10-CM

## 2024-11-18 DIAGNOSIS — I48.0 PAROXYSMAL ATRIAL FIBRILLATION (HCC): ICD-10-CM

## 2024-11-18 DIAGNOSIS — L03.032 PARONYCHIA OF GREAT TOE, LEFT: ICD-10-CM

## 2024-11-18 DIAGNOSIS — E53.8 VITAMIN B12 DEFICIENCY: ICD-10-CM

## 2024-11-18 DIAGNOSIS — K64.4 EXTERNAL HEMORRHOID: ICD-10-CM

## 2024-11-18 DIAGNOSIS — N13.8 BPH WITH OBSTRUCTION/LOWER URINARY TRACT SYMPTOMS: ICD-10-CM

## 2024-11-18 DIAGNOSIS — E66.9 OBESITY (BMI 30-39.9): ICD-10-CM

## 2024-11-18 DIAGNOSIS — R79.89 ELEVATED TSH: ICD-10-CM

## 2024-11-18 DIAGNOSIS — Z98.890 S/P CARDIAC CATHETERIZATION: ICD-10-CM

## 2024-11-18 DIAGNOSIS — E78.5 HYPERLIPIDEMIA WITH TARGET LDL LESS THAN 100: ICD-10-CM

## 2024-11-18 DIAGNOSIS — E11.42 DIABETIC PERIPHERAL NEUROPATHY ASSOCIATED WITH TYPE 2 DIABETES MELLITUS (HCC): ICD-10-CM

## 2024-11-18 DIAGNOSIS — N40.1 BPH WITH OBSTRUCTION/LOWER URINARY TRACT SYMPTOMS: ICD-10-CM

## 2024-11-18 RX ORDER — CYANOCOBALAMIN 1000 UG/ML
1000 INJECTION, SOLUTION INTRAMUSCULAR; SUBCUTANEOUS ONCE
Status: COMPLETED | OUTPATIENT
Start: 2024-11-18 | End: 2024-11-18

## 2024-11-18 RX ADMIN — CYANOCOBALAMIN 1000 MCG: 1000 INJECTION, SOLUTION INTRAMUSCULAR; SUBCUTANEOUS at 15:59

## 2024-11-18 SDOH — ECONOMIC STABILITY: INCOME INSECURITY: HOW HARD IS IT FOR YOU TO PAY FOR THE VERY BASICS LIKE FOOD, HOUSING, MEDICAL CARE, AND HEATING?: NOT HARD AT ALL

## 2024-11-18 SDOH — ECONOMIC STABILITY: FOOD INSECURITY: WITHIN THE PAST 12 MONTHS, YOU WORRIED THAT YOUR FOOD WOULD RUN OUT BEFORE YOU GOT MONEY TO BUY MORE.: NEVER TRUE

## 2024-11-18 SDOH — ECONOMIC STABILITY: FOOD INSECURITY: WITHIN THE PAST 12 MONTHS, THE FOOD YOU BOUGHT JUST DIDN'T LAST AND YOU DIDN'T HAVE MONEY TO GET MORE.: NEVER TRUE

## 2024-11-18 ASSESSMENT — PATIENT HEALTH QUESTIONNAIRE - PHQ9
SUM OF ALL RESPONSES TO PHQ QUESTIONS 1-9: 0
SUM OF ALL RESPONSES TO PHQ QUESTIONS 1-9: 0
2. FEELING DOWN, DEPRESSED OR HOPELESS: NOT AT ALL
SUM OF ALL RESPONSES TO PHQ QUESTIONS 1-9: 0
SUM OF ALL RESPONSES TO PHQ9 QUESTIONS 1 & 2: 0
SUM OF ALL RESPONSES TO PHQ QUESTIONS 1-9: 0
1. LITTLE INTEREST OR PLEASURE IN DOING THINGS: NOT AT ALL

## 2024-11-18 ASSESSMENT — LIFESTYLE VARIABLES: HOW OFTEN DO YOU HAVE A DRINK CONTAINING ALCOHOL: NEVER

## 2024-11-18 NOTE — PROGRESS NOTES
Medicare Annual Wellness Visit    Edwin MCKEON Justoalissagajose is here for Medicare AWV    Assessment & Plan   Medicare annual wellness visit, subsequent  Iron deficiency anemia, unspecified iron deficiency anemia type  -     cyanocobalamin injection 1,000 mcg; 1,000 mcg, IntraMUSCular, ONCE, 1 dose, On Mon 11/18/24 at 1600  Uncontrolled type 2 diabetes mellitus with hyperglycemia (HCC)  -     Comprehensive Metabolic Panel, Fasting; Future  -     Hemoglobin A1C; Future  -     Microalbumin / Creatinine Urine Ratio; Future  Panlobular emphysema (HCC)  Paroxysmal atrial fibrillation (HCC)  -     TSH with Reflex; Future  Vitamin B12 deficiency  -     Vitamin B12 & Folate; Future  Meralgia paresthetica of right side  PVD (peripheral vascular disease) (HCC)  Diabetic peripheral neuropathy associated with type 2 diabetes mellitus (HCC)  Primary hypertension  Hyperlipidemia with target LDL less than 100  -     Comprehensive Metabolic Panel, Fasting; Future  -     Lipid Panel; Future  COPD with asthma (HCC)  External hemorrhoid  Exposure to Agent Orange  BPH with obstruction/lower urinary tract symptoms  -     PSA, Prostatic Specific Antigen; Future  Other glaucoma of both eyes  Peripheral polyneuropathy  Obesity (BMI 30-39.9)  Arthritis  S/P cardiac catheterization: 11/20/2013: No obstructive lesions. Moderate LI's in the mid LAD and in the RCA.  Essential tremor  Obstructive sleep apnea on CPAP  Paronychia of great toe, left  Post traumatic stress disorder  Squamous cell carcinoma of scalp  Paresthesia of right leg  Hyponatremia  Anemia, unspecified type  -     CBC; Future  -     Iron Binding Capacity; Future  -     Iron; Future  -     Ferritin; Future  -     CBC; Future  Elevated TSH  -     TSH; Future  -     T4; Future    Recommendations for Preventive Services Due: see orders and patient instructions/AVS.  Recommended screening schedule for the next 5-10 years is provided to the patient in written form: see Patient

## 2024-11-18 NOTE — PROGRESS NOTES
[facility]  [facility]  [address]  [address]  [facility] [address]  Dept: [phone]  Dept Fax: [phone]  Loc: [phone]    Edwin Stock is a 74 y.o. male who presents today for:  No chief complaint on file.      HPI:     HPI    [facility] but comes here 3 times yearly to go over all the information as well as maintenance of diabetes.  PTSD did have a flare during a pacemaker placement when they did not give him enough pain medication.  He reports thoughts of hurting the surgeon but did not act on them when redirected by the nurse.  He has since worked [facility]. He is no longer seeing the counselor but is seeing the psychiatrist.    Hypertension: Patient here for follow-up of elevated blood pressure. He is not exercising and is adherent to low salt diet.  Blood pressure is well controlled at home. Cardiac symptoms none. Patient denies chest pain, dyspnea and palpitations.  Cardiovascular risk factors: advanced age (older than 55 for men, 65 for women), dyslipidemia, hypertension and male gender. Use of agents associated with hypertension: none. History of target organ damage: stroke, paroxysmal afib, and CAD diagnosed by cath in 2013 but no stents.    Hyperlipidemia: Patient presents with hyperlipidemia.  He was tested because hypertension and CVA.  His last labs showed   Lab Results   Component Value Date    CHOL 126 04/21/2023    CHOL 139 11/17/2022    CHOL 127 04/21/2022     Lab Results   Component Value Date    TRIG 83 04/21/2023    TRIG 72 11/17/2022    TRIG 77 04/21/2022     Lab Results   Component Value Date    HDL 50 04/21/2023    HDL 51 11/17/2022    HDL 51 04/21/2022     Lab Results   Component Value Date    LDLCALC 59 04/21/2023    LDLCALC 73 11/17/2022    LDLCALC 61 04/21/2022     No results found for: \"LABVLDL\", \"VLDL\"  No results found

## 2024-11-18 NOTE — PROGRESS NOTES
Administrations This Visit       cyanocobalamin injection 1,000 mcg       Admin Date  11/18/2024  15:59 Action  Given Dose  1,000 mcg Route  IntraMUSCular Site  Deltoid Left Documented By  Corina Orozco MA    NDC: 7118-4107-88    Lot#: [id number]    : [employer]    Patient Supplied?: No                  Patient tolerated well

## 2024-11-19 LAB
T4 SERPL-MCNC: 6.2 UG/DL (ref 4.5–11.7)
TSH SERPL DL<=0.005 MIU/L-ACNC: 3.56 UIU/ML (ref 0.4–4.2)

## 2024-11-22 RX ORDER — FUROSEMIDE 20 MG/1
20 TABLET ORAL DAILY
Qty: 90 TABLET | Refills: 2 | Status: SHIPPED | OUTPATIENT
Start: 2024-11-22

## 2024-12-09 ENCOUNTER — OFFICE VISIT (OUTPATIENT)
Dept: PULMONOLOGY | Age: 74
End: 2024-12-09
Payer: MEDICARE

## 2024-12-09 VITALS
TEMPERATURE: 97.7 F | SYSTOLIC BLOOD PRESSURE: 116 MMHG | OXYGEN SATURATION: 100 % | DIASTOLIC BLOOD PRESSURE: 60 MMHG | HEIGHT: 71 IN | WEIGHT: 202 LBS | BODY MASS INDEX: 28.28 KG/M2 | HEART RATE: 70 BPM

## 2024-12-09 DIAGNOSIS — R09.81 CHRONIC NASAL CONGESTION: ICD-10-CM

## 2024-12-09 DIAGNOSIS — G47.33 OSA ON CPAP: Primary | ICD-10-CM

## 2024-12-09 DIAGNOSIS — E66.3 OVERWEIGHT (BMI 25.0-29.9): ICD-10-CM

## 2024-12-09 PROCEDURE — 1123F ACP DISCUSS/DSCN MKR DOCD: CPT

## 2024-12-09 PROCEDURE — 3078F DIAST BP <80 MM HG: CPT

## 2024-12-09 PROCEDURE — 3074F SYST BP LT 130 MM HG: CPT

## 2024-12-09 PROCEDURE — 1036F TOBACCO NON-USER: CPT

## 2024-12-09 PROCEDURE — G8427 DOCREV CUR MEDS BY ELIG CLIN: HCPCS

## 2024-12-09 PROCEDURE — G8484 FLU IMMUNIZE NO ADMIN: HCPCS

## 2024-12-09 PROCEDURE — G8417 CALC BMI ABV UP PARAM F/U: HCPCS

## 2024-12-09 PROCEDURE — 3017F COLORECTAL CA SCREEN DOC REV: CPT

## 2024-12-09 PROCEDURE — 1159F MED LIST DOCD IN RCRD: CPT

## 2024-12-09 PROCEDURE — 99214 OFFICE O/P EST MOD 30 MIN: CPT

## 2024-12-09 ASSESSMENT — ENCOUNTER SYMPTOMS
SORE THROAT: 0
RHINORRHEA: 1
WHEEZING: 0
COUGH: 0
SHORTNESS OF BREATH: 0

## 2024-12-09 NOTE — PROGRESS NOTES
bedtime      atorvastatin (LIPITOR) 40 MG tablet Take by mouth daily 1/2 tab      amLODIPine (NORVASC) 5 MG tablet Take 1 tablet by mouth daily      metFORMIN (GLUCOPHAGE) 500 MG tablet Take by mouth 2 times daily (with meals) 2 tab      omeprazole (PRILOSEC) 40 MG delayed release capsule Take 1 capsule by mouth daily      Multiple Vitamins-Minerals (PRESERVISION AREDS 2 PO) Take by mouth 2 times daily      insulin glargine (LANTUS SOLOSTAR) 100 UNIT/ML injection pen Inject 26 Units into the skin nightly      topiramate (TOPAMAX SPRINKLE) 25 MG capsule Take 1 capsule by mouth 2 times daily      DULoxetine (CYMBALTA) 60 MG extended release capsule Take 1 capsule by mouth daily      loratadine (CLARITIN) 10 MG tablet Take 1 tablet by mouth daily      rivaroxaban (XARELTO) 20 MG TABS tablet Take 1 tablet by mouth daily (with breakfast)      gabapentin (NEURONTIN) 300 MG capsule Take 1 capsule by mouth 2 times daily.      propranolol (INDERAL LA) 120 MG CR capsule Take 1 capsule by mouth daily      montelukast (SINGULAIR) 10 MG tablet take 1 tablet by mouth once daily 90 tablet 3    BUSPIRONE HCL PO Take 10 mg by mouth 3 times daily       fluticasone (FLONASE) 50 MCG/ACT nasal spray USE 2 SPRAYS NASALLY DAILY 3 Bottle 3    aspirin 81 MG EC tablet Take 1 tablet by mouth daily       No current facility-administered medications for this visit.       Review of systems     Review of Systems   Constitutional:  Negative for appetite change and fever.   HENT:  Positive for congestion and rhinorrhea. Negative for postnasal drip, sneezing and sore throat.    Respiratory:  Negative for cough, shortness of breath and wheezing.    Cardiovascular: Negative.  Negative for chest pain, palpitations and leg swelling.   Allergic/Immunologic: Negative for environmental allergies.          Physical exam     BMI:  Body mass index is 28.17 kg/m².    Wt Readings from Last 3 Encounters:   12/09/24 91.6 kg (202 lb)   11/18/24 91 kg (200 lb 9.9

## 2025-01-23 ENCOUNTER — NURSE ONLY (OUTPATIENT)
Dept: CARDIOLOGY CLINIC | Age: 75
End: 2025-01-23

## 2025-01-23 DIAGNOSIS — Z95.0 PACEMAKER: Primary | ICD-10-CM

## 2025-02-17 RX ORDER — SODIUM CHLORIDE 1 G/1
TABLET ORAL
Qty: 180 TABLET | Refills: 3 | Status: SHIPPED | OUTPATIENT
Start: 2025-02-17

## 2025-03-19 ENCOUNTER — APPOINTMENT (OUTPATIENT)
Dept: CT IMAGING | Age: 75
End: 2025-03-19
Attending: FAMILY MEDICINE
Payer: MEDICARE

## 2025-03-19 ENCOUNTER — HOSPITAL ENCOUNTER (EMERGENCY)
Age: 75
Discharge: HOME | End: 2025-03-19
Attending: FAMILY MEDICINE
Payer: MEDICARE

## 2025-03-19 VITALS
WEIGHT: 210 LBS | RESPIRATION RATE: 16 BRPM | SYSTOLIC BLOOD PRESSURE: 126 MMHG | TEMPERATURE: 97.9 F | HEART RATE: 70 BPM | HEIGHT: 71 IN | DIASTOLIC BLOOD PRESSURE: 86 MMHG | BODY MASS INDEX: 29.4 KG/M2 | OXYGEN SATURATION: 100 %

## 2025-03-19 DIAGNOSIS — S00.01XA ABRASION, SCALP W/O INFECTION: Primary | ICD-10-CM

## 2025-03-19 PROCEDURE — 99284 EMERGENCY DEPT VISIT MOD MDM: CPT

## 2025-03-19 PROCEDURE — 70450 CT HEAD/BRAIN W/O DYE: CPT

## 2025-03-19 PROCEDURE — 72125 CT NECK SPINE W/O DYE: CPT

## 2025-03-19 ASSESSMENT — PAIN - FUNCTIONAL ASSESSMENT: PAIN_FUNCTIONAL_ASSESSMENT: 0-10

## 2025-03-19 ASSESSMENT — ENCOUNTER SYMPTOMS
NAUSEA: 0
VOMITING: 0

## 2025-03-19 ASSESSMENT — PAIN DESCRIPTION - LOCATION: LOCATION: HEAD

## 2025-03-19 ASSESSMENT — PAIN SCALES - GENERAL: PAINLEVEL_OUTOF10: 5

## 2025-03-19 NOTE — ED PROVIDER NOTES
LA) 120 MG CR CAPSULE    Take 1 capsule by mouth daily    RIVAROXABAN (XARELTO) 20 MG TABS TABLET    Take 1 tablet by mouth daily (with breakfast)    SODIUM CHLORIDE 1 G TABLET    TAKE ONE TABLET, TWO TIMES A DAY, IN THE MORNING AND AT BEDTIME, BY MOUTH.    TAMSULOSIN (FLOMAX) 0.4 MG CAPSULE    Take 1 capsule by mouth daily    TOPIRAMATE (TOPAMAX SPRINKLE) 25 MG CAPSULE    Take 1 capsule by mouth 2 times daily    VITAMIN D (CHOLECALCIFEROL) 25 MCG (1000 UT) TABS TABLET    Take 1 tablet by mouth in the morning and at bedtime       ALLERGIES     is allergic to latex, doxycycline calcium, lactose, nasacort [triamcinolone], and wellbutrin [bupropion].    FAMILY HISTORY     He indicated that his mother is . He indicated that his father is . He indicated that his sister is alive. He indicated that his brother is alive. He indicated that the status of his paternal grandfather is unknown.   family history includes Cancer in his paternal grandfather; Diabetes in his mother; Heart Disease in his father and mother; High Blood Pressure in his father and mother; High Cholesterol in his father and mother; Kidney Disease in his mother; Stroke in his father.    SOCIAL HISTORY      reports that he quit smoking about 53 years ago. His smoking use included cigarettes. He started smoking about 59 years ago. He has a 12 pack-year smoking history. He has quit using smokeless tobacco. He reports that he does not currently use drugs after having used the following drugs: Marijuana (Weed). He reports that he does not drink alcohol.    PHYSICAL EXAM     INITIAL VITALS:  height is 1.803 m (5' 11\") and weight is 95.3 kg (210 lb). His oral temperature is 97.9 °F (36.6 °C). His blood pressure is 126/86 and his pulse is 70. His respiration is 16 and oxygen saturation is 100%.    Physical Exam  Vitals and nursing note reviewed.   HENT:      Head: Normocephalic.      Right Ear: Tympanic membrane normal.      Left Ear: Tympanic

## 2025-03-19 NOTE — ED NOTES
Pt resting, resp even and unlabored, skin pink, warm and dry. Abrasion on scalp cleansed with wound cleanser. Pt given discharge instructions and verbalizes understanding, pt released.

## 2025-03-19 NOTE — DISCHARGE INSTRUCTIONS
Watch for increased redness, discharge at the abrasion site on the top of the scalp.  Soap and water at least a few times daily.  Follow-up with PCP if redness discharge at the abrasion site should occur.  Monitor any changes any altered mental status, recurrent vomiting then return to ED.

## 2025-03-19 NOTE — ED NOTES
Pt states he stood up yesterday and and tree branch hit him in the head. Pt denies LOC, head or neck pain. Pt states he has been feeling \"loopy\" since incident. Pt denies nausea, vomiting, dizzy, lightheadness. Pt does complain of a headache. Pt alert and oriented times 3, resp even and unlabored, skin pink, warm and dry. Abrasion noted on top of scalp.

## 2025-03-20 ENCOUNTER — OFFICE VISIT (OUTPATIENT)
Dept: ENT CLINIC | Age: 75
End: 2025-03-20
Payer: MEDICARE

## 2025-03-20 VITALS
TEMPERATURE: 97.5 F | DIASTOLIC BLOOD PRESSURE: 60 MMHG | HEART RATE: 69 BPM | HEIGHT: 73 IN | OXYGEN SATURATION: 98 % | BODY MASS INDEX: 26.36 KG/M2 | SYSTOLIC BLOOD PRESSURE: 104 MMHG | WEIGHT: 198.9 LBS

## 2025-03-20 DIAGNOSIS — J34.2 NASAL SEPTAL DEVIATION: Primary | ICD-10-CM

## 2025-03-20 DIAGNOSIS — Z97.4 WEARS HEARING AID IN BOTH EARS: ICD-10-CM

## 2025-03-20 DIAGNOSIS — H93.8X3 SENSATION OF FULLNESS IN BOTH EARS: ICD-10-CM

## 2025-03-20 DIAGNOSIS — J32.2 CHRONIC ETHMOIDAL SINUSITIS: ICD-10-CM

## 2025-03-20 DIAGNOSIS — G47.33 OSA ON CPAP: ICD-10-CM

## 2025-03-20 DIAGNOSIS — J32.0 CHRONIC MAXILLARY SINUSITIS: ICD-10-CM

## 2025-03-20 DIAGNOSIS — H74.93: ICD-10-CM

## 2025-03-20 PROCEDURE — 1036F TOBACCO NON-USER: CPT | Performed by: REGISTERED NURSE

## 2025-03-20 PROCEDURE — 99204 OFFICE O/P NEW MOD 45 MIN: CPT | Performed by: REGISTERED NURSE

## 2025-03-20 PROCEDURE — 3074F SYST BP LT 130 MM HG: CPT | Performed by: REGISTERED NURSE

## 2025-03-20 PROCEDURE — 3078F DIAST BP <80 MM HG: CPT | Performed by: REGISTERED NURSE

## 2025-03-20 PROCEDURE — G8427 DOCREV CUR MEDS BY ELIG CLIN: HCPCS | Performed by: REGISTERED NURSE

## 2025-03-20 PROCEDURE — 1159F MED LIST DOCD IN RCRD: CPT | Performed by: REGISTERED NURSE

## 2025-03-20 PROCEDURE — G8417 CALC BMI ABV UP PARAM F/U: HCPCS | Performed by: REGISTERED NURSE

## 2025-03-20 PROCEDURE — 1123F ACP DISCUSS/DSCN MKR DOCD: CPT | Performed by: REGISTERED NURSE

## 2025-03-20 PROCEDURE — 3017F COLORECTAL CA SCREEN DOC REV: CPT | Performed by: REGISTERED NURSE

## 2025-03-20 PROCEDURE — 1160F RVW MEDS BY RX/DR IN RCRD: CPT | Performed by: REGISTERED NURSE

## 2025-03-20 RX ORDER — L. ACIDOPHILUS/L.BULGARICUS 1MM CELL
1 TABLET ORAL DAILY
Qty: 21 TABLET | Refills: 0 | Status: SHIPPED | OUTPATIENT
Start: 2025-03-20

## 2025-03-20 NOTE — PATIENT INSTRUCTIONS
the solution is not hot.  Bend over the sink (some people do this in the shower) and squirt the solution into each side of your nose, aiming the stream toward the back of your head, not the top of your head. The solution should flow into one nostril and out of the other, but it will not harm you if you swallow a little. Avoid blowing your nose for about 15 minutes (this is especially helpful if the solution sometimes gets trapped in your ears).  Some people experience a little burning sensation the first few times that they use buffered saline solution, but this usually goes away after they adapt to it.    Flush each side of nose with saline in a NeilMed bottle, filled with distilled water and a NeilMed packet twice a day. Lean over and face down, rinse up one side and let it run out the other nostril. Then do the other side the same way. If it runs down your throat, lean over farther.

## 2025-03-20 NOTE — PROGRESS NOTES
[facility] [place] [facility]  [facility]AR, [facility]SE AND TH[facility]  [address]  [address]  [place] [id number]  Dept: [phone]  Dept Fax: [phone]  Loc: [phone]    Edwin Stock is a 74 y.o. male who was referred by Rommel Downs APRN* for:  Chief Complaint   Patient presents with    New Patient     New patient here for chronic nasal congestion.  Patient states he had nasal congestion all of his life, patient states that both of his ears are hurting today as well.    .    HPI:     Edwin Stock presents today for chronic ear nose and throat concerns.  Patient was referred by Rommel Downs APRN*; notes reviewed.    Patient endorses longstanding nasal obstruction worse to the left side.  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  Patient also has significant history of loud noise exposure working as a  in the [employer].  He endorses frequent history of sinus pressure and pain usually across the forehead and into the bilateral cheeks.  He does get frequent discolored drainage out of the nose but usually runs down the back of his throat.  He endorses constantly feeling like he has to blow his nose but unable to expel any mucus or cause any improvement in his breathing quality.  Patient does have history of HARINDER on CPAP and follows with sleep medicine.  He describes chronic ear concerns [facility] and the sensation of water in his ears ongoing for many years.  Patient states that he had history of palate/oral surgery in the 1970s through the [employer] and they potentially did something in regards to his nose at this time but he is uncertain.  He endorses that he would be very interested in surgical management of his chronic nasal obstruction as he voices being very tired of mouth breathing.  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  Today patient endorses some mild discomfort to bilateral mastoid

## 2025-04-12 ENCOUNTER — HOSPITAL ENCOUNTER (OUTPATIENT)
Dept: GENERAL RADIOLOGY | Age: 75
Discharge: HOME | End: 2025-04-12
Payer: MEDICARE

## 2025-04-12 DIAGNOSIS — D64.9 ANEMIA, UNSPECIFIED TYPE: ICD-10-CM

## 2025-04-12 DIAGNOSIS — N13.8 BPH WITH OBSTRUCTION/LOWER URINARY TRACT SYMPTOMS: ICD-10-CM

## 2025-04-12 DIAGNOSIS — N40.1 BPH WITH OBSTRUCTION/LOWER URINARY TRACT SYMPTOMS: ICD-10-CM

## 2025-04-12 DIAGNOSIS — E78.5 HYPERLIPIDEMIA WITH TARGET LDL LESS THAN 100: ICD-10-CM

## 2025-04-12 DIAGNOSIS — E11.65 UNCONTROLLED TYPE 2 DIABETES MELLITUS WITH HYPERGLYCEMIA (HCC): ICD-10-CM

## 2025-04-12 DIAGNOSIS — I48.0 PAROXYSMAL ATRIAL FIBRILLATION (HCC): ICD-10-CM

## 2025-04-12 DIAGNOSIS — E53.8 VITAMIN B12 DEFICIENCY: ICD-10-CM

## 2025-04-12 LAB
ALBUMIN SERPL BCG-MCNC: 4.3 G/DL (ref 3.4–4.9)
ALP SERPL-CCNC: 150 U/L (ref 40–129)
ALT SERPL W/O P-5'-P-CCNC: 35 U/L (ref 10–50)
ANION GAP SERPL CALC-SCNC: 10 MEQ/L (ref 8–16)
AST SERPL-CCNC: 21 U/L (ref 10–50)
BILIRUB SERPL-MCNC: 0.4 MG/DL (ref 0.3–1.2)
BUN SERPL-MCNC: 18 MG/DL (ref 8–23)
CALCIUM SERPL-MCNC: 9.2 MG/DL (ref 8.8–10.2)
CHLORIDE SERPL-SCNC: 102 MEQ/L (ref 98–111)
CHOLEST SERPL-MCNC: 98 MG/DL (ref 100–199)
CO2 SERPL-SCNC: 26 MEQ/L (ref 22–29)
CREAT SERPL-MCNC: 1.1 MG/DL (ref 0.7–1.2)
CREAT UR-MCNC: 150 MG/DL
DEPRECATED MEAN GLUCOSE BLD GHB EST-ACNC: 123 MG/DL (ref 70–126)
DEPRECATED RDW RBC AUTO: 47.8 FL (ref 35–45)
ERYTHROCYTE [DISTWIDTH] IN BLOOD BY AUTOMATED COUNT: 13.8 % (ref 11.5–14.5)
FERRITIN SERPL IA-MCNC: 39 NG/ML (ref 30–400)
FOLATE SERPL-MCNC: > 20 NG/ML (ref 4.6–34.8)
GFR SERPL CREATININE-BSD FRML MDRD: 70 ML/MIN/1.73M2
GLUCOSE FASTING: 74 MG/DL (ref 74–109)
HBA1C MFR BLD HPLC: 6.1 % (ref 4–6)
HCT VFR BLD AUTO: 41.9 % (ref 42–52)
HDLC SERPL-MCNC: 37 MG/DL
HGB BLD-MCNC: 13.4 GM/DL (ref 14–18)
IRON SATN MFR SERPL: 28 % (ref 20–50)
IRON SERPL-MCNC: 86 UG/DL (ref 61–157)
LDLC SERPL CALC-MCNC: 50 MG/DL
MCH RBC QN AUTO: 30.3 PG (ref 26–33)
MCHC RBC AUTO-ENTMCNC: 32 GM/DL (ref 32.2–35.5)
MCV RBC AUTO: 94.8 FL (ref 80–94)
MICROALBUMIN UR-MCNC: < 2 MG/DL
MICROALBUMIN/CREAT RATIO PNL UR: 13 MG/G (ref 0–30)
PLATELET # BLD AUTO: 201 THOU/MM3 (ref 130–400)
PMV BLD AUTO: 9.8 FL (ref 9.4–12.4)
POTASSIUM SERPL-SCNC: 5 MEQ/L (ref 3.5–5.2)
PROT SERPL-MCNC: 6.7 G/DL (ref 6.4–8.3)
PSA SERPL-MCNC: 5.47 NG/ML (ref 0–1)
RBC # BLD AUTO: 4.42 MILL/MM3 (ref 4.7–6.1)
SODIUM SERPL-SCNC: 138 MEQ/L (ref 135–145)
T4 FREE SERPL-MCNC: 1.2 NG/DL (ref 0.92–1.68)
TIBC SERPL-MCNC: 306 UG/DL (ref 171–450)
TRIGL SERPL-MCNC: 56 MG/DL (ref 0–199)
TSH SERPL DL<=0.05 MIU/L-ACNC: 4.9 UIU/ML (ref 0.27–4.2)
VIT B12 SERPL-MCNC: 543 PG/ML (ref 232–1245)
WBC # BLD AUTO: 6.7 THOU/MM3 (ref 4.8–10.8)

## 2025-04-12 PROCEDURE — 83540 ASSAY OF IRON: CPT

## 2025-04-12 PROCEDURE — 82607 VITAMIN B-12: CPT

## 2025-04-12 PROCEDURE — 80053 COMPREHEN METABOLIC PANEL: CPT

## 2025-04-12 PROCEDURE — 83550 IRON BINDING TEST: CPT

## 2025-04-12 PROCEDURE — 83036 HEMOGLOBIN GLYCOSYLATED A1C: CPT

## 2025-04-12 PROCEDURE — 84439 ASSAY OF FREE THYROXINE: CPT

## 2025-04-12 PROCEDURE — 82728 ASSAY OF FERRITIN: CPT

## 2025-04-12 PROCEDURE — 85027 COMPLETE CBC AUTOMATED: CPT

## 2025-04-12 PROCEDURE — 80061 LIPID PANEL: CPT

## 2025-04-12 PROCEDURE — 82746 ASSAY OF FOLIC ACID SERUM: CPT

## 2025-04-12 PROCEDURE — 84443 ASSAY THYROID STIM HORMONE: CPT

## 2025-04-12 PROCEDURE — 84153 ASSAY OF PSA TOTAL: CPT

## 2025-04-12 PROCEDURE — 36415 COLL VENOUS BLD VENIPUNCTURE: CPT

## 2025-04-12 PROCEDURE — 82043 UR ALBUMIN QUANTITATIVE: CPT

## 2025-04-13 ENCOUNTER — RESULTS FOLLOW-UP (OUTPATIENT)
Dept: FAMILY MEDICINE CLINIC | Age: 75
End: 2025-04-13

## 2025-04-21 ENCOUNTER — RESULTS FOLLOW-UP (OUTPATIENT)
Dept: ENT CLINIC | Age: 75
End: 2025-04-21

## 2025-04-21 ENCOUNTER — HOSPITAL ENCOUNTER (OUTPATIENT)
Dept: CT IMAGING | Age: 75
Discharge: HOME | End: 2025-04-21
Attending: REGISTERED NURSE
Payer: MEDICARE

## 2025-04-21 DIAGNOSIS — J34.2 NASAL SEPTAL DEVIATION: ICD-10-CM

## 2025-04-21 DIAGNOSIS — H93.8X3 SENSATION OF FULLNESS IN BOTH EARS: ICD-10-CM

## 2025-04-21 DIAGNOSIS — J32.0 CHRONIC MAXILLARY SINUSITIS: ICD-10-CM

## 2025-04-21 DIAGNOSIS — Z97.4 WEARS HEARING AID IN BOTH EARS: ICD-10-CM

## 2025-04-21 DIAGNOSIS — H74.93: ICD-10-CM

## 2025-04-21 DIAGNOSIS — J32.2 CHRONIC ETHMOIDAL SINUSITIS: ICD-10-CM

## 2025-04-21 PROCEDURE — 70486 CT MAXILLOFACIAL W/O DYE: CPT

## 2025-05-01 ENCOUNTER — PREP FOR PROCEDURE (OUTPATIENT)
Dept: ENT CLINIC | Age: 75
End: 2025-05-01

## 2025-05-01 ENCOUNTER — TELEPHONE (OUTPATIENT)
Dept: CARDIOLOGY CLINIC | Age: 75
End: 2025-05-01

## 2025-05-01 ENCOUNTER — TELEPHONE (OUTPATIENT)
Dept: FAMILY MEDICINE CLINIC | Age: 75
End: 2025-05-01

## 2025-05-01 ENCOUNTER — OFFICE VISIT (OUTPATIENT)
Dept: ENT CLINIC | Age: 75
End: 2025-05-01

## 2025-05-01 ENCOUNTER — TELEPHONE (OUTPATIENT)
Dept: PULMONOLOGY | Age: 75
End: 2025-05-01

## 2025-05-01 VITALS
OXYGEN SATURATION: 98 % | RESPIRATION RATE: 16 BRPM | WEIGHT: 194.8 LBS | SYSTOLIC BLOOD PRESSURE: 116 MMHG | HEIGHT: 73 IN | BODY MASS INDEX: 25.82 KG/M2 | TEMPERATURE: 97.6 F | DIASTOLIC BLOOD PRESSURE: 62 MMHG | HEART RATE: 70 BPM

## 2025-05-01 DIAGNOSIS — J34.2 DEVIATED NASAL SEPTUM: ICD-10-CM

## 2025-05-01 DIAGNOSIS — J32.2 CHRONIC ETHMOIDAL SINUSITIS: ICD-10-CM

## 2025-05-01 DIAGNOSIS — J32.0 CHRONIC MAXILLARY SINUSITIS: ICD-10-CM

## 2025-05-01 DIAGNOSIS — J34.3 HYPERTROPHY OF BOTH INFERIOR NASAL TURBINATES: ICD-10-CM

## 2025-05-01 DIAGNOSIS — J34.3 HYPERTROPHY OF INFERIOR NASAL TURBINATE: ICD-10-CM

## 2025-05-01 DIAGNOSIS — J34.89 NASAL OBSTRUCTION: ICD-10-CM

## 2025-05-01 DIAGNOSIS — J32.0 CHRONIC SINUSITIS OF BOTH MAXILLARY SINUSES: ICD-10-CM

## 2025-05-01 DIAGNOSIS — J34.89 NASAL OBSTRUCTION: Primary | ICD-10-CM

## 2025-05-01 DIAGNOSIS — J34.2 NASAL SEPTAL DEVIATION: ICD-10-CM

## 2025-05-01 RX ORDER — TRIAMCINOLONE ACETONIDE 1 MG/G
CREAM TOPICAL
COMMUNITY
Start: 2025-04-22

## 2025-05-01 NOTE — TELEPHONE ENCOUNTER
Pre op Risk Assessment    Procedure septoplasty, bilateral inferior nasal turbinate reduction, image guided nasal endoscopy with total bilateral ethmoidectomy, bilateral maxillary antrostomy without tissue removal.  Physician Dr Guadarrama   Date of surgery/procedure 06/13/2025    Last OV 08/2024  Last Stress 2017  Last Echo 2017  Last Cath   Last Stent ?  Is patient on blood thinners ASA  Hold Meds/how many days ?

## 2025-05-01 NOTE — TELEPHONE ENCOUNTER
Patient is being scheduled for a procedure with Dr. Guadarrama and we are requesting clearance from Pulmonary.    DOS: 06/13/2025  Procedure: Septoplasty, bilateral inferior nasal turbinate reduction, image guided nasal endoscoy with total bilateral ethmoidectomy, bilateral maxillary antrostomy without tissue removal.   Meds to hold:  asa, multivitamin x 1 week. Metformin x 2 days. Insulin glargine 1/2 dose the night before surgery. Xarelto direction from [facility].      Please advise by 06/06/2025.  Thank you!

## 2025-05-01 NOTE — TELEPHONE ENCOUNTER
Patient is being scheduled for a procedure with Dr. Guadarrama and we are requesting clearance from Dr. Boone.    DOS: 06/13/2025  Procedure: Septoplasty, bilateral inferior nasal turbinate reduction, image guided nasal endoscoy with total bilateral ethmoidectomy, bilateral maxillary antrostomy without tissue removal.   Meds to hold:  asa, multivitamin x 1 week. Metformin x 2 days. Insulin glargine 1/2 dose the night before surgery. Xarelto direction from [employer].      Thank you!

## 2025-05-01 NOTE — PROGRESS NOTES
inferior turbinates reduction  37618 stereotactic computerized image-guidance for sinus surgery  26463 Bilateral total ethmoid  89335 Bilateral maxillary without tissue removal.  The risks, benefits, alternatives and expected outcomes of the procedure were explained to the patient. The patient verbalized understanding and agreed to proceed.    Surgery difficulties and possible need to complete it in two occasions were discussed.    The findings were explained and his questions were answered.        Return for post op.         Anthony Guadarrama MD    **This report has been created using voice recognition software. It may contain minor errors which are inherent in voice recognition technology.**

## 2025-05-01 NOTE — TELEPHONE ENCOUNTER
Patient is being scheduled for a procedure with Dr. Guadarrama and we are requesting clearance from PCP.    DOS: 06/13/2025  Procedure: Septoplasty, bilateral inferior nasal turbinate reduction, image guided nasal endoscoy with total bilateral ethmoidectomy, bilateral maxillary antrostomy without tissue removal.   Meds to hold:  asa, multivitamin x 1 week. Metformin x 2 days. Insulin glargine 1/2 dose the night before surgery. Xarelto direction from [facility].      Patient will also be getting Cardiology and Pulmonary clearance.  His CBC, CMP, and EKG are current per Anesthesia guidelines.  No specific form is required, just a note on this PE or in an OV if one is required.     Please advise by 06/06/2025.  Thank you!

## 2025-05-19 ENCOUNTER — RESULTS FOLLOW-UP (OUTPATIENT)
Dept: FAMILY MEDICINE CLINIC | Age: 75
End: 2025-05-19

## 2025-05-19 ENCOUNTER — HOSPITAL ENCOUNTER (OUTPATIENT)
Age: 75
Discharge: HOME | End: 2025-05-19
Payer: MEDICARE

## 2025-05-19 ENCOUNTER — HOSPITAL ENCOUNTER (OUTPATIENT)
Dept: GENERAL RADIOLOGY | Age: 75
Discharge: HOME | End: 2025-05-19
Attending: FAMILY MEDICINE
Payer: MEDICARE

## 2025-05-19 ENCOUNTER — ANTI-COAG VISIT (OUTPATIENT)
Dept: FAMILY MEDICINE CLINIC | Age: 75
End: 2025-05-19

## 2025-05-19 ENCOUNTER — OFFICE VISIT (OUTPATIENT)
Dept: FAMILY MEDICINE CLINIC | Age: 75
End: 2025-05-19
Payer: MEDICARE

## 2025-05-19 VITALS
TEMPERATURE: 97 F | SYSTOLIC BLOOD PRESSURE: 110 MMHG | DIASTOLIC BLOOD PRESSURE: 58 MMHG | HEART RATE: 70 BPM | OXYGEN SATURATION: 97 % | RESPIRATION RATE: 18 BRPM | WEIGHT: 191.8 LBS | BODY MASS INDEX: 25.3 KG/M2

## 2025-05-19 DIAGNOSIS — I10 PRIMARY HYPERTENSION: ICD-10-CM

## 2025-05-19 DIAGNOSIS — D50.9 IRON DEFICIENCY ANEMIA, UNSPECIFIED IRON DEFICIENCY ANEMIA TYPE: ICD-10-CM

## 2025-05-19 DIAGNOSIS — L03.032 PARONYCHIA OF GREAT TOE, LEFT: ICD-10-CM

## 2025-05-19 DIAGNOSIS — F43.10 POST TRAUMATIC STRESS DISORDER: ICD-10-CM

## 2025-05-19 DIAGNOSIS — H40.89 OTHER GLAUCOMA OF BOTH EYES: ICD-10-CM

## 2025-05-19 DIAGNOSIS — E11.65 UNCONTROLLED TYPE 2 DIABETES MELLITUS WITH HYPERGLYCEMIA (HCC): Primary | ICD-10-CM

## 2025-05-19 DIAGNOSIS — R79.89 ELEVATED TSH: ICD-10-CM

## 2025-05-19 DIAGNOSIS — G62.9 PERIPHERAL POLYNEUROPATHY: ICD-10-CM

## 2025-05-19 DIAGNOSIS — E78.5 HYPERLIPIDEMIA WITH TARGET LDL LESS THAN 100: ICD-10-CM

## 2025-05-19 DIAGNOSIS — E11.42 DIABETIC PERIPHERAL NEUROPATHY ASSOCIATED WITH TYPE 2 DIABETES MELLITUS (HCC): ICD-10-CM

## 2025-05-19 DIAGNOSIS — J44.89 COPD WITH ASTHMA (HCC): ICD-10-CM

## 2025-05-19 DIAGNOSIS — S51.811A SKIN TEAR OF RIGHT FOREARM WITHOUT COMPLICATION, INITIAL ENCOUNTER: ICD-10-CM

## 2025-05-19 DIAGNOSIS — I48.0 PAROXYSMAL ATRIAL FIBRILLATION (HCC): ICD-10-CM

## 2025-05-19 DIAGNOSIS — D64.9 ANEMIA, UNSPECIFIED TYPE: ICD-10-CM

## 2025-05-19 DIAGNOSIS — M19.90 ARTHRITIS: ICD-10-CM

## 2025-05-19 DIAGNOSIS — K64.4 EXTERNAL HEMORRHOID: ICD-10-CM

## 2025-05-19 DIAGNOSIS — E87.1 HYPONATREMIA: ICD-10-CM

## 2025-05-19 DIAGNOSIS — R20.2 PARESTHESIA OF RIGHT LEG: ICD-10-CM

## 2025-05-19 DIAGNOSIS — N13.8 BPH WITH OBSTRUCTION/LOWER URINARY TRACT SYMPTOMS: ICD-10-CM

## 2025-05-19 DIAGNOSIS — C44.42 SQUAMOUS CELL CARCINOMA OF SCALP: ICD-10-CM

## 2025-05-19 DIAGNOSIS — Z98.890 S/P CARDIAC CATHETERIZATION: ICD-10-CM

## 2025-05-19 DIAGNOSIS — I73.9 PVD (PERIPHERAL VASCULAR DISEASE): ICD-10-CM

## 2025-05-19 DIAGNOSIS — G57.11 MERALGIA PARESTHETICA OF RIGHT SIDE: ICD-10-CM

## 2025-05-19 DIAGNOSIS — Z01.818 PREOP EXAMINATION: ICD-10-CM

## 2025-05-19 DIAGNOSIS — Z77.098 EXPOSURE TO AGENT ORANGE: ICD-10-CM

## 2025-05-19 DIAGNOSIS — R94.131 ABNORMAL EMG: ICD-10-CM

## 2025-05-19 DIAGNOSIS — S51.812A SKIN TEAR OF FOREARM WITHOUT COMPLICATION, LEFT, INITIAL ENCOUNTER: ICD-10-CM

## 2025-05-19 DIAGNOSIS — G25.0 ESSENTIAL TREMOR: ICD-10-CM

## 2025-05-19 DIAGNOSIS — E66.9 OBESITY (BMI 30-39.9): ICD-10-CM

## 2025-05-19 DIAGNOSIS — G47.33 OBSTRUCTIVE SLEEP APNEA ON CPAP: ICD-10-CM

## 2025-05-19 DIAGNOSIS — E53.8 VITAMIN B12 DEFICIENCY: ICD-10-CM

## 2025-05-19 DIAGNOSIS — J43.1 PANLOBULAR EMPHYSEMA (HCC): ICD-10-CM

## 2025-05-19 DIAGNOSIS — N40.1 BPH WITH OBSTRUCTION/LOWER URINARY TRACT SYMPTOMS: ICD-10-CM

## 2025-05-19 LAB
ALBUMIN SERPL BCG-MCNC: 4.1 G/DL (ref 3.4–4.9)
ALP SERPL-CCNC: 160 U/L (ref 40–129)
ALT SERPL W/O P-5'-P-CCNC: 25 U/L (ref 10–50)
ANION GAP SERPL CALC-SCNC: 13 MEQ/L (ref 8–16)
AST SERPL-CCNC: 18 U/L (ref 10–50)
BILIRUB SERPL-MCNC: 0.4 MG/DL (ref 0.3–1.2)
BUN SERPL-MCNC: 17 MG/DL (ref 8–23)
CALCIUM SERPL-MCNC: 9 MG/DL (ref 8.8–10.2)
CHLORIDE SERPL-SCNC: 101 MEQ/L (ref 98–111)
CO2 SERPL-SCNC: 23 MEQ/L (ref 22–29)
CREAT SERPL-MCNC: 1.1 MG/DL (ref 0.7–1.2)
DEPRECATED RDW RBC AUTO: 46.7 FL (ref 35–45)
ERYTHROCYTE [DISTWIDTH] IN BLOOD BY AUTOMATED COUNT: 13.6 % (ref 11.5–14.5)
GFR SERPL CREATININE-BSD FRML MDRD: 70 ML/MIN/1.73M2
GLUCOSE FASTING: 115 MG/DL (ref 74–109)
HCT VFR BLD AUTO: 43.6 % (ref 42–52)
HGB BLD-MCNC: 14 GM/DL (ref 14–18)
INR BLD: 1.42 (ref 0.85–1.13)
MCH RBC QN AUTO: 30.2 PG (ref 26–33)
MCHC RBC AUTO-ENTMCNC: 32.1 GM/DL (ref 32.2–35.5)
MCV RBC AUTO: 94 FL (ref 80–94)
PLATELET # BLD AUTO: 219 THOU/MM3 (ref 130–400)
PMV BLD AUTO: 10.1 FL (ref 9.4–12.4)
POTASSIUM SERPL-SCNC: 4 MEQ/L (ref 3.5–5.2)
PROT SERPL-MCNC: 6.7 G/DL (ref 6.4–8.3)
RBC # BLD AUTO: 4.64 MILL/MM3 (ref 4.7–6.1)
SODIUM SERPL-SCNC: 137 MEQ/L (ref 135–145)
WBC # BLD AUTO: 7.4 THOU/MM3 (ref 4.8–10.8)

## 2025-05-19 PROCEDURE — G8417 CALC BMI ABV UP PARAM F/U: HCPCS | Performed by: FAMILY MEDICINE

## 2025-05-19 PROCEDURE — 1123F ACP DISCUSS/DSCN MKR DOCD: CPT | Performed by: FAMILY MEDICINE

## 2025-05-19 PROCEDURE — 3078F DIAST BP <80 MM HG: CPT | Performed by: FAMILY MEDICINE

## 2025-05-19 PROCEDURE — 3044F HG A1C LEVEL LT 7.0%: CPT | Performed by: FAMILY MEDICINE

## 2025-05-19 PROCEDURE — 3074F SYST BP LT 130 MM HG: CPT | Performed by: FAMILY MEDICINE

## 2025-05-19 PROCEDURE — 85027 COMPLETE CBC AUTOMATED: CPT

## 2025-05-19 PROCEDURE — 96372 THER/PROPH/DIAG INJ SC/IM: CPT | Performed by: FAMILY MEDICINE

## 2025-05-19 PROCEDURE — 80053 COMPREHEN METABOLIC PANEL: CPT

## 2025-05-19 PROCEDURE — 85610 PROTHROMBIN TIME: CPT

## 2025-05-19 PROCEDURE — 1036F TOBACCO NON-USER: CPT | Performed by: FAMILY MEDICINE

## 2025-05-19 PROCEDURE — 99215 OFFICE O/P EST HI 40 MIN: CPT | Performed by: FAMILY MEDICINE

## 2025-05-19 PROCEDURE — 3023F SPIROM DOC REV: CPT | Performed by: FAMILY MEDICINE

## 2025-05-19 PROCEDURE — 1160F RVW MEDS BY RX/DR IN RCRD: CPT | Performed by: FAMILY MEDICINE

## 2025-05-19 PROCEDURE — 1159F MED LIST DOCD IN RCRD: CPT | Performed by: FAMILY MEDICINE

## 2025-05-19 PROCEDURE — 36415 COLL VENOUS BLD VENIPUNCTURE: CPT

## 2025-05-19 PROCEDURE — 71046 X-RAY EXAM CHEST 2 VIEWS: CPT

## 2025-05-19 PROCEDURE — G8427 DOCREV CUR MEDS BY ELIG CLIN: HCPCS | Performed by: FAMILY MEDICINE

## 2025-05-19 RX ORDER — CYANOCOBALAMIN 1000 UG/ML
1000 INJECTION, SOLUTION INTRAMUSCULAR; SUBCUTANEOUS ONCE
Status: COMPLETED | OUTPATIENT
Start: 2025-05-19 | End: 2025-05-19

## 2025-05-19 RX ORDER — SODIUM CHLORIDE 1 G/1
1 TABLET ORAL 2 TIMES DAILY
Qty: 180 TABLET | Refills: 3 | Status: SHIPPED | OUTPATIENT
Start: 2025-05-19

## 2025-05-19 RX ADMIN — CYANOCOBALAMIN 1000 MCG: 1000 INJECTION, SOLUTION INTRAMUSCULAR; SUBCUTANEOUS at 15:41

## 2025-05-19 SDOH — ECONOMIC STABILITY: FOOD INSECURITY: WITHIN THE PAST 12 MONTHS, YOU WORRIED THAT YOUR FOOD WOULD RUN OUT BEFORE YOU GOT MONEY TO BUY MORE.: NEVER TRUE

## 2025-05-19 SDOH — ECONOMIC STABILITY: FOOD INSECURITY: WITHIN THE PAST 12 MONTHS, THE FOOD YOU BOUGHT JUST DIDN'T LAST AND YOU DIDN'T HAVE MONEY TO GET MORE.: NEVER TRUE

## 2025-05-19 ASSESSMENT — PATIENT HEALTH QUESTIONNAIRE - PHQ9
SUM OF ALL RESPONSES TO PHQ QUESTIONS 1-9: 0
1. LITTLE INTEREST OR PLEASURE IN DOING THINGS: NOT AT ALL
SUM OF ALL RESPONSES TO PHQ QUESTIONS 1-9: 0
2. FEELING DOWN, DEPRESSED OR HOPELESS: NOT AT ALL

## 2025-05-19 NOTE — PROGRESS NOTES
Administrations This Visit       cyanocobalamin injection 1,000 mcg       Admin Date  05/19/2025  15:41 Action  Given Dose  1,000 mcg Route  IntraMUSCular Site  Deltoid Right Documented By  Marisela Greene CMA ([facility])    NDC: 1969-8728-83    Lot#: [id number]    : [employer]    Patient Supplied?: No                    Patient instructed to remain in clinic for 20 minutes after injection and was advised to report any adverse reaction to me immediately.    
equal.   Cardiovascular: Normal rate, regular rhythm, S1 normal, S2 normal and normal heart sounds.  Exam reveals no gallop.    No murmur heard.  Pulmonary/Chest: Effort normal and breath sounds normal. No respiratory distress. He has no wheezes. He has no rhonchi. He has no rales.   Abdominal: Soft. Normal appearance and bowel sounds are normal. He exhibits no distension and no mass. There is no hepatosplenomegaly. No tenderness. He has no rigidity, no rebound and no guarding. No hernia.   Musculoskeletal:        Right lower leg: He exhibits no edema.        Left lower leg: He exhibits no edema.   Neurological: He is alert. Oriented and pleasent    Cardiology clearance is on through Dr. Boone's office on May 1, 2025  CXR: Unchanged from 2024  Labs:  ok for surgery    Physical Exam  Mouth/Throat: Mucous membranes moist, no erythema, no exudate  Respiratory: Clear to auscultation, no wheezing, rales or rhonchi  Cardiovascular: Regular rate and rhythm, no murmurs, rubs, or gallops  Extremities: No edema, no cyanosis  Skin: Dry skin noted, typical skin tear on right forearm x 2    No results found for this visit on 05/19/25.    Assessment/Plan   Edwin \"Alexus\" was seen today for pre-op exam.    Diagnoses and all orders for this visit:    Uncontrolled type 2 diabetes mellitus with hyperglycemia (HCC)  -     Basic Metabolic Panel; Future  -     Hemoglobin A1C; Future    Panlobular emphysema (HCC)    Paroxysmal atrial fibrillation (HCC)    Diabetic peripheral neuropathy associated with type 2 diabetes mellitus (HCC)    Iron deficiency anemia, unspecified iron deficiency anemia type  -     cyanocobalamin injection 1,000 mcg    Vitamin B12 deficiency    Meralgia paresthetica of right side    PVD (peripheral vascular disease)    External hemorrhoid    Primary hypertension    Hyperlipidemia with target LDL less than 100    COPD with asthma (HCC)    Exposure to Agent Orange    BPH with obstruction/lower urinary tract

## 2025-05-28 NOTE — PROGRESS NOTES
[facility]    Patient: PETRONA GARCIA, 75 y.o.   : 1950    Patient of Dr. Han    Assessment/Plan     Assessment & Plan     1.COPD with asthma  -Well controlled on current regimen  -Continue Advair, Inhale 1 puff into the lungs in the morning and 1 puff in the evening.   -Continue Albuterol, Inhale 2 puffs into the lungs every 6 hours as needed for Wheezing  -Discussed albuterol inhaler use. Reviewed signs and symptoms indicating need for use including Shortness of breath and wheezing. Discussed with patient the importance of using inhaler  within the prescribed time frames. Patient also verbalized understanding that if they experience no relief after using albuterol and resting for 15 minutes they need to go to nearest ER or call 911.   -Reviewed preventative vaccinations  -Repeat PFT prior to next visit  -Chest x-ray completed on 2025 showed no acute lung findings, infiltrates, or effusions.     2. Overweight (BMI 25.0-29.9)  -Counseled patient on weight loss         From a pulmonary standpoint, patient would be at low risk for pulmonary complications going for a procedure involving general anesthesia and mechanical ventilation, due to history of COPD with asthma. Risks include but are not limited to prolonged mechanical ventilation, inability to wean from mechanical ventilation, postoperative pneumonia, and reintubation. To minimize complications, recommend breathing treatments pre and post operatively, monitoring SpO2, and aggressive pulmonary hygiene after procedure.   Risk factors for pulmonary complications include age, COPD with asthma, and obstructive sleep apnea. The risk increases if the procedure lasts longer than 3 hours and if requires general anesthesia.    ARISCAT risk score points: 3  [place] postop pneumonia risk: 0.7%  Ceferino postoperative respiratory failure risk/risk of mechanical ventilation for more than 48 hours and reintubation  Principal Discharge DX:	Nasal congestion

## 2025-05-29 ENCOUNTER — OFFICE VISIT (OUTPATIENT)
Dept: PULMONOLOGY | Age: 75
End: 2025-05-29
Payer: MEDICARE

## 2025-05-29 VITALS
WEIGHT: 193.2 LBS | DIASTOLIC BLOOD PRESSURE: 62 MMHG | HEART RATE: 69 BPM | OXYGEN SATURATION: 97 % | TEMPERATURE: 98.4 F | HEIGHT: 73 IN | BODY MASS INDEX: 25.6 KG/M2 | SYSTOLIC BLOOD PRESSURE: 124 MMHG

## 2025-05-29 DIAGNOSIS — J44.89 COPD WITH ASTHMA (HCC): Primary | ICD-10-CM

## 2025-05-29 DIAGNOSIS — E66.3 OVERWEIGHT (BMI 25.0-29.9): ICD-10-CM

## 2025-05-29 PROCEDURE — 1036F TOBACCO NON-USER: CPT

## 2025-05-29 PROCEDURE — 3074F SYST BP LT 130 MM HG: CPT

## 2025-05-29 PROCEDURE — G8427 DOCREV CUR MEDS BY ELIG CLIN: HCPCS

## 2025-05-29 PROCEDURE — 1160F RVW MEDS BY RX/DR IN RCRD: CPT

## 2025-05-29 PROCEDURE — 1123F ACP DISCUSS/DSCN MKR DOCD: CPT

## 2025-05-29 PROCEDURE — G8417 CALC BMI ABV UP PARAM F/U: HCPCS

## 2025-05-29 PROCEDURE — 1159F MED LIST DOCD IN RCRD: CPT

## 2025-05-29 PROCEDURE — 3078F DIAST BP <80 MM HG: CPT

## 2025-05-29 PROCEDURE — 99214 OFFICE O/P EST MOD 30 MIN: CPT

## 2025-05-29 PROCEDURE — 3023F SPIROM DOC REV: CPT

## 2025-05-29 RX ORDER — HYDROCORTISONE ACETATE 25 MG/1
25 SUPPOSITORY RECTAL 2 TIMES DAILY
COMMUNITY

## 2025-06-05 RX ORDER — OXYBUTYNIN CHLORIDE 10 MG/1
10 TABLET, EXTENDED RELEASE ORAL DAILY
Qty: 90 TABLET | Refills: 3 | Status: SHIPPED | OUTPATIENT
Start: 2025-06-05

## 2025-06-05 NOTE — PROGRESS NOTES
Attempted PAT call, patient not available, spoke with wife she said to call back tomorrow about 1100

## 2025-06-05 NOTE — TELEPHONE ENCOUNTER
Edwin Stock called requesting a refill on the following medications:  Requested Prescriptions     Pending Prescriptions Disp Refills    oxyBUTYnin (DITROPAN-XL) 10 MG extended release tablet [Pharmacy Med Name: OXYBUTYNIN CHLORIDE ER 10MG ER TABLET ER 24HR] 90 tablet 3     Sig: TAKE ONE TABLET DAILY, BY MOUTH.     Pharmacy verified:  .pv      Date of last visit: 10/29/2024  Date of next visit (if applicable): 10/28/2025

## 2025-06-06 NOTE — PROGRESS NOTES
Follow all instructions given by your physician  Do not eat or drink anything after midnight prior to surgery(includes water, chewing gum, mints and ice chips)  Sips of water am of surgery with allowed medications  May brush teeth   Bring insurance info and photo ID  Bring pertinent paperwork with you from Doctor or surgeons's office  Wear clean comfortable, loose-fitting clothing  No jewelry, piercings, or contact lenses to be worn day of surgery  No glue on dentures morning of surgery; you will be asked to remove them for surgery. Case for glasses.  Shower the night before and the morning of surgery with cleansing soap provided or a liquid antibacterial soap, dry with new fresh clean towel after each shower, no lotions, creams or powder.  Clean sheets and pillowcase on bed night before surgery  Bring rescue inhalers with you  Patient stated per Dr. Guadarrama he will not use his CPAP machine directly post op  Do you have a DNR? Yes  Please Bring Healthcare Directive or Healthcare Power of  in so we can scan it into your chart.    Report to [facility] on 2nd floor  Make sure you have a responsible adult to drive you home after your surgery otherwise surgery will be cancelled.   You must have a responsible adult to stay with you overnight after your procedure.    If you would become ill prior to surgery, please call the surgeon right away.  We request that you limit to 2 visitors in pre-op area    Call [phone] for any questions    Our pharmacy has a Meds to Beds program where they will deliver any new prescriptions you may have to your room before you leave. Our Pharmacy will clear it through your insurance; for example (same co pay). This enables you to take your new RX as soon as you need when you get home and avoids stop/wait delays on the way home.  Please have a form of payment with you and have someone designated as your Pharmacy contact with their phone # as you may not feel well or still be under the  influence of anesthesia.    Please refer to the SSI-Surgical Site Infection Flyer you hopefully received in the mail-together we can prevent infections; signs and symptoms reviewed.  When discharged be sure you understand how to care for your wound and that you have the Dr./office phone # to call if you have any concerns or questions about your wound.

## 2025-06-13 ENCOUNTER — ANESTHESIA (OUTPATIENT)
Dept: OPERATING ROOM | Age: 75
End: 2025-06-13
Payer: MEDICARE

## 2025-06-13 ENCOUNTER — HOSPITAL ENCOUNTER (OUTPATIENT)
Age: 75
Setting detail: OUTPATIENT SURGERY
Discharge: HOME | End: 2025-06-13
Attending: OTOLARYNGOLOGY | Admitting: OTOLARYNGOLOGY
Payer: MEDICARE

## 2025-06-13 ENCOUNTER — ANESTHESIA EVENT (OUTPATIENT)
Dept: OPERATING ROOM | Age: 75
End: 2025-06-13
Payer: MEDICARE

## 2025-06-13 VITALS
WEIGHT: 188 LBS | SYSTOLIC BLOOD PRESSURE: 127 MMHG | BODY MASS INDEX: 26.32 KG/M2 | RESPIRATION RATE: 18 BRPM | OXYGEN SATURATION: 96 % | HEIGHT: 71 IN | TEMPERATURE: 97 F | DIASTOLIC BLOOD PRESSURE: 64 MMHG | HEART RATE: 72 BPM

## 2025-06-13 DIAGNOSIS — G89.18 PAIN FOLLOWING SURGERY OR PROCEDURE: Primary | ICD-10-CM

## 2025-06-13 DIAGNOSIS — J34.2 NASAL SEPTAL DEVIATION: ICD-10-CM

## 2025-06-13 DIAGNOSIS — J34.3 HYPERTROPHY OF INFERIOR NASAL TURBINATE: ICD-10-CM

## 2025-06-13 DIAGNOSIS — J32.2 CHRONIC ETHMOIDAL SINUSITIS: ICD-10-CM

## 2025-06-13 DIAGNOSIS — J32.0 CHRONIC MAXILLARY SINUSITIS: ICD-10-CM

## 2025-06-13 DIAGNOSIS — J34.89 NASAL OBSTRUCTION: ICD-10-CM

## 2025-06-13 LAB
GLUCOSE BLD STRIP.AUTO-MCNC: 106 MG/DL (ref 70–108)
POTASSIUM SERPL-SCNC: 4.4 MEQ/L (ref 3.5–5.2)

## 2025-06-13 PROCEDURE — 30802 ABLATE INF TURBINATE SUBMUC: CPT | Performed by: OTOLARYNGOLOGY

## 2025-06-13 PROCEDURE — 2580000003 HC RX 258: Performed by: OTOLARYNGOLOGY

## 2025-06-13 PROCEDURE — 3600000004 HC SURGERY LEVEL 4 BASE: Performed by: OTOLARYNGOLOGY

## 2025-06-13 PROCEDURE — 6370000000 HC RX 637 (ALT 250 FOR IP): Performed by: NURSE ANESTHETIST, CERTIFIED REGISTERED

## 2025-06-13 PROCEDURE — 6370000000 HC RX 637 (ALT 250 FOR IP): Performed by: OTOLARYNGOLOGY

## 2025-06-13 PROCEDURE — 6360000002 HC RX W HCPCS: Performed by: NURSE ANESTHETIST, CERTIFIED REGISTERED

## 2025-06-13 PROCEDURE — 84132 ASSAY OF SERUM POTASSIUM: CPT

## 2025-06-13 PROCEDURE — 2709999900 HC NON-CHARGEABLE SUPPLY: Performed by: OTOLARYNGOLOGY

## 2025-06-13 PROCEDURE — 31256 EXPLORATION MAXILLARY SINUS: CPT | Performed by: OTOLARYNGOLOGY

## 2025-06-13 PROCEDURE — 7100000001 HC PACU RECOVERY - ADDTL 15 MIN: Performed by: OTOLARYNGOLOGY

## 2025-06-13 PROCEDURE — 6360000002 HC RX W HCPCS: Performed by: OTOLARYNGOLOGY

## 2025-06-13 PROCEDURE — 82948 REAGENT STRIP/BLOOD GLUCOSE: CPT

## 2025-06-13 PROCEDURE — 30520 REPAIR OF NASAL SEPTUM: CPT | Performed by: OTOLARYNGOLOGY

## 2025-06-13 PROCEDURE — 3600000014 HC SURGERY LEVEL 4 ADDTL 15MIN: Performed by: OTOLARYNGOLOGY

## 2025-06-13 PROCEDURE — 2500000003 HC RX 250 WO HCPCS: Performed by: NURSE ANESTHETIST, CERTIFIED REGISTERED

## 2025-06-13 PROCEDURE — 7100000000 HC PACU RECOVERY - FIRST 15 MIN: Performed by: OTOLARYNGOLOGY

## 2025-06-13 PROCEDURE — 31255 NSL/SINS NDSC W/TOT ETHMDCT: CPT | Performed by: OTOLARYNGOLOGY

## 2025-06-13 PROCEDURE — 7100000011 HC PHASE II RECOVERY - ADDTL 15 MIN: Performed by: OTOLARYNGOLOGY

## 2025-06-13 PROCEDURE — 3700000000 HC ANESTHESIA ATTENDED CARE: Performed by: OTOLARYNGOLOGY

## 2025-06-13 PROCEDURE — 2720000010 HC SURG SUPPLY STERILE: Performed by: OTOLARYNGOLOGY

## 2025-06-13 PROCEDURE — 88305 TISSUE EXAM BY PATHOLOGIST: CPT

## 2025-06-13 PROCEDURE — 61782 SCAN PROC CRANIAL EXTRA: CPT | Performed by: OTOLARYNGOLOGY

## 2025-06-13 PROCEDURE — 36415 COLL VENOUS BLD VENIPUNCTURE: CPT

## 2025-06-13 PROCEDURE — 88300 SURGICAL PATH GROSS: CPT

## 2025-06-13 PROCEDURE — 2500000003 HC RX 250 WO HCPCS: Performed by: OTOLARYNGOLOGY

## 2025-06-13 PROCEDURE — 3700000001 HC ADD 15 MINUTES (ANESTHESIA): Performed by: OTOLARYNGOLOGY

## 2025-06-13 PROCEDURE — 7100000010 HC PHASE II RECOVERY - FIRST 15 MIN: Performed by: OTOLARYNGOLOGY

## 2025-06-13 RX ORDER — OXYMETAZOLINE HYDROCHLORIDE 0.05 G/100ML
3 SPRAY NASAL ONCE
Status: COMPLETED | OUTPATIENT
Start: 2025-06-13 | End: 2025-06-13

## 2025-06-13 RX ORDER — ROCURONIUM BROMIDE 10 MG/ML
INJECTION, SOLUTION INTRAVENOUS
Status: DISCONTINUED | OUTPATIENT
Start: 2025-06-13 | End: 2025-06-13 | Stop reason: SDUPTHER

## 2025-06-13 RX ORDER — AMOXICILLIN 500 MG/1
500 CAPSULE ORAL 3 TIMES DAILY
Qty: 21 CAPSULE | Refills: 0 | Status: SHIPPED | OUTPATIENT
Start: 2025-06-13 | End: 2025-06-20

## 2025-06-13 RX ORDER — SODIUM CHLORIDE 9 MG/ML
INJECTION, SOLUTION INTRAVENOUS CONTINUOUS
Status: DISCONTINUED | OUTPATIENT
Start: 2025-06-13 | End: 2025-06-13 | Stop reason: HOSPADM

## 2025-06-13 RX ORDER — MIDAZOLAM HYDROCHLORIDE 1 MG/ML
INJECTION, SOLUTION INTRAMUSCULAR; INTRAVENOUS
Status: DISCONTINUED | OUTPATIENT
Start: 2025-06-13 | End: 2025-06-13 | Stop reason: SDUPTHER

## 2025-06-13 RX ORDER — SODIUM CHLORIDE 0.9 % (FLUSH) 0.9 %
5-40 SYRINGE (ML) INJECTION EVERY 12 HOURS SCHEDULED
Status: DISCONTINUED | OUTPATIENT
Start: 2025-06-13 | End: 2025-06-13 | Stop reason: HOSPADM

## 2025-06-13 RX ORDER — LIDOCAINE HCL/PF 100 MG/5ML
SYRINGE (ML) INJECTION
Status: DISCONTINUED | OUTPATIENT
Start: 2025-06-13 | End: 2025-06-13 | Stop reason: SDUPTHER

## 2025-06-13 RX ORDER — SODIUM CHLORIDE 0.9 % (FLUSH) 0.9 %
5-40 SYRINGE (ML) INJECTION EVERY 12 HOURS SCHEDULED
Status: DISCONTINUED | OUTPATIENT
Start: 2025-06-13 | End: 2025-06-13 | Stop reason: SDUPTHER

## 2025-06-13 RX ORDER — HYDRALAZINE HYDROCHLORIDE 20 MG/ML
10 INJECTION INTRAMUSCULAR; INTRAVENOUS
Status: DISCONTINUED | OUTPATIENT
Start: 2025-06-13 | End: 2025-06-13 | Stop reason: HOSPADM

## 2025-06-13 RX ORDER — HYDROCODONE BITARTRATE AND ACETAMINOPHEN 5; 325 MG/1; MG/1
1 TABLET ORAL EVERY 8 HOURS PRN
Qty: 15 TABLET | Refills: 0 | Status: SHIPPED | OUTPATIENT
Start: 2025-06-13 | End: 2025-06-18

## 2025-06-13 RX ORDER — SODIUM CHLORIDE 9 MG/ML
INJECTION, SOLUTION INTRAVENOUS PRN
Status: DISCONTINUED | OUTPATIENT
Start: 2025-06-13 | End: 2025-06-13 | Stop reason: HOSPADM

## 2025-06-13 RX ORDER — PHENYLEPHRINE HCL IN 0.9% NACL 1 MG/10 ML
SYRINGE (ML) INTRAVENOUS
Status: DISCONTINUED | OUTPATIENT
Start: 2025-06-13 | End: 2025-06-13 | Stop reason: SDUPTHER

## 2025-06-13 RX ORDER — DEXAMETHASONE SODIUM PHOSPHATE 4 MG/ML
INJECTION, SOLUTION INTRA-ARTICULAR; INTRALESIONAL; INTRAMUSCULAR; INTRAVENOUS; SOFT TISSUE
Status: DISCONTINUED | OUTPATIENT
Start: 2025-06-13 | End: 2025-06-13 | Stop reason: SDUPTHER

## 2025-06-13 RX ORDER — SODIUM CHLORIDE 0.9 % (FLUSH) 0.9 %
5-40 SYRINGE (ML) INJECTION PRN
Status: DISCONTINUED | OUTPATIENT
Start: 2025-06-13 | End: 2025-06-13 | Stop reason: HOSPADM

## 2025-06-13 RX ORDER — MUPIROCIN 2 %
OINTMENT (GRAM) TOPICAL PRN
Status: DISCONTINUED | OUTPATIENT
Start: 2025-06-13 | End: 2025-06-13 | Stop reason: ALTCHOICE

## 2025-06-13 RX ORDER — LIDOCAINE HYDROCHLORIDE AND EPINEPHRINE 10; 10 MG/ML; UG/ML
INJECTION, SOLUTION INFILTRATION; PERINEURAL PRN
Status: DISCONTINUED | OUTPATIENT
Start: 2025-06-13 | End: 2025-06-13 | Stop reason: ALTCHOICE

## 2025-06-13 RX ORDER — EPHEDRINE SULFATE/0.9% NACL/PF 25 MG/5 ML
SYRINGE (ML) INTRAVENOUS
Status: DISCONTINUED | OUTPATIENT
Start: 2025-06-13 | End: 2025-06-13 | Stop reason: SDUPTHER

## 2025-06-13 RX ORDER — PROPOFOL 10 MG/ML
INJECTION, EMULSION INTRAVENOUS
Status: DISCONTINUED | OUTPATIENT
Start: 2025-06-13 | End: 2025-06-13 | Stop reason: SDUPTHER

## 2025-06-13 RX ORDER — ONDANSETRON 2 MG/ML
4 INJECTION INTRAMUSCULAR; INTRAVENOUS
Status: DISCONTINUED | OUTPATIENT
Start: 2025-06-13 | End: 2025-06-13 | Stop reason: HOSPADM

## 2025-06-13 RX ORDER — LABETALOL HYDROCHLORIDE 5 MG/ML
10 INJECTION, SOLUTION INTRAVENOUS
Status: DISCONTINUED | OUTPATIENT
Start: 2025-06-13 | End: 2025-06-13 | Stop reason: HOSPADM

## 2025-06-13 RX ORDER — SODIUM CHLORIDE 9 MG/ML
INJECTION, SOLUTION INTRAVENOUS PRN
Status: DISCONTINUED | OUTPATIENT
Start: 2025-06-13 | End: 2025-06-13 | Stop reason: SDUPTHER

## 2025-06-13 RX ORDER — MINERAL OIL AND WHITE PETROLATUM 150; 830 MG/G; MG/G
OINTMENT OPHTHALMIC
Status: DISCONTINUED | OUTPATIENT
Start: 2025-06-13 | End: 2025-06-13 | Stop reason: SDUPTHER

## 2025-06-13 RX ORDER — NALOXONE HYDROCHLORIDE 0.4 MG/ML
INJECTION, SOLUTION INTRAMUSCULAR; INTRAVENOUS; SUBCUTANEOUS PRN
Status: DISCONTINUED | OUTPATIENT
Start: 2025-06-13 | End: 2025-06-13 | Stop reason: HOSPADM

## 2025-06-13 RX ORDER — OXYMETAZOLINE HYDROCHLORIDE 0.05 G/100ML
SPRAY NASAL PRN
Status: DISCONTINUED | OUTPATIENT
Start: 2025-06-13 | End: 2025-06-13 | Stop reason: ALTCHOICE

## 2025-06-13 RX ORDER — SODIUM CHLORIDE 0.9 % (FLUSH) 0.9 %
5-40 SYRINGE (ML) INJECTION PRN
Status: DISCONTINUED | OUTPATIENT
Start: 2025-06-13 | End: 2025-06-13 | Stop reason: SDUPTHER

## 2025-06-13 RX ORDER — FENTANYL CITRATE 50 UG/ML
INJECTION, SOLUTION INTRAMUSCULAR; INTRAVENOUS
Status: DISCONTINUED | OUTPATIENT
Start: 2025-06-13 | End: 2025-06-13 | Stop reason: SDUPTHER

## 2025-06-13 RX ORDER — ONDANSETRON 2 MG/ML
INJECTION INTRAMUSCULAR; INTRAVENOUS
Status: DISCONTINUED | OUTPATIENT
Start: 2025-06-13 | End: 2025-06-13 | Stop reason: SDUPTHER

## 2025-06-13 RX ADMIN — SODIUM CHLORIDE: 9 INJECTION, SOLUTION INTRAVENOUS at 10:11

## 2025-06-13 RX ADMIN — ONDANSETRON 4 MG: 2 INJECTION, SOLUTION INTRAMUSCULAR; INTRAVENOUS at 09:17

## 2025-06-13 RX ADMIN — WATER 2000 MG: 1 INJECTION INTRAMUSCULAR; INTRAVENOUS; SUBCUTANEOUS at 09:06

## 2025-06-13 RX ADMIN — Medication 300 MCG: at 09:57

## 2025-06-13 RX ADMIN — EPHEDRINE SULFATE 15 MG: 5 INJECTION INTRAVENOUS at 09:48

## 2025-06-13 RX ADMIN — Medication 100 MG: at 08:57

## 2025-06-13 RX ADMIN — Medication 100 MCG: at 09:15

## 2025-06-13 RX ADMIN — SODIUM CHLORIDE: 9 INJECTION, SOLUTION INTRAVENOUS at 07:10

## 2025-06-13 RX ADMIN — ROCURONIUM BROMIDE 50 MG: 10 INJECTION, SOLUTION INTRAVENOUS at 08:57

## 2025-06-13 RX ADMIN — Medication 200 MCG: at 09:44

## 2025-06-13 RX ADMIN — DEXAMETHASONE SODIUM PHOSPHATE 8 MG: 4 INJECTION, SOLUTION INTRAMUSCULAR; INTRAVENOUS at 09:17

## 2025-06-13 RX ADMIN — Medication 100 MCG: at 09:24

## 2025-06-13 RX ADMIN — FENTANYL CITRATE 100 MCG: 50 INJECTION, SOLUTION INTRAMUSCULAR; INTRAVENOUS at 09:13

## 2025-06-13 RX ADMIN — SUGAMMADEX 200 MG: 100 INJECTION, SOLUTION INTRAVENOUS at 11:10

## 2025-06-13 RX ADMIN — Medication 200 MCG: at 09:34

## 2025-06-13 RX ADMIN — OXYMETAZOLINE HYDROCHLORIDE 3 SPRAY: 0.5 SPRAY NASAL at 07:14

## 2025-06-13 RX ADMIN — MIDAZOLAM 2 MG: 1 INJECTION INTRAMUSCULAR; INTRAVENOUS at 08:51

## 2025-06-13 RX ADMIN — MINERAL OIL, WHITE PETROLATUM 1 APPLICATOR: .03; .94 OINTMENT OPHTHALMIC at 09:04

## 2025-06-13 RX ADMIN — Medication 200 MCG: at 09:50

## 2025-06-13 RX ADMIN — EPHEDRINE SULFATE 10 MG: 5 INJECTION INTRAVENOUS at 09:45

## 2025-06-13 RX ADMIN — PROPOFOL 130 MG: 10 INJECTION, EMULSION INTRAVENOUS at 08:57

## 2025-06-13 ASSESSMENT — PAIN - FUNCTIONAL ASSESSMENT
PAIN_FUNCTIONAL_ASSESSMENT: WONG-BAKER FACES
PAIN_FUNCTIONAL_ASSESSMENT: 0-10

## 2025-06-13 ASSESSMENT — PAIN SCALES - GENERAL
PAINLEVEL_OUTOF10: 0

## 2025-06-13 NOTE — DISCHARGE INSTRUCTIONS
Sleep with head elevated  Avoid blowing nose  Avoid heavy lifting (no more than 10 pounds)  Use Afrin for blood oozing only.  Use Normal saline spray 2-3 times daily starting tomorrow.  Take medications as instructed.  Follow up as scheduled for splints removal.  If you are using CPAP/BIPAP, hold on using it for 2 weeks

## 2025-06-13 NOTE — ANESTHESIA PRE PROCEDURE
psychiatric history:            GI/Hepatic/Renal:   (+) GERD:          Endo/Other:    (+) Diabetes.          Pt had no PAT visit       Abdominal:             Vascular:          Other Findings:       Anesthesia Plan      general     ASA 3       Induction: intravenous.    MIPS: Postoperative opioids intended and Prophylactic antiemetics administered.  Anesthetic plan and risks discussed with patient.      Plan discussed with CRNA.                Isaias Prince MD   6/13/2025

## 2025-06-13 NOTE — PROGRESS NOTES
1117 Patient to pacu from surgery,report from Scott PORTER and surgical RN. Patient does not respond to voice or command, Vitals stable, vent paced on tele. Oxygen 90-92% on room air. Face tent applied. No bloody drainage noted from nose at this time. SCDs applied.  1120 Respirations easy and unlabored.  1125 Vitals remain stable, patient on 2L via face tent.  1130 Patient opens eyes spontaneously, able to answer questions appropriately. Face tent removed.   1135 Patient resting comfortably in bed, vitals stable on room air. Denies any pain at this time.   1140 Ice chips given for patient comfort.   1145 Respirations easy and unlabored on room air. Able to answer questions appropriately. Denies any pain at this time.   1150 Patient meets criteria to move to phase 2. Patient back to room 14, denies any pain. No new bleeding noted from nose. Family at bedside, call light in reach. Snack and drink given per preference. Report given to JARVIS RN.

## 2025-06-13 NOTE — H&P
[facility] [place] [facility]  [facility] EAR, NOSE AND THROAT  [address]  [address]  [facility] [address]  Dept: [phone]  Dept Fax: [phone]  Loc: [phone]    Edwin Stock is a 75 y.o. male who was referred byNo ref. provider found for:  No chief complaint on file.  .    HPI:     Edwin Stock is a 75 y.o. male who presents today for follow up. CT scan sinus 4/21/25: Moderate mucosal thickening in the left posterior ethmoid air cells. Mild mucosal thickening in the right ethmoid air cells and right maxillary  sinus. Remaining paranasal sinuses are clear.Patient endorses longstanding nasal obstruction worse to the left side. He endorses history of head and face trauma working in a [employer] for 31 years. He endorses frequent history of sinus pressure and pain usually across the forehead and into the bilateral cheeks. He does get frequent discolored drainage out of the nose but usually runs down the back of his throat. He endorses constantly feeling like he has to blow his nose but unable to expel any mucus or cause any improvement in his breathing quality. Patient does have history of HARINDER on CPAP and follows with sleep medicine. Patient states that he had history of palate/oral surgery in the 1970s through the [employer] and they potentially did something in regards to his nose at this time but he is uncertain.     History:     Allergies   Allergen Reactions    Latex Itching and Rash    Doxycycline Calcium Diarrhea    Lactose Diarrhea    Nasacort [Triamcinolone] Other (See Comments)     Unable to remember    Wellbutrin [Bupropion] Other (See Comments)     Does not remember     No current facility-administered medications for this encounter.     Current Outpatient Medications   Medication Sig Dispense Refill    oxyBUTYnin (DITROPAN-XL) 10 MG extended release tablet TAKE ONE TABLET DAILY, BY MOUTH. 90 tablet 3    hydrocortisone (ANUSOL-HC) 25 MG suppository Place 1 suppository  times daily       fluticasone (FLONASE) 50 MCG/ACT nasal spray USE 2 SPRAYS NASALLY DAILY 3 Bottle 3    aspirin 81 MG EC tablet Take 1 tablet by mouth daily      insulin glargine (LANTUS SOLOSTAR) 100 UNIT/ML injection pen Inject 26 Units into the skin nightly       Past Medical History:   Diagnosis Date    Asthma     [employer] dual pacemaker 04/09/2014    [employer] dual pacemaker  01/12/2023    Bronchitis, chronic (HCC)     Carotid artery stenosis     Dr Tello    Chickenpox     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 Moderate LI's in the mid LAD and in the RCA. 11/20/2013 11/20/2013: No obstructive lesions. Moderate LI's in the mid LAD and in the RCA. Dr. 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 WERNER Valdez     EYE SURGERY      right eye clog oil duct    EYE SURGERY Bilateral 01/2017    MANDIBLE SURGERY N/A 05/2017    jaw surgery lower front of mouth    MOHS SURGERY N/A 02/18/2022    MOHS DEFECT REPAIR SCC TOP OF HEAD performed by

## 2025-06-13 NOTE — PROGRESS NOTES
Patient oriented to Same Day department and admitted to Same Day Surgery room 14.   Patient verbalized approval for first name, last initial with physician name on unit whiteboard.     Plan of care reviewed with patient.   Patient room whiteboard filled out and discussed with patient and responsible adult.   Patient and responsible adult offered Same Day Welcome Packet to review.    Call light in reach.   Bed in lowest position, locked, with one bed rail up.   SCDs and warming blanket in place.  Appropriate arm bands on patient.   Bathroom offered.   All questions and concerns of patient addressed.        Meds to Beds:   Patient informed of [facility] Meds to B[facility] program during admission. Patient is agreeable to program.   Contact information for the pharmacy and the Meds to Beds program:   Name: Andre   Relationship to patient:child   Phone number: [phone]

## 2025-06-13 NOTE — OP NOTE
made in the left nasal cavity. A sub-perichondrial and sub-periosteal plane was elevated with a caudal elevator to the vomer. Elevation was performed from maxillary crest to 1.5 cm from the dorsal septum. A vertical incision was made 1.5 cm posterior to the caudal septum with a caudal and a contralateral sub-perichondrial and sub-periosteal plane was elevated with a caudal elevator to the vomer. Deviated cartilage and bone was removed as possible preserving an anterior and superior strut for nasal support. The mucosa was re-approximated with a 4-0 plane gut suture on a isabelle needle. The hem-itransfixion incision was closed with a 3-0 chromic interrupted stitch. Afrin soaked pledgets were placed for hemostasis.    2. Bilateral total ethmoidectomy, bilateral maxillary antrostomy:    Surgery began in the left nasal cavity. Using a 0-degree nasal endoscope and a Canalou elevator, the middle turbinate was medialized in its inferior third. The uncinate was then infractured with a sinus seeker. A Botello window was then created with a backbiting forceps. The uncinate was then taken down in its entirety superiorly to its attachment to the middle turbinate and inferiorly down to the inferior turbinate with a StraightShot microdebrider.     Using the 30-degree endoscopy, a seeker was used to cannulate the maxillary ostium. The natural ostium of the maxillary sinus was identified and back fractured to the fontanelle and a large maxillary antrostomy was performed with Arden-Cut rongeurs and a 4 mm StraightShot microdebrider.     Using 0 degree endoscope, The ethmoid bulla was then entered in its central cavity with a Smith elevator. The ethmoid bulla was then removed in  its entirety with a 4 mm StraightShot microdebrider up to the lamina papyracea laterally and short of the skull base superiorly. The basal lamella was opened  with a 0 degree microdebrider and the posterior ethmoids were opened posteriorly to the anterior wall of  the sphenoid sinus, lateral to lamina and superiorly to skull base.     Similar procedure was performed on the other side.      3. Bilateral Inferior Turbinate Reduction with Coblation:    The inferior turbinates were addressed using a coblation wand. Submucosal tissue was ablated while preserving the mucosal surface to reduce turbinate size while maintaining function.Care was taken to ensure adequate airway patency without excessive resection.The turbinate bone was then in-fractured and out-fractured with a caudal elevator.     The Afrin pledgets were placed then removed after 5 minutes and the wound was then thoroughly irrigated with normal saline. Sinus-foam was placed between the middle turbinate and lateral wall bilaterally. Woodruff splints soaked in Bactroban were placed between the septum and turbinates in each nostril. The splints were secured through the septum with a 3-0 prolene stitch.    The procedure was performed with an image-guided navigation system.    Electronically signed by Anthony Guadarrama MD on 6/13/2025 at 11:38 AM

## 2025-06-13 NOTE — ANESTHESIA POSTPROCEDURE EVALUATION
Department of Anesthesiology  Postprocedure Note    Patient: Edwin Stock  MRN: [id number]  YOB: 1950  Date of evaluation: 6/13/2025    Procedure Summary       Date: 06/13/25 Room / Location: [facility] OR  / [facility] OR    Anesthesia Start: 0851 Anesthesia Stop: 1120    Procedure: Septoplasty, bilateral inferior nasal turbinate reduction, image guided nasal endoscopy with total bilateral ethmoidectomy, bilateral maxillary antrostomy without tissue removal. (Nose) Diagnosis:       Nasal septal deviation      Hypertrophy of inferior nasal turbinate      Chronic ethmoidal sinusitis      Chronic maxillary sinusitis      Nasal obstruction      (Nasal septal deviation [J34.2])      (Hypertrophy of inferior nasal turbinate [J34.3])      (Chronic ethmoidal sinusitis [J32.2])      (Chronic maxillary sinusitis [J32.0])      (Nasal obstruction [J34.89])    Surgeons: Anthony Guadarrama MD Responsible Provider: Isaias Prince MD    Anesthesia Type: general ASA Status: 3            Anesthesia Type: No value filed.    Carrillo Phase I: Carrillo Score: 6    Carrillo Phase II: Carrillo Score: 10    Anesthesia Post Evaluation    Patient location during evaluation: PACU  Patient participation: complete - patient participated  Level of consciousness: awake and alert  Airway patency: patent  Nausea & Vomiting: no nausea  Cardiovascular status: blood pressure returned to baseline and hemodynamically stable  Respiratory status: acceptable and spontaneous ventilation  Hydration status: euvolemic  Pain management: adequate    No notable events documented.

## 2025-06-16 ENCOUNTER — TELEPHONE (OUTPATIENT)
Dept: ENT CLINIC | Age: 75
End: 2025-06-16

## 2025-06-16 NOTE — TELEPHONE ENCOUNTER
I called patient back. It took awhile for his wake to get him to awake. I let him know what Celia said. He said that he didn't need any pain medication. He said he doesn't need it. He said to cancel his appointment tomorrow because he can't drive to [place]. I encouraged him to drink more fluids and he said he would try, but if he felt worse he needs to go to the ER. H said that he had no way there and wasn't paying for an ambulance. I talked to Celia who said he defintely needs seen.

## 2025-06-16 NOTE — TELEPHONE ENCOUNTER
Patient called in and post op 06/13 alicia ratliff igs, ethmoid, max. He states that he believes the Amoxicillan and Hydrocodone is making him seek. He states that he has severe diarrhrea, dizziness, has been unable to eat or drink since surgery. Weakness and feels like he is dying. Patient has not taken his temp because he does not have a thermometer. He did mention that he is not in any pain.

## 2025-06-16 NOTE — TELEPHONE ENCOUNTER
I would not expect plain amoxicillin to cause those symptoms.  His narcotic pain medication may be.  He can stop the pain medication and take extra strength tylenol instead.  Make sure he is taking extra fluids.  To ER if no improvement (or worse).

## 2025-06-17 ENCOUNTER — APPOINTMENT (OUTPATIENT)
Dept: CT IMAGING | Age: 75
End: 2025-06-17
Payer: MEDICARE

## 2025-06-17 ENCOUNTER — HOSPITAL ENCOUNTER (EMERGENCY)
Age: 75
Discharge: HOME | End: 2025-06-17
Payer: MEDICARE

## 2025-06-17 VITALS
HEART RATE: 70 BPM | SYSTOLIC BLOOD PRESSURE: 156 MMHG | OXYGEN SATURATION: 98 % | DIASTOLIC BLOOD PRESSURE: 76 MMHG | WEIGHT: 188 LBS | HEIGHT: 71 IN | BODY MASS INDEX: 26.32 KG/M2 | TEMPERATURE: 98.2 F | RESPIRATION RATE: 13 BRPM

## 2025-06-17 DIAGNOSIS — R10.9 ABDOMINAL PAIN, UNSPECIFIED ABDOMINAL LOCATION: Primary | ICD-10-CM

## 2025-06-17 LAB
ALBUMIN SERPL BCG-MCNC: 3.9 G/DL (ref 3.4–4.9)
ALP SERPL-CCNC: 148 U/L (ref 40–129)
ALT SERPL W/O P-5'-P-CCNC: 13 U/L (ref 10–50)
ANION GAP SERPL CALC-SCNC: 13 MEQ/L (ref 8–16)
AST SERPL-CCNC: 19 U/L (ref 10–50)
BASOPHILS ABSOLUTE: 0 THOU/MM3 (ref 0–0.1)
BASOPHILS NFR BLD AUTO: 0.4 %
BILIRUB CONJ SERPL-MCNC: 0.6 MG/DL (ref 0–0.2)
BILIRUB SERPL-MCNC: 1.2 MG/DL (ref 0.3–1.2)
BUN SERPL-MCNC: 15 MG/DL (ref 8–23)
CALCIUM SERPL-MCNC: 9.5 MG/DL (ref 8.8–10.2)
CHLORIDE SERPL-SCNC: 102 MEQ/L (ref 98–111)
CO2 SERPL-SCNC: 22 MEQ/L (ref 22–29)
CREAT SERPL-MCNC: 0.8 MG/DL (ref 0.7–1.2)
DEPRECATED RDW RBC AUTO: 42.9 FL (ref 35–45)
EKG ATRIAL RATE: 72 BPM
EKG Q-T INTERVAL: 478 MS
EKG QRS DURATION: 166 MS
EKG QTC CALCULATION (BAZETT): 516 MS
EKG R AXIS: -86 DEGREES
EKG T AXIS: 83 DEGREES
EKG VENTRICULAR RATE: 70 BPM
EOSINOPHIL NFR BLD AUTO: 0.7 %
EOSINOPHILS ABSOLUTE: 0.1 THOU/MM3 (ref 0–0.4)
ERYTHROCYTE [DISTWIDTH] IN BLOOD BY AUTOMATED COUNT: 13.1 % (ref 11.5–14.5)
GFR SERPL CREATININE-BSD FRML MDRD: > 90 ML/MIN/1.73M2
GLUCOSE SERPL-MCNC: 102 MG/DL (ref 74–109)
HCT VFR BLD AUTO: 42.3 % (ref 42–52)
HGB BLD-MCNC: 14.4 GM/DL (ref 14–18)
IMM GRANULOCYTES # BLD AUTO: 0.07 THOU/MM3 (ref 0–0.07)
IMM GRANULOCYTES NFR BLD AUTO: 0.9 %
LIPASE SERPL-CCNC: 14 U/L (ref 13–60)
LYMPHOCYTES ABSOLUTE: 1.6 THOU/MM3 (ref 1–4.8)
LYMPHOCYTES NFR BLD AUTO: 19.3 %
MAGNESIUM SERPL-MCNC: 1.9 MG/DL (ref 1.6–2.6)
MCH RBC QN AUTO: 30.6 PG (ref 26–33)
MCHC RBC AUTO-ENTMCNC: 34 GM/DL (ref 32.2–35.5)
MCV RBC AUTO: 90 FL (ref 80–94)
MONOCYTES ABSOLUTE: 1.1 THOU/MM3 (ref 0.4–1.3)
MONOCYTES NFR BLD AUTO: 13 %
NEUTROPHILS ABSOLUTE: 5.4 THOU/MM3 (ref 1.8–7.7)
NEUTROPHILS NFR BLD AUTO: 65.7 %
NRBC BLD AUTO-RTO: 0 /100 WBC
OSMOLALITY SERPL CALC.SUM OF ELEC: 274.8 MOSMOL/KG (ref 275–300)
PLATELET # BLD AUTO: 203 THOU/MM3 (ref 130–400)
PMV BLD AUTO: 8.9 FL (ref 9.4–12.4)
POTASSIUM SERPL-SCNC: 4.8 MEQ/L (ref 3.5–5.2)
PROT SERPL-MCNC: 6.6 G/DL (ref 6.4–8.3)
RBC # BLD AUTO: 4.7 MILL/MM3 (ref 4.7–6.1)
SODIUM SERPL-SCNC: 137 MEQ/L (ref 135–145)
TROPONIN, HIGH SENSITIVITY: 17 NG/L (ref 0–12)
TROPONIN, HIGH SENSITIVITY: 20 NG/L (ref 0–12)
WBC # BLD AUTO: 8.2 THOU/MM3 (ref 4.8–10.8)

## 2025-06-17 PROCEDURE — 96375 TX/PRO/DX INJ NEW DRUG ADDON: CPT

## 2025-06-17 PROCEDURE — 93010 ELECTROCARDIOGRAM REPORT: CPT | Performed by: INTERNAL MEDICINE

## 2025-06-17 PROCEDURE — 93005 ELECTROCARDIOGRAM TRACING: CPT | Performed by: PHYSICIAN ASSISTANT

## 2025-06-17 PROCEDURE — 83690 ASSAY OF LIPASE: CPT

## 2025-06-17 PROCEDURE — 99285 EMERGENCY DEPT VISIT HI MDM: CPT

## 2025-06-17 PROCEDURE — 6360000002 HC RX W HCPCS: Performed by: PHYSICIAN ASSISTANT

## 2025-06-17 PROCEDURE — 80053 COMPREHEN METABOLIC PANEL: CPT

## 2025-06-17 PROCEDURE — 85025 COMPLETE CBC W/AUTO DIFF WBC: CPT

## 2025-06-17 PROCEDURE — 96361 HYDRATE IV INFUSION ADD-ON: CPT

## 2025-06-17 PROCEDURE — 36415 COLL VENOUS BLD VENIPUNCTURE: CPT

## 2025-06-17 PROCEDURE — 74177 CT ABD & PELVIS W/CONTRAST: CPT

## 2025-06-17 PROCEDURE — 6360000004 HC RX CONTRAST MEDICATION: Performed by: PHYSICIAN ASSISTANT

## 2025-06-17 PROCEDURE — 96374 THER/PROPH/DIAG INJ IV PUSH: CPT

## 2025-06-17 PROCEDURE — 82248 BILIRUBIN DIRECT: CPT

## 2025-06-17 PROCEDURE — 2580000003 HC RX 258: Performed by: PHYSICIAN ASSISTANT

## 2025-06-17 PROCEDURE — 83735 ASSAY OF MAGNESIUM: CPT

## 2025-06-17 PROCEDURE — 84484 ASSAY OF TROPONIN QUANT: CPT

## 2025-06-17 RX ORDER — 0.9 % SODIUM CHLORIDE 0.9 %
1000 INTRAVENOUS SOLUTION INTRAVENOUS ONCE
Status: COMPLETED | OUTPATIENT
Start: 2025-06-17 | End: 2025-06-17

## 2025-06-17 RX ORDER — IOPAMIDOL 755 MG/ML
80 INJECTION, SOLUTION INTRAVASCULAR
Status: COMPLETED | OUTPATIENT
Start: 2025-06-17 | End: 2025-06-17

## 2025-06-17 RX ORDER — DIPHENHYDRAMINE HYDROCHLORIDE 50 MG/ML
25 INJECTION, SOLUTION INTRAMUSCULAR; INTRAVENOUS ONCE
Status: COMPLETED | OUTPATIENT
Start: 2025-06-17 | End: 2025-06-17

## 2025-06-17 RX ORDER — METOCLOPRAMIDE HYDROCHLORIDE 5 MG/ML
10 INJECTION INTRAMUSCULAR; INTRAVENOUS ONCE
Status: COMPLETED | OUTPATIENT
Start: 2025-06-17 | End: 2025-06-17

## 2025-06-17 RX ORDER — ONDANSETRON 4 MG/1
4 TABLET, ORALLY DISINTEGRATING ORAL 3 TIMES DAILY PRN
Qty: 21 TABLET | Refills: 0 | Status: SHIPPED | OUTPATIENT
Start: 2025-06-17

## 2025-06-17 RX ADMIN — DIPHENHYDRAMINE HYDROCHLORIDE 25 MG: 50 INJECTION INTRAMUSCULAR; INTRAVENOUS at 14:29

## 2025-06-17 RX ADMIN — IOPAMIDOL 80 ML: 755 INJECTION, SOLUTION INTRAVENOUS at 15:05

## 2025-06-17 RX ADMIN — METOCLOPRAMIDE HYDROCHLORIDE 10 MG: 5 INJECTION INTRAMUSCULAR; INTRAVENOUS at 14:28

## 2025-06-17 RX ADMIN — SODIUM CHLORIDE 1000 ML: 0.9 INJECTION, SOLUTION INTRAVENOUS at 10:18

## 2025-06-17 ASSESSMENT — PAIN - FUNCTIONAL ASSESSMENT
PAIN_FUNCTIONAL_ASSESSMENT: NONE - DENIES PAIN
PAIN_FUNCTIONAL_ASSESSMENT: NONE - DENIES PAIN
PAIN_FUNCTIONAL_ASSESSMENT: 0-10
PAIN_FUNCTIONAL_ASSESSMENT: NONE - DENIES PAIN

## 2025-06-17 ASSESSMENT — PAIN DESCRIPTION - LOCATION: LOCATION: ABDOMEN

## 2025-06-17 NOTE — DISCHARGE INSTR - COC
Primary or Immunocompromised, (age 12y+), IM, 100 mcg/0.5mL 11/10/2021    COVID-19, MODERNA, 2024/25, (age 12y+), IM, 50mcg/0.5mL 10/04/2023, 09/23/2024    COVID-19, PFIZER PURPLE top, DILUTE for use, (age 12 y+), 30mcg/0.3mL 02/25/2021, 03/18/2021    Hepatitis B (Engerix-B) 02/22/1995, 03/27/1995, 09/14/1995    Influenza A (K6D5-26) Vaccine PF IM 11/11/2009    Influenza Vaccine, unspecified formulation 09/01/2016    Influenza Virus Vaccine 09/28/2011, 09/14/2012, 09/24/2014, 08/28/2015, 10/03/2017    Influenza, FLUAD, (age 65 y+), IM, Quadv, 0.5mL 10/07/2020, 08/27/2022, 10/04/2023    Influenza, FLUAD, (age 65 y+), IM, Trivalent PF, 0.5mL 09/27/2018, 09/25/2019    Influenza, FLUZONE High Dose (age 65 y+), IM, Quadv, 0.7mL 10/08/2021    Influenza, FLUZONE High Dose, (age 65 y+), IM, Trivalent PF, 0.5mL 10/03/2017, 09/27/2018    Pneumococcal, PCV-13, PREVNAR 13, (age 6w+), IM, 0.5mL 03/26/2015, 09/28/2015    Pneumococcal, PPSV23, PNEUMOVAX 23, (age 2y+), SC/IM, 0.5mL 04/21/2014, 01/24/2017    TD 5LF, TENIVAC, (age 7y+), IM, 0.5mL 01/25/2017    Td, unspecified formulation 01/22/2013    Zoster Recombinant (Shingrix) 06/05/2018, 08/14/2018, 10/30/2019       Active Problems:  Patient Active Problem List   Diagnosis Code    Diabetes mellitus type 2, uncontrolled [id number]    Post traumatic stress disorder F43.10    Hyperlipidemia with target LDL less than 100 E78.5    Hypertension I10    Arthritis M19.90    S/P cardiac catheterization: 11/20/2013: No obstructive lesions. Moderate LI's in the mid LAD and in the RCA. Z98.890    Pacemaker at end of battery life Z45.010    Paroxysmal atrial fibrillation (HCC) I48.0    Essential tremor G25.0    Peripheral polyneuropathy G62.9    Obstructive sleep apnea on CPAP G47.33    Elevated PSA R97.20    BPH with obstruction/lower urinary tract symptoms N40.1, N13.8    SOB (shortness of breath) R06.02    Hyponatremia E87.1    Mild intermittent asthma without complication J45.20    Obesity

## 2025-06-17 NOTE — ED PROVIDER NOTES
[facility] EMERGENCY DEPARTMENT      EMERGENCY MEDICINE     Pt Name: Edwin Stock  MRN: [id number]  Birthdate 1950  Date of evaluation: 6/17/2025  Provider: MAME Howard    CHIEF COMPLAINT       Chief Complaint   Patient presents with    Dehydration     HISTORY OF PRESENT ILLNESS   Edwin Stock is a pleasant 75 y.o. male who presents to the emergency department from from home, by private vehicle for evaluation of possible dehydration.  The patient had surgery on 13 June.  The patient had a septoplasty done.  He is here because he has decreased appetite.  He was given IV fluids as well as antiemetic for EMS and now is feeling better.  He denies any chest pain, abdominal pain, vomiting or diarrhea he has been drinking fluids and have a normal bowel movements.  He is otherwise without complaints..        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 Moderate LI's in the mid LAD and in the RCA. 11/20/2013 11/20/2013: No obstructive lesions. Moderate LI's in the mid LAD and in the RCA. Dr. 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

## 2025-06-17 NOTE — ED TRIAGE NOTES
Pt presents to the ED via EMS with  c/o dehydration. Pt reports having a septoplasty procedure 4 days ago and reports not eating or drinking very much since then. Pt voices no other complaints. Pt is alert and oriented. VSS. Call light left within reach.

## 2025-06-17 NOTE — ED NOTES
Pt medicated per MAR. Pt resting in bed with call light in reach. Pt voices no complaints at this time.

## 2025-06-17 NOTE — ED NOTES
Pt up to bathroom and back to bed. Pt tolerated well. VSS. Call light in reach. No concerns voiced at this time.

## 2025-06-18 NOTE — TELEPHONE ENCOUNTER
I called patient and he said he is still very weak and nauseated. He said that Celia took his splints out at the ER yesterday and he did not want to schedule a post op with MARCOS Guadarrama on Friday because he is feeling bad. I told him that if he starts feeling better and wants to schedule please call us to let us know. He said that he would. He also said he was so thankful that Celia came yesterday and took the stents out.

## 2025-06-26 ENCOUNTER — OFFICE VISIT (OUTPATIENT)
Dept: FAMILY MEDICINE CLINIC | Age: 75
End: 2025-06-26
Payer: MEDICARE

## 2025-06-26 ENCOUNTER — HOSPITAL ENCOUNTER (OUTPATIENT)
Dept: GENERAL RADIOLOGY | Age: 75
Discharge: HOME | End: 2025-06-26
Payer: MEDICARE

## 2025-06-26 ENCOUNTER — OFFICE VISIT (OUTPATIENT)
Dept: ENT CLINIC | Age: 75
End: 2025-06-26

## 2025-06-26 ENCOUNTER — HOSPITAL ENCOUNTER (OUTPATIENT)
Age: 75
Discharge: HOME | End: 2025-06-26
Payer: MEDICARE

## 2025-06-26 VITALS
OXYGEN SATURATION: 92 % | HEART RATE: 52 BPM | DIASTOLIC BLOOD PRESSURE: 62 MMHG | RESPIRATION RATE: 20 BRPM | BODY MASS INDEX: 25.28 KG/M2 | TEMPERATURE: 99.4 F | HEIGHT: 71 IN | SYSTOLIC BLOOD PRESSURE: 106 MMHG | WEIGHT: 180.6 LBS

## 2025-06-26 VITALS
HEART RATE: 71 BPM | DIASTOLIC BLOOD PRESSURE: 70 MMHG | OXYGEN SATURATION: 97 % | TEMPERATURE: 97.6 F | SYSTOLIC BLOOD PRESSURE: 120 MMHG | RESPIRATION RATE: 14 BRPM | BODY MASS INDEX: 25.2 KG/M2 | HEIGHT: 71 IN

## 2025-06-26 DIAGNOSIS — R07.81 RIB PAIN ON RIGHT SIDE: ICD-10-CM

## 2025-06-26 DIAGNOSIS — R42 DIZZINESS: Primary | ICD-10-CM

## 2025-06-26 DIAGNOSIS — D50.9 IRON DEFICIENCY ANEMIA, UNSPECIFIED IRON DEFICIENCY ANEMIA TYPE: ICD-10-CM

## 2025-06-26 DIAGNOSIS — J01.91 ACUTE RECURRENT SINUSITIS, UNSPECIFIED LOCATION: Primary | ICD-10-CM

## 2025-06-26 DIAGNOSIS — E53.8 VITAMIN B12 DEFICIENCY: ICD-10-CM

## 2025-06-26 LAB
ALBUMIN SERPL BCG-MCNC: 4 G/DL (ref 3.4–4.9)
ALP SERPL-CCNC: 137 U/L (ref 40–129)
ALT SERPL W/O P-5'-P-CCNC: 26 U/L (ref 10–50)
ANION GAP SERPL CALC-SCNC: 14 MEQ/L (ref 8–16)
AST SERPL-CCNC: 19 U/L (ref 10–50)
BILIRUB SERPL-MCNC: 0.5 MG/DL (ref 0.3–1.2)
BUN SERPL-MCNC: 15 MG/DL (ref 8–23)
CALCIUM SERPL-MCNC: 9.4 MG/DL (ref 8.8–10.2)
CHLORIDE SERPL-SCNC: 101 MEQ/L (ref 98–111)
CO2 SERPL-SCNC: 24 MEQ/L (ref 22–29)
CREAT SERPL-MCNC: 1.2 MG/DL (ref 0.7–1.2)
DEPRECATED RDW RBC AUTO: 45.3 FL (ref 35–45)
ERYTHROCYTE [DISTWIDTH] IN BLOOD BY AUTOMATED COUNT: 13.3 % (ref 11.5–14.5)
FERRITIN SERPL IA-MCNC: 101 NG/ML (ref 30–400)
FOLATE SERPL-MCNC: > 20 NG/ML (ref 4.6–34.8)
GFR SERPL CREATININE-BSD FRML MDRD: 63 ML/MIN/1.73M2
GLUCOSE FASTING: 139 MG/DL (ref 74–109)
HCT VFR BLD AUTO: 41.9 % (ref 42–52)
HGB BLD-MCNC: 13.7 GM/DL (ref 14–18)
IRON SATN MFR SERPL: 16 % (ref 20–50)
IRON SERPL-MCNC: 42 UG/DL (ref 61–157)
MCH RBC QN AUTO: 30.6 PG (ref 26–33)
MCHC RBC AUTO-ENTMCNC: 32.7 GM/DL (ref 32.2–35.5)
MCV RBC AUTO: 93.5 FL (ref 80–94)
PLATELET # BLD AUTO: 277 THOU/MM3 (ref 130–400)
PMV BLD AUTO: 10.1 FL (ref 9.4–12.4)
POTASSIUM SERPL-SCNC: 4.5 MEQ/L (ref 3.5–5.2)
PROT SERPL-MCNC: 7 G/DL (ref 6.4–8.3)
RBC # BLD AUTO: 4.48 MILL/MM3 (ref 4.7–6.1)
SODIUM SERPL-SCNC: 139 MEQ/L (ref 135–145)
TIBC SERPL-MCNC: 257 UG/DL (ref 171–450)
TROPONIN, HIGH SENSITIVITY: 18 NG/L (ref 0–12)
VIT B12 SERPL-MCNC: 1460 PG/ML (ref 232–1245)
WBC # BLD AUTO: 7.5 THOU/MM3 (ref 4.8–10.8)

## 2025-06-26 PROCEDURE — 1036F TOBACCO NON-USER: CPT | Performed by: FAMILY MEDICINE

## 2025-06-26 PROCEDURE — 96372 THER/PROPH/DIAG INJ SC/IM: CPT | Performed by: FAMILY MEDICINE

## 2025-06-26 PROCEDURE — 1159F MED LIST DOCD IN RCRD: CPT | Performed by: FAMILY MEDICINE

## 2025-06-26 PROCEDURE — G8417 CALC BMI ABV UP PARAM F/U: HCPCS | Performed by: FAMILY MEDICINE

## 2025-06-26 PROCEDURE — 36415 COLL VENOUS BLD VENIPUNCTURE: CPT | Performed by: FAMILY MEDICINE

## 2025-06-26 PROCEDURE — 1160F RVW MEDS BY RX/DR IN RCRD: CPT | Performed by: FAMILY MEDICINE

## 2025-06-26 PROCEDURE — G8427 DOCREV CUR MEDS BY ELIG CLIN: HCPCS | Performed by: FAMILY MEDICINE

## 2025-06-26 PROCEDURE — 3078F DIAST BP <80 MM HG: CPT | Performed by: FAMILY MEDICINE

## 2025-06-26 PROCEDURE — 1123F ACP DISCUSS/DSCN MKR DOCD: CPT | Performed by: FAMILY MEDICINE

## 2025-06-26 PROCEDURE — 99024 POSTOP FOLLOW-UP VISIT: CPT | Performed by: OTOLARYNGOLOGY

## 2025-06-26 PROCEDURE — 71101 X-RAY EXAM UNILAT RIBS/CHEST: CPT

## 2025-06-26 PROCEDURE — 3074F SYST BP LT 130 MM HG: CPT | Performed by: FAMILY MEDICINE

## 2025-06-26 PROCEDURE — 99215 OFFICE O/P EST HI 40 MIN: CPT | Performed by: FAMILY MEDICINE

## 2025-06-26 RX ORDER — CYANOCOBALAMIN 1000 UG/ML
1000 INJECTION, SOLUTION INTRAMUSCULAR; SUBCUTANEOUS ONCE
Status: COMPLETED | OUTPATIENT
Start: 2025-06-26 | End: 2025-06-26

## 2025-06-26 RX ORDER — CETIRIZINE HYDROCHLORIDE 10 MG/1
10 TABLET ORAL DAILY
COMMUNITY

## 2025-06-26 RX ADMIN — CYANOCOBALAMIN 1000 MCG: 1000 INJECTION, SOLUTION INTRAMUSCULAR; SUBCUTANEOUS at 16:49

## 2025-06-26 NOTE — PROGRESS NOTES
Administrations This Visit       cyanocobalamin injection 1,000 mcg       Admin Date  06/26/2025  16:49 Action  Given Dose  1,000 mcg Route  IntraMUSCular Site  Deltoid Left Documented By  Pearl Guo MA    NDC: 1645-8811-01    Lot#: [id number]    : [employer]    Patient Supplied?: No                 Pt tolerated appropriately.

## 2025-06-26 NOTE — PROGRESS NOTES
Subjective:    Pt is following up post operatively.  Procedure(s):  Septoplasty, bilateral inferior nasal turbinate reduction, image guided nasal endoscopy with total bilateral ethmoidectomy, bilateral maxillary antrostomy without tissue removal.  Other Findings: pus in right maxillary sinus and left posterior ethmoid sinus, severe deviated nasal septum bilaterally with a sharp left spur, bilateral inferior turbinates hypertrophy. Accessory maxillary ostium on the left.  Sinus contents, curetting:             Benign nasal mucosa with mixed acute and chronic   inflammation.   Pt did not start his oral antibiotics or normal saline rinses.  Objective:   Nasal debridement was performed today with no complications . Evidence of infection, no bleeding.      Assessment and plan:  Start normal saline spray/rinses and oral antibiotics.   Rtc as scheduled.

## 2025-06-26 NOTE — PROGRESS NOTES
[facility]  [facility]  [address]  [address]  [facility] [address]  Dept: [phone]  Dept Fax: [phone]  Loc: [phone]    Edwin Stock is a 75 y.o. male who presents today for:  Chief Complaint   Patient presents with    Follow-up           HPI:     HPI    History of Present Illness  The patient is a 75-year-old male who presents for overall feeling weak post sinus surgery.    He underwent sinus surgery on 06/13/2025, which was successful. However, his medications, including psychiatric ones, were discontinued post-surgery, leading to a week-long period of nausea and inability to eat or drink. Since resuming his medication regimen, his condition has improved, but he continues to experience weakness, dizziness, and lightheadedness. He reports no complete blackouts but admits to episodes of dizziness. He also reports no headaches, ear popping or ringing, sore throat, chest tightness, shortness of breath, or stomach cramps. His urinary function is normal, but he has not had a bowel movement. He plans to take a fiber supplement upon returning home, which he finds beneficial. His appetite is gradually returning with the resumption of his medications. He has been adhering to his maintenance medication regimen for approximately a week. He maintains hydration with water and broth, consuming about 40 to 60 ounces daily, and includes chicken and noodles in his diet. He also consumes Gatorade Zero. He has not received his B12 injection, which was scheduled for Wednesday or Thursday, due to illness. He has not taken his pain medication and continues to perform sinus rinses. He reports no new allergies. He is currently on amoxicillin 500 mg three times daily, which he started today after initially discontinuing it due to illness.    He had an ER visit on 06/17/2025 due to dehydration and abdominal pain, during which he was difficult to receive IV fluids

## 2025-06-27 ENCOUNTER — RESULTS FOLLOW-UP (OUTPATIENT)
Dept: FAMILY MEDICINE CLINIC | Age: 75
End: 2025-06-27

## 2025-06-27 DIAGNOSIS — N28.9 LOW KIDNEY FUNCTION: ICD-10-CM

## 2025-06-27 DIAGNOSIS — R79.89 ELEVATED TROPONIN I LEVEL: Primary | ICD-10-CM

## 2025-07-02 ENCOUNTER — OFFICE VISIT (OUTPATIENT)
Dept: ENT CLINIC | Age: 75
End: 2025-07-02

## 2025-07-02 VITALS
DIASTOLIC BLOOD PRESSURE: 52 MMHG | HEART RATE: 70 BPM | TEMPERATURE: 97.4 F | SYSTOLIC BLOOD PRESSURE: 118 MMHG | OXYGEN SATURATION: 95 % | WEIGHT: 183.9 LBS | RESPIRATION RATE: 16 BRPM | BODY MASS INDEX: 25.75 KG/M2 | HEIGHT: 71 IN

## 2025-07-02 DIAGNOSIS — J01.21 ACUTE RECURRENT ETHMOIDAL SINUSITIS: Primary | ICD-10-CM

## 2025-07-02 PROCEDURE — 99024 POSTOP FOLLOW-UP VISIT: CPT | Performed by: OTOLARYNGOLOGY

## 2025-07-02 NOTE — PROGRESS NOTES
Subjective:    Pt is following up post operatively.  Procedure(s):  Septoplasty, bilateral inferior nasal turbinate reduction, image guided nasal endoscopy with total bilateral ethmoidectomy, bilateral maxillary antrostomy without tissue removal.  Other Findings: pus in right maxillary sinus and left posterior ethmoid sinus, severe deviated nasal septum bilaterally with a sharp left spur, bilateral inferior turbinates hypertrophy. Accessory maxillary ostium on the left.  Sinus contents, curetting:             Benign nasal mucosa with mixed acute and chronic   inflammation.   Pt reported improvement in as nasal breathing and dizziness.   Objective:   Nasal debridement was performed today with no complications . Evidence of infection-improved, no bleeding.      Assessment and plan:  Continue normal saline spray/rinses and Augmentin antibiotics.  Culture was obtained.  We will call the patient if antibiotic change is needed.   Rtc as scheduled.

## 2025-07-06 LAB
BACTERIA SPEC AEROBE CULT: ABNORMAL
ORGANISM: ABNORMAL
ORGANISM: ABNORMAL

## 2025-07-07 LAB
BACTERIA SPEC AEROBE CULT: ABNORMAL
ORGANISM: ABNORMAL
ORGANISM: ABNORMAL

## 2025-07-10 ENCOUNTER — TELEPHONE (OUTPATIENT)
Dept: ENT CLINIC | Age: 75
End: 2025-07-10

## 2025-07-10 NOTE — TELEPHONE ENCOUNTER
Patient called in stating that he is having bowel problems, patient would like to know If he should continue with Augmentin. Please advise.

## 2025-07-10 NOTE — TELEPHONE ENCOUNTER
If he is not currently taking a probiotic, hold next dose of Augmentin and start probiotic twice daily.  Then restart the Augmentin.

## 2025-07-10 NOTE — TELEPHONE ENCOUNTER
Called patient back to confirm symptoms, patient states he does have abdominal pain and loose stool. Patient denies nausea and vomiting. Please advise.

## 2025-07-10 NOTE — TELEPHONE ENCOUNTER
Called patient to advise him as to what Crystal said. Patient verbalized understanding and thanked me.

## 2025-07-15 ENCOUNTER — OFFICE VISIT (OUTPATIENT)
Dept: ENT CLINIC | Age: 75
End: 2025-07-15

## 2025-07-15 VITALS
TEMPERATURE: 97.1 F | WEIGHT: 184.6 LBS | RESPIRATION RATE: 18 BRPM | HEIGHT: 71 IN | BODY MASS INDEX: 25.84 KG/M2 | DIASTOLIC BLOOD PRESSURE: 70 MMHG | OXYGEN SATURATION: 87 % | SYSTOLIC BLOOD PRESSURE: 120 MMHG | HEART RATE: 79 BPM

## 2025-07-15 DIAGNOSIS — Z98.890 STATUS POST SURGERY: Primary | ICD-10-CM

## 2025-07-15 PROCEDURE — 99024 POSTOP FOLLOW-UP VISIT: CPT | Performed by: OTOLARYNGOLOGY

## 2025-07-15 NOTE — PROGRESS NOTES
Subjective:    Pt is following up post operatively.  Nose culture: Staphylococcus aureus Abnormal , Proteus mirabilis Abnormal .   Procedure(s):  Septoplasty, bilateral inferior nasal turbinate reduction, image guided nasal endoscopy with total bilateral ethmoidectomy, bilateral maxillary antrostomy without tissue removal.  Other Findings: pus in right maxillary sinus and left posterior ethmoid sinus, severe deviated nasal septum bilaterally with a sharp left spur, bilateral inferior turbinates hypertrophy. Accessory maxillary ostium on the left.  Sinus contents, curetting:             Benign nasal mucosa with mixed acute and chronic   inflammation.   Pt reported improvement in as nasal breathing and dizziness.     Objective:   Nasal cavities healed well. No evidence of infection, no bleeding.      Assessment and plan:  Pt is doing well. He finished his Abxc and has been using normal saline rinses as directed. Rtc as scheduled.

## 2025-07-25 PROCEDURE — 93296 REM INTERROG EVL PM/IDS: CPT | Performed by: INTERNAL MEDICINE

## 2025-07-25 PROCEDURE — 93294 REM INTERROG EVL PM/LDLS PM: CPT | Performed by: INTERNAL MEDICINE

## 2025-07-29 ENCOUNTER — OFFICE VISIT (OUTPATIENT)
Dept: FAMILY MEDICINE CLINIC | Age: 75
End: 2025-07-29
Payer: MEDICARE

## 2025-07-29 VITALS
OXYGEN SATURATION: 94 % | SYSTOLIC BLOOD PRESSURE: 110 MMHG | BODY MASS INDEX: 25.41 KG/M2 | RESPIRATION RATE: 16 BRPM | TEMPERATURE: 97.5 F | HEART RATE: 70 BPM | WEIGHT: 182.1 LBS | DIASTOLIC BLOOD PRESSURE: 68 MMHG

## 2025-07-29 DIAGNOSIS — I73.9 PVD (PERIPHERAL VASCULAR DISEASE): ICD-10-CM

## 2025-07-29 DIAGNOSIS — I48.0 PAROXYSMAL ATRIAL FIBRILLATION (HCC): ICD-10-CM

## 2025-07-29 DIAGNOSIS — R20.2 PARESTHESIA OF RIGHT LEG: ICD-10-CM

## 2025-07-29 DIAGNOSIS — E87.1 HYPONATREMIA: ICD-10-CM

## 2025-07-29 DIAGNOSIS — E11.65 UNCONTROLLED TYPE 2 DIABETES MELLITUS WITH HYPERGLYCEMIA (HCC): ICD-10-CM

## 2025-07-29 DIAGNOSIS — Z77.098 EXPOSURE TO AGENT ORANGE: ICD-10-CM

## 2025-07-29 DIAGNOSIS — R07.81 RIB PAIN ON RIGHT SIDE: Primary | ICD-10-CM

## 2025-07-29 DIAGNOSIS — G25.0 ESSENTIAL TREMOR: ICD-10-CM

## 2025-07-29 DIAGNOSIS — J44.89 COPD WITH ASTHMA (HCC): ICD-10-CM

## 2025-07-29 DIAGNOSIS — R79.89 ELEVATED TROPONIN I LEVEL: ICD-10-CM

## 2025-07-29 DIAGNOSIS — G57.11 MERALGIA PARESTHETICA OF RIGHT SIDE: ICD-10-CM

## 2025-07-29 DIAGNOSIS — H40.89 OTHER GLAUCOMA OF BOTH EYES: ICD-10-CM

## 2025-07-29 DIAGNOSIS — G62.9 PERIPHERAL POLYNEUROPATHY: ICD-10-CM

## 2025-07-29 DIAGNOSIS — E11.42 DIABETIC PERIPHERAL NEUROPATHY ASSOCIATED WITH TYPE 2 DIABETES MELLITUS (HCC): ICD-10-CM

## 2025-07-29 DIAGNOSIS — R42 DIZZINESS: ICD-10-CM

## 2025-07-29 DIAGNOSIS — D50.9 IRON DEFICIENCY ANEMIA, UNSPECIFIED IRON DEFICIENCY ANEMIA TYPE: ICD-10-CM

## 2025-07-29 DIAGNOSIS — F43.10 POST TRAUMATIC STRESS DISORDER: ICD-10-CM

## 2025-07-29 DIAGNOSIS — I10 PRIMARY HYPERTENSION: ICD-10-CM

## 2025-07-29 DIAGNOSIS — E53.8 VITAMIN B12 DEFICIENCY: ICD-10-CM

## 2025-07-29 DIAGNOSIS — N28.9 LOW KIDNEY FUNCTION: ICD-10-CM

## 2025-07-29 DIAGNOSIS — T14.8XXA SCRATCH MARK: ICD-10-CM

## 2025-07-29 DIAGNOSIS — E78.5 HYPERLIPIDEMIA WITH TARGET LDL LESS THAN 100: ICD-10-CM

## 2025-07-29 DIAGNOSIS — N40.1 BPH WITH OBSTRUCTION/LOWER URINARY TRACT SYMPTOMS: ICD-10-CM

## 2025-07-29 DIAGNOSIS — Z98.890 S/P CARDIAC CATHETERIZATION: ICD-10-CM

## 2025-07-29 DIAGNOSIS — K64.4 EXTERNAL HEMORRHOID: ICD-10-CM

## 2025-07-29 DIAGNOSIS — L03.032 PARONYCHIA OF GREAT TOE, LEFT: ICD-10-CM

## 2025-07-29 DIAGNOSIS — C44.42 SQUAMOUS CELL CARCINOMA OF SCALP: ICD-10-CM

## 2025-07-29 DIAGNOSIS — N13.8 BPH WITH OBSTRUCTION/LOWER URINARY TRACT SYMPTOMS: ICD-10-CM

## 2025-07-29 DIAGNOSIS — J43.1 PANLOBULAR EMPHYSEMA (HCC): ICD-10-CM

## 2025-07-29 LAB
ANION GAP SERPL CALC-SCNC: 12 MEQ/L (ref 8–16)
BUN SERPL-MCNC: 15 MG/DL (ref 8–23)
CALCIUM SERPL-MCNC: 9.1 MG/DL (ref 8.8–10.2)
CHLORIDE SERPL-SCNC: 100 MEQ/L (ref 98–111)
CO2 SERPL-SCNC: 24 MEQ/L (ref 22–29)
CREAT SERPL-MCNC: 1 MG/DL (ref 0.7–1.2)
GFR SERPL CREATININE-BSD FRML MDRD: 78 ML/MIN/1.73M2
GLUCOSE SERPL-MCNC: 98 MG/DL (ref 74–109)
POTASSIUM SERPL-SCNC: 4.5 MEQ/L (ref 3.5–5.2)
SODIUM SERPL-SCNC: 136 MEQ/L (ref 135–145)
TROPONIN, HIGH SENSITIVITY: 12 NG/L (ref 0–12)

## 2025-07-29 PROCEDURE — 3078F DIAST BP <80 MM HG: CPT | Performed by: FAMILY MEDICINE

## 2025-07-29 PROCEDURE — 1123F ACP DISCUSS/DSCN MKR DOCD: CPT | Performed by: FAMILY MEDICINE

## 2025-07-29 PROCEDURE — 3074F SYST BP LT 130 MM HG: CPT | Performed by: FAMILY MEDICINE

## 2025-07-29 PROCEDURE — 99215 OFFICE O/P EST HI 40 MIN: CPT | Performed by: FAMILY MEDICINE

## 2025-07-29 PROCEDURE — 36415 COLL VENOUS BLD VENIPUNCTURE: CPT | Performed by: FAMILY MEDICINE

## 2025-07-29 PROCEDURE — G8427 DOCREV CUR MEDS BY ELIG CLIN: HCPCS | Performed by: FAMILY MEDICINE

## 2025-07-29 PROCEDURE — 3023F SPIROM DOC REV: CPT | Performed by: FAMILY MEDICINE

## 2025-07-29 PROCEDURE — 3044F HG A1C LEVEL LT 7.0%: CPT | Performed by: FAMILY MEDICINE

## 2025-07-29 PROCEDURE — 1159F MED LIST DOCD IN RCRD: CPT | Performed by: FAMILY MEDICINE

## 2025-07-29 PROCEDURE — G8417 CALC BMI ABV UP PARAM F/U: HCPCS | Performed by: FAMILY MEDICINE

## 2025-07-29 PROCEDURE — 1036F TOBACCO NON-USER: CPT | Performed by: FAMILY MEDICINE

## 2025-07-29 PROCEDURE — 1160F RVW MEDS BY RX/DR IN RCRD: CPT | Performed by: FAMILY MEDICINE

## 2025-07-29 RX ORDER — FERROUS SULFATE 325(65) MG
325 TABLET ORAL
Qty: 90 TABLET | Refills: 1 | Status: SHIPPED | OUTPATIENT
Start: 2025-07-29

## 2025-07-29 NOTE — PROGRESS NOTES
[facility]  [facility]  [address]  [address]  [facility] [id number]  Dept: [phone]  Dept Fax: [phone]  Loc: [phone]    Edwin Stock is a 75 y.o. male who presents today for:  Chief Complaint   Patient presents with    Follow-up     1 month follow up           HPI:     HPI    History of Present Illness  The patient is a 75-year-old male who presents for a recheck.    He reports fatigue and lack of energy but does not experience headaches or sinus issues. He has been experiencing unusual chest pain, which is not associated with shortness of breath. He also does not have nausea, sweating, vomiting, fevers, chills, or stomach cramps. His appetite has improved, and he maintains regular bowel movements. He experiences occasional dizziness and lightheadedness but does not feel faint. He describes a sensation of the room spinning. He consumes approximately 5 to 6 glasses of water daily and continues to drink Gatorade Zero. He has been receiving B12 injections and is under the care of a urologist. He has not had any recent falls. His bruised ribs are healing well, allowing him to breathe, cough, and laugh without significant pain. He was not advised to take iron supplements but is currently taking a multivitamin that contains iron. He is lactose intolerant and has not tried almond milk. He does not consume much meat due to personal preference. He likes beans but they cause him digestive discomfort.  He has been trying to eat more fish recently.  He reports no swelling or rashes. He has not introduced any new medications into his regimen.    He recently sustained a scratch from his dog jumping on him, which resulted in a red edmundo. He has been applying rubbing alcohol to the area.    Social History:  Diet: Lactose intolerant, does not consume much fish or meat, likes beans but they cause digestive discomfort    Reviewed chart forpast medical history

## 2025-07-30 ENCOUNTER — RESULTS FOLLOW-UP (OUTPATIENT)
Dept: FAMILY MEDICINE CLINIC | Age: 75
End: 2025-07-30

## 2025-08-05 ENCOUNTER — CLINICAL SUPPORT (OUTPATIENT)
Dept: FAMILY MEDICINE CLINIC | Age: 75
End: 2025-08-05
Payer: MEDICARE

## 2025-08-05 DIAGNOSIS — E53.8 VITAMIN B12 DEFICIENCY: Primary | ICD-10-CM

## 2025-08-05 PROCEDURE — 96372 THER/PROPH/DIAG INJ SC/IM: CPT | Performed by: FAMILY MEDICINE

## 2025-08-05 RX ORDER — CYANOCOBALAMIN 1000 UG/ML
1000 INJECTION, SOLUTION INTRAMUSCULAR; SUBCUTANEOUS ONCE
Status: COMPLETED | OUTPATIENT
Start: 2025-08-05 | End: 2025-08-05

## 2025-08-05 RX ADMIN — CYANOCOBALAMIN 1000 MCG: 1000 INJECTION, SOLUTION INTRAMUSCULAR; SUBCUTANEOUS at 11:05

## 2025-08-07 ENCOUNTER — OFFICE VISIT (OUTPATIENT)
Age: 75
End: 2025-08-07
Payer: MEDICARE

## 2025-08-07 VITALS
DIASTOLIC BLOOD PRESSURE: 66 MMHG | TEMPERATURE: 97.4 F | SYSTOLIC BLOOD PRESSURE: 110 MMHG | HEIGHT: 71 IN | WEIGHT: 184 LBS | HEART RATE: 71 BPM | OXYGEN SATURATION: 100 % | BODY MASS INDEX: 25.76 KG/M2

## 2025-08-07 DIAGNOSIS — G47.33 OSA ON CPAP: Primary | ICD-10-CM

## 2025-08-07 DIAGNOSIS — R06.02 SOB (SHORTNESS OF BREATH): ICD-10-CM

## 2025-08-07 DIAGNOSIS — J44.89 COPD WITH ASTHMA (HCC): ICD-10-CM

## 2025-08-07 DIAGNOSIS — J43.1 PANLOBULAR EMPHYSEMA (HCC): ICD-10-CM

## 2025-08-07 DIAGNOSIS — E66.3 OVERWEIGHT (BMI 25.0-29.9): ICD-10-CM

## 2025-08-07 PROCEDURE — G8417 CALC BMI ABV UP PARAM F/U: HCPCS

## 2025-08-07 PROCEDURE — 1123F ACP DISCUSS/DSCN MKR DOCD: CPT

## 2025-08-07 PROCEDURE — 1036F TOBACCO NON-USER: CPT

## 2025-08-07 PROCEDURE — 3078F DIAST BP <80 MM HG: CPT

## 2025-08-07 PROCEDURE — 1159F MED LIST DOCD IN RCRD: CPT

## 2025-08-07 PROCEDURE — 3074F SYST BP LT 130 MM HG: CPT

## 2025-08-07 PROCEDURE — 99212 OFFICE O/P EST SF 10 MIN: CPT

## 2025-08-07 PROCEDURE — G8427 DOCREV CUR MEDS BY ELIG CLIN: HCPCS

## 2025-08-07 PROCEDURE — 3023F SPIROM DOC REV: CPT

## 2025-08-07 ASSESSMENT — ENCOUNTER SYMPTOMS
COUGH: 0
SHORTNESS OF BREATH: 1
SINUS PRESSURE: 0
RHINORRHEA: 0
WHEEZING: 0
CHEST TIGHTNESS: 1
SINUS PAIN: 0

## 2025-08-20 ENCOUNTER — OFFICE VISIT (OUTPATIENT)
Dept: CARDIOLOGY CLINIC | Age: 75
End: 2025-08-20
Payer: MEDICARE

## 2025-08-20 VITALS
DIASTOLIC BLOOD PRESSURE: 58 MMHG | OXYGEN SATURATION: 98 % | SYSTOLIC BLOOD PRESSURE: 100 MMHG | HEART RATE: 70 BPM | WEIGHT: 184 LBS | BODY MASS INDEX: 25.76 KG/M2 | HEIGHT: 71 IN

## 2025-08-20 DIAGNOSIS — I48.91 ATRIAL FIBRILLATION, UNSPECIFIED TYPE (HCC): Primary | ICD-10-CM

## 2025-08-20 PROCEDURE — 99214 OFFICE O/P EST MOD 30 MIN: CPT | Performed by: INTERNAL MEDICINE

## 2025-08-20 PROCEDURE — 1123F ACP DISCUSS/DSCN MKR DOCD: CPT | Performed by: INTERNAL MEDICINE

## 2025-08-20 PROCEDURE — 3078F DIAST BP <80 MM HG: CPT | Performed by: INTERNAL MEDICINE

## 2025-08-20 PROCEDURE — G8427 DOCREV CUR MEDS BY ELIG CLIN: HCPCS | Performed by: INTERNAL MEDICINE

## 2025-08-20 PROCEDURE — 3074F SYST BP LT 130 MM HG: CPT | Performed by: INTERNAL MEDICINE

## 2025-08-20 PROCEDURE — 1036F TOBACCO NON-USER: CPT | Performed by: INTERNAL MEDICINE

## 2025-08-20 PROCEDURE — G8417 CALC BMI ABV UP PARAM F/U: HCPCS | Performed by: INTERNAL MEDICINE

## 2025-08-20 PROCEDURE — 1159F MED LIST DOCD IN RCRD: CPT | Performed by: INTERNAL MEDICINE

## 2025-08-27 RX ORDER — FUROSEMIDE 20 MG/1
20 TABLET ORAL DAILY
Qty: 90 TABLET | Refills: 3 | Status: SHIPPED | OUTPATIENT
Start: 2025-08-27

## (undated) DEVICE — ENT: Brand: MEDLINE INDUSTRIES, INC.

## (undated) DEVICE — SUTURE CHROMIC GUT SZ 4-0 L27IN ABSRB BRN L17MM RB-1 1/2 U203H

## (undated) DEVICE — KIT 20ML NEUT BUFF FRMLN BX PROST CNTNR

## (undated) DEVICE — SPLINT 1524050 5PK PAIR DOYLE II AIRWAY: Brand: DOYLE II ™

## (undated) DEVICE — COTTON BALL ST

## (undated) DEVICE — CYSTO: Brand: MEDLINE INDUSTRIES, INC.

## (undated) DEVICE — TUBING, SUCTION, 1/4" X 20', STRAIGHT: Brand: MEDLINE INDUSTRIES, INC.

## (undated) DEVICE — GLOVE ORANGE PI 7   MSG9070

## (undated) DEVICE — SUTURE NONABSORBABLE BRAIDED 4-0 SH 30 IN BLK PERMA HND K831H

## (undated) DEVICE — TUBING 1895522 5PK STRAIGHTSHOT TO XPS: Brand: STRAIGHTSHOT®

## (undated) DEVICE — SPONGE,NEURO,0.5"X3",XR,STRL,LF,10/PK: Brand: MEDLINE

## (undated) DEVICE — SUTURE PLN GUT SZ 4-0 L18IN ABSRB YELLOWISH TAN L13MM SC-1 1828H

## (undated) DEVICE — SOLUTION IV 1000 ML 0.9 NACL INJ USP EXCEL PLAS CONTAINER

## (undated) DEVICE — SUTURE MCRYL SZ 4-0 L18IN ABSRB UD P-3 L13MM 3/8 CIR PRIM Y494G

## (undated) DEVICE — INSTRUMENT TRACKER 9733533XOM ENT 1PK

## (undated) DEVICE — SYRINGE MED 10ML TRNSLUC BRL PLUNG BLK MRK POLYPR CTRL

## (undated) DEVICE — BANDAGE,GAUZE,4.5"X4.1YD,STERILE,LF: Brand: MEDLINE

## (undated) DEVICE — SINU FOAM: Brand: SINU-FOAM

## (undated) DEVICE — MICRO TIP WIPE: Brand: DEVON

## (undated) DEVICE — TOWEL,OR,DSP,ST,BLUE,STD,4/PK,20PK/CS: Brand: MEDLINE

## (undated) DEVICE — SOLUTION IRRIG 1000ML H2O PIC PLAS SHATTERPROOF CONTAINER

## (undated) DEVICE — GLOVE ORANGE PI 8   MSG9080

## (undated) DEVICE — SUTURE PROL SZ 2-0 L18IN NONABSORBABLE BLU FS L26MM 3/8 CIR 8685H

## (undated) DEVICE — HYPODERMIC SAFETY NEEDLE: Brand: MAGELLAN

## (undated) DEVICE — GLOVE SURG SZ 8 L11.77IN FNGR THK9.8MIL STRW LTX POLYMER

## (undated) DEVICE — PATIENT TRACKER 9734887XOM NON-INVASIVE

## (undated) DEVICE — GLOVE ORANGE PI 7 1/2   MSG9075

## (undated) DEVICE — REFLEX ULTRA PTR WITH INTEGRATED CABLE: Brand: COBLATION

## (undated) DEVICE — CONTAINER,SPECIMEN,PNEU TUBE,4OZ,OR STRL: Brand: MEDLINE

## (undated) DEVICE — SOLUTION IRRIG 1000ML 09% SOD CHL USP PIC PLAS CONTAINER

## (undated) DEVICE — BLADE 1884080EM TRICUT 4MMX13CM M4 ROHS: Brand: FUSION®

## (undated) DEVICE — COAGULATOR SUCT 10FR L6IN HND FT SWCH VALLEYLAB

## (undated) DEVICE — GOWN,SIRUS,NON REINFRCD,LARGE,SET IN SL: Brand: MEDLINE

## (undated) DEVICE — MAX-CORE® DISPOSABLE CORE BIOPSY INSTRUMENT, 18G X 25CM: Brand: MAX-CORE

## (undated) DEVICE — PACK PROCEDURE SURG PLAS SC MIN SRHP LF

## (undated) DEVICE — SYRINGE IRRIG 60ML SFT PLIABLE BLB EZ TO GRP 1 HND USE W/